# Patient Record
Sex: FEMALE | Race: WHITE | NOT HISPANIC OR LATINO | Employment: PART TIME | URBAN - METROPOLITAN AREA
[De-identification: names, ages, dates, MRNs, and addresses within clinical notes are randomized per-mention and may not be internally consistent; named-entity substitution may affect disease eponyms.]

---

## 2017-01-03 ENCOUNTER — GENERIC CONVERSION - ENCOUNTER (OUTPATIENT)
Dept: OTHER | Facility: OTHER | Age: 32
End: 2017-01-03

## 2017-01-09 ENCOUNTER — ALLSCRIPTS OFFICE VISIT (OUTPATIENT)
Dept: OTHER | Facility: OTHER | Age: 32
End: 2017-01-09

## 2017-01-11 ENCOUNTER — ALLSCRIPTS OFFICE VISIT (OUTPATIENT)
Dept: PERINATAL CARE | Facility: CLINIC | Age: 32
End: 2017-01-11
Payer: COMMERCIAL

## 2017-01-11 PROCEDURE — 99204 OFFICE O/P NEW MOD 45 MIN: CPT | Performed by: GENETIC COUNSELOR, MS

## 2017-01-14 ENCOUNTER — LAB CONVERSION - ENCOUNTER (OUTPATIENT)
Dept: OTHER | Facility: OTHER | Age: 32
End: 2017-01-14

## 2017-01-14 LAB
T4 FREE SERPL-MCNC: 1.5 NG/DL (ref 0.8–1.8)
TSH SERPL DL<=0.05 MIU/L-ACNC: 0.87 MIU/L

## 2017-01-16 ENCOUNTER — LAB CONVERSION - ENCOUNTER (OUTPATIENT)
Dept: OTHER | Facility: OTHER | Age: 32
End: 2017-01-16

## 2017-01-16 ENCOUNTER — GENERIC CONVERSION - ENCOUNTER (OUTPATIENT)
Dept: OTHER | Facility: OTHER | Age: 32
End: 2017-01-16

## 2017-01-16 LAB
AB SCRN, RBC W/RFLX ID,TITER,AG (HISTORICAL): NORMAL
ABO GROUP BLD: NORMAL
BASOPHILS # BLD AUTO: 0.5 %
BASOPHILS # BLD AUTO: 40 CELLS/UL (ref 0–200)
DEPRECATED RDW RBC AUTO: 13.7 % (ref 11–15)
EOSINOPHIL # BLD AUTO: 174 CELLS/UL (ref 15–500)
EOSINOPHIL # BLD AUTO: 2.2 %
HCT VFR BLD AUTO: 38.8 % (ref 35–45)
HEPATITIS B SURFACE ANTIGEN (HISTORICAL): NORMAL
HGB BLD-MCNC: 13.1 G/DL (ref 11.7–15.5)
HIV AG/AB, 4TH GEN (HISTORICAL): NORMAL
LYMPHOCYTES # BLD AUTO: 1612 CELLS/UL (ref 850–3900)
LYMPHOCYTES # BLD AUTO: 20.4 %
MCH RBC QN AUTO: 29.4 PG (ref 27–33)
MCHC RBC AUTO-ENTMCNC: 33.8 G/DL (ref 32–36)
MCV RBC AUTO: 87.2 FL (ref 80–100)
MONOCYTES # BLD AUTO: 356 CELLS/UL (ref 200–950)
MONOCYTES (HISTORICAL): 4.5 %
NEUTROPHILS # BLD AUTO: 5720 CELLS/UL (ref 1500–7800)
NEUTROPHILS # BLD AUTO: 72.4 %
PLATELET # BLD AUTO: 221 THOUSAND/UL (ref 140–400)
PMV BLD AUTO: 9.1 FL (ref 7.5–11.5)
RBC # BLD AUTO: 4.45 MILLION/UL (ref 3.8–5.1)
RH BLD: NORMAL
RPR SCREEN (HISTORICAL): NORMAL
RUBELLA, IGG (HISTORICAL): 2.73
WBC # BLD AUTO: 7.9 THOUSAND/UL (ref 3.8–10.8)

## 2017-01-17 ENCOUNTER — GENERIC CONVERSION - ENCOUNTER (OUTPATIENT)
Dept: OTHER | Facility: OTHER | Age: 32
End: 2017-01-17

## 2017-01-19 ENCOUNTER — LAB CONVERSION - ENCOUNTER (OUTPATIENT)
Dept: OTHER | Facility: OTHER | Age: 32
End: 2017-01-19

## 2017-01-19 ENCOUNTER — ALLSCRIPTS OFFICE VISIT (OUTPATIENT)
Dept: OTHER | Facility: OTHER | Age: 32
End: 2017-01-19

## 2017-01-23 ENCOUNTER — ALLSCRIPTS OFFICE VISIT (OUTPATIENT)
Dept: OTHER | Facility: OTHER | Age: 32
End: 2017-01-23

## 2017-01-30 ENCOUNTER — ALLSCRIPTS OFFICE VISIT (OUTPATIENT)
Dept: PERINATAL CARE | Facility: CLINIC | Age: 32
End: 2017-01-30
Payer: COMMERCIAL

## 2017-01-31 ENCOUNTER — GENERIC CONVERSION - ENCOUNTER (OUTPATIENT)
Dept: OTHER | Facility: OTHER | Age: 32
End: 2017-01-31

## 2017-02-04 ENCOUNTER — GENERIC CONVERSION - ENCOUNTER (OUTPATIENT)
Dept: OTHER | Facility: OTHER | Age: 32
End: 2017-02-04

## 2017-02-13 ENCOUNTER — ALLSCRIPTS OFFICE VISIT (OUTPATIENT)
Dept: PERINATAL CARE | Facility: CLINIC | Age: 32
End: 2017-02-13
Payer: COMMERCIAL

## 2017-02-13 ENCOUNTER — GENERIC CONVERSION - ENCOUNTER (OUTPATIENT)
Dept: OTHER | Facility: OTHER | Age: 32
End: 2017-02-13

## 2017-02-13 PROCEDURE — 76801 OB US < 14 WKS SINGLE FETUS: CPT | Performed by: OBSTETRICS & GYNECOLOGY

## 2017-02-14 DIAGNOSIS — E55.9 VITAMIN D DEFICIENCY: ICD-10-CM

## 2017-02-14 DIAGNOSIS — F41.9 ANXIETY DISORDER: ICD-10-CM

## 2017-02-14 DIAGNOSIS — E06.3 AUTOIMMUNE THYROIDITIS: ICD-10-CM

## 2017-02-14 DIAGNOSIS — E03.8 OTHER SPECIFIED HYPOTHYROIDISM: ICD-10-CM

## 2017-02-14 DIAGNOSIS — L30.9 DERMATITIS: ICD-10-CM

## 2017-02-14 DIAGNOSIS — Z33.1 PREGNANT STATE, INCIDENTAL: ICD-10-CM

## 2017-02-15 ENCOUNTER — ALLSCRIPTS OFFICE VISIT (OUTPATIENT)
Dept: OTHER | Facility: OTHER | Age: 32
End: 2017-02-15

## 2017-02-15 ENCOUNTER — GENERIC CONVERSION - ENCOUNTER (OUTPATIENT)
Dept: OTHER | Facility: OTHER | Age: 32
End: 2017-02-15

## 2017-02-16 LAB — CULTURE RESULT (HISTORICAL): NORMAL

## 2017-02-20 ENCOUNTER — GENERIC CONVERSION - ENCOUNTER (OUTPATIENT)
Dept: OTHER | Facility: OTHER | Age: 32
End: 2017-02-20

## 2017-02-22 ENCOUNTER — LAB CONVERSION - ENCOUNTER (OUTPATIENT)
Dept: OTHER | Facility: OTHER | Age: 32
End: 2017-02-22

## 2017-02-22 LAB
25(OH)D3 SERPL-MCNC: 26 NG/ML (ref 30–100)
ALBUMIN SERPL BCP-MCNC: 4 G/DL (ref 3.6–5.1)
BUN SERPL-MCNC: 13 MG/DL (ref 7–25)
BUN/CREA RATIO (HISTORICAL): NORMAL (CALC) (ref 6–22)
CALCIUM SERPL-MCNC: 9 MG/DL (ref 8.6–10.2)
CALCIUM SERPL-MCNC: 9 MG/DL (ref 8.6–10.2)
CHLORIDE SERPL-SCNC: 103 MMOL/L (ref 98–110)
CO2 SERPL-SCNC: 25 MMOL/L (ref 20–31)
CREAT SERPL-MCNC: 0.59 MG/DL (ref 0.5–1.1)
EGFR AFRICAN AMERICAN (HISTORICAL): 143 ML/MIN/1.73M2
EGFR-AMERICAN CALC (HISTORICAL): 123 ML/MIN/1.73M2
GLUCOSE (HISTORICAL): 89 MG/DL (ref 65–99)
PHOSPHATE SERPL-MCNC: 3.6 MG/DL (ref 2.5–4.5)
POTASSIUM SERPL-SCNC: 3.7 MMOL/L (ref 3.5–5.3)
PTH-INTACT SERPL-MCNC: 32 PG/ML (ref 14–64)
SODIUM SERPL-SCNC: 135 MMOL/L (ref 135–146)
T4 FREE SERPL-MCNC: 1.4 NG/DL (ref 0.8–1.8)
TSH SERPL DL<=0.05 MIU/L-ACNC: 0.8 MIU/L

## 2017-02-27 ENCOUNTER — GENERIC CONVERSION - ENCOUNTER (OUTPATIENT)
Dept: OTHER | Facility: OTHER | Age: 32
End: 2017-02-27

## 2017-03-06 ENCOUNTER — GENERIC CONVERSION - ENCOUNTER (OUTPATIENT)
Dept: OTHER | Facility: OTHER | Age: 32
End: 2017-03-06

## 2017-03-06 ENCOUNTER — ALLSCRIPTS OFFICE VISIT (OUTPATIENT)
Dept: PERINATAL CARE | Facility: CLINIC | Age: 32
End: 2017-03-06
Payer: COMMERCIAL

## 2017-03-06 PROCEDURE — 76805 OB US >/= 14 WKS SNGL FETUS: CPT | Performed by: OBSTETRICS & GYNECOLOGY

## 2017-03-08 ENCOUNTER — GENERIC CONVERSION - ENCOUNTER (OUTPATIENT)
Dept: OTHER | Facility: OTHER | Age: 32
End: 2017-03-08

## 2017-03-09 ENCOUNTER — ALLSCRIPTS OFFICE VISIT (OUTPATIENT)
Dept: OTHER | Facility: OTHER | Age: 32
End: 2017-03-09

## 2017-03-13 ENCOUNTER — GENERIC CONVERSION - ENCOUNTER (OUTPATIENT)
Dept: OTHER | Facility: OTHER | Age: 32
End: 2017-03-13

## 2017-03-14 ENCOUNTER — LAB CONVERSION - ENCOUNTER (OUTPATIENT)
Dept: OTHER | Facility: OTHER | Age: 32
End: 2017-03-14

## 2017-03-14 LAB
A/G RATIO (HISTORICAL): 1.6 (CALC) (ref 1–2.5)
ALBUMIN SERPL BCP-MCNC: 4 G/DL (ref 3.6–5.1)
ALP SERPL-CCNC: 41 U/L (ref 33–115)
ALT SERPL W P-5'-P-CCNC: 16 U/L (ref 6–29)
AST SERPL W P-5'-P-CCNC: 19 U/L (ref 10–30)
BASOPHILS # BLD AUTO: 0.3 %
BASOPHILS # BLD AUTO: 23 CELLS/UL (ref 0–200)
BILIRUB SERPL-MCNC: 0.7 MG/DL (ref 0.2–1.2)
BUN SERPL-MCNC: 10 MG/DL (ref 7–25)
BUN/CREA RATIO (HISTORICAL): ABNORMAL (CALC) (ref 6–22)
CALCIUM SERPL-MCNC: 9 MG/DL (ref 8.6–10.2)
CHLORIDE SERPL-SCNC: 103 MMOL/L (ref 98–110)
CO2 SERPL-SCNC: 24 MMOL/L (ref 20–31)
CREAT SERPL-MCNC: 0.6 MG/DL (ref 0.5–1.1)
DEPRECATED RDW RBC AUTO: 14.2 % (ref 11–15)
EGFR AFRICAN AMERICAN (HISTORICAL): 142 ML/MIN/1.73M2
EGFR-AMERICAN CALC (HISTORICAL): 122 ML/MIN/1.73M2
EOSINOPHIL # BLD AUTO: 0.7 %
EOSINOPHIL # BLD AUTO: 53 CELLS/UL (ref 15–500)
ERYTHROCYTE SEDIMENTATION RATE (HISTORICAL): 9 MM/H
GAMMA GLOBULIN (HISTORICAL): 2.5 G/DL (CALC) (ref 1.9–3.7)
GLUCOSE (HISTORICAL): 72 MG/DL (ref 65–99)
HCT VFR BLD AUTO: 36 % (ref 35–45)
HGB BLD-MCNC: 12.1 G/DL (ref 11.7–15.5)
LYMPHOCYTES # BLD AUTO: 2040 CELLS/UL (ref 850–3900)
LYMPHOCYTES # BLD AUTO: 27.2 %
MCH RBC QN AUTO: 30 PG (ref 27–33)
MCHC RBC AUTO-ENTMCNC: 33.7 G/DL (ref 32–36)
MCV RBC AUTO: 89.2 FL (ref 80–100)
MONOCYTES # BLD AUTO: 278 CELLS/UL (ref 200–950)
MONOCYTES (HISTORICAL): 3.7 %
NEUTROPHILS # BLD AUTO: 5108 CELLS/UL (ref 1500–7800)
NEUTROPHILS # BLD AUTO: 68.1 %
PLATELET # BLD AUTO: 224 THOUSAND/UL (ref 140–400)
PMV BLD AUTO: 9.1 FL (ref 7.5–12.5)
POTASSIUM SERPL-SCNC: 3.7 MMOL/L (ref 3.5–5.3)
RBC # BLD AUTO: 4.04 MILLION/UL (ref 3.8–5.1)
SODIUM SERPL-SCNC: 134 MMOL/L (ref 135–146)
TOTAL PROTEIN (HISTORICAL): 6.5 G/DL (ref 6.1–8.1)
WBC # BLD AUTO: 7.5 THOUSAND/UL (ref 3.8–10.8)

## 2017-03-15 ENCOUNTER — GENERIC CONVERSION - ENCOUNTER (OUTPATIENT)
Dept: OTHER | Facility: OTHER | Age: 32
End: 2017-03-15

## 2017-03-16 ENCOUNTER — GENERIC CONVERSION - ENCOUNTER (OUTPATIENT)
Dept: OTHER | Facility: OTHER | Age: 32
End: 2017-03-16

## 2017-03-23 ENCOUNTER — GENERIC CONVERSION - ENCOUNTER (OUTPATIENT)
Dept: OTHER | Facility: OTHER | Age: 32
End: 2017-03-23

## 2017-03-23 DIAGNOSIS — E03.8 OTHER SPECIFIED HYPOTHYROIDISM: ICD-10-CM

## 2017-03-23 DIAGNOSIS — E06.3 AUTOIMMUNE THYROIDITIS: ICD-10-CM

## 2017-03-23 DIAGNOSIS — O99.281 ENDOCRINE, NUTRITIONAL AND METABOLIC DISEASES COMPLICATING PREGNANCY, FIRST TRIMESTER: ICD-10-CM

## 2017-03-25 ENCOUNTER — LAB CONVERSION - ENCOUNTER (OUTPATIENT)
Dept: OTHER | Facility: OTHER | Age: 32
End: 2017-03-25

## 2017-03-25 LAB
T4 FREE SERPL-MCNC: 1.2 NG/DL (ref 0.8–1.8)
TSH SERPL DL<=0.05 MIU/L-ACNC: 0.8 MIU/L

## 2017-03-28 ENCOUNTER — ALLSCRIPTS OFFICE VISIT (OUTPATIENT)
Dept: OTHER | Facility: OTHER | Age: 32
End: 2017-03-28

## 2017-03-29 DIAGNOSIS — E03.8 OTHER SPECIFIED HYPOTHYROIDISM: ICD-10-CM

## 2017-03-29 DIAGNOSIS — E06.3 AUTOIMMUNE THYROIDITIS: ICD-10-CM

## 2017-03-29 DIAGNOSIS — O99.281 ENDOCRINE, NUTRITIONAL AND METABOLIC DISEASES COMPLICATING PREGNANCY, FIRST TRIMESTER: ICD-10-CM

## 2017-03-29 DIAGNOSIS — F41.9 ANXIETY DISORDER: ICD-10-CM

## 2017-03-31 ENCOUNTER — GENERIC CONVERSION - ENCOUNTER (OUTPATIENT)
Dept: OTHER | Facility: OTHER | Age: 32
End: 2017-03-31

## 2017-04-03 ENCOUNTER — ALLSCRIPTS OFFICE VISIT (OUTPATIENT)
Dept: PERINATAL CARE | Facility: CLINIC | Age: 32
End: 2017-04-03
Payer: COMMERCIAL

## 2017-04-03 ENCOUNTER — GENERIC CONVERSION - ENCOUNTER (OUTPATIENT)
Dept: OTHER | Facility: OTHER | Age: 32
End: 2017-04-03

## 2017-04-03 PROCEDURE — 76811 OB US DETAILED SNGL FETUS: CPT | Performed by: OBSTETRICS & GYNECOLOGY

## 2017-04-03 PROCEDURE — 76817 TRANSVAGINAL US OBSTETRIC: CPT | Performed by: OBSTETRICS & GYNECOLOGY

## 2017-04-06 ENCOUNTER — GENERIC CONVERSION - ENCOUNTER (OUTPATIENT)
Dept: OTHER | Facility: OTHER | Age: 32
End: 2017-04-06

## 2017-04-10 ENCOUNTER — ALLSCRIPTS OFFICE VISIT (OUTPATIENT)
Dept: OTHER | Facility: OTHER | Age: 32
End: 2017-04-10

## 2017-04-10 ENCOUNTER — GENERIC CONVERSION - ENCOUNTER (OUTPATIENT)
Dept: OTHER | Facility: OTHER | Age: 32
End: 2017-04-10

## 2017-04-11 ENCOUNTER — ALLSCRIPTS OFFICE VISIT (OUTPATIENT)
Dept: PERINATAL CARE | Facility: CLINIC | Age: 32
End: 2017-04-11
Payer: COMMERCIAL

## 2017-04-11 ENCOUNTER — GENERIC CONVERSION - ENCOUNTER (OUTPATIENT)
Dept: OTHER | Facility: OTHER | Age: 32
End: 2017-04-11

## 2017-04-11 PROCEDURE — 76827 ECHO EXAM OF FETAL HEART: CPT | Performed by: OBSTETRICS & GYNECOLOGY

## 2017-04-11 PROCEDURE — 76825 ECHO EXAM OF FETAL HEART: CPT | Performed by: OBSTETRICS & GYNECOLOGY

## 2017-04-11 PROCEDURE — 93325 DOPPLER ECHO COLOR FLOW MAPG: CPT | Performed by: OBSTETRICS & GYNECOLOGY

## 2017-04-18 ENCOUNTER — ALLSCRIPTS OFFICE VISIT (OUTPATIENT)
Dept: PERINATAL CARE | Facility: CLINIC | Age: 32
End: 2017-04-18
Payer: COMMERCIAL

## 2017-04-18 ENCOUNTER — GENERIC CONVERSION - ENCOUNTER (OUTPATIENT)
Dept: OTHER | Facility: OTHER | Age: 32
End: 2017-04-18

## 2017-04-18 PROCEDURE — 76816 OB US FOLLOW-UP PER FETUS: CPT | Performed by: OBSTETRICS & GYNECOLOGY

## 2017-05-08 ENCOUNTER — GENERIC CONVERSION - ENCOUNTER (OUTPATIENT)
Dept: OTHER | Facility: OTHER | Age: 32
End: 2017-05-08

## 2017-05-09 ENCOUNTER — LAB CONVERSION - ENCOUNTER (OUTPATIENT)
Dept: OTHER | Facility: OTHER | Age: 32
End: 2017-05-09

## 2017-05-09 LAB
T4 FREE SERPL-MCNC: 1.2 NG/DL (ref 0.8–1.8)
TSH SERPL DL<=0.05 MIU/L-ACNC: 0.33 MIU/L

## 2017-05-11 ENCOUNTER — GENERIC CONVERSION - ENCOUNTER (OUTPATIENT)
Dept: OTHER | Facility: OTHER | Age: 32
End: 2017-05-11

## 2017-05-16 ENCOUNTER — ALLSCRIPTS OFFICE VISIT (OUTPATIENT)
Dept: OTHER | Facility: OTHER | Age: 32
End: 2017-05-16

## 2017-05-17 ENCOUNTER — LAB CONVERSION - ENCOUNTER (OUTPATIENT)
Dept: OTHER | Facility: OTHER | Age: 32
End: 2017-05-17

## 2017-05-17 LAB
MESSAGE: (HISTORICAL): NORMAL
NAME ON REQUISITION: (HISTORICAL): NORMAL
QUESTION/PROBLEM (HISTORICAL): NORMAL
SITE/SOURCE (HISTORICAL): NORMAL
TEST ORDERED ON REQ: (HISTORICAL): NORMAL

## 2017-05-22 ENCOUNTER — LAB CONVERSION - ENCOUNTER (OUTPATIENT)
Dept: OTHER | Facility: OTHER | Age: 32
End: 2017-05-22

## 2017-05-22 ENCOUNTER — GENERIC CONVERSION - ENCOUNTER (OUTPATIENT)
Dept: OTHER | Facility: OTHER | Age: 32
End: 2017-05-22

## 2017-05-22 DIAGNOSIS — E06.3 AUTOIMMUNE THYROIDITIS: ICD-10-CM

## 2017-05-22 DIAGNOSIS — F41.9 ANXIETY DISORDER: ICD-10-CM

## 2017-05-22 DIAGNOSIS — E03.8 OTHER SPECIFIED HYPOTHYROIDISM: ICD-10-CM

## 2017-05-22 LAB
GLUCOSE 1 HR 50 GM GLUC CHALLENGE-PREG PTS (HISTORICAL): 80 MG/DL
HCT VFR BLD AUTO: 33.9 % (ref 35–45)
HGB BLD-MCNC: 11 G/DL (ref 11.7–15.5)
RPR SCREEN (HISTORICAL): NORMAL

## 2017-05-23 ENCOUNTER — GENERIC CONVERSION - ENCOUNTER (OUTPATIENT)
Dept: OTHER | Facility: OTHER | Age: 32
End: 2017-05-23

## 2017-05-25 ENCOUNTER — LAB CONVERSION - ENCOUNTER (OUTPATIENT)
Dept: OTHER | Facility: OTHER | Age: 32
End: 2017-05-25

## 2017-05-25 ENCOUNTER — GENERIC CONVERSION - ENCOUNTER (OUTPATIENT)
Dept: OTHER | Facility: OTHER | Age: 32
End: 2017-05-25

## 2017-05-25 LAB
T4 FREE SERPL-MCNC: 1.3 NG/DL (ref 0.8–1.8)
TSH SERPL DL<=0.05 MIU/L-ACNC: 0.22 MIU/L

## 2017-05-26 ENCOUNTER — GENERIC CONVERSION - ENCOUNTER (OUTPATIENT)
Dept: OTHER | Facility: OTHER | Age: 32
End: 2017-05-26

## 2017-05-26 DIAGNOSIS — E03.8 OTHER SPECIFIED HYPOTHYROIDISM: ICD-10-CM

## 2017-05-26 DIAGNOSIS — O99.281 ENDOCRINE, NUTRITIONAL AND METABOLIC DISEASES COMPLICATING PREGNANCY, FIRST TRIMESTER: ICD-10-CM

## 2017-05-31 ENCOUNTER — LAB CONVERSION - ENCOUNTER (OUTPATIENT)
Dept: OTHER | Facility: OTHER | Age: 32
End: 2017-05-31

## 2017-06-05 ENCOUNTER — GENERIC CONVERSION - ENCOUNTER (OUTPATIENT)
Dept: OTHER | Facility: OTHER | Age: 32
End: 2017-06-05

## 2017-06-08 ENCOUNTER — GENERIC CONVERSION - ENCOUNTER (OUTPATIENT)
Dept: OTHER | Facility: OTHER | Age: 32
End: 2017-06-08

## 2017-06-13 ENCOUNTER — GENERIC CONVERSION - ENCOUNTER (OUTPATIENT)
Dept: OTHER | Facility: OTHER | Age: 32
End: 2017-06-13

## 2017-06-13 ENCOUNTER — HOSPITAL ENCOUNTER (OUTPATIENT)
Facility: HOSPITAL | Age: 32
Discharge: HOME | End: 2017-06-13
Attending: OBSTETRICS & GYNECOLOGY | Admitting: OBSTETRICS & GYNECOLOGY
Payer: COMMERCIAL

## 2017-06-13 VITALS
HEIGHT: 72 IN | BODY MASS INDEX: 27.9 KG/M2 | HEART RATE: 77 BPM | RESPIRATION RATE: 18 BRPM | TEMPERATURE: 98.2 F | DIASTOLIC BLOOD PRESSURE: 61 MMHG | WEIGHT: 206 LBS | SYSTOLIC BLOOD PRESSURE: 115 MMHG

## 2017-06-13 DIAGNOSIS — O26.893 ABDOMINAL PAIN DURING PREGNANCY IN THIRD TRIMESTER: ICD-10-CM

## 2017-06-13 DIAGNOSIS — R10.9 ABDOMINAL PAIN DURING PREGNANCY IN THIRD TRIMESTER: ICD-10-CM

## 2017-06-13 PROCEDURE — 99201 HB OFFICE/OUTPATIENT VISIT NEW (BRIEF): CPT

## 2017-06-13 RX ORDER — FERROUS SULFATE 325(65) MG
325 TABLET ORAL DAILY
COMMUNITY
End: 2018-06-08

## 2017-06-13 RX ORDER — LEVOTHYROXINE SODIUM 0.12 MG/1
125 TABLET ORAL DAILY
COMMUNITY
Start: 2014-12-31 | End: 2018-03-12

## 2017-06-13 RX ORDER — DOCOSAHEXAENOIC ACID 200 MG
1 CAPSULE ORAL DAILY
COMMUNITY
End: 2018-11-29 | Stop reason: ALTCHOICE

## 2017-06-26 ENCOUNTER — GENERIC CONVERSION - ENCOUNTER (OUTPATIENT)
Dept: OTHER | Facility: OTHER | Age: 32
End: 2017-06-26

## 2017-06-26 ENCOUNTER — ALLSCRIPTS OFFICE VISIT (OUTPATIENT)
Dept: PERINATAL CARE | Facility: CLINIC | Age: 32
End: 2017-06-26
Payer: COMMERCIAL

## 2017-06-26 PROCEDURE — 76816 OB US FOLLOW-UP PER FETUS: CPT | Performed by: OBSTETRICS & GYNECOLOGY

## 2017-06-28 DIAGNOSIS — E03.8 OTHER SPECIFIED HYPOTHYROIDISM: ICD-10-CM

## 2017-06-29 ENCOUNTER — LAB CONVERSION - ENCOUNTER (OUTPATIENT)
Dept: OTHER | Facility: OTHER | Age: 32
End: 2017-06-29

## 2017-06-29 LAB
T4 FREE SERPL-MCNC: 1.3 NG/DL (ref 0.8–1.8)
TSH SERPL DL<=0.05 MIU/L-ACNC: 0.24 MIU/L

## 2017-06-30 ENCOUNTER — GENERIC CONVERSION - ENCOUNTER (OUTPATIENT)
Dept: OTHER | Facility: OTHER | Age: 32
End: 2017-06-30

## 2017-07-03 ENCOUNTER — GENERIC CONVERSION - ENCOUNTER (OUTPATIENT)
Dept: OTHER | Facility: OTHER | Age: 32
End: 2017-07-03

## 2017-07-10 ENCOUNTER — ALLSCRIPTS OFFICE VISIT (OUTPATIENT)
Dept: OTHER | Facility: OTHER | Age: 32
End: 2017-07-10

## 2017-07-11 ENCOUNTER — GENERIC CONVERSION - ENCOUNTER (OUTPATIENT)
Dept: OTHER | Facility: OTHER | Age: 32
End: 2017-07-11

## 2017-07-25 ENCOUNTER — ALLSCRIPTS OFFICE VISIT (OUTPATIENT)
Dept: OTHER | Facility: OTHER | Age: 32
End: 2017-07-25

## 2017-07-27 LAB — CULTURE GENITAL-BSB ON (HISTORICAL): NORMAL

## 2017-07-31 DIAGNOSIS — D64.9 ANEMIA: ICD-10-CM

## 2017-07-31 DIAGNOSIS — O99.283 ENDOCRINE, NUTRITIONAL AND METABOLIC DISEASES COMPLICATING PREGNANCY, THIRD TRIMESTER: ICD-10-CM

## 2017-07-31 DIAGNOSIS — E03.8 OTHER SPECIFIED HYPOTHYROIDISM: ICD-10-CM

## 2017-08-01 ENCOUNTER — GENERIC CONVERSION - ENCOUNTER (OUTPATIENT)
Dept: OTHER | Facility: OTHER | Age: 32
End: 2017-08-01

## 2017-08-01 ENCOUNTER — LAB CONVERSION - ENCOUNTER (OUTPATIENT)
Dept: OTHER | Facility: OTHER | Age: 32
End: 2017-08-01

## 2017-08-01 LAB
T4 FREE SERPL-MCNC: 1.1 NG/DL (ref 0.8–1.8)
TSH SERPL DL<=0.05 MIU/L-ACNC: 0.56 MIU/L

## 2017-08-08 ENCOUNTER — GENERIC CONVERSION - ENCOUNTER (OUTPATIENT)
Dept: OTHER | Facility: OTHER | Age: 32
End: 2017-08-08

## 2017-08-11 ENCOUNTER — HOSPITAL ENCOUNTER (INPATIENT)
Facility: HOSPITAL | Age: 32
LOS: 2 days | Discharge: HOME | End: 2017-08-13
Attending: OBSTETRICS & GYNECOLOGY | Admitting: OBSTETRICS & GYNECOLOGY
Payer: COMMERCIAL

## 2017-08-11 DIAGNOSIS — Z3A.38 38 WEEKS GESTATION OF PREGNANCY: Primary | ICD-10-CM

## 2017-08-11 DIAGNOSIS — O26.899 ABDOMINAL PAIN AFFECTING PREGNANCY: ICD-10-CM

## 2017-08-11 DIAGNOSIS — R10.9 ABDOMINAL PAIN AFFECTING PREGNANCY: ICD-10-CM

## 2017-08-11 LAB
BASOPHILS # BLD AUTO: 0.02 THOUSANDS/ΜL (ref 0–0.1)
BASOPHILS NFR BLD AUTO: 0 % (ref 0–1)
EOSINOPHIL # BLD AUTO: 0.05 THOUSAND/ΜL (ref 0–0.61)
EOSINOPHIL NFR BLD AUTO: 1 % (ref 0–6)
ERYTHROCYTE [DISTWIDTH] IN BLOOD BY AUTOMATED COUNT: 13.6 % (ref 11.6–15.1)
HCT VFR BLD AUTO: 32.5 % (ref 34.8–46.1)
HGB BLD-MCNC: 11.5 G/DL (ref 11.5–15.4)
LYMPHOCYTES # BLD AUTO: 2.5 THOUSANDS/ΜL (ref 0.6–4.47)
LYMPHOCYTES NFR BLD AUTO: 26 % (ref 14–44)
MCH RBC QN AUTO: 30.5 PG (ref 26.8–34.3)
MCHC RBC AUTO-ENTMCNC: 35.4 G/DL (ref 31.4–37.4)
MCV RBC AUTO: 86 FL (ref 82–98)
MONOCYTES # BLD AUTO: 0.58 THOUSAND/ΜL (ref 0.17–1.22)
MONOCYTES NFR BLD AUTO: 6 % (ref 4–12)
NEUTROPHILS # BLD AUTO: 6.47 THOUSANDS/ΜL (ref 1.85–7.62)
NEUTS SEG NFR BLD AUTO: 67 % (ref 43–75)
NRBC BLD AUTO-RTO: 0 /100 WBCS
PLATELET # BLD AUTO: 190 THOUSANDS/UL (ref 149–390)
PMV BLD AUTO: 11.2 FL (ref 8.9–12.7)
RBC # BLD AUTO: 3.77 MILLION/UL (ref 3.81–5.12)
WBC # BLD AUTO: 9.62 THOUSAND/UL (ref 4.31–10.16)

## 2017-08-11 PROCEDURE — 86900 BLOOD TYPING SEROLOGIC ABO: CPT | Performed by: OBSTETRICS & GYNECOLOGY

## 2017-08-11 PROCEDURE — 86592 SYPHILIS TEST NON-TREP QUAL: CPT | Performed by: OBSTETRICS & GYNECOLOGY

## 2017-08-11 PROCEDURE — 85025 COMPLETE CBC W/AUTO DIFF WBC: CPT | Performed by: OBSTETRICS & GYNECOLOGY

## 2017-08-11 PROCEDURE — 86901 BLOOD TYPING SEROLOGIC RH(D): CPT | Performed by: OBSTETRICS & GYNECOLOGY

## 2017-08-11 PROCEDURE — 86850 RBC ANTIBODY SCREEN: CPT | Performed by: OBSTETRICS & GYNECOLOGY

## 2017-08-11 RX ORDER — VALACYCLOVIR HYDROCHLORIDE 500 MG/1
500 TABLET, FILM COATED ORAL
Status: DISCONTINUED | OUTPATIENT
Start: 2017-08-12 | End: 2017-08-13 | Stop reason: HOSPADM

## 2017-08-11 RX ORDER — SODIUM CHLORIDE, SODIUM LACTATE, POTASSIUM CHLORIDE, CALCIUM CHLORIDE 600; 310; 30; 20 MG/100ML; MG/100ML; MG/100ML; MG/100ML
125 INJECTION, SOLUTION INTRAVENOUS CONTINUOUS
Status: DISCONTINUED | OUTPATIENT
Start: 2017-08-11 | End: 2017-08-13 | Stop reason: HOSPADM

## 2017-08-11 RX ORDER — LEVOTHYROXINE SODIUM 0.12 MG/1
125 TABLET ORAL
Status: DISCONTINUED | OUTPATIENT
Start: 2017-08-12 | End: 2017-08-13 | Stop reason: HOSPADM

## 2017-08-11 RX ORDER — ONDANSETRON 2 MG/ML
4 INJECTION INTRAMUSCULAR; INTRAVENOUS EVERY 6 HOURS PRN
Status: DISCONTINUED | OUTPATIENT
Start: 2017-08-11 | End: 2017-08-12

## 2017-08-12 ENCOUNTER — ANESTHESIA (INPATIENT)
Dept: LABOR AND DELIVERY | Facility: HOSPITAL | Age: 32
End: 2017-08-12
Payer: COMMERCIAL

## 2017-08-12 ENCOUNTER — ANESTHESIA EVENT (INPATIENT)
Dept: LABOR AND DELIVERY | Facility: HOSPITAL | Age: 32
End: 2017-08-12
Payer: COMMERCIAL

## 2017-08-12 PROBLEM — Z3A.38 38 WEEKS GESTATION OF PREGNANCY: Status: ACTIVE | Noted: 2017-08-12

## 2017-08-12 PROBLEM — O98.519 HSV-2 INFECTION COMPLICATING PREGNANCY: Status: ACTIVE | Noted: 2017-08-12

## 2017-08-12 PROBLEM — B00.9 HSV-2 INFECTION COMPLICATING PREGNANCY: Status: ACTIVE | Noted: 2017-08-12

## 2017-08-12 LAB
ABO GROUP BLD: NORMAL
BASE EXCESS BLDCOA CALC-SCNC: -0.9 MMOL/L (ref 3–11)
BASE EXCESS BLDCOV CALC-SCNC: 0 MMOL/L (ref 1–9)
BLD GP AB SCN SERPL QL: NEGATIVE
HCO3 BLDCOA-SCNC: 25.1 MMOL/L (ref 17.3–27.3)
HCO3 BLDCOV-SCNC: 24.1 MMOL/L (ref 12.2–28.6)
O2 CT VFR BLDCOA CALC: 12.1 ML/DL
OXYHGB MFR BLDCOA: 56.6 %
OXYHGB MFR BLDCOV: 79.8 %
PCO2 BLDCOA: 46.7 MM[HG] (ref 30–60)
PCO2 BLDCOV: 37.5 MM HG (ref 27–43)
PH BLDCOA: 7.35 [PH] (ref 7.23–7.43)
PH BLDCOV: 7.43 [PH] (ref 7.19–7.49)
PO2 BLDCOA: 25 MM HG (ref 5–25)
PO2 BLDCOV: 35.8 MM HG (ref 15–45)
RH BLD: POSITIVE
SAO2 % BLDCOV: 16 ML/DL
SPECIMEN EXPIRATION DATE: NORMAL

## 2017-08-12 PROCEDURE — 3E033VJ INTRODUCTION OF OTHER HORMONE INTO PERIPHERAL VEIN, PERCUTANEOUS APPROACH: ICD-10-PCS | Performed by: OBSTETRICS & GYNECOLOGY

## 2017-08-12 PROCEDURE — 99214 OFFICE O/P EST MOD 30 MIN: CPT

## 2017-08-12 PROCEDURE — 82805 BLOOD GASES W/O2 SATURATION: CPT | Performed by: OBSTETRICS & GYNECOLOGY

## 2017-08-12 RX ORDER — DOCUSATE SODIUM 100 MG/1
100 CAPSULE, LIQUID FILLED ORAL 2 TIMES DAILY
Status: DISCONTINUED | OUTPATIENT
Start: 2017-08-12 | End: 2017-08-13 | Stop reason: HOSPADM

## 2017-08-12 RX ORDER — ROPIVACAINE HYDROCHLORIDE 2 MG/ML
INJECTION, SOLUTION EPIDURAL; INFILTRATION; PERINEURAL AS NEEDED
Status: DISCONTINUED | OUTPATIENT
Start: 2017-08-12 | End: 2017-08-12 | Stop reason: SURG

## 2017-08-12 RX ORDER — OXYTOCIN/RINGER'S LACTATE 30/500 ML
PLASTIC BAG, INJECTION (ML) INTRAVENOUS
Status: COMPLETED
Start: 2017-08-12 | End: 2017-08-13

## 2017-08-12 RX ORDER — ONDANSETRON 2 MG/ML
4 INJECTION INTRAMUSCULAR; INTRAVENOUS EVERY 8 HOURS PRN
Status: DISCONTINUED | OUTPATIENT
Start: 2017-08-12 | End: 2017-08-13 | Stop reason: HOSPADM

## 2017-08-12 RX ORDER — IBUPROFEN 600 MG/1
600 TABLET ORAL EVERY 6 HOURS PRN
Status: DISCONTINUED | OUTPATIENT
Start: 2017-08-12 | End: 2017-08-13 | Stop reason: HOSPADM

## 2017-08-12 RX ORDER — OXYCODONE HYDROCHLORIDE AND ACETAMINOPHEN 5; 325 MG/1; MG/1
2 TABLET ORAL EVERY 4 HOURS PRN
Status: DISCONTINUED | OUTPATIENT
Start: 2017-08-12 | End: 2017-08-13 | Stop reason: HOSPADM

## 2017-08-12 RX ORDER — DIPHENHYDRAMINE HCL 25 MG
25 TABLET ORAL EVERY 6 HOURS PRN
Status: DISCONTINUED | OUTPATIENT
Start: 2017-08-12 | End: 2017-08-13 | Stop reason: HOSPADM

## 2017-08-12 RX ORDER — OXYCODONE HYDROCHLORIDE AND ACETAMINOPHEN 5; 325 MG/1; MG/1
1 TABLET ORAL EVERY 4 HOURS PRN
Status: DISCONTINUED | OUTPATIENT
Start: 2017-08-12 | End: 2017-08-13 | Stop reason: HOSPADM

## 2017-08-12 RX ORDER — ACETAMINOPHEN 325 MG/1
650 TABLET ORAL EVERY 4 HOURS PRN
Status: DISCONTINUED | OUTPATIENT
Start: 2017-08-12 | End: 2017-08-13 | Stop reason: HOSPADM

## 2017-08-12 RX ORDER — OXYTOCIN/RINGER'S LACTATE 30/500 ML
250 PLASTIC BAG, INJECTION (ML) INTRAVENOUS CONTINUOUS
Status: ACTIVE | OUTPATIENT
Start: 2017-08-12 | End: 2017-08-12

## 2017-08-12 RX ORDER — DIAPER,BRIEF,INFANT-TODD,DISP
1 EACH MISCELLANEOUS AS NEEDED
Status: DISCONTINUED | OUTPATIENT
Start: 2017-08-12 | End: 2017-08-13 | Stop reason: HOSPADM

## 2017-08-12 RX ORDER — ACETAMINOPHEN 325 MG/1
650 TABLET ORAL EVERY 6 HOURS PRN
Status: DISCONTINUED | OUTPATIENT
Start: 2017-08-12 | End: 2017-08-13 | Stop reason: HOSPADM

## 2017-08-12 RX ADMIN — ROPIVACAINE HYDROCHLORIDE 5 ML: 2 INJECTION, SOLUTION EPIDURAL; INFILTRATION at 04:40

## 2017-08-12 RX ADMIN — SODIUM CHLORIDE, SODIUM LACTATE, POTASSIUM CHLORIDE, AND CALCIUM CHLORIDE 125 ML/HR: .6; .31; .03; .02 INJECTION, SOLUTION INTRAVENOUS at 06:39

## 2017-08-12 RX ADMIN — ROPIVACAINE HYDROCHLORIDE 5 ML: 2 INJECTION, SOLUTION EPIDURAL; INFILTRATION at 04:38

## 2017-08-12 RX ADMIN — IBUPROFEN 600 MG: 600 TABLET, FILM COATED ORAL at 20:44

## 2017-08-12 RX ADMIN — SODIUM CHLORIDE, SODIUM LACTATE, POTASSIUM CHLORIDE, AND CALCIUM CHLORIDE 125 ML/HR: .6; .31; .03; .02 INJECTION, SOLUTION INTRAVENOUS at 04:41

## 2017-08-12 RX ADMIN — DOCUSATE SODIUM 100 MG: 100 CAPSULE, LIQUID FILLED ORAL at 19:08

## 2017-08-12 RX ADMIN — SODIUM CHLORIDE, SODIUM LACTATE, POTASSIUM CHLORIDE, AND CALCIUM CHLORIDE 999 ML/HR: .6; .31; .03; .02 INJECTION, SOLUTION INTRAVENOUS at 03:44

## 2017-08-12 RX ADMIN — LEVOTHYROXINE SODIUM 125 MCG: 125 TABLET ORAL at 09:42

## 2017-08-12 RX ADMIN — ROPIVACAINE HYDROCHLORIDE: 2 INJECTION, SOLUTION EPIDURAL; INFILTRATION at 04:45

## 2017-08-12 RX ADMIN — Medication 250 UNITS: at 09:30

## 2017-08-12 RX ADMIN — IBUPROFEN 600 MG: 600 TABLET, FILM COATED ORAL at 13:47

## 2017-08-12 RX ADMIN — BENZOCAINE AND MENTHOL: 20; .5 SPRAY TOPICAL at 13:47

## 2017-08-13 VITALS
DIASTOLIC BLOOD PRESSURE: 70 MMHG | HEIGHT: 72 IN | HEART RATE: 55 BPM | SYSTOLIC BLOOD PRESSURE: 117 MMHG | BODY MASS INDEX: 29.66 KG/M2 | WEIGHT: 219 LBS | RESPIRATION RATE: 17 BRPM | OXYGEN SATURATION: 98 % | TEMPERATURE: 98.5 F

## 2017-08-13 PROBLEM — F41.9 ANXIETY: Status: ACTIVE | Noted: 2017-08-13

## 2017-08-13 RX ORDER — ACETAMINOPHEN 325 MG/1
TABLET ORAL
Qty: 30 TABLET | Refills: 0
Start: 2017-08-13 | End: 2021-09-24 | Stop reason: ALTCHOICE

## 2017-08-13 RX ORDER — IBUPROFEN 600 MG/1
600 TABLET ORAL EVERY 6 HOURS PRN
Qty: 30 TABLET | Refills: 0
Start: 2017-08-13 | End: 2018-11-29 | Stop reason: ALTCHOICE

## 2017-08-13 RX ADMIN — IBUPROFEN 600 MG: 600 TABLET, FILM COATED ORAL at 16:25

## 2017-08-13 RX ADMIN — LEVOTHYROXINE SODIUM 125 MCG: 125 TABLET ORAL at 06:28

## 2017-08-13 RX ADMIN — DOCUSATE SODIUM 100 MG: 100 CAPSULE, LIQUID FILLED ORAL at 08:10

## 2017-08-13 RX ADMIN — IBUPROFEN 600 MG: 600 TABLET, FILM COATED ORAL at 04:19

## 2017-08-14 ENCOUNTER — GENERIC CONVERSION - ENCOUNTER (OUTPATIENT)
Dept: OTHER | Facility: OTHER | Age: 32
End: 2017-08-14

## 2017-08-14 LAB — RPR SER QL: NORMAL

## 2017-08-16 ENCOUNTER — TELEPHONE (OUTPATIENT)
Dept: LABOR AND DELIVERY | Facility: HOSPITAL | Age: 32
End: 2017-08-16

## 2017-08-17 ENCOUNTER — GENERIC CONVERSION - ENCOUNTER (OUTPATIENT)
Dept: OTHER | Facility: OTHER | Age: 32
End: 2017-08-17

## 2017-08-18 ENCOUNTER — GENERIC CONVERSION - ENCOUNTER (OUTPATIENT)
Dept: OTHER | Facility: OTHER | Age: 32
End: 2017-08-18

## 2017-08-21 LAB — PLACENTA IN STORAGE: NORMAL

## 2017-09-20 ENCOUNTER — TELEPHONE (OUTPATIENT)
Dept: LABOR AND DELIVERY | Facility: HOSPITAL | Age: 32
End: 2017-09-20

## 2017-09-20 ENCOUNTER — GENERIC CONVERSION - ENCOUNTER (OUTPATIENT)
Dept: OTHER | Facility: OTHER | Age: 32
End: 2017-09-20

## 2017-09-20 ENCOUNTER — ALLSCRIPTS OFFICE VISIT (OUTPATIENT)
Dept: OTHER | Facility: OTHER | Age: 32
End: 2017-09-20

## 2017-09-20 LAB — HGB BLD-MCNC: 12.7 G/DL

## 2017-09-23 DIAGNOSIS — E03.8 OTHER SPECIFIED HYPOTHYROIDISM: ICD-10-CM

## 2017-09-27 ENCOUNTER — LAB CONVERSION - ENCOUNTER (OUTPATIENT)
Dept: OTHER | Facility: OTHER | Age: 32
End: 2017-09-27

## 2017-09-27 LAB
T4 FREE SERPL-MCNC: 1.2 NG/DL (ref 0.8–1.8)
TSH SERPL DL<=0.05 MIU/L-ACNC: 0.32 MIU/L

## 2017-09-28 ENCOUNTER — GENERIC CONVERSION - ENCOUNTER (OUTPATIENT)
Dept: OTHER | Facility: OTHER | Age: 32
End: 2017-09-28

## 2017-10-03 DIAGNOSIS — F41.9 ANXIETY DISORDER: ICD-10-CM

## 2017-10-03 DIAGNOSIS — O99.283 ENDOCRINE, NUTRITIONAL AND METABOLIC DISEASES COMPLICATING PREGNANCY, THIRD TRIMESTER: ICD-10-CM

## 2017-10-03 DIAGNOSIS — E03.8 OTHER SPECIFIED HYPOTHYROIDISM: ICD-10-CM

## 2017-10-03 DIAGNOSIS — E06.3 AUTOIMMUNE THYROIDITIS: ICD-10-CM

## 2017-10-12 ENCOUNTER — GENERIC CONVERSION - ENCOUNTER (OUTPATIENT)
Dept: OTHER | Facility: OTHER | Age: 32
End: 2017-10-12

## 2017-10-25 ENCOUNTER — ALLSCRIPTS OFFICE VISIT (OUTPATIENT)
Dept: OTHER | Facility: OTHER | Age: 32
End: 2017-10-25

## 2017-10-25 LAB — HCG, QUALITATIVE (HISTORICAL): NEGATIVE

## 2017-10-26 NOTE — PROCEDURES
Assessment  1  Encounter for insertion of mirena IUD (V25 11) (Z30 430)    Plan  Encounter for insertion of mirena IUD    · Urine HCG- POC; Status:Complete - Retrospective Authorization;   Done: 58GLQ0988  04:04PM   Performed: In Office; 050 [id number]; Last Updated By:Martín Nava; 10/25/2017 4:04:30 PM;Ordered; Today;For:Encounter for insertion of mirena IUD; Ordered By:Bryon Sousa;   · Follow-up visit in 1 month Evaluation and Treatment  Follow-up  Status: Hold For -  Scheduling  Requested for: 63IVZ1017   Ordered; For: Encounter for insertion of mirena IUD; Ordered By: Shae Kelly Performed:  Due: 90IUR9910    Discussion/Summary  Discussion Summary:   1  Mirena IUD insertion-inserted without problems  Patient tolerated the procedure well  Pelvic rest was recommended for the next 5-7 days  She will follow up in 1 month time for IUD check  She was counseled about cramping and irregular bleeding but will call with any severe pain, heavy bleeding, or fevers  History herpes-no active infections noted recentlyOther-we discussed healthy eating/exercise  The patient is about 15 or 20 lb above her goal weight  Practical recommendations were reviewed  She could consider dietitian or weight watchers as wellwill follow up in 1 month for IUD check  Active Problems  1  34 weeks gestation of pregnancy (V22 2) (Z3A 34)   2  Anemia (285 9) (D64 9)   3  Anxiety (300 00) (F41 9)   4  Dermatitis (692 9) (L30 9)   5  Encounter for postpartum visit (V24 2) (Z39 2)   6  Family historic risk of congenital abnormality (655 23) (O35 8XX0)   7  Fetal arrhythmia affecting pregnancy, antepartum (659 73) (H61 8111)   8  Hashimoto's disease (245 2) (E06 3)   9  Herpes simplex infection (054 9) (B00 9)   10  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 thyroiditis (244 8,245 2) (E03 8,E06 3)   11  Hypothyroidism in pregnancy, third trimester (648 13,244 9) (O99 283,E03 9)   12  Pregnancy, obstetrical care (V22 1) (Z34 90)   13   Previous child with anomaly, antepartum, first trimester (V23 49) (O09 291)   14  Vitamin D deficiency (268 9) (E55 9)   15  Weight loss (783 21) (R63 4)    Current Meds  1  Synthroid 100 MCG Oral Tablet; take one tablet daily 1 hour before breakfast;   Therapy: 79JTG6792 to (Last Rx:54Oil8872)  Requested for: 33ACG6736 Ordered  2  Pre- Formula Oral Tablet Recorded    Allergies  1  No Known Drug Allergies  2  Seasonal    Physical Exam    Constitutional   General appearance: No acute distress, well appearing and well nourished  Genitourinary   External genitalia: Normal and no lesions appreciated  Vagina: Normal, no lesions or dryness appreciated  Urethra: Normal     Urethral meatus: Normal     Bladder: Normal, soft, non-tender and no prolapse or masses appreciated  Cervix: Normal, no palpable masses  Uterus: Normal, non-tender, not enlarged, and no palpable masses  -- Anteverted, top-normal size, nontender, without mass  Adnexa/parametria: Normal, non-tender and no fullness or masses appreciated  Abdomen   Abdomen: Normal, non-tender, and no organomegaly noted  Liver and spleen: No hepatomegaly or splenomegaly  Examination for hernias: No hernias appreciated  Psychiatric   Orientation to person, place, and time: Normal     Mood and affect: Normal        Procedure    Procedure: intrauterine device (IUD) placement  Risks, benefits and alternatives were discussed with the patient  We discussed possible complications and risks, including infection,-- bleeding,-- pelvic pain,-- expulsion,-- failure,-- uterine perforation-- and-- increased risk of ectopic should pregnancy occur  written consent was obtained prior to the procedure and is detailed in the patient's record  Prior to the start of the procedure a time out was taken and the identity of the patient was confirmed via name and date of birth with the patient  The correct procedure to be performed were confirmed   The positioning of the patient was verified  The availability of the correct equipment was verified  the patient's LMP was Pre pregnancy-- and-- a pregnancy test was performed and confirmed negative  Procedure Note:   Anesthesia: none  The cervix was prepped with betadine  The cervix was stabilized with a single toothed tenaculum  The cervix allowed easy passage of a uterine sound without dilation  The uterus was anteverted-- and-- sounded to Eight cm  The Mirena IUD was gently inserted to the fundus of the uterus using standard technique  Post-Procedure:   Patient Status: the patient tolerated the procedure well  Complications: there were no complications  Follow up: return for follow up visit in 1-2 months  Future Appointments    Date/Time Provider Specialty Site   04/03/2018 09:40 AM MACARENA Mulligan   Endocrinology [facility]     Signatures   Electronically signed by : MACARENA Littlejohn ; Oct 25 2017  4:11PM EST                       (Author)

## 2017-11-27 ENCOUNTER — ALLSCRIPTS OFFICE VISIT (OUTPATIENT)
Dept: OTHER | Facility: OTHER | Age: 32
End: 2017-11-27

## 2017-11-28 DIAGNOSIS — E03.8 OTHER SPECIFIED HYPOTHYROIDISM: ICD-10-CM

## 2017-11-28 NOTE — PROGRESS NOTES
Assessment    1  IUD contraception (V45 51) (Z97 5)   2  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 thyroiditis (244 8,245 2) (E03 8,E06  3)    Plan  SocHx: IUD contraception    · Follow-up visit in 3 months Evaluation and Treatment  Follow-up  Status: Complete Done: 51GFW5427   Ordered;SocHx: IUD contraception; Ordered By: Damian Pratt Performed:  Due: 21CZL5657; Last Updated By: Chayo Barba; 11/27/2017 4:35:39 PM    Discussion/Summary  Discussion Summary:   1  Mirena IUD check-in good position on exam today  Patient was instructed on how to check for the IUD string, but she likely will not do so  She will call with any issues  Of note, she had some hormonal issues for the 1st few weeks after IUD was inserted, but this seems to have resolved  She will call with any worsening symptoms  History herpes-no active infections noted recentlyOther-we discussed healthy eating/exercise previously  Additionally, the patient does have hypothyroidism and thyroid testing is to be done tomorrow  Strongly suggested follow up with this as this could also cause hormonal type symptoms  will follow up in January 2018 for yearly exam or as needed  Chief Complaint  Chief Complaint Free Text Note Form: PT C/O BEING VERY HORMONAL THE FIRST TWO WEEKS AFTER THE IUD PLACEMENT BUT NOW SEEMS TO BE GETTING BETTER  History of Present Illness  HPI: Patient was seen today for follow-up visit  Please see assessment plan for details  Review of Systems  Focused-Female:  Constitutional: No fever, no chills, feels well, no tiredness, no recent weight gain or loss  ENT: no ear ache, no loss of hearing, no nosebleeds or nasal discharge, no sore throat or hoarseness  Cardiovascular: no complaints of slow or fast heart rate, no chest pain, no palpitations, no leg claudication or lower extremity edema  Respiratory: no complaints of shortness of breath, no wheezing, no dyspnea on exertion, no orthopnea or PND    Breasts: no complaints of breast pain, breast lump or nipple discharge  Gastrointestinal: no complaints of abdominal pain, no constipation, no nausea or diarrhea, no vomiting, no bloody stools  Genitourinary: no complaints of dysuria, no incontinence, no pelvic pain, no dysmenorrhea, no vaginal discharge or abnormal vaginal bleeding  Musculoskeletal: no complaints of arthralgia, no myalgia, no joint swelling or stiffness, no limb pain or swelling  Integumentary: no complaints of skin rash or lesion, no itching or dry skin, no skin wounds  Neurological: no complaints of headache, no confusion, no numbness or tingling, no dizziness or fainting  ROS Reviewed:   ROS reviewed  Active Problems    1  34 weeks gestation of pregnancy (V22 2) (Z3A 34)   2  Anemia (285 9) (D64 9)   3  Anxiety (300 00) (F41 9)   4  Dermatitis (692 9) (L30 9)   5  Encounter for insertion of mirena IUD (V25 11) (Z30 430)   6  Family historic risk of congenital abnormality (655 23) (O35 8XX0)   7  Fetal arrhythmia affecting pregnancy, antepartum (659 73) (X77 7284)   8  Hashimoto's disease (245 2) (E06 3)   9  Herpes simplex infection (054 9) (B00 9)   10  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 thyroiditis (244 8,245 2) (E03 8,E06 3)   11  Previous child with anomaly, antepartum, first trimester (V23 49) (O09 291)   12  Vitamin D deficiency (268 9) (E55 9)   13  Weight loss (783 21) (R63 4)    Past Medical History  1  History of Depression (311) (F32 9)   2  History of  2   3  History of Hirsutism (704 1) (L68 0)   4  History of Hashimoto thyroiditis (V12 29) (Z86 39)   5  History of pregnancy (V13 29)   6  History of urinary tract infection (V13 02) (Z87 440)   7  History of Hypothyroidism in pregnancy, first trimester (648 13,244 9) (O99 281,E03 9)   8  History of Mild cervical dysplasia (622 11) (N87 0)   9  History of Subclinical hypothyroidism (244 8) (E03 9)   10   History of Vitamin D deficiency (268 9) (E55 9)  Active Problems And Past Medical History Reviewed: The active problems and past medical history were reviewed and updated today  Surgical History  1  History of Atypical endocervical cells on Pap smear (795 00) (R8 619)   2  History of Colposcopy Cervix With Biopsy(S) With Endocervical Curettage   3  History of Dilation And Curettage   4  History of Hysteroscopy With Resection For Intrauterine Polyp Removal  Surgical History Reviewed: The surgical history was reviewed and updated today  Family History  Mother    1  Family history of    2  Family history of cerebrovascular accident (V17 1) (Z82 3)   3  Family history of depression (V17 0) (Z81 8)   4  Family history of thyroid disease (V18 19) (Z83 49)  Father    5  No pertinent family history  Maternal Aunt    6  Family history of Depression   7  Family history of Diabetes Mellitus (V18 0)  Maternal Uncle    8  Family history of Diabetes Mellitus (V18 0)  Paternal Uncle    5  Family history of myocardial infarction (V17 3) (Z82 49)  Family History    10  Family history of Diabetes Mellitus (V18 0)  Family History Reviewed: The family history was reviewed and updated today  Social History     · Being A Social Drinker   · Caffeine Use   · Denied: History of Drug Use   · IUD contraception (V45 51) (Z97 5)   · Marital History - Currently    · Never A Smoker   · [address]   · Two children   · Uses Safety Equipment - Seatbelts  Social History Reviewed: The social history was reviewed and updated today  The social history was reviewed and is unchanged  Current Meds   1  Mirena (52 MG) 20 MCG/24HR Intrauterine Intrauterine Device; Therapy: (Recorded:2017) to Recorded   2  Pre- Formula Oral Tablet Recorded   3  Synthroid 100 MCG Oral Tablet; take one tablet daily 1 hour before breakfast; Therapy: 94KAN5215 to (Last Rx:94Qnb8309)  Requested for: 75YGU1367 Ordered  Medication List Reviewed: The medication list was reviewed and updated today  Allergies  1  No Known Drug Allergies  2  Seasonal    Vitals  Vital Signs    Recorded: 03RLT2475 96:72JB   Systolic 135, LUE, Sitting   Diastolic 60, LUE, Sitting   Height 6 ft    Weight 197 lb 6 oz   BMI Calculated 26 77   BSA Calculated 2 12   LMP 24KHY6264       Physical Exam   Constitutional  General appearance: No acute distress, well appearing and well nourished  Genitourinary  External genitalia: Normal and no lesions appreciated  Vagina: Normal, no lesions or dryness appreciated  Urethra: Normal    Urethral meatus: Normal    Bladder: Normal, soft, non-tender and no prolapse or masses appreciated  Cervix: Normal, no palpable masses  -- Without lesions or discharge or cervicitis  No Cervical motion tenderness  IUD string seen at a length of about 2 5 cm  Uterus: Normal, non-tender, not enlarged, and no palpable masses  -- Anteverted, top-normal size, nontender, without mass  Adnexa/parametria: Normal, non-tender and no fullness or masses appreciated  Abdomen  Abdomen: Normal, non-tender, and no organomegaly noted  Liver and spleen: No hepatomegaly or splenomegaly  Examination for hernias: No hernias appreciated  Psychiatric  Orientation to person, place, and time: Normal    Mood and affect: Normal        Future Appointments    Date/Time Provider Specialty Site   04/03/2018 09:40 MACARENA Butts  Endocrinology [facility]OLOG[facility]   01/22/2018 10:30 AM MACARENA Neumann   Obstetrics/Gynecology [facility]       Signatures   Electronically signed by : MACARENA Pascual ; Nov 27 2017  4:54PM EST                       (Author)

## 2017-11-30 ENCOUNTER — GENERIC CONVERSION - ENCOUNTER (OUTPATIENT)
Dept: OTHER | Facility: OTHER | Age: 32
End: 2017-11-30

## 2017-11-30 ENCOUNTER — LAB CONVERSION - ENCOUNTER (OUTPATIENT)
Dept: OTHER | Facility: OTHER | Age: 32
End: 2017-11-30

## 2017-11-30 LAB
T4 FREE SERPL-MCNC: 1.6 NG/DL (ref 0.8–1.8)
TSH SERPL DL<=0.05 MIU/L-ACNC: 0.18 MIU/L

## 2018-01-09 NOTE — RESULT NOTES
Discussion/Summary   take levothyroxine 125 mcg Mon-Saturday and 2 tabs on sunday   repeat TSH and Ft4 in 4 weeks     Verified Results  (1) T4, FREE 82WFH6801 09:32AM Clarisse Garcia     Test Name Result Flag Reference   T4, FREE 1 3 ng/dL  0 8-1 8     (Q) TSH, 3RD GENERATION 40RDC0743 09:32AM Clarisse Garcia   REPORT COMMENT:  FASTING:NO     Test Name Result Flag Reference   TSH 0 22 mIU/L L    Reference Range                         > or = 20 Years  0 40-4 50                              Pregnancy Ranges            First trimester    0 26-2 66            Second trimester   0 55-2 73            Third trimester    0 43-2 91

## 2018-01-09 NOTE — MISCELLANEOUS
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

## 2018-01-09 NOTE — MISCELLANEOUS
Message  Message Free Text Note Form: Patient called with nausea and vomiting  Last menstrual period places her at 8+ weeks gestation  She states that the symptoms are much more significant than with her first pregnancy  Recommended vitamin B6 25 mg twice a day along with doxylamine 12 5 or 25 mg at at bedtime  If no improvement, she should call back for further evaluation  Also discussed practical measures such as sea bands, avoidance of irritating foods, eating solids with solids and liquids with liquids, avoiding lying down shortly after eating        Signatures   Electronically signed by : MACARENA Parr ; Jan 2 2017 10:16AM EST                       (Author)

## 2018-01-09 NOTE — RESULT NOTES
Message   all recent bw was stable     Verified Results  (1) COMPREHENSIVE METABOLIC PANEL 79HJD6086 25:54DK Xena Riggs     Test Name Result Flag Reference   GLUCOSE 72 mg/dL  65-99   Fasting reference interval   UREA NITROGEN (BUN) 10 mg/dL  7-25   CREATININE 0 60 mg/dL  0 50-1 10   eGFR NON-AFR   AMERICAN 122 mL/min/1 73m2  > OR = 60   eGFR AFRICAN AMERICAN 142 mL/min/1 73m2  > OR = 60   BUN/CREATININE RATIO   4-75   NOT APPLICABLE (calc)   SODIUM 134 mmol/L L 135-146   POTASSIUM 3 7 mmol/L  3 5-5 3   CHLORIDE 103 mmol/L     CARBON DIOXIDE 24 mmol/L  20-31   CALCIUM 9 0 mg/dL  8 6-10 2   PROTEIN, TOTAL 6 5 g/dL  6 1-8 1   ALBUMIN 4 0 g/dL  3 6-5 1   GLOBULIN 2 5 g/dL (calc)  1 9-3 7   ALBUMIN/GLOBULIN RATIO 1 6 (calc)  1 0-2 5   BILIRUBIN, TOTAL 0 7 mg/dL  0 2-1 2   ALKALINE PHOSPHATASE 41 U/L     AST 19 U/L  10-30   ALT 16 U/L  6-29     (1) CBC/PLT/DIFF 10GXI8150 22:55SF Toney Rodriguez   REPORT COMMENT:  FASTING:NO     Test Name Result Flag Reference   WHITE BLOOD CELL COUNT 7 5 Thousand/uL  3 8-10 8   RED BLOOD CELL COUNT 4 04 Million/uL  3 80-5 10   HEMOGLOBIN 12 1 g/dL  11 7-15 5   HEMATOCRIT 36 0 %  35 0-45 0   MCV 89 2 fL  80 0-100 0   MCH 30 0 pg  27 0-33 0   MCHC 33 7 g/dL  32 0-36 0   RDW 14 2 %  11 0-15 0   PLATELET COUNT 268 Thousand/uL  140-400   MPV 9 1 fL  7 5-12 5   ABSOLUTE NEUTROPHILS 5108 cells/uL  1655-8781   ABSOLUTE LYMPHOCYTES 2040 cells/uL  850-3900   ABSOLUTE MONOCYTES 278 cells/uL  200-950   ABSOLUTE EOSINOPHILS 53 cells/uL     ABSOLUTE BASOPHILS 23 cells/uL  0-200   NEUTROPHILS 68 1 %     LYMPHOCYTES 27 2 %     MONOCYTES 3 7 %     EOSINOPHILS 0 7 %     BASOPHILS 0 3 %       (Q) SED RATE BY MODIFIED WESTERGREN [id number] 44:97EG Toney Rodriguez     Test Name Result Flag Reference   SED RATE BY MODIFIED$WESTERGREN 9 mm/h  < OR = 20

## 2018-01-10 NOTE — PROGRESS NOTES
Active Problems    1  Anxiety (300 00) (F41 9)   2  Dermatitis (692 9) (L30 9)   3  Family historic risk of congenital abnormality (655 23) (O35 8XX0)   4  Hashimoto's disease (245 2) (E06 3)   5  Herpes simplex infection (054 9) (B00 9)   6  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 thyroiditis (244 8,245 2) (E03 8,E06 3)   7  Hypothyroidism in pregnancy, first trimester (648 13,244 9) (O99 281,E03 9)   8  Pregnancy (V22 2) (Z33 1)   9  Pregnancy, obstetrical care (V22 1) (Z34 90)   10  Previous child with anomaly, antepartum, first trimester (V23 49) (O09 291)   11  Urinary tract infection (599 0) (N39 0)   12  Vitamin D deficiency (268 9) (E55 9)    Current Meds    1  Levothyroxine Sodium 125 MCG Oral Tablet; take 1 tablet by mouth daily Monday thru   Friday; take 2 tabs on Saturday and ; Therapy: 28PTI8871 to (Evaluate:84Dov6685)  Requested for: 94FXS9268; Last   Rx:88Jks7752 Ordered    2  PreNatal DHA CAPS; 1 tablet daily; Therapy: (Recorded:2017) to Recorded   3  Pre- Formula Oral Tablet Recorded    Allergies    1  No Known Drug Allergies    2  Seasonal    Results/Data   Transvaginal Ultrasound OB [facility]:   Procedure: [id number] pregnant uterus real time with image documentation, fetal and maternal evaluation, first trimester (<14 weeks 0 days), transvaginal approach: single or first gestation  The study was done today in the office  Indication: EDC gestational age 19w2d weeks  Exam indication: heart rate  Findings:   Impression: Fetal heart rate was recorded for 7-8min with the recorded heart rate 154-160 bpm  Follow up at  will be performed  Future Appointments    Date/Time Provider Specialty Site   07/10/2017 01:15 PM Prashant Mckeon, [address] Endocrinology [facility] END[facility]INOLOG[facility]   2017 03:15 PM MACARNEA Jones   Obstetrics/Gynecology [facility]   2017 09:00 AM  [address] Signatures   Electronically signed by : Juan M Bautista, ; Apr 10 2017  4:10PM EST                       (Author)    Electronically signed by : MACARENA Domingo ; Apr 10 2017  9:41PM EST                       (Author)

## 2018-01-10 NOTE — PROGRESS NOTES
2017         RE: Benito Zavala                             To: MACARENA Dean    MR#: [id number]                                   [address]     : [facility], [address] [place] [address]: [id number]                             Fax: [phone]   (Exam #: [id number])      The LMP of this 28year old,  G2, P1-0-0-1 patient was 2016, her   working JADEN is AUG 23 2017 and the current gestational age is 26 weeks 2   days by 1st Trimester Sono  A sonographic examination was performed on 2017 using real time equipment  The ultrasound examination was   performed using abdominal technique  The patient has a BMI of 27 9  Her   blood pressure today was 95/51  Earliest ultrasound found in her record: 17   8w2d  17 JADEN                  Francois Spatz has no complaints today  She reports regular fetal movement and   denies problems related to vaginal bleeding,  labor, or   hypertension  A TSH level obtained on May 24 was abnormally low at 0 22   mIU/ml  She is currently treated with levothyroxine replacement at the   direction of her endocrinologist   Screening for gestational diabetes in   late May was normal  Francois Spatz is scheduled for a follow-up TSH level later   this week        Cardiac motion was observed at 132 bpm       INDICATIONS      previous child with birth defects   fetal arrythmia   fetal growth   hypothyroidism      Exam Types      Level I      RESULTS      Fetus # 1 of 1   Vertex presentation   Fetal growth appeared normal   Placenta Location = Anterior   No placenta previa   Placenta Grade = II      MEASUREMENTS (* Included In Average GA)      AC              28 4 cm        32 weeks 4 days* (50%)   BPD              8 4 cm        33 weeks 5 days* (66%)   HC              30 7 cm        33 weeks 6 days* (58%)   Femur            6 5 cm        33 weeks 1 day * (66%)      Cerebellum       4 0 cm        33 weeks 3 days HC/AC           1 08   FL/AC           0 23   FL/BPD          0 77   EFW (Ac/Fl/Hc)  2093 grams - 4 lbs 10 oz                 (54%)      THE AVERAGE GESTATIONAL AGE is 33 weeks 2 days +/- 18 days  AMNIOTIC FLUID      Q1: 3 6      Q2: 2 0      Q3: 1 9      Q4: 4 9   HORACE Total = 12 5 cm   Amniotic Fluid: Normal      ANATOMY DETAILS      Visualized Appearing Sonographically Normal:   HEAD: (Calvarium, BPD Level, Cavum, Lateral Ventricles, Cerebellum);      TH  CAV : (Diaphragm); HEART: (Four Chamber View, Proximal Left   Outflow, Proximal Right Outflow, 3VV, Short [place] of Greater Vessels,   Cardiac Axis, Cardiac Position);    STOMACH, RIGHT KIDNEY, LEFT KIDNEY,   BLADDER, PLACENTA, UMBL  CORD      IMPRESSION      Joaquin IUP   33 weeks and 2 days by this ultrasound  (JADEN=AUG 12 2017)   32 weeks and 2 days by 1st Tri Sono  (JADEN=AUG 19 2017)   Vertex presentation   Fetal growth appeared normal   Regular fetal heart rate of 132 bpm   Anterior placenta   No placenta previa      GENERAL COMMENT      No fetal structural abnormality is identified on the Level I survey today  Fetal interval growth and amniotic fluid volume are normal       Today's ultrasound findings and suggested follow-up were discussed in   detail with [facility]  Daily third trimester fetal kick counting was   discussed at the visit today  No further appointment has been scheduled in   the [facility], [facility]  for [facility] at this time  Follow-up [facility] ultrasound   evaluation is recommended as clinically indicated  Aileen's TSH and   gestational diabetes screening results were reviewed at her visit today  The face to face time, in addition to time spent discussing ultrasound   results, was approximately 10 minutes, greater than 50% of which was spent   during counseling and coordination of care  CloudbuildDAT M D     Maternal-Fetal Medicine   Electronically signed 06/27/17 11:19

## 2018-01-10 NOTE — MISCELLANEOUS
Message   Recorded as Task   Date: 09/20/2017 11:03 AM, Created By: Micheal Glover   Task Name: Miscellaneous   Assigned To: Babar Lea   Regarding Patient: Paul Cleary, Status: Active   CommentLiborio Cantu - 20 Sep 2017 11:03 AM     TASK CREATED  Patient is considering Mirena IUD or Nexplanon for contraception  Please check for insurance company coverage and inform the patient of the findings  Please arrange for insertion in the near future and recommend ibuprofen or naproxen prior to the procedure  Babar Lea - 20 Sep 2017 11:46 AM     TASK EDITED  Patient aware  No coverage under the plan for iud  Patient will decide what she would like to use for birth control and she will call back  She is changing insurance in January  Maybe she will have coverage for iud with the new  I will check once she calls me back with new information  Spoke to insurance again:patient is covered under plan for Mirena iud  Patient wishes to proceed with insertion  Appointment given with Dr Melissa Dubose  1        1 Amended By: Babar Lea; Oct 16 2017 3:11 PM EST    Active Problems   1  34 weeks gestation of pregnancy (V22 2) (Z3A 34)  2  Anemia (285 9) (D64 9)  3  Anxiety (300 00) (F41 9)  4  Dermatitis (692 9) (L30 9)  5  Encounter for postpartum visit (V24 2) (Z39 2)  6  Family historic risk of congenital abnormality (655 23) (O35 8XX0)  7  Fetal arrhythmia affecting pregnancy, antepartum (659 73) (O36 8990)  8  Hashimoto's disease (245 2) (E06 3)  9  Herpes simplex infection (054 9) (B00 9)  10  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 thyroiditis (244 8,245 2) (E03 8,E06 3)  11  Hypothyroidism in pregnancy, third trimester (648 13,244 9) (O99 283,E03 9)  12  Pregnancy, obstetrical care (V22 1) (Z34 90)  13  Previous child with anomaly, antepartum, first trimester (V23 49) (O09 291)  14  Vitamin D deficiency (268 9) (E55 9)    Current Meds  1  Iron 325 (65 Fe) MG Oral Tablet; 4-5 times per week;    Therapy: (Recorded:81Ihj4507) to Recorded  2  Levothyroxine Sodium 125 MCG Oral Tablet; TAKE 1 TABLET BY MOUTH EVERY DAY  AFTER DELIVERY, DECREASE 1 TAB LESS  A WEEK; Therapy: 68UUR9603 to (Jose Alberto Pettit)  Requested for: 85Vys4317; Last   Rx:[id number] Ordered  3  Pre-Juan Formula Oral Tablet Recorded  4  ValACYclovir HCl - 500 MG Oral Tablet; TAKE 1 TABLET DAILY; Therapy:  to (Evaluate:2017); Last Rx:13Seu0955 Ordered    Allergies   1  No Known Drug Allergies   2  Seasonal    Signatures   Electronically signed by :  Radha Friedman, ; Oct 16 2017  3:12PM EST                       (Author)

## 2018-01-10 NOTE — PROGRESS NOTES
Assessment    1  Dermatitis (692 9) (L30 9)    Plan  Dermatitis    · (1) CBC/PLT/DIFF; Status:Active; Requested for:09Mar2017;    · (1) COMPREHENSIVE METABOLIC PANEL; Status:Active; Requested for:09Mar2017;    · (1) SED RATE; Status:Active; Requested EQF:32PZT2869;     Discussion/Summary    Dermatitis  Etiology unknown at this time  Patient with possible breakout of eczema although does not have a prior history of eczema  She will use Zyrtec 10 mg one by mouth daily at bedtime  She may use hydrocortisone over-the-counter on more pruritic areas  She was given a referral to dermatologist for further evaluation  Chief Complaint  Patient is here c/o a red itchy rash on her torso which is now spreading to her arms x's 1 wk  All medications were reviewed and updated with the patient  History of Present Illness  HPI: Review chief complaint with the patient  She does not identify any triggers or changes in her home  She denies any changes in diet or any new clothing  She denies any illness recently although she states her son has been sick with viral upper respiratory infections in the past few weeks  Review of Systems    Constitutional: No fever, no chills, feels well, no tiredness, no recent weight gain or loss  Cardiovascular: no complaints of slow or fast heart rate, no chest pain, no palpitations, no leg claudication or lower extremity edema  Respiratory: no complaints of shortness of breath, no wheezing, no dyspnea on exertion, no orthopnea or PND  Gastrointestinal: no complaints of abdominal pain, no constipation, no nausea or diarrhea, no vomiting, no bloody stools  Genitourinary: no complaints of dysuria, no incontinence, no pelvic pain, no dysmenorrhea, no vaginal discharge or abnormal vaginal bleeding  Integumentary: as noted in HPI  Active Problems    1  Anxiety (300 00) (F41 9)   2  Family historic risk of congenital abnormality (655 23) (O35 8XX0)   3   Hashimoto's disease (245 2) (E06 3)   4  Herpes simplex infection (054 9) (B00 9)   5  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 thyroiditis (244 8,245 2) (E03 8,E06 3)   6  Hypothyroidism in pregnancy, first trimester (648 13,244 9) (O99 281,E03 9)   7  Pregnancy (V22 2) (Z33 1)   8  Pregnancy, obstetrical care (V22 1) (Z34 90)   9  Previous child with anomaly, antepartum, first trimester (V23 49) (O09 291)   10  Urinary tract infection (599 0) (N39 0)   11  Vitamin D deficiency (268 9) (E55 9)    Past Medical History    1  History of Depression (311) (F32 9)   2  History of Hirsutism (704 1) (L68 0)   3  History of Hashimoto thyroiditis (V12 29) (Z86 39)   4  History of Mild cervical dysplasia (622 11) (N87 0)   5  History of Subclinical hypothyroidism (244 8) (E03 9)   6  History of Vitamin D deficiency (268 9) (E55 9)    Family History  Mother    1  Family history of    2  Family history of cerebrovascular accident (V17 1) (Z82 3)   3  Family history of depression (V17 0) (Z81 8)   4  Family history of thyroid disease (V18 19) (Z83 49)  Father    5  No pertinent family history  Maternal Aunt    6  Family history of Depression   7  Family history of Diabetes Mellitus (V18 0)  Maternal Uncle    8  Family history of Diabetes Mellitus (V18 0)  Paternal Uncle    5  Family history of myocardial infarction (V17 3) (Z82 49)  Family History    10  Family history of Diabetes Mellitus (V18 0)    Social History    · Being A Social Drinker   · Caffeine Use   · Denied: History of Drug Use   · Marital History - Currently    · Never A Smoker   · One child   · [address]   · Uses Safety Equipment - Seatbelts  The social history was reviewed and updated today  Surgical History    1  History of Atypical endocervical cells on Pap smear (795 00) (R87 619)   2  History of Colposcopy Cervix With Biopsy(S) With Endocervical Curettage   3  History of Dilation And Curettage   4   History of Hysteroscopy With Resection For Intrauterine Polyp Removal    Current Meds   1  Levothyroxine Sodium 125 MCG Oral Tablet; take 1 tablet by mouth daily Monday thru   Friday; take 2 tabs on Saturday and ; Therapy: 79WHE0764 to (Evaluate:30Hwd5731)  Requested for: 36SRD4091; Last   Rx:[id number] Ordered   2  PreNatal DHA CAPS; 1 tablet daily; Therapy: (Recorded:2017) to Recorded   3  Pre- Formula Oral Tablet Recorded    The medication list was reviewed and updated today  Allergies    1  No Known Drug Allergies    2  Seasonal    Vitals   Recorded: 44XJR2948 02:34PM   Temperature 98 5 F   Heart Rate 92   Respiration 14   Systolic 438   Diastolic 82   Height 6 ft    Weight 189 lb 4 00 oz   BMI Calculated 25 67   BSA Calculated 2 08     Physical Exam    Constitutional   General appearance: No acute distress, well appearing and well nourished  Pulmonary   Respiratory effort: No increased work of breathing or signs of respiratory distress  Auscultation of lungs: Clear to auscultation  Cardiovascular   Auscultation of heart: Normal rate and rhythm, normal S1 and S2, without murmurs  Skin   Skin and subcutaneous tissue: Abnormal   Patient with scattered sandpaper type skin on bilateral arms and trunk area  No significant changes of lower extremities  Future Appointments    Date/Time Provider Specialty Site   2017 01:30 PM MACARENA Ojeda  Endocrinology [facility] ENDOCRINOLOGY [facility]   2017 02:00 PM  [address]   2017 03:45 PM MACARENA Ribera   Obstetrics/Gynecology [facility]     Signatures   Electronically signed by : MACARENA Chaudhary ; Mar  9 5326  3:48PM EST                       (Author)

## 2018-01-11 NOTE — RESULT NOTES
Message   normal thyroid function continue with same levothyroxine  TSH and FT4 in 4 weeks   - no need for extra vitamin D , c/w MVI as such   Last seen by [facility]     Verified Results  (1) T4, FREE [id number] 11:37AM [facility]     Test Name Result Flag Reference   T4, FREE 1 4 ng/dL  0 8-1 8     (Q) PTH, INTACT AND CALCIUM 55Sae2393 11:37AM [facility]     Test Name Result Flag Reference   PARATHYROID HORMONE,$INTACT 32 pg/mL  14-64   Interpretive Guide    Intact PTH           Calcium  ------------------    ----------           -------  Normal Parathyroid    Normal               Normal  Hypoparathyroidism    Low or Low Normal    Low  Hyperparathyroidism     Primary            Normal or High       High     Secondary          High                 Normal or Low     Tertiary           High                 High  Non-Parathyroid     Hypercalcemia      Low or Low Normal    High   CALCIUM 9 0 mg/dL  8 6-10 2     (1) RENAL FUNCTION PANEL [id number] 11:37AM [facility]     Test Name Result Flag Reference   GLUCOSE 89 mg/dL  65-99   Fasting reference interval   UREA NITROGEN (BUN) 13 mg/dL  7-25   CREATININE 0 59 mg/dL  0 50-1 10   eGFR NON-AFR   AMERICAN 123 mL/min/1 73m2  > OR = 60   eGFR AFRICAN AMERICAN 143 mL/min/1 73m2  > OR = 60   BUN/CREATININE RATIO   9-13   NOT APPLICABLE (calc)   SODIUM 135 mmol/L  135-146   POTASSIUM 3 7 mmol/L  3 5-5 3   CHLORIDE 103 mmol/L     CARBON DIOXIDE 25 mmol/L  20-31   CALCIUM 9 0 mg/dL  8 6-10 2   PHOSPHATE (AS PHOSPHORUS) 3 6 mg/dL  2 5-4 5   ALBUMIN 4 0 g/dL  3 6-5 1     (Q) TSH, 3RD GENERATION [id number] 11:37AM [facility]     Test Name Result Flag Reference   TSH 0 80 mIU/L     Reference Range                         > or = 20 Years  0 40-4 50                              Pregnancy Ranges            First trimester    0 26-2 66            Second trimester   0 55-2 73            Third trimester    0 43-2 91     *(Q) VITAMIN D, 25-HYDROXY, LC/MS/MS 75Gtp6573 11:37AM Eriberto Garzon   REPORT COMMENT:  FASTING:NO     Test Name Result Flag Reference   VITAMIN D, 25-OH, TOTAL 26 ng/mL L    Vitamin D Status         25-OH Vitamin D:     Deficiency:                    <20 ng/mL  Insufficiency:             20 - 29 ng/mL  Optimal:                 > or = 30 ng/mL     For 25-OH Vitamin D testing on patients on   D2-supplementation and patients for whom quantitation   of D2 and D3 fractions is required, the QuestAssureD(TM)  25-OH VIT D, (D2,D3), LC/MS/MS is recommended: order   code 44527 (patients >2yrs)  For more information on this test, go to:  http://education  [employer]/faq/IKT829  (This link is being provided for   informational/educational purposes only )

## 2018-01-11 NOTE — RESULT NOTES
Discussion/Summary   TSH and free T4 within normal, continue current dose of Levothyroxine, repeat TSH/free T4 in 3 weeks  Hope she is doing well  Remind patient after delievery to decrease Levothyroxine 125 mg by taking 1 tab less a week and repeat TSH/free T4 6 weeks after change        Verified Results  (Q) TSH, 3RD GENERATION [id number] 11:02AM Álvaro Stone   REPORT COMMENT:  FASTING:NO     Test Name Result Flag Reference   TSH 0 56 mIU/L     Reference Range                         > or = 20 Years  0 40-4 50                              Pregnancy Ranges            First trimester    0 26-2 66            Second trimester   0 55-2 73            Third trimester    0 43-2 91

## 2018-01-11 NOTE — PROGRESS NOTES
Active Problems    1  Anxiety (300 00) (F41 9)   2  Hashimoto's disease (245 2) (E06 3)   3  Herpes simplex infection (054 9) (B00 9)   4  Pregnancy, obstetrical care (V22 1) (Z34 90)    Current Meds    1  Levothyroxine Sodium 125 MCG Oral Tablet; take 1 tablet by mouth daily Monday thru   Friday; take 2 tabs on Saturday and ; Therapy: 53RSD8857 to (Evaluate:09Eei1024)  Requested for: 82AIS0876; Last   Rx:[id number] Ordered    2  Pre-Juan Formula Oral Tablet Recorded    Allergies    1  No Known Drug Allergies    2  Seasonal    Results/Data  [address] Transvaginal Ultrasound OB [facility]:   Procedure: [id number] pregnant uterus real time with image documentation, fetal and maternal evaluation, first trimester (<14 weeks 0 days), transvaginal approach: single or first gestation  The study was done today in the office  Indication: EDC gestational age 6w7d with an JADEN of 2017 weeks  Exam indication: New OB  Findings:   Number of gestational sacs and fetuses: One gestational sac containing one embryo  Gestational sac/fetal measurements appropriate for gestation:   CRL= 18 2mm 8w2d  YS= 5 0mm  FHR= 147bpm      Survey of visible fetal and placental anatomic structure within normal limits  Qualitative assessment of amniotic fluid volume/gestational sac shape: within normal limits  Exam of maternal uterus and adnexa: within normal limits  Right ovary contains a 2 2cm corpus luteal cyst    Impression: Single, viable IUP small for dates, however consistent with patient's longer cycles  8w2d with an JADEN of 2017        Future Appointments    Date/Time Provider Specialty Site   2017 12:45 PM Angeles Breaux, [address]  Endocrinology [facility] ENDOCRINOLOGY [facility]   2017 02:15 PM Alyne Boyd, Nurse Schedule  [facility]   2017 01:45 PM [facility]     Signatures   Electronically signed by : Susie Blue, ; 2017  2:16PM EST (Author)    Electronically signed by : MACARENA Giraldo ; Jan 9 2017  3:22PM EST                          Electronically signed by : MACARENA Giraldo ; [address] 10 2017  4:57PM EST

## 2018-01-11 NOTE — RESULT NOTES
Discussion/Summary   Please inform patient, we need to reduce the dose, to Synthroid 100 mcg, p o  daily, except on Sunday she should take half tablet only, please update the med list, she will need repeat TSH and free T4 in 6 weeks        Verified Results  (Q) TSH, 3RD GENERATION W/REFLEX TO FT4 [id number] 10:27AM Melani Mosher   REPORT COMMENT:  FASTING:NO     Test Name Result Flag Reference   TSH W/REFLEX TO FT4 0 18 mIU/L L    Reference Range                         > or = 20 Years  0 40-4 50                              Pregnancy Ranges            First trimester    0 26-2 66            Second trimester   0 55-2 73            Third trimester    0 43-2 91   T4, FREE 1 6 ng/dL  0 8-1 8

## 2018-01-11 NOTE — RESULT NOTES
Message   Thyroid function is normal  keep same levothyroxine  calcium is normal  PTH is slightly elevated and vitamin D is low normal  Start taking vitamin D 4000 units a day, OTC and stop when she  gets pregnant  Verified Results  (1) RENAL FUNCTION PANEL [id number] 09:45AM Asia Vidal     Test Name Result Flag Reference   GLUCOSE 80 mg/dL  65-99   Fasting reference interval   UREA NITROGEN (BUN) 19 mg/dL  7-25   CREATININE 0 83 mg/dL  0 50-1 10   eGFR NON-AFR   AMERICAN 95 mL/min/1 73m2  > OR = 60   eGFR AFRICAN AMERICAN 110 mL/min/1 73m2  > OR = 60   BUN/CREATININE RATIO   4-11   NOT APPLICABLE (calc)   SODIUM 139 mmol/L  135-146   POTASSIUM 4 0 mmol/L  3 5-5 3   CHLORIDE 103 mmol/L     CARBON DIOXIDE 27 mmol/L  20-31   CALCIUM 9 3 mg/dL  8 6-10 2   PHOSPHATE (AS PHOSPHORUS) 3 8 mg/dL  2 5-4 5   ALBUMIN 4 6 g/dL  3 6-5 1     (1) T4, FREE 07Oct2016 09:45AM Asia Vidal     Test Name Result Flag Reference   T4, FREE 1 1 ng/dL  0 8-1 8     (Q) PTH, INTACT AND CALCIUM 07Oct2016 09:45AM Asia Young     Test Name Result Flag Reference   PARATHYROID HORMONE,$INTACT 72 pg/mL H 14-64   Interpretive Guide    Intact PTH           Calcium  ------------------    ----------           -------  Normal Parathyroid    Normal               Normal  Hypoparathyroidism    Low or Low Normal    Low  Hyperparathyroidism     Primary            Normal or High       High     Secondary          High                 Normal or Low     Tertiary           High                 High  Non-Parathyroid     Hypercalcemia      Low or Low Normal    High   CALCIUM 9 3 mg/dL  8 6-10 2     (1) HEPATIC FUNCTION PANEL 07Oct2016 09:45AM Asia Young     Test Name Result Flag Reference   PROTEIN, TOTAL 7 3 g/dL  6 1-8 1   ALBUMIN 4 6 g/dL  3 6-5 1   GLOBULIN 2 7 g/dL (calc)  1 9-3 7   ALBUMIN/GLOBULIN RATIO 1 7 (calc)  1 0-2 5   BILIRUBIN, TOTAL 0 9 mg/dL  0 2-1 2   BILIRUBIN, DIRECT 0 2 mg/dL  < OR = 0 2   BILIRUBIN, INDIRECT 0 7 mg/dL (calc)  0 2-1 2   ALKALINE PHOSPHATASE 65 U/L     AST 20 U/L  10-30   ALT 15 U/L  6-29     (1) CBC/PLT/DIFF 07Oct2016 09:45YANNICK Vidal     Test Name Result Flag Reference   WHITE BLOOD CELL COUNT 5 2 Thousand/uL  3 8-10 8   RED BLOOD CELL COUNT 4 85 Million/uL  3 80-5 10   HEMOGLOBIN 13 9 g/dL  11 7-15 5   HEMATOCRIT 43 3 %  35 0-45 0   MCV 89 3 fL  80 0-100 0   MCH 28 6 pg  27 0-33 0   MCHC 32 0 g/dL  32 0-36 0   RDW 14 1 %  11 0-15 0   PLATELET COUNT 106 Thousand/uL  140-400   MPV 9 0 fL  7 5-11 5   ABSOLUTE NEUTROPHILS 2954 cells/uL  9335-9520   ABSOLUTE LYMPHOCYTES 1934 cells/uL  850-3900   ABSOLUTE MONOCYTES 255 cells/uL  200-950   ABSOLUTE EOSINOPHILS 52 cells/uL     ABSOLUTE BASOPHILS 5 cells/uL  0-200   NEUTROPHILS 56 8 %     LYMPHOCYTES 37 2 %     MONOCYTES 4 9 %     EOSINOPHILS 1 0 %     BASOPHILS 0 1 %       (Q) TSH, 3RD GENERATION 07Oct2016 09:45YANNICK Vidal     Test Name Result Flag Reference   TSH 0 80 mIU/L     Reference Range                         > or = 20 Years  0 40-4 50                              Pregnancy Ranges            First trimester    0 26-2 66            Second trimester   0 55-2 73            Third trimester    0 43-2 91     *(Q) VITAMIN D, 25-HYDROXY, LC/MS/MS 07Oct2016 09:45YANNICK Vidal   REPORT COMMENT:  FASTING:YES     Test Name Result Flag Reference   VITAMIN D, 25-OH, TOTAL 31 ng/mL     Vitamin D Status         25-OH Vitamin D:     Deficiency:                    <20 ng/mL  Insufficiency:             20 - 29 ng/mL  Optimal:                 > or = 30 ng/mL     For 25-OH Vitamin D testing on patients on   D2-supplementation and patients for whom quantitation   of D2 and D3 fractions is required, the QuestAssureD(TM)  25-OH VIT D, (D2,D3), LC/MS/MS is recommended: order   code 97921 (patients >2yrs)  For more information on this test, go to:  http://[employer]/faq/QHU676  (This link is being provided for informational/educational purposes only )

## 2018-01-12 VITALS
WEIGHT: 197.38 LBS | SYSTOLIC BLOOD PRESSURE: 110 MMHG | BODY MASS INDEX: 26.73 KG/M2 | DIASTOLIC BLOOD PRESSURE: 60 MMHG | HEIGHT: 72 IN

## 2018-01-12 VITALS
DIASTOLIC BLOOD PRESSURE: 64 MMHG | SYSTOLIC BLOOD PRESSURE: 109 MMHG | WEIGHT: 185.6 LBS | HEIGHT: 72 IN | BODY MASS INDEX: 25.14 KG/M2

## 2018-01-12 VITALS
DIASTOLIC BLOOD PRESSURE: 82 MMHG | HEART RATE: 92 BPM | RESPIRATION RATE: 14 BRPM | HEIGHT: 72 IN | WEIGHT: 189.25 LBS | TEMPERATURE: 98.5 F | BODY MASS INDEX: 25.63 KG/M2 | SYSTOLIC BLOOD PRESSURE: 122 MMHG

## 2018-01-12 VITALS
HEIGHT: 72 IN | BODY MASS INDEX: 26.73 KG/M2 | WEIGHT: 197.38 LBS | DIASTOLIC BLOOD PRESSURE: 64 MMHG | SYSTOLIC BLOOD PRESSURE: 102 MMHG

## 2018-01-12 VITALS
DIASTOLIC BLOOD PRESSURE: 65 MMHG | SYSTOLIC BLOOD PRESSURE: 101 MMHG | HEIGHT: 72 IN | WEIGHT: 194 LBS | BODY MASS INDEX: 26.28 KG/M2

## 2018-01-12 NOTE — RESULT NOTES
Discussion/Summary   what was the dose of lt4 before and after this test result?      Verified Results  (1) T4, FREE [id number] 12:35PM [facility]     Test Name Result Flag Reference   T4, FREE 1 2 ng/dL  0 8-1 8     (Q) TSH, 3RD GENERATION [id number] 12:35PM [facility]   REPORT COMMENT:  FASTING:NO     Test Name Result Flag Reference   TSH 0 33 mIU/L L    Reference Range                         > or = 20 Years  0 40-4 50                              Pregnancy Ranges            First trimester    0 26-2 66            Second trimester   0 55-2 73            Third trimester    0 43-2 91

## 2018-01-12 NOTE — RESULT NOTES
Message   TSH and free T4 normal, continue current dose of Levothyroxine       Verified Results  (1) T4, FREE [id number] 11:24AM Arminda Felipe     Test Name Result Flag Reference   T4, FREE 1 6 ng/dL  0 8-1 8     (Q) TSH, 3RD GENERATION [id number] 11:24AM Arminda Feliep   REPORT COMMENT:  FASTING:NO     Test Name Result Flag Reference   TSH 0 47 mIU/L     Reference Range                         > or = 20 Years  0 40-4 50                              Pregnancy Ranges            First trimester    0 26-2 66            Second trimester   0 55-2 73            Third trimester    0 43-2 91       Signatures   Electronically signed by : Yoni Diaz, ; Feb 10 2016  6:17PM EST                       (Author)

## 2018-01-13 VITALS
BODY MASS INDEX: 27.09 KG/M2 | WEIGHT: 200 LBS | DIASTOLIC BLOOD PRESSURE: 68 MMHG | HEIGHT: 72 IN | SYSTOLIC BLOOD PRESSURE: 120 MMHG

## 2018-01-13 VITALS
SYSTOLIC BLOOD PRESSURE: 112 MMHG | DIASTOLIC BLOOD PRESSURE: 62 MMHG | HEIGHT: 72 IN | WEIGHT: 212.38 LBS | HEART RATE: 88 BPM | BODY MASS INDEX: 28.76 KG/M2

## 2018-01-13 VITALS
WEIGHT: 198.2 LBS | HEIGHT: 72 IN | BODY MASS INDEX: 26.84 KG/M2 | DIASTOLIC BLOOD PRESSURE: 52 MMHG | SYSTOLIC BLOOD PRESSURE: 99 MMHG

## 2018-01-13 VITALS
HEIGHT: 72 IN | BODY MASS INDEX: 29.39 KG/M2 | SYSTOLIC BLOOD PRESSURE: 116 MMHG | WEIGHT: 217 LBS | DIASTOLIC BLOOD PRESSURE: 62 MMHG

## 2018-01-13 VITALS
SYSTOLIC BLOOD PRESSURE: 95 MMHG | BODY MASS INDEX: 27.87 KG/M2 | HEIGHT: 72 IN | DIASTOLIC BLOOD PRESSURE: 51 MMHG | WEIGHT: 205.8 LBS

## 2018-01-13 VITALS
HEIGHT: 72 IN | SYSTOLIC BLOOD PRESSURE: 106 MMHG | DIASTOLIC BLOOD PRESSURE: 60 MMHG | BODY MASS INDEX: 25.38 KG/M2 | HEART RATE: 60 BPM | WEIGHT: 187.38 LBS

## 2018-01-13 NOTE — MISCELLANEOUS
Message   Recorded as Task   Date: 2017 12:24 PM, Created By: Robin Huston   Task Name: Follow Up   Assigned To: Janeen Del Cid   Regarding Patient: Con Aguiar, Status: Active   CommentDonshahana Ibarra - 25 May 2017 12:24 PM     TASK CREATED  Called patient to check  States feeling fine today  No contx  She thinks she had too much activity yesterday and was feeling pressure from that  Advised pace herself, rest, increase fluids, eat regularly, call back if problem  Bryon Sousa - 25 May 2017 4:29 PM     TASK REPLIED TO: Previously Assigned To Bryon Sousa  Agree, thanks        Active Problems    1  Anxiety (300 00) (F41 9)   2  Dermatitis (692 9) (L30 9)   3  Family historic risk of congenital abnormality (655 23) (O35 8XX0)   4  Fetal arrhythmia affecting pregnancy, antepartum (659 73) (O36 8990)   5  Hashimoto's disease (245 2) (E06 3)   6  Herpes simplex infection (054 9) (B00 9)   7  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 thyroiditis (244 8,245 2) (E03 8,E06 3)   8  Pregnancy, obstetrical care (V22 1) (Z34 90)   9  Previous child with anomaly, antepartum, first trimester (V23 49) (O09 291)   10  Vitamin D deficiency (268 9) (E55 9)    Current Meds   1  Levothyroxine Sodium 125 MCG Oral Tablet; take 1 tablet Monday through Friday take 2   tablets on Saturday and ; Therapy: 36SFF0093 to (Evaluate:2017)  Requested for: 59JLO5190 Recorded   2  PreNatal DHA CAPS; 1 tablet daily; Therapy: (Recorded:2017) to Recorded   3  Pre- Formula Oral Tablet Recorded    Allergies    1  No Known Drug Allergies    2   Seasonal    Signatures   Electronically signed by : Shara Del Real, ; May 25 2017  5:02PM EST                       (Author)

## 2018-01-13 NOTE — PROGRESS NOTES
2017         RE: Tayler Flores                             To: MACARENA Figueroa    MR#: [id number]                                   [address]     : [facility], [address]: [phone]                             Fax: [phone]   (Exam #: [id number])      The LMP of this 28year old,  G2, P1-0-0-1 patient was 2016, her   working JADEN is AUG 23 2017 and the current gestational age is 25 weeks 3   days by 1st Trimester Sono  A sonographic examination was performed on 2017 using real time equipment  The ultrasound examination was   performed using abdominal technique  The patient has a BMI of 26 9  Her   blood pressure today was 99/52  Earliest ultrasound found in her record: 17   8w2d  17 JADEN      Problem list   1  History of Hashimoto's thyroiditis with a TSH of 0 80 on 17  She   is on Levothyroxine and follows with Dr Dawn Stephenson from endocrinology  2  First trimester bleeding   3  Normal NIPT   4  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 and a normal MicroArray  She underwent   selective reduction with a normal outcome of the unaffected twin  5  HSV infection   6  Vitamin D deficiency on replacement   7   Patient was diagnosed with fetal PACs at 21 weeks      Cardiac motion was observed at 141 bpm       INDICATIONS      previous child with birth defects   fetal arrythmia      Exam Types      Doppler study   Amniotic Fluid Index      RESULTS      Fetus # 1 of 1   Vertex presentation   Placenta Location = Anterior   Placenta Grade = I      AMNIOTIC FLUID         Largest Vertical Pocket = 4 6 cm   Amniotic Fluid: Normal      FETAL VESSELS                                     S/D   PI    RI    PSV   AEDV RF                                                    cm/s       Umbilical Artery                 1 02              No   No       Middle Cerebral Artery           1 41        23 40 No      ANATOMY COMMENTS      No further PACs are noted on today's ultrasound  The middle cerebral artery   dopplers are normal for gestational age  There is no cephalization of flow   seen  The umbilical artery dopplers are normal for gestational age  IMPRESSION      Joaquin IUP   22 weeks and 3 days by 1st Tri Sono  (JADEN=AUG 19 2017)   Vertex presentation   Regular fetal heart rate of 141 bpm   Anterior placenta      GENERAL COMMENT      She can resume her normal care with a follow-up ultrasound at 32 weeks for   fetal growth secondary to hypothyroidism  Recommend she continue to   abstain from caffeinated foods  DAT Massey M D     Maternal-Fetal Medicine   Electronically signed 04/18/17 19:54

## 2018-01-13 NOTE — MISCELLANEOUS
Message   Date: 08 Mar 2017 8:58 AM EST, Recorded By: Tricia Ray For: Hieu Matt: Suman Grigsby, [place]   Phone: [phone] [facility]), [phone] (Work)   Reason: Medical Complaint   pt is 16 weeks pregnant and has a rash    it isn't itchy  Latisha Lightning advised pt to see PCP  Latisha Lightning Active Problems    1  Anxiety (300 00) (F41 9)   2  Family historic risk of congenital abnormality (655 23) (O35 8XX0)   3  Hashimoto's disease (245 2) (E06 3)   4  Herpes simplex infection (054 9) (B00 9)   5  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 thyroiditis (244 8,245 2) (E03 8,E06 3)   6  Hypothyroidism in pregnancy, first trimester (648 13,244 9) (O99 281,E03 9)   7  Pregnancy (V22 2) (Z33 1)   8  Pregnancy, obstetrical care (V22 1) (Z34 90)   9  Previous child with anomaly, antepartum, first trimester (V23 49) (O09 291)   10  Urinary tract infection (599 0) (N39 0)   11  Vitamin D deficiency (268 9) (E55 9)    Current Meds   1  Levothyroxine Sodium 125 MCG Oral Tablet; take 1 tablet by mouth daily Monday thru   Friday; take 2 tabs on Saturday and ; Therapy: 26CXC4613 to (Evaluate:43Car6393)  Requested for: 92GDC3678; Last   Rx:[id number] Ordered   2  PreNatal DHA CAPS; 1 tablet daily; Therapy: (Recorded:2017) to Recorded   3  Pre-Juan Formula Oral Tablet Recorded    Allergies    1  No Known Drug Allergies    2   Seasonal    Signatures   Electronically signed by : Chan Honeycutt, ; Mar  8 2017  8:59AM EST                       (Author)

## 2018-01-13 NOTE — RESULT NOTES
Message   c/w same Levothyroxine and repeat TSH and FT4 in 4-6 weeks     Verified Results  (1) T4, FREE [id number] 11:32AM Amira Su     Test Name Result Flag Reference   T4, FREE 1 5 ng/dL  0 8-1 8     (Q) TSH, 3RD GENERATION 74SQB1978 11:32AM Amira Su   REPORT COMMENT:  FASTING:NO     Test Name Result Flag Reference   TSH 0 87 mIU/L     Reference Range                         > or = 20 Years  0 40-4 50                              Pregnancy Ranges            First trimester    0 26-2 66            Second trimester   0 55-2 73            Third trimester    0 43-2 91

## 2018-01-14 VITALS
WEIGHT: 186.5 LBS | HEIGHT: 72 IN | DIASTOLIC BLOOD PRESSURE: 80 MMHG | SYSTOLIC BLOOD PRESSURE: 114 MMHG | BODY MASS INDEX: 25.26 KG/M2

## 2018-01-14 VITALS
SYSTOLIC BLOOD PRESSURE: 115 MMHG | DIASTOLIC BLOOD PRESSURE: 63 MMHG | HEIGHT: 72 IN | BODY MASS INDEX: 25.14 KG/M2 | WEIGHT: 185.6 LBS

## 2018-01-14 VITALS
HEIGHT: 72 IN | BODY MASS INDEX: 25.9 KG/M2 | WEIGHT: 191.2 LBS | SYSTOLIC BLOOD PRESSURE: 106 MMHG | DIASTOLIC BLOOD PRESSURE: 55 MMHG

## 2018-01-14 VITALS
SYSTOLIC BLOOD PRESSURE: 96 MMHG | DIASTOLIC BLOOD PRESSURE: 60 MMHG | BODY MASS INDEX: 26.36 KG/M2 | WEIGHT: 194.6 LBS | HEIGHT: 72 IN

## 2018-01-14 VITALS
DIASTOLIC BLOOD PRESSURE: 62 MMHG | SYSTOLIC BLOOD PRESSURE: 100 MMHG | BODY MASS INDEX: 25.77 KG/M2 | HEART RATE: 72 BPM | WEIGHT: 190 LBS

## 2018-01-14 NOTE — PROGRESS NOTES
MAR 6 2017         RE: Balaji Dural                             To: Nathaly Andres, M D    MR#: [id number]                                   [address]     : [address] [place] [address], [address] [place] [address] Street: [id number]                             Fax: [phone]   (Exam #: [id number])      The LMP of this 28year old,  G2, P1-0-0-1 patient was 2016, her   working JADEN is AUG 23 2017 and the current gestational age is 12 weeks 2   days by 1st Trimester Sono  A sonographic examination was performed on MAR   6 2017 using real time equipment  The ultrasound examination was performed   using abdominal technique  The patient has a BMI of 25 9  Her blood   pressure today was 106/55  Earliest ultrasound found in her record: 17   8w2d  17 JADEN      Problem list   1  History of Hashimoto's thyroiditis with a TSH of 0 80 on 17  She   is on Levothyroxine and follows with Dr Orantes from endocrinology  2  First trimester bleeding   3  Normal NIPT   4  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 and a normal MicroArray  She underwent   selective reduction with a normal outcome of the unaffected twin  5  HSV infection   6   Vitamin D deficiency on replacement      Cardiac motion was observed at 138 bpm       INDICATIONS      maternal hashimoto thyroiditis   previous child with birth defects   early fetal anatomy and growth      Exam Types      Level I      RESULTS      Fetus # 1 of 1   Vertex presentation   Fetal growth appeared normal   Placenta Location = Posterior   No placenta previa   Placenta Grade = II      MEASUREMENTS (* Included In Average GA)      AC              11 2 cm        16 weeks 5 days* (65%)   BPD              3 6 cm        16 weeks 6 days*   HC              13 3 cm        16 weeks 5 days*   Femur            2 2 cm        16 weeks 4 days*      Nuchal Fold      3 2 mm Humerus          2 3 cm        17 weeks 1 day   (75%)      Cerebellum       1 6 cm        16 weeks 6 days   Biorbit          2 2 cm        14 weeks 4 days   CisternaMagna    4 5 mm      HC/AC           1 18   FL/AC           0 19   FL/BPD          0 61   EFW (Ac/Fl/Hc)   167 grams - 0 lbs 6 oz      THE AVERAGE GESTATIONAL AGE is 16 weeks 5 days +/- 7 days  AMNIOTIC FLUID         Largest Vertical Pocket = 4 3 cm   Amniotic Fluid: Normal      ANATOMY      Head                                    See Details   Face/Neck                               See Details   Th  Cav  See Details   Heart                                   See Details   Abd  Cav  Normal   Stomach                                 Normal   Right Kidney                            Normal   Left Kidney                             Normal   Bladder                                 Normal   Abd  Wall                               Normal   Spine                                   See Details   Extrems                                 See Details   Genitalia                               Normal   Placenta                                Normal   Umbl  Cord                              Normal   Uterus                                  Normal   PCI                                     Normal      ANATOMY DETAILS      Visualized Appearing Sonographically Normal:   HEAD: (Calvarium, BPD Level, Lateral Ventricles, Choroid Plexus,   Cerebellum, Cisterna Magna);    FACE/NECK: (Neck, Nuchal Fold, Orbits,   Palate);    TH  CAV : (Diaphragm); HEART: (Four Chamber View, Proximal   Left Outflow, Proximal Right Outflow, 3VV, 3 Vessel Trachea, Short Axis of   Greater Vessels, Ductal Arch, Aortic Arch, Cardiac Axis, Cardiac   Position);    ABD  CAV : (Liver);    STOMACH, RIGHT KIDNEY, LEFT KIDNEY,   BLADDER, ABD   WALL, EXTREMS: (Lt Humerus, Rt Humerus, Lt Forearm, Rt   Forearm, Lt Femur, Rt Femur, Lt Low Leg, Rt Low Leg, Lt Foot, Rt Foot);      GENITALIA (Female), PLACENTA, UMBL  CORD, UTERUS, PCI      Suboptimally Visualized:   HEART: (Interventricular Septum, Interatrial Septum); SPINE: (Cervical   Spine, Thoracic Spine, Lumbar Spine, Sacrum);    EXTREMS: (Lt Hand, Rt   Hand)      Not Visualized:   HEAD: (Cavum);    FACE/NECK: (Profile, Nose/Lips, Face); HEART: (IVC,   SVC)      ANATOMY COMMENTS      Her survey of the fetal anatomy is not complete  No fetal structural abnormality or ultrasound marker for aneuploidy is   identified on the Level II ultrasound study today  The missed or limited   views above are secondary to her early gestational age  Fetal growth and   amniotic fluid volume appear normal   Active movement of the fetal body &   extremities was seen  There is no suspicion of a subchorionic bleed  The   placental cord insertion was normal       ADNEXA      The left ovary was not visualized  The right ovary was not visualized  IMPRESSION      Joaquin IUP   16 weeks and 5 days by this ultrasound  (JADEN=AUG 16 2017)   16 weeks and 2 days by 1st Tri Sono  (JADEN=AUG 19 2017)   Vertex presentation   Fetal growth appeared normal   Regular fetal heart rate of 138 bpm   Posterior placenta   No placenta previa      GENERAL COMMENT      The patient was informed of the findings and counseled about the   limitations of the exam in detecting all forms of fetal congenital   abnormalities  She denies any vaginal bleeding or uterine cramping/contractions  She does   feel fetal movement  She complained today of a very small quarter-sized   rash underneath her bra near the bra closure  This is erythematous and not   raised and only appears mildly irritated   In other areas of her skin she   also feels some raised little bumps that are not erythematous not pustules   and not visible to the naked eye on her lower back and in the area just   underneath her anterior chest  She denies any of these symptoms on her   arms or legs  Exam shows she is comfortable and her abdomen is non tender  Follow up recommended: 1  Recommend a follow up US at 20 weeks for a full anatomy scan  2  Recommend just observation of her raised bump rash that she can feel   but not seen as many skin rashes will resolve on their own and this does   not appear to be a rash associated with pregnancy that I can tell  3  The small quarter size rash near her Bra closure may be reaction to the   metal in the closure  She can also try 1% hydrocortizone cream          The face to face time, in addition to time spent discussing ultrasound   results, was approximately 15 minutes, greater than 50% of which was spent   during counseling and coordination of care  DAT Connor M D     Maternal-Fetal Medicine   Electronically signed 03/06/17 19:11

## 2018-01-14 NOTE — PROGRESS NOTES
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, large choroid plexus cyst and suspicion of neuronal migration abnormalities  The patient and her  were seen at [address] during that pregnancy and counseled of the poor prognosis associated with the brain abnormality  They elected to selectively reduce that pregnancy  Chromosome analysis and MicroArray performed in that pregnancy were normal  They declined autopsy evaluation of the fetal remains following delivery of the healthy twin  No diagnosis was ever confirmed for the anomalies seen in her prior pregnancy therefore we discussed and  Risk for recurrence of approximately 3-5%  This risk takes into consideration that the anomalies seen in her prior pregnancy was a random occurrence with minimal risk for happening again versus a rare autosomal recessive condition with up to a 25% risk for recurrence in this current pregnancy  Without a confirmed diagnosis no prenatal diagnosis would be available in this pregnancy other than ultrasound evaluation to assess fetal anatomy for the presence of similar findings  This couple was advised of the availability and limitations of a nuchal translucency ultrasound evaluation at approximately 12-13 weeks followed by an early anatomy scan at 16 weeks and full level II ultrasound at 20 weeks   They are planning on pursuing all these ultrasound evaluations at the appropriate times  We did discuss that since chromosome analysis and MicroArray performed in her prior pregnancy were normal that the risk for them to have a chromosome problem in this pregnancy would be the patient's age-related risk  At the age of 28, there is an estimated 1/384 risk for a fetal chromosome abnormality at term  This risk includes an estimated 1/796 risk for Down syndrome with the remainder being due to other chromosome abnormalities  Even though this couple is not considered at increased risk for a chromosome abnormality in this pregnancy they were interested in considering all available testing options  I reviewed with the patient the options regarding prenatal diagnosis for chromosome abnormalities including CVS and amniocentesis  Chorionic villus sampling(CVS) is generally performed between 10 and 12 weeks of pregnancy and amniocentesis is generally performed at 16 weeks or later  Recent literature supports that CVS and amniocentesis both have an associated procedure related risk of miscarriage of less than 1/300  Chromosome results from CVS or amniocentesis are greater than 99 9% accurate  In addition, micrarray testing is able to detect submicroscopic deletions and duplications throughout the genome  Occasionally a repeat procedure may be necessary due to cell culture failure  Approximately 1% of the time, a mosaic chromosome result from CVS will necessitate a followup amniocentesis  Measurement of AFP from amniotic fluid is able to detect approximately 95% of open neural tube defects  Maternal serum AFP is recommended for patients who elect CVS     We also reviewed the availability and limitations of sequential screening, NIPS, and level II ultrasound evaluation   Sequential screening consisting of first trimester measurement of nuchal translucency combined with first trimester biochemical analysis as well as second trimester biochemical analysis is able to detect approximately 90% of pregnancies in which the fetus has Down syndrome, 90% of pregnancies in which the fetus has trisomy 25 and 80% of pregnancies which appears has an open neural tube defect  NIPS involves assessment of fragments of fetal DNA in maternal blood  This test has varying detection rates depending on the laboratory used though the quoted detection rate for Down syndrome is generally greater than 99% and detection rate for trisomy 18 is 98% or better  NIPS has a low false positive rate however consideration of prenatal diagnostic testing is always recommended following a positive NIPS  Level II ultrasound evaluation is able to detect many major physical birth defects and variations in fetal development that may be associated with chromosome abnormalities  Level II ultrasound evaluation is not able to detect all birth defects or health problems  After discussing the available prenatal diagnostic and prescription procedures, this couple elected to pursue NIPS  They were provided with the contact information for 's labs to check insurance coverage and any expected out of pocket expense  Her insurance requires prior authorization  Our office will initiate that process  During our counseling session, histories were taken on the patient's family and her 's family both of which were otherwise unremarkable  The patient reports being of [place] and [place] descent while her partner reports being of [place], [place] and [place] descent  They both deny having any known [employer] ancestry carrier screening for CF, SMA and Fragile X as well as expanded carrier screening given that Justin's father was adopted was discussed  This couple expressed interest in considering expanded carrier screening and were given the codes to check insurance coverage she  They were instructed to contact me if they elected to move forward      Lastly, we discussed the fact that it is important to keep in mind that everyone in the general population regardless of age, family history, or medical background has approximately a 3% risk of having a child with some type of her defect, genetic disease or intellectual disability  Currently there are no tests available to rule out all birth defects or health problems  Active Problems    1  Anxiety (300 00) (F41 9)   2  Hashimoto's disease (245 2) (E06 3)   3  Herpes simplex infection (054 9) (B00 9)   4  Pregnancy, obstetrical care (V22 1) (Z34 90)    Past Medical History    1  History of Abnormal menstrual periods (626 2) (N92 6)   2  History of Acute sinusitis (461 9) (J01 90)   3  History of Allergic rhinitis (477 9) (J30 9)   4  History of Amenorrhea (626 0) (N91 2)   5  History of Cervical cancer screening (V76 2) (Z12 4)   6  History of Cervical cancer screening (V76 2) (Z12 4)   7  History of Depression (311) (F32 9)   8  History of Encounter for initial prescription of contraceptive pills (V25 01) (Z30 011)   9  History of Encounter for preconception consultation (V26 49) (Z31 69)   10  History of Encounter for routine gynecological examination with Papanicolaou smear of    cervix (V72 31,V76 2) (Z01 419)   11  History of Encounter for surveillance of contraceptive pills (V25 41) (Z30 41)   12  History of Hirsutism (704 1) (L68 0)   13  History of Hashimoto thyroiditis (V12 29) (Z86 39)   14  History of self breast exam   15  History of upper respiratory infection (V12 09) (Z87 09)   16  History of Influenza vaccine administered (V04 81) (Z23)   17  History of Mild cervical dysplasia (622 11) (N87 0)   18  History of Muscle ache (729 1) (M79 1)   19  History of Oligomenorrhea (626 1) (N91 5)   20  History of Pain, upper back (724 5) (M54 9)   21  History of Postpartum exam (V24 2) (Z39 2)   22  History of Subclinical hypothyroidism (244 8) (E03 9)   23  History of Visit for routine gyn exam (V72 31) (Z01 419)   24  History of Vitamin D deficiency (268 9) (E55 9)    Surgical History    1   History of Atypical endocervical cells on Pap smear (795 00) (R85 339)   2  History of Colposcopy Cervix With Biopsy(S) With Endocervical Curettage   3  History of Dilation And Curettage   4  History of Hysteroscopy With Resection For Intrauterine Polyp Removal    Family History    1  Family history of    2  Family history of cerebrovascular accident (V17 1) (Z82 3)   3  Family history of depression (V17 0) (Z81 8)   4  Family history of thyroid disease (V18 19) (Z83 49)    5  No pertinent family history    6  Family history of Depression   7  Family history of Diabetes Mellitus (V18 0)    8  Family history of Diabetes Mellitus (V18 0)    9  Family history of myocardial infarction (V17 3) (Z82 49)    10  Family history of Diabetes Mellitus (V18 0)    Social History    · Being A Social Drinker   · Caffeine Use   · Denied: History of Drug Use   · Marital History - Currently    · Never A Smoker   · One child   · Christianity Affiliation [employer]   · Uses Safety Equipment - Seatbelts    Current Meds   1  Levothyroxine Sodium 125 MCG Oral Tablet; take 1 tablet by mouth daily Monday thru   Friday; take 2 tabs on Saturday and ; Therapy: 26MJM4921 to (Evaluate:89Xjl8632)  Requested for: 17UZD1590; Last   Rx:36Pyv7745 Ordered   2  Pre- Formula Oral Tablet Recorded    Allergies    1  No Known Drug Allergies    2  Seasonal    Vitals  Signs   Recorded: 45JCM9271 93:84ST   Systolic: 99, LUE, Sitting  Diastolic: 57, LUE, Sitting  Height: 6 ft   Weight: 189 lb   BMI Calculated: 25 63  BSA Calculated: 2 08  Pain Scale: 0    Future Appointments    Date/Time Provider Specialty Site   2017 12:45 PM Angeles Breaux, [address] Endocrinology [facility] ENDOCRINOLOGY Aimee Basta [facility]   02/15/2017 02:30 PM MACARENA Cm   Obstetrics/Gynecology [facility]   2017 01:00 PM  [address]   Electronically signed by : Rustam Andersen, ; 2017 4:22PM EST                       (Author)

## 2018-01-15 NOTE — MISCELLANEOUS
Message   Date: 09 Dec 2016 2:53 PM EST, Recorded By: Daja Herron   Calling For: Alexis Has: Adan Parkinson, Self   Phone: [phone] [facility]), [phone] (Work)   Reason: Other   Pt called, has first prenatal appointments in January  Questions if she should get a flu shot  Informed patient she can get it at her appt or she can go elsewhere anytime but bring documentation of receiving injection  Also asked about folic acid  Advised is getting enough in her PNV DHA  Active Problems    1  Anxiety (300 00) (F41 9)   2  Cervical cancer screening (V76 2) (Z12 4)   3  E coli infection (041 49) (A49 8)   4  Encounter for initial prescription of contraceptive pills (V25 01) (Z30 011)   5  Encounter for routine gynecological examination with Papanicolaou smear of cervix   (V72 31,V76 2) (Z01 419)   6  Encounter for surveillance of contraceptive pills (V25 41) (Z30 41)   7  Fatigue (780 79) (R53 83)   8  Hashimoto's thyroiditis (245 2) (E06 3)   9  Herpes simplex infection (054 9) (B00 9)   10  Hirsutism (704 1) (L68 0)   11  History of self breast exam   12  Hypothyroidism (244 9) (E03 9)   13  Influenza vaccine administered (V04 81) (Z23)   14  Muscle ache (729 1) (M79 1)   15  Pain, upper back (724 5) (M54 9)   16  Postpartum exam (V24 2) (Z39 2)   17  Upper respiratory infection (465 9) (J06 9)   18  Vitamin D deficiency (268 9) (E55 9)    Current Meds   1  Levothyroxine Sodium 125 MCG Oral Tablet; take 1 tablet by mouth every day; Therapy: 06NLR4148 to (Evaluate:38Mwr2754)  Requested for: 14Grh1303; Last   Rx:21Ybd1939 Ordered   2  Pre-Juan Formula Oral Tablet Recorded    Allergies    1  No Known Drug Allergies    2   Seasonal    Signatures   Electronically signed by : Shaunna Spain, ; Dec  9 2016  3:10PM EST                       (Author)

## 2018-01-15 NOTE — PROGRESS NOTES
2017         RE: Benito Zavala                             To: MACARENA Dean    MR#: [id number]                                   [address]     : [address]: [id number]                             Fax: [phone]   (Exam #: [id number])      The LMP of this 28year old,  G2, P1-0-0-1 patient was 2016, her   working JADEN is AUG 23 2017 and the current gestational age is 22 weeks 3   days by 1st Trimester Sono  A sonographic examination was performed on 2017 using real time equipment  The ultrasound examination was   performed using abdominal technique  The patient has a BMI of 26 3  Her   blood pressure today was 101/65  Earliest ultrasound found in her record: 17   8w2d  17 JADEN      Problem list   1  History of Hashimoto's thyroiditis with a TSH of 0 80 on 17  She   is on Levothyroxine and follows with Dr Dean Girard from endocrinology  2  First trimester bleeding   3  Normal NIPT   4  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 and a normal MicroArray  She underwent   selective reduction with a normal outcome of the unaffected twin  5  HSV infection   6  Vitamin D deficiency on replacement      Francois Spatz presents today for a maternal fetal medicine evaluation secondary   to a fetal arrhythmia that was appreciated in the office  Her pregnancy   has been otherwise uncomplicated  On exam, the patient appears well, in no   acute distress, and her abdomen is nontender  Cardiac motion was observed        INDICATIONS      previous child with birth defects   fetal arrythmia      Exam Types      Fetal Echocardiogram      RESULTS      Fetus # 1 of 1   Breech presentation   Placenta Location = Anterior   No placenta previa   Placenta Grade = I      AMNIOTIC FLUID         Largest Vertical Pocket = 3 9 cm   Amniotic Fluid: Normal FETAL VESSELS                                     S/D   PI    RI    PSV   AEDV RF                                                    cm/s       Umbilical Artery                 1 31       Middle Cerebral Artery           1 38        30 00      FETAL ECHOCARDIOGRAPHY      Visceral/abdominal situs                Normal   4 chamber view apical                   Normal   4 chamber view subcostal                Normal   Atrial septum                           Normal   Ventricular septum                      Normal   LVOT                                    Normal   RVOT                                    Normal   3 Vessel-trachea view                   Normal   3 Vessel view                           Normal   Short Axis ventricles                   Normal   Short axis outflows                     Normal   Aortic Arch                             Normal   Ductal Arch                             Normal   Superior vena cava                      Normal   Inferior vena cava                      Normal   Pulmonary veins                         Normal   Foramen ovale                           Normal   Mitral valve                            Normal   Tricuspid valve                         Normal   Aortic valve                            Normal   Pulmonary valve                         Normal   M-mode/rhythm                           Abnormal   Umbilical artery                        Normal   Ductus venosus                          Normal   Umbilical vein                          Normal      FETAL ECHO      FETAL ECHOCARDIOGRAM: The four chamber view of the heart was seen and   appeared normal  The situs and axis were normal   The left ventricular   outflow tract to aorta and the right ventricular outflow tract to   pulmonary artery were seen  At least two pulmonary veins were seen   entering the left atrium  In the sagittal plane, the IVC and SVC were   noted to enter the right atrium   The aortic arch to descending aorta and the pulmonary artery to ductus to descending aorta were seen  The three   vessel view and low short axis view were obtained and appeared normal     There was no evidence of pericardial, pleural or ascitic fluid  The atrial   to ventricular ratio was irregular most consistent with premature atrial   contractions with bigeminy  Pulsed Doppler demonstrated normal waveform   features and the flow velocities across the mitral 52 cm/sec , tricuspid   50 cm/sec, aortic 97 cm/sec,  pulmonary 66 cm/sec and ductus arteriosus 99   cm/sec appear normal  The cardiothorasic area appears normal at < 33%  A   normal ductus venosus waveform is appreciated  Color Doppler study   demonstrated unidirectional flow across the tricuspid and mitral valves   with no evidence of regurgitation  INTERPRETATION:  Normal structural fetal echocardiogram with irregular   fetal rhythm most consistent with premature atrial contractions with   bigeminy  The patient was made aware of the limitations of fetal   echocardiography in that malformations such as atrial septal defects,   ventricular septal defects and coarctation of the aorta are very difficult   to diagnose prenatally  Frequent premature atrial contractions with   bigeminy are seen today  Isolated fetal premature atrial contractions are   usually self-limited and not associated with fetal morbidity or mortality  Fetal supraventricular tachycardia may occur in less than 1% of cases  We recommend avoidance of caffeine, chocolate, tobacco and other   stimulants which may exacerbate this arrhythmia  Recommend weekly MFM   evaluation of the fetal heart, and daily fetal movement counting  If   persistent over the next 2 weeks or if associated with frequent fetal   tachycardia, recommend evaluation with pediatric cardiology        IMPRESSION      Joaquin IUP   21 weeks and 3 days by 1st Tri Sono  (JADEN=AUG 19 2017)   Breech presentation   Irregular fetal heart rate   Anterior placenta   No placenta previa      GENERAL COMMENT      I discussed today's findings in detail with Ale Ro  I reassured her   that the overwhelming majority of the time premature atrial contractions   even with bigeminy are benign findings with overall excellent fetal   outcomes  I did recommend weekly follow-up until the arrhythmia resolves  If it persists or if it is associated with tachycardia,, I recommend   evaluation by pediatric cardiology  Ale Ro was somewhat upset although   I try to reassure her that this has excellent prognosis and outcomes  Thank you very much for allowing us to participate in the care of this   very nice patient  Should you have any questions, please do not hesitate   to contact our office  Please note, in addition to the time spent discussing the results of the   ultrasound, I spent approximately 10 minutes of face-to-face time with the   patient, greater than 50% of which was spent in counseling and the   coordination of care for this patient  Portions of the record may have been created with voice recognition   software  Occasional wrong word or "sound a like" substitutions may have   occurred due to the inherent limitations of voice recognition software  Read the chart carefully and recognize, using context, where substitutions   have occurred  DAT Garcia M D     Electronically signed 04/18/17 08:28

## 2018-01-15 NOTE — MISCELLANEOUS
Message   Recorded as Task   Date: 08/15/2017 09:48 AM, Created By: Filemon Chaidez   Task Name: Follow Up   Assigned To: Seven Gonzales   Regarding Patient: Chantell Chaidez, Status: Complete   Radha Dmitry - 15 Aug 2017 9:48 AM     TASK CREATED  Caller: Self; [phone] (Home); [phone] (Work)  Called patient for post partum check  SAVD on 8/12/17  States feeling well, minimal lochia, bowels and bladder WNL, Breastfeeding with no problems  Will continue PNV but will d/c extra iron as directed  Patient questions need for a follow up H&H  Seven Gonzales - 15 Aug 2017 1:05 PM     TASK REASSIGNED: Previously Assigned To Jose A Preciado  - 15 Aug 2017 7:17 PM     TASK REPLIED TO: Previously Assigned To js [facility]o [facility] - 15 Aug 2017 7:30 PM     TASK COMPLETED   Mailed order to patient for follow up blood work  Active Problems    1  34 weeks gestation of pregnancy (V22 2) (Z3A 34)   2  Anemia (285 9) (D64 9)   3  Anxiety (300 00) (F41 9)   4  Dermatitis (692 9) (L30 9)   5  Family historic risk of congenital abnormality (655 23) (O35 8XX0)   6  Fetal arrhythmia affecting pregnancy, antepartum (659 73) (O36 8990)   7  Hashimoto's disease (245 2) (E06 3)   8  Herpes simplex infection (054 9) (B00 9)   9  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 thyroiditis (244 8,245 2) (E03 8,E06 3)   10  Hypothyroidism in pregnancy, third trimester (648 13,244 9) (O99 283,E03 9)   11  Pregnancy, obstetrical care (V22 1) (Z34 90)   12  Previous child with anomaly, antepartum, first trimester (V23 49) (O09 291)   13  Vitamin D deficiency (268 9) (E55 9)    Current Meds   1  Iron 325 (65 Fe) MG Oral Tablet; 4-5 times per week; Therapy: (Recorded:28Uqc5794) to Recorded   2  Levothyroxine Sodium 125 MCG Oral Tablet; Take 1 tablet daily (after delivery, decrease   1 tab less a week);    Therapy: 02ANQ7766 to (Ines Garcia)  Requested for: 10Aug2017; Last   Rx:72Saw2782 Ordered   3  Pre- Formula Oral Tablet Recorded   4  ValACYclovir HCl - 500 MG Oral Tablet; TAKE 1 TABLET DAILY; Therapy: 69FOY2696 to (Evaluate:2017); Last Rx:12Rwq4889 Ordered    Allergies    1  No Known Drug Allergies    2  Seasonal    Plan  Anemia    · (1) CBC/ PLT (NO DIFF); Status:Active - Retrospective Authorization;  Requested  for:90Jwl6329;     Signatures   Electronically signed by : Jessika Yoo, ; Aug 18 2017  1:53PM EST                       (Author)

## 2018-01-15 NOTE — MISCELLANEOUS
Message   Recorded as Task   Date: 2017 11:36 AM, Created By: Beto Holliday   Task Name: Miscellaneous   Assigned To: Bertha Mendoza   Regarding Patient: Alberto Perez, Status: Active   CommentTanya Gunter - 23 May 2017 11:36 AM     TASK CREATED  Glucola is normal, RPR is negative, hemoglobin 11 0, slightly low  Recommend iron once daily   JackSwapna - 23 May 2017 3:23 PM     TASK EDITED  pt informed        Active Problems    1  Anxiety (300 00) (F41 9)   2  Dermatitis (692 9) (L30 9)   3  Family historic risk of congenital abnormality (655 23) (O35 8XX0)   4  Fetal arrhythmia affecting pregnancy, antepartum (659 73) (O36 8990)   5  Hashimoto's disease (245 2) (E06 3)   6  Herpes simplex infection (054 9) (B00 9)   7  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 thyroiditis (244 8,245 2) (E03 8,E06 3)   8  Pregnancy, obstetrical care (V22 1) (Z34 90)   9  Previous child with anomaly, antepartum, first trimester (V23 49) (O09 291)   10  Vitamin D deficiency (268 9) (E55 9)    Current Meds   1  Levothyroxine Sodium 125 MCG Oral Tablet; take 1 tablet Monday through Friday take 2   tablets on Saturday and ; Therapy: 19OIM0677 to (Evaluate:2017)  Requested for: 26ZEM5625 Recorded   2  PreNatal DHA CAPS; 1 tablet daily; Therapy: (Recorded:2017) to Recorded   3  Pre- Formula Oral Tablet Recorded    Allergies    1  No Known Drug Allergies    2   Seasonal    Signatures   Electronically signed by : Dimas Nguyễn, ; May 23 2017  3:23PM EST                       (Author)

## 2018-01-15 NOTE — PROGRESS NOTES
APR 3 2017         RE: Luna Ragsdale                             To: MACARENA Pascual ROBIN    MR#: [id number]                                   [address]     : [address] [address], [address] [place] [address] Street: [id number]                             Fax: [phone]   (Exam #: [id number])      The LMP of this 28year old,  G2, P1-0-0-1 patient was 2016, her   working JADEN is AUG 23 2017 and the current gestational age is 25 weeks 2   days by 1st Trimester Sono  A sonographic examination was performed on APR   3 2017 using real time equipment  The ultrasound examination was performed   using abdominal & vaginal techniques  The patient has a BMI of 26 3  Her   blood pressure today was 96/60  Earliest ultrasound found in her record: 17   8w2d  17 JADEN      Problem list   1  History of Hashimoto's thyroiditis with a TSH of 0 80 on 17  She   is on Levothyroxine and follows with Dr Seldon Gowers from endocrinology  2  First trimester bleeding   3  Normal NIPT   4  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 and a normal MicroArray  She underwent   selective reduction with a normal outcome of the unaffected twin  5  HSV infection   6  Vitamin D deficiency on replacement      Costa Pinto reports fetal movement and denies vaginal bleeding  Cell free DNA   analysis earlier in the pregnancy revealed negative results  She was   recently evaluated by Dermatology for a diffuse rash, with biopsies taken  The differential diagnosis is pityriasis rosea versus Mucha-Habermann   disease  Her non-pruritic diffuse rash has been less prominent recently  Costa Pinto has a follow-up Dermatology evaluation scheduled for later this   week  Recent parvovirus B-19 titers revealed a positive IgG and negative   IgM component, indicating past infection and current immunity   Costa Pinto   remains treated with 125 micrograms of levothyroxine Monday through Friday   and 250 mg Saturday and Sunday  A TSH level obtained on March 24 was 0 80   mIU/L  Cardiac motion was observed at 145 bpm       INDICATIONS      previous child with birth defects      Exam Types      LEVEL II   Transvaginal      RESULTS      Fetus # 1 of 1   Vertex presentation   Placenta Location = Anterior   No placenta previa   Placenta Grade = I      MEASUREMENTS (* Included In Average GA)      AC              16 2 cm        21 weeks 0 days* (64%)   BPD              4 5 cm        19 weeks 5 days* (35%)   HC              17 4 cm        19 weeks 6 days* (36%)   Femur            3 6 cm        21 weeks 3 days* (64%)      NBL              5 6 mm      Humerus          3 4 cm        21 weeks 3 days  (73%)      Biorbit          3 5 cm        22 weeks 3 days      HC/AC           1 08   FL/AC           0 22   FL/BPD          0 79   EFW (Ac/Fl/Hc)   394 grams - 0 lbs 14 oz      THE AVERAGE GESTATIONAL AGE is 20 weeks 4 days +/- 10 days  AMNIOTIC FLUID         Largest Vertical Pocket = 4 7 cm   Amniotic Fluid: Normal      CERVICAL EVALUATION      SUPINE      Cervical Length: 4 30 cm      OTHER TEST RESULTS           Funneling?: No             Dynamic Changes?: No        Resp  To TFP?: No      ANATOMY      Head                                    Normal   Face/Neck                               Normal   Th  Cav  Normal   Heart                                   Normal   Abd  Cav  Normal   Stomach                                 Normal   Right Kidney                            Normal   Left Kidney                             Normal   Bladder                                 Normal   Abd   Wall                               Normal   Spine                                   Normal   Extrems                                 Normal   Genitalia                               Normal   Placenta Normal   Umbl  Cord                              Normal   Uterus                                  Normal   PCI                                     Normal      ANATOMY DETAILS      Visualized Appearing Sonographically Normal:   HEAD: (Calvarium, BPD Level, Cavum, Lateral Ventricles, Choroid Plexus,   Cerebellum, Cisterna Magna);    FACE/NECK: (Neck, Nuchal Fold, Profile,   Orbits, Nose/Lips, Palate, Face);    TH  CAV  : (Lungs, Diaphragm); HEART: (Four Chamber View, Proximal Left Outflow, Proximal Right Outflow,   3VV, 3 Vessel Trachea, Short Axis of Greater Vessels, Ductal Arch, Aortic   Arch, Interventricular Septum, Interatrial Septum, IVC, SVC, Cardiac Axis,   Cardiac Position);    ABD  CAV : (Liver);    STOMACH, RIGHT KIDNEY, LEFT   KIDNEY, BLADDER, ABD  WALL, SPINE: (Cervical Spine, Thoracic Spine, Lumbar   Spine, Sacrum);    EXTREMS: (Lt Humerus, Rt Humerus, Lt Forearm, Rt   Forearm, Lt Hand, Rt Hand, Lt Femur, Rt Femur, Lt Low Leg, Rt Low Leg, Lt   Foot, Rt Foot);    GENITALIA (Female), PLACENTA, UMBL  CORD, UTERUS, PCI      ADNEXA      The left ovary was not visualized  The right ovary appeared normal and   measured 3 5 x 4 2 x 2 2 cm with a volume of 16 9 cc  IMPRESSION      Joaquin IUP   20 weeks and 4 days by this ultrasound  (JADEN=AUG 17 2017)   20 weeks and 2 days by [facility] Tri Sono  (JADEN=AUG 19 2017)   Vertex presentation   Normal anatomy survey   Regular fetal heart rate of 145 bpm   Anterior placenta   No placenta previa      GENERAL COMMENT      No fetal structural abnormality or ultrasound marker for aneuploidy is   identified on the Level II ultrasound study today  Fetal growth and   amniotic fluid volume are normal   The placenta is normal in appearance  The cervix is normal in appearance by transvaginal sonography  The   cervical length is normal   Cervical debris is not present  Cervical   funneling is not present  Neither provocative nor dynamic change is   appreciated  Today's ultrasound findings and suggested follow-up were discussed in   detail with [facility]  We discussed that prenatal ultrasound cannot rule   out all congenital abnormalities  [facility] will return to the [facility] at 32 weeks gestation to assess interval growth for the indication   of hypothyroidism  TSH levels are recommended at least once per trimester,   more frequently if otherwise clinically indicated  The face to face time, in addition to time spent discussing ultrasound   results, was approximately 10 minutes, greater than 50% of which was spent   during counseling and coordination of care  DAT Allen M D     Maternal-Fetal Medicine   Electronically signed 04/03/17 16:50

## 2018-01-15 NOTE — MISCELLANEOUS
Message   Recorded as Task   Date: 2017 01:07 PM, Created By: Chris Miller   Task Name: Go to Result   Assigned To: Julia Malcolmfelicita   Regarding Patient: Anne Hall, Status: In Progress   Zheng Carr - 2017 1:07 PM     TASK CREATED  Urine culture is negative, please inform the patient   Daja Herron - 2017 3:08 PM     TASK IN PROGRESS   Swapna Santiago - 2017 10:54 AM     TASK EDITED  pt informed        Active Problems    1  Anxiety (300 00) (F41 9)   2  Encounter for genetic counseling (V26 33) (Z31 5)   3  Family historic risk of congenital abnormality (655 23) (O35 8XX0)   4  Hashimoto's disease (245 2) (E06 3)   5  Herpes simplex infection (054 9) (B00 9)   6  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 thyroiditis (244 8,245 2) (E03 8,E06 3)   7  Hypothyroidism in pregnancy, first trimester (648 13,244 9) (O99 281,E03 9)   8  Pregnancy (V22 2) (Z33 1)   9  Pregnancy, obstetrical care (V22 1) (Z34 90)   10  Previous child with anomaly, antepartum, first trimester (V23 49) (O09 291)   11  Urinary tract infection (599 0) (N39 0)   12  Vitamin D deficiency (268 9) (E55 9)    Current Meds   1  Levothyroxine Sodium 125 MCG Oral Tablet; take 1 tablet by mouth daily Monday thru   Friday; take 2 tabs on Saturday and ; Therapy: 94LBD1163 to (Evaluate:97Meg1860)  Requested for: 38CTY8606; Last   Rx:90Adn4000 Ordered   2  PreNatal DHA CAPS; 1 tablet daily; Therapy: (Recorded:2017) to Recorded   3  Pre-Juan Formula Oral Tablet Recorded    Allergies    1  No Known Drug Allergies    2   Seasonal    Signatures   Electronically signed by : Griselda Shiley, ; 2017 10:54AM EST                       (Author)

## 2018-01-16 NOTE — MISCELLANEOUS
Message   Date: 06 Dec 2016 10:22 AM EST, Recorded By: John Mayer For: Olivia Rojas: Erin Robbins, Self   Phone: [phone] [facility]), [phone] (Work)   Reason: Other   New OB-  pts LMP 2016    pt will schedule her 9 week appt for around 17    Active Problems    1  Anxiety (300 00) (F41 9)   2  Cervical cancer screening (V76 2) (Z12 4)   3  E coli infection (041 49) (A49 8)   4  Encounter for initial prescription of contraceptive pills (V25 01) (Z30 011)   5  Encounter for routine gynecological examination with Papanicolaou smear of cervix   (V72 31,V76 2) (Z01 419)   6  Encounter for surveillance of contraceptive pills (V25 41) (Z30 41)   7  Fatigue (780 79) (R53 83)   8  Hashimoto's thyroiditis (245 2) (E06 3)   9  Herpes simplex infection (054 9) (B00 9)   10  Hirsutism (704 1) (L68 0)   11  History of self breast exam   12  Hypothyroidism (244 9) (E03 9)   13  Influenza vaccine administered (V04 81) (Z23)   14  Muscle ache (729 1) (M79 1)   15  Pain, upper back (724 5) (M54 9)   16  Postpartum exam (V24 2) (Z39 2)   17  Upper respiratory infection (465 9) (J06 9)   18  Vitamin D deficiency (268 9) (E55 9)    Current Meds   1  Levothyroxine Sodium 125 MCG Oral Tablet; take 1 tablet by mouth every day; Therapy: 45KVX8000 to (Evaluate:34Njf3192)  Requested for: 29Hdn5434; Last   Rx:51Yxr9920 Ordered   2  Pre-Juan Formula Oral Tablet Recorded    Allergies    1  No Known Drug Allergies    2   Seasonal    Signatures   Electronically signed by : Hudson Welch, ; Dec  6 2016 10:23AM EST                       (Author)

## 2018-01-16 NOTE — MISCELLANEOUS
Chief Complaint  Chief Complaint Free Text Note Form: BALAJI: Pt was admitted to [facility] from 2017 through 2017  Dx: 38 weeks gestation of pregnancy,   LM for pt to call back  Pt did return call  Spoke with front office staff  Pt reports she had her second baby and is doing well  Pt will follow up with her OB for post partum check up  History of Present Illness  TCM Communication [facility]: The patient is being contacted for follow-up after hospitalization and 2017  She was hospitalized at [facility]  The date of admission: 2017, date of discharge: 2017  Diagnosis: see note  She was discharged to home  She did not schedule a follow up appointment  The patient is currently asymptomatic  Communication performed and completed by Kell Alexander      Active Problems    1  34 weeks gestation of pregnancy (V22 2) (Z3A 34)   2  Anxiety (300 00) (F41 9)   3  Dermatitis (692 9) (L30 9)   4  Family historic risk of congenital abnormality (655 23) (O35 8XX0)   5  Fetal arrhythmia affecting pregnancy, antepartum (659 73) (O36 8990)   6  Hashimoto's disease (245 2) (E06 3)   7  Herpes simplex infection (054 9) (B00 9)   8  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 thyroiditis (244 8,245 2) (E03 8,E06 3)   9  Hypothyroidism in pregnancy, third trimester (648 13,244 9) (O99 283,E03 9)   10  Pregnancy, obstetrical care (V22 1) (Z34 90)   11  Previous child with anomaly, antepartum, first trimester (V23 49) (O09 291)   12  Vitamin D deficiency (268 9) (E55 9)    Past Medical History    1  History of Depression (311) (F32 9)   2  History of Hirsutism (704 1) (L68 0)   3  History of Hashimoto thyroiditis (V12 29) (Z86 39)   4  History of pregnancy (V13 29)   5  History of urinary tract infection (V13 02) (Z87 440)   6  History of Hypothyroidism in pregnancy, first trimester (648 13,244 9) (O99 281,E03 9)   7  History of Mild cervical dysplasia (622 11) (N87 0)   8   History of Subclinical hypothyroidism (244 8) (E03 9)   9  History of Vitamin D deficiency (268 9) (E55 9)    Surgical History    1  History of Atypical endocervical cells on Pap smear (795 00) (R87 619)   2  History of Colposcopy Cervix With Biopsy(S) With Endocervical Curettage   3  History of Dilation And Curettage   4  History of Hysteroscopy With Resection For Intrauterine Polyp Removal    Family History  Mother    1  Family history of    2  Family history of cerebrovascular accident (V17 1) (Z82 3)   3  Family history of depression (V17 0) (Z81 8)   4  Family history of thyroid disease (V18 19) (Z83 49)  Father    5  No pertinent family history  Maternal Aunt    6  Family history of Depression   7  Family history of Diabetes Mellitus (V18 0)  Maternal Uncle    8  Family history of Diabetes Mellitus (V18 0)  Paternal Uncle    5  Family history of myocardial infarction (V17 3) (Z82 49)  Family History    10  Family history of Diabetes Mellitus (V18 0)    Social History    · Being A Social Drinker   · Caffeine Use   · Denied: History of Drug Use   · Marital History - Currently    · Never A Smoker   · One child   · Yarsanism Affiliation [employer]   · Uses Safety Equipment - Seatbelts    Current Meds   1  Iron 325 (65 Fe) MG Oral Tablet; 4-5 times per week; Therapy: (Recorded:33Tog5199) to Recorded   2  Levothyroxine Sodium 125 MCG Oral Tablet; Take 1 tablet daily (after delivery, decrease 1   tab less a week); Therapy: 32VXE7020 to ([facility])  Requested for: 2017; Last   Rx:97Lgd9289 Ordered   3  Pre-Juan Formula Oral Tablet Recorded   4  ValACYclovir HCl - 500 MG Oral Tablet; TAKE 1 TABLET DAILY; Therapy: 87MJR5050 to (Evaluate:2017); Last Rx:49Ryb3490 Ordered    Allergies    1  No Known Drug Allergies    2  Seasonal    Future Appointments    Date/Time Provider Specialty Site   2017 09:00 AM MACARENA Feliz   Endocrinology [facility] ENDOCRINOLOGY [facility] 08/15/2017 08:00 AM Fito Marquis MD Obstetrics/Gynecology [facility]     Signatures   Electronically signed by :  MACARENA More ; Aug 14 2017  9:40AM EST                       (Author)

## 2018-01-16 NOTE — RESULT NOTES
Message   seen in office     Verified Results  (1) T4, FREE [id number] 09:51AM [address]     Test Name Result Flag Reference   T4, FREE 1 2 ng/dL  0 8-1 8     (Q) TSH, 3RD GENERATION [id number] 09:51AM Lillie Gunjose carlos   REPORT COMMENT:  FASTING:NO     Test Name Result Flag Reference   TSH 0 80 mIU/L     Reference Range                         > or = 20 Years  0 40-4 50                              Pregnancy Ranges            First trimester    0 26-2 66            Second trimester   0 55-2 73            Third trimester    0 43-2 91

## 2018-01-16 NOTE — MISCELLANEOUS
Message  Spoke with [employer] rep, Emmy Angel, for precert for NIPT and carrier screening  Tests were precerted #[id number]  Called patient and advised her to schedule an appointment for blood draw next week preferably on a day that I am in the office        Signatures   Electronically signed by : Bertha Espino, ; Jan 20 2017  4:25PM EST                       (Author)

## 2018-01-16 NOTE — MISCELLANEOUS
Message   Date: 2017 3:20 PM EST, Recorded By: Kevin Alarcon   Calling For: Marilyn Zepeda: Bernardo Dowling, Self   Phone: [phone] [facility]), [phone] (Work)   Reason: Medical Complaint   Patient called c/o slammed down hard on her chair seat and now has pelvic cramps  FM WNL  [facility]  Active Problems    1  Anxiety (300 00) (F41 9)   2  Dermatitis (692 9) (L30 9)   3  Family historic risk of congenital abnormality (655 23) (O35 8XX0)   4  Fetal arrhythmia affecting pregnancy, antepartum (659 73) (O36 8990)   5  Hashimoto's disease (245 2) (E06 3)   6  Herpes simplex infection (054 9) (B00 9)   7  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 thyroiditis (244 8,245 2) (E03 8,E06 3)   8  Pregnancy, obstetrical care (V22 1) (Z34 90)   9  Previous child with anomaly, antepartum, first trimester (V23 49) (O09 291)   10  Vitamin D deficiency (268 9) (E55 9)    Current Meds   1  Iron 325 (65 Fe) MG Oral Tablet; Therapy: (Recorded:87Mli3667) to Recorded   2  Levothyroxine Sodium 125 MCG Oral Tablet; take 1 tablet Monday through Saturday   take 2 tablets on ; Therapy: 91YZW6766 to (Evaluate:44Ich4239)  Requested for: 52SZF5403 Recorded   3  PreNatal DHA CAPS; 1 tablet daily; Therapy: (CIRROTUO:26VUA0342) to Recorded   4  Pre- Formula Oral Tablet Recorded    Allergies    1  No Known Drug Allergies    2   Seasonal    Signatures   Electronically signed by : Padmaja Santana, ; 2017  3:22PM EST                       (Author)

## 2018-01-17 NOTE — MISCELLANEOUS
Message   Recorded as Task   Date: 10/11/2017 04:51 PM, Created By: Juliann Patterson   Task Name: Care Coordination   Assigned To: Swapna Santiago   Regarding Patient: Con Aguiar, Status: In Progress   Comment:    Swapna Santiago - 11 Oct 2017 4:51 PM     TASK CREATED  pt was interested in the IUD, but has been seeing that there are lawsuits for pseudo tumors    Any opinion on this? Bryon Sousa - 11 Oct 2017 4:58 PM     TASK REPLIED TO: Previously Assigned To Bryon Sousa  Have not heard anything about this  Would recommend she limit Internet searching and stick to credible web site such as [employer] - 12 Oct 2017 1:58 PM     TASK IN PROGRESS   Swapna Santiago - 12 Oct 2017 2:35 PM     TASK EDITED  PT INFORMED  Mesha Briggs Active Problems    1  34 weeks gestation of pregnancy (V22 2) (Z3A 34)   2  Anemia (285 9) (D64 9)   3  Anxiety (300 00) (F41 9)   4  Dermatitis (692 9) (L30 9)   5  Encounter for postpartum visit (V24 2) (Z39 2)   6  Family historic risk of congenital abnormality (655 23) (O35 8XX0)   7  Fetal arrhythmia affecting pregnancy, antepartum (659 73) (O20 8684)   8  Hashimoto's disease (245 2) (E06 3)   9  Herpes simplex infection (054 9) (B00 9)   10  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 thyroiditis (244 8,245 2) (E03 8,E06 3)   11  Hypothyroidism in pregnancy, third trimester (648 13,244 9) (O99 283,E03 9)   12  Pregnancy, obstetrical care (V22 1) (Z34 90)   13  Previous child with anomaly, antepartum, first trimester (V23 49) (O09 291)   14  Vitamin D deficiency (268 9) (E55 9)   15  Weight loss (783 21) (R63 4)    Current Meds   1  Pre-Juan Formula Oral Tablet Recorded   2  Synthroid 100 MCG Oral Tablet (Levothyroxine Sodium); take one tablet daily 1 hour   before breakfast;   Therapy: 96VMH9202 to (Last Rx:83Gsu7373)  Requested for: 86KJP6991 Ordered    Allergies    1  No Known Drug Allergies    2   Seasonal    Signatures   Electronically signed by : Gene Pink ; Oct 12 2017  2:36PM EST                       (Author)

## 2018-01-17 NOTE — PROGRESS NOTES
2017         RE: Gemma Zhao                             To: Sycamorenohemi Rodríguezs, M D    MR#: [id number]                                   [address]     : [facility] [address], [address]: [id number]                             Fax: [phone]   (Exam #: [id number])      The LMP of this 28year old,  G2, P1-0-0-1 patient was 2016, her   working JADEN is AUG 23 2017 and the current gestational age is 17 weeks 2   days by 1st Trimester Sono  A sonographic examination was performed on 2017 using real time equipment  The ultrasound examination was   performed using abdominal technique  The patient has a BMI of 25 2  Her   blood pressure today was 109/64  Earliest ultrasound found in her record: 17   8w2d  17 JADEN   Multiple longitudinal and transverse sections revealed a knight   intrauterine pregnancy  The placenta is anterior in implantation  Cardiac motion was observed at 141 bpm       INDICATIONS      maternal hashimoto thyroiditis   previous child with birth defects      Exam Types      Level I      RESULTS      Fetus # 1 of 1   Fetal growth appeared normal      MEASUREMENTS (* Included In Average GA)      CRL              8 6 cm        14 weeks 1 day *   Nuchal Trans    2 60 mm      THE AVERAGE GESTATIONAL AGE is 14 weeks 1 day +/- 7 days  ANATOMY COMMENTS      Anatomic detail is limited at this gestational age  The yolk sac was not   noted  The fetal cranium appeared normal in shape and the nuchal   translucency was normal in size at 2 6 mm  Several small unilateral   choroid plexus cysts appear to be present  The brain is otherwise normal   in appearance  The nasal bone appears to be present     Evaluation of the   spine revealed no obvious evidence for a neural tube defect  Anatomy of   the fetal thorax appeared within normal limits  The cardiac rhythm was   regular    Within the abdomen, stomach & bladder were visualized and the   abdominal wall appeared intact  A three vessel cord appears to be present  Active movement of the fetal body & extremities was seen  There is no   suspicion of a subchorionic bleed  The placental cord insertion was   normal    There is no suspicion of a uterine myoma  Free fluid is not seen   in the posterior cul-de-sac  ADNEXA      The left ovary was not visualized  The right ovary was not visualized  IMPRESSION      Joaquin IUP   14 weeks and 1 day by this ultrasound  (JADEN=AUG 13 2017)   13 weeks and 2 days by 1st Tri Sono  (JADEN=AUG 19 2017)   Fetal growth appeared normal   Regular fetal heart rate of 141 bpm   Anterior placenta      CONSULT COMMENT      Thank you very much for your kind referral of Claudean Auerbach  to the   [address] [place] [address] on February 13, 2017 for first trimester   ultrasound evaluation and MFM consult  [facility] is a 28-year-old white   female who is currently at 13-2/7 weeks gestation by an estimated due date   of August 19, 2017  She is referred for the indications of hypothyroidism   and a history of a prior pregnancy in which he fetal congenital anomaly   was diagnosed  She experienced a small amount of vaginal bleeding in early   February, and has  also experienced intermittent nausea  Otherwise, her   early prenatal course so far has been unremarkable  Saintclair Seller met with   Sidra Gallego for genetic counseling earlier in the pregnancy  Non   invasive prenatal testing using cell free DNA analysis recently revealed   negative results  A  TSH level obtained on January 13 was normal at 0 87   uIU/L, with a normal free T4 value of 1 5 ng/dL  She is currently followed   by an endocrinologist       [facility] is well known to the [facility] group  Her only prior pregnancy was   complicated by a dichorionic, diamniotic twin pregnancy with multiple    brain abnormalities affecting one of the fetuses   Genetic amniocentesis   had revealed a normal microarray result  Fabricio Thurman had evaluation also   performed at [facility]  Given a   dismal  prognosis for a good outcome for the anomalous fetus based upon   ultrasound and MRI imaging, Fabricio Thurman chose to undergo selective reduction   of that fetus  Her prenatal course then went on to be unremarkable, with   eventual delivery at term vaginally of a 8 lbs  8 oz  baby boy, currently   healthy  Fabricio Thurman has a history of Hashimoto's thyroiditis, currently treated with   125 micrograms  of levothyroxine a day 5 days a week and 250 micrograms on   the other 2 days of the week  Her past medical and surgical histories are   otherwise unremarkable  She currently takes no other medication with the   exception of  prenatal vitamin on a daily basis and has no known drug   allergy  Fabricio Thurman denies tobacco, alcohol, or illicit drug use during the   pregnancy  The family genetic history is otherwise noncontributory  Today's ultrasound findings and suggested follow-up were discussed in   detail with Fabricio Thurman  She will return to the [facility]  in 4 weeks   for early second trimester assessment of fetal anatomy, with level II   ultrasound evaluation to be performed at about 20 weeks gestation, given a   history of a prior baby with multiple congenital anomalies  MSAFP testing   is recommended between 16 and 20 weeks gestation  Her cell free DNA   analysis results were discussed today  Hypothyroidism can have adverse effects on pregnancy outcomes, depending   upon the severity of the biochemical abnormalities   Hypothyroidism has   been associated with an increased risk of several complications, including   preeclampsia and gestational hypertension, placental abruption, non   reassuring fetal heart rate tracings,  delivery, low birthweight,   increased rate  section,   morbidity and mortality, and   neuropsychological and cognitive impairment  The goal is to maintain TSH   in the trimester-specific reference range (0 1 to 2 5mU/L, 0 2 to 3 mU/L,   and 0 3 to 3 mU/L for the first, second, and third trimesters,   respectively)  If the TSH remains above the normal trimester-specific   reference range, the dose of T4 can be increased by 12 to 25 mcg a day  TSH should be measured every 4 weeks during the first trimester, because   dose adjustments are often required  TSH can be monitored less often,   though at least once each trimester, in the latter half of the pregnancy,   as long as the dose is unchanged  About 50-85% of women with pre-existing   hypothyroidism need an increase of the T4 dose during pregnancy  Dose   requirements may increase to as much as 50% during pregnancy  The face to face time, in addition to time spent discussing ultrasound   results, was approximately 15 minutes, greater than 50% of which was spent   during counseling and coordination of care  DAT Marie S , R ROBIN CANO S  MACARENA Haley     Maternal-Fetal Medicine   Electronically signed 02/14/17 11:46

## 2018-01-18 NOTE — RESULT NOTES
Discussion/Summary   did She get my last message from May 26? If she did, decrease to levothyroxine 125 Âµg once days of the week  Repeat TSH and free T4 in 4 weeks       Verified Results  (1) T4, FREE 28Jun2017 09:35AM Eriberto Garzon     Test Name Result Flag Reference   T4, FREE 1 3 ng/dL  0 8-1 8     (Q) TSH, 3RD GENERATION 28Jun2017 09:35AM Eriberto Garzon   REPORT COMMENT:  FASTING:NO     Test Name Result Flag Reference   TSH 0 24 mIU/L L    Reference Range                         > or = 20 Years  0 40-4 50                              Pregnancy Ranges            First trimester    0 26-2 66            Second trimester   0 55-2 73            Third trimester    0 43-2 91

## 2018-01-22 VITALS
BODY MASS INDEX: 29.05 KG/M2 | HEIGHT: 72 IN | WEIGHT: 200.38 LBS | BODY MASS INDEX: 27.14 KG/M2 | DIASTOLIC BLOOD PRESSURE: 58 MMHG | SYSTOLIC BLOOD PRESSURE: 100 MMHG | SYSTOLIC BLOOD PRESSURE: 112 MMHG | DIASTOLIC BLOOD PRESSURE: 68 MMHG | WEIGHT: 214.5 LBS | HEIGHT: 72 IN

## 2018-01-22 VITALS
HEART RATE: 68 BPM | BODY MASS INDEX: 27.06 KG/M2 | SYSTOLIC BLOOD PRESSURE: 112 MMHG | DIASTOLIC BLOOD PRESSURE: 66 MMHG | WEIGHT: 199.5 LBS

## 2018-01-22 VITALS
DIASTOLIC BLOOD PRESSURE: 64 MMHG | SYSTOLIC BLOOD PRESSURE: 120 MMHG | WEIGHT: 219.25 LBS | BODY MASS INDEX: 29.7 KG/M2 | HEIGHT: 72 IN

## 2018-01-22 VITALS
BODY MASS INDEX: 25.9 KG/M2 | DIASTOLIC BLOOD PRESSURE: 78 MMHG | WEIGHT: 191.25 LBS | SYSTOLIC BLOOD PRESSURE: 120 MMHG | HEIGHT: 72 IN

## 2018-01-22 VITALS
DIASTOLIC BLOOD PRESSURE: 68 MMHG | SYSTOLIC BLOOD PRESSURE: 110 MMHG | WEIGHT: 206.5 LBS | BODY MASS INDEX: 27.97 KG/M2 | HEIGHT: 72 IN

## 2018-01-22 VITALS
DIASTOLIC BLOOD PRESSURE: 62 MMHG | SYSTOLIC BLOOD PRESSURE: 110 MMHG | BODY MASS INDEX: 25.48 KG/M2 | HEIGHT: 72 IN | WEIGHT: 188.13 LBS

## 2018-01-22 VITALS
SYSTOLIC BLOOD PRESSURE: 118 MMHG | BODY MASS INDEX: 29.73 KG/M2 | DIASTOLIC BLOOD PRESSURE: 66 MMHG | WEIGHT: 219.5 LBS | HEIGHT: 72 IN

## 2018-01-22 VITALS
SYSTOLIC BLOOD PRESSURE: 98 MMHG | DIASTOLIC BLOOD PRESSURE: 78 MMHG | HEIGHT: 72 IN | WEIGHT: 196.13 LBS | BODY MASS INDEX: 26.56 KG/M2

## 2018-01-22 VITALS
HEIGHT: 72 IN | SYSTOLIC BLOOD PRESSURE: 99 MMHG | DIASTOLIC BLOOD PRESSURE: 57 MMHG | BODY MASS INDEX: 25.6 KG/M2 | WEIGHT: 189 LBS

## 2018-01-22 VITALS
HEIGHT: 72 IN | BODY MASS INDEX: 27.95 KG/M2 | SYSTOLIC BLOOD PRESSURE: 108 MMHG | DIASTOLIC BLOOD PRESSURE: 68 MMHG | WEIGHT: 206.38 LBS

## 2018-01-22 VITALS — HEIGHT: 72 IN | WEIGHT: 199.5 LBS | BODY MASS INDEX: 27.02 KG/M2

## 2018-02-09 ENCOUNTER — OFFICE VISIT (OUTPATIENT)
Dept: OBGYN CLINIC | Facility: CLINIC | Age: 33
End: 2018-02-09
Payer: COMMERCIAL

## 2018-02-09 VITALS
WEIGHT: 195.8 LBS | HEIGHT: 71 IN | BODY MASS INDEX: 27.41 KG/M2 | DIASTOLIC BLOOD PRESSURE: 60 MMHG | SYSTOLIC BLOOD PRESSURE: 110 MMHG

## 2018-02-09 DIAGNOSIS — Z12.4 SCREENING FOR CERVICAL CANCER: ICD-10-CM

## 2018-02-09 DIAGNOSIS — Z01.419 WOMEN'S ANNUAL ROUTINE GYNECOLOGICAL EXAMINATION: Primary | ICD-10-CM

## 2018-02-09 DIAGNOSIS — Z11.51 SCREENING FOR HUMAN PAPILLOMAVIRUS (HPV): ICD-10-CM

## 2018-02-09 PROBLEM — E55.9 VITAMIN D DEFICIENCY: Status: ACTIVE | Noted: 2017-01-23

## 2018-02-09 PROBLEM — O36.8390 FETAL ARRHYTHMIA AFFECTING PREGNANCY, ANTEPARTUM: Status: ACTIVE | Noted: 2017-04-18

## 2018-02-09 PROBLEM — E03.8 HYPOTHYROIDISM DUE TO HASHIMOTO'S THYROIDITIS: Status: ACTIVE | Noted: 2017-01-23

## 2018-02-09 PROBLEM — E55.9 VITAMIN D DEFICIENCY: Status: RESOLVED | Noted: 2017-01-23 | Resolved: 2018-02-09

## 2018-02-09 PROBLEM — O35.8XX0 FAMILY HISTORIC RISK OF CONGENITAL ABNORMALITY: Status: ACTIVE | Noted: 2017-01-30

## 2018-02-09 PROBLEM — D64.9 ANEMIA: Status: ACTIVE | Noted: 2017-08-18

## 2018-02-09 PROBLEM — R63.4 WEIGHT LOSS: Status: ACTIVE | Noted: 2017-09-28

## 2018-02-09 PROBLEM — D64.9 ANEMIA: Status: RESOLVED | Noted: 2017-08-18 | Resolved: 2018-02-09

## 2018-02-09 PROBLEM — L30.9 DERMATITIS: Status: RESOLVED | Noted: 2017-03-09 | Resolved: 2018-02-09

## 2018-02-09 PROBLEM — E06.3 HYPOTHYROIDISM DUE TO HASHIMOTO'S THYROIDITIS: Status: ACTIVE | Noted: 2017-01-23

## 2018-02-09 PROBLEM — L30.9 DERMATITIS: Status: ACTIVE | Noted: 2017-03-09

## 2018-02-09 PROBLEM — O36.8390 FETAL ARRHYTHMIA AFFECTING PREGNANCY, ANTEPARTUM: Status: RESOLVED | Noted: 2017-04-18 | Resolved: 2018-02-09

## 2018-02-09 PROBLEM — R63.4 WEIGHT LOSS: Status: RESOLVED | Noted: 2017-09-28 | Resolved: 2018-02-09

## 2018-02-09 PROBLEM — O35.8XX0 FAMILY HISTORIC RISK OF CONGENITAL ABNORMALITY: Status: RESOLVED | Noted: 2017-01-30 | Resolved: 2018-02-09

## 2018-02-09 PROCEDURE — G0145 SCR C/V CYTO,THINLAYER,RESCR: HCPCS | Performed by: OBSTETRICS & GYNECOLOGY

## 2018-02-09 PROCEDURE — 87624 HPV HI-RISK TYP POOLED RSLT: CPT | Performed by: OBSTETRICS & GYNECOLOGY

## 2018-02-09 PROCEDURE — 99395 PREV VISIT EST AGE 18-39: CPT | Performed by: OBSTETRICS & GYNECOLOGY

## 2018-02-09 RX ORDER — LEVOTHYROXINE SODIUM 100 MCG
TABLET ORAL
COMMUNITY
Start: 2018-02-08 | End: 2018-03-12

## 2018-02-09 NOTE — PROGRESS NOTES
Assessment/Plan:  1  Yearly exam-Pap smear done with HPV testing with patient consent, self-breast awareness reviewed  2  Mirena IUD-placed 10/25/17  The patient is doing well with this  She was counseled about how to check for the IUD string  She will call with any issues  3   Secondary amenorrhea-likely related to breastfeeding her 10month-old baby and the Mirena IUD  She will call with any menometrorrhagia  4   History of HSV-no current symptoms noted  5  History of anxiety-patient does occasionally feel overwhelmed with   She does plan to follow up with psychologist and she will do this through her new insurance  She was also referred to the [facility] program   She does note mostly anger  She denies depression she  She denies any suicidal/homicidal issues  6   Other-patient had numerous concerns about HPV  Pap with HPV was done today  All questions were answered in detail  She will follow-up in 1 year or as needed  No problem-specific Assessment & Plan notes found for this encounter  There are no diagnoses linked to this encounter  Vitals:    02/09/18 0842   BP: 110/60       Subjective:      Patient ID: Annemarie Miranda is a 33 y o  female  Patient was seen today for yearly exam   She denies any complaints  Gynecologic Exam   The patient's pertinent negatives include no pelvic pain or vaginal discharge  Pertinent negatives include no abdominal pain, back pain, chills, constipation, diarrhea, dysuria, fever, frequency, headaches, hematuria, nausea, rash, urgency or vomiting  The following portions of the patient's history were reviewed and updated as appropriate: allergies, current medications, past family history, past medical history, past social history, past surgical history and problem list     Review of Systems   Constitutional: Negative for chills, diaphoresis, fatigue and fever     Respiratory: Negative for apnea, cough, chest tightness, shortness of breath and wheezing  Cardiovascular: Negative for chest pain, palpitations and leg swelling  Gastrointestinal: Negative for abdominal distention, abdominal pain, anal bleeding, constipation, diarrhea, nausea, rectal pain and vomiting  Genitourinary: Negative for difficulty urinating, dyspareunia, dysuria, frequency, hematuria, menstrual problem, pelvic pain, urgency, vaginal bleeding, vaginal discharge and vaginal pain  Musculoskeletal: Negative for arthralgias, back pain and myalgias  Skin: Negative for color change and rash  Neurological: Negative for dizziness, syncope, light-headedness, numbness and headaches  Hematological: Negative for adenopathy  Does not bruise/bleed easily  Psychiatric/Behavioral: Negative for dysphoric mood and sleep disturbance  The patient is not nervous/anxious  Objective:     Physical Exam   Constitutional: She appears well-developed and well-nourished  Objective      /60 (BP Location: Right arm, Patient Position: Sitting, Cuff Size: Standard)   Ht 5' 11" (1 803 m)   Wt 88 8 kg (195 lb 12 8 oz)   BMI 27 31 kg/m²     General:   alert and oriented, in no acute distress, alert, appears stated age and cooperative   Neck: normal to inspection and palpation   Breast: normal appearance, no masses or tenderness   Heart:    Lungs:    Abdomen: soft, non-tender, without masses or organomegaly   Vulva: normal, without lesions noted   Vagina: Without lesions or discharge noted  Cervix: Without lesions or discharge or cervicitis  No Cervical motion tenderness    IUD string seen at a length of 2 5 cm   Uterus: top normal size, anteverted   Adnexa: no mass, fullness, tenderness   Rectum: negative

## 2018-02-13 LAB — HPV RRNA GENITAL QL NAA+PROBE: NORMAL

## 2018-02-14 ENCOUNTER — TELEPHONE (OUTPATIENT)
Dept: OBGYN CLINIC | Facility: CLINIC | Age: 33
End: 2018-02-14

## 2018-02-14 LAB
LAB AP GYN PRIMARY INTERPRETATION: NORMAL
Lab: NORMAL

## 2018-02-28 LAB
25(OH)D3 SERPL-MCNC: 40 NG/ML (ref 30–100)
TSH SERPL-ACNC: 4.5 MIU/L

## 2018-03-12 DIAGNOSIS — E03.9 HYPOTHYROIDISM, UNSPECIFIED TYPE: Primary | ICD-10-CM

## 2018-03-12 RX ORDER — LEVOTHYROXINE SODIUM 112 MCG
112 TABLET ORAL DAILY
Qty: 30 TABLET | Refills: 2 | Status: SHIPPED | OUTPATIENT
Start: 2018-03-12 | End: 2018-06-18 | Stop reason: SDUPTHER

## 2018-03-12 NOTE — TELEPHONE ENCOUNTER
Spoke to patient who understood  Patient is taking brand  Patient wondering if she'll need any labs before 4/3/18 appt with Dr Emily Weston

## 2018-03-28 DIAGNOSIS — E03.8 OTHER SPECIFIED HYPOTHYROIDISM: ICD-10-CM

## 2018-03-28 DIAGNOSIS — E55.9 VITAMIN D DEFICIENCY: ICD-10-CM

## 2018-04-26 LAB
T4 FREE SERPL-MCNC: 1.3 NG/DL (ref 0.8–1.8)
TSH SERPL-ACNC: 2.59 MIU/L

## 2018-05-16 ENCOUNTER — TELEPHONE (OUTPATIENT)
Dept: ENDOCRINOLOGY | Facility: CLINIC | Age: 33
End: 2018-05-16

## 2018-05-18 ENCOUNTER — OFFICE VISIT (OUTPATIENT)
Dept: ENDOCRINOLOGY | Facility: CLINIC | Age: 33
End: 2018-05-18
Payer: COMMERCIAL

## 2018-05-18 VITALS
SYSTOLIC BLOOD PRESSURE: 110 MMHG | BODY MASS INDEX: 26.88 KG/M2 | DIASTOLIC BLOOD PRESSURE: 72 MMHG | HEIGHT: 71 IN | WEIGHT: 192 LBS

## 2018-05-18 DIAGNOSIS — E03.8 HYPOTHYROIDISM DUE TO HASHIMOTO'S THYROIDITIS: Primary | ICD-10-CM

## 2018-05-18 DIAGNOSIS — E06.3 HYPOTHYROIDISM DUE TO HASHIMOTO'S THYROIDITIS: Primary | ICD-10-CM

## 2018-05-18 DIAGNOSIS — E06.3 HASHIMOTO'S DISEASE: ICD-10-CM

## 2018-05-18 PROCEDURE — 99214 OFFICE O/P EST MOD 30 MIN: CPT | Performed by: NURSE PRACTITIONER

## 2018-05-18 NOTE — PROGRESS NOTES
Dakota Patch 33 y o  female MRN: [id number]    Encounter: [id number]      Assessment/Plan     Assessment: This is a 33y o -year-old female with hypothyroidism due to Hashimoto's thyroiditis  Plan:  1  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 thyroiditis:  She complains of some fatigue which she attributes to being a new parent and lack of sleep  Her most recent TSH is 2 59 with a free T4 of 1 3  Discussed increasing her levothyroxine dose by 1/2 tablet on Sunday to bring her in the target range of 0 6 to 2 0 for her TSH  She states that she would like to hold off for now and re-evaluate when her lab work is reassessed in July 2018  2   Vitamin-D deficiency:  She states she sometimes forgets her vitamin-D  Suggested that she continue supplementation with 2000 units of vitamin D3 daily on a consistent basis  CC:  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 thyroiditis    History of Present Illness     HPI:  71-year-old female presents in the office today for follow-up of hypothyroidism due to Hashimoto's thyroiditis and vitamin-D deficiency  She states that she has been treated for hypothyroidism since her pregnancy with her 1st child  She is currently taking levothyroxine 112 mcg daily  She complains of some fatigue which she attributes to being a new mother and lack of sleep  Her most recent TSH from April 25, 2018 is 2 59 with a free T4 of 1 3  She states that she often times forgets her vitamin-D supplementation  She is attempting to supplement with 2000 units of vitamin D3 daily  Her most recent Vitamin D level was 40 on 2/27/2018  Review of Systems   Constitutional: Positive for fatigue (At times)  Negative for chills and fever  HENT: Negative  Eyes: Negative  Respiratory: Negative  Negative for chest tightness and shortness of breath  Cardiovascular: Negative  Negative for chest pain  Gastrointestinal: Negative    Negative for abdominal pain, constipation, diarrhea and vomiting  Endocrine: Negative for cold intolerance, heat intolerance, polydipsia, polyphagia and polyuria  Genitourinary: Negative  Musculoskeletal: Negative  Skin: Negative  Allergic/Immunologic: Negative  Neurological: Negative  Negative for dizziness, syncope, light-headedness and headaches  Hematological: Negative  Psychiatric/Behavioral: Negative  All other systems reviewed and are negative        Historical Information   Past Medical History:   Diagnosis Date    Abnormal Pap smear of cervix     Anemia     Depression     Dermatitis      2 para 2     Herpes     Hirsutism     last assessed: 14    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 thyroiditis     Mild cervical dysplasia     Pregnancy     last assessed: 3/28/17    Subclinical hypothyroidism     last assessed: 14    Urinary tract infection     Varicella     Vitamin D deficiency      Past Surgical History:   Procedure Laterality Date    COLPOSCOPY VULVA W/ BIOPSY      10/1/07 - MALLORY-1 noted at 5:00 and 12:00 ECC was negative    COMBINED HYSTEROSCOPY DIAGNOSTIC / D&C  10/2014    polyp removal    DILATION AND CURETTAGE OF UTERUS      WISDOM TOOTH EXTRACTION       Social History   History   Alcohol Use No     Comment: social drinker as per allscripts     History   Drug Use No     History   Smoking Status    Never Smoker   Smokeless Tobacco    Never Used     Family History:   Family History   Problem Relation Age of Onset    Stroke Mother     Depression Mother     Thyroid disease Mother     Depression Maternal Aunt     Diabetes Maternal Aunt     Heart attack Paternal Uncle     Diabetes Other        Meds/Allergies   Current Outpatient Prescriptions   Medication Sig Dispense Refill    acetaminophen (TYLENOL) 325 mg tablet Every 4 hours for mild pain 30 tablet 0    [employer] VITAMIN D 2000 units CAPS Take 1 capsule by mouth daily  2    ibuprofen (MOTRIN) 600 mg tablet Take 1 tablet by mouth every 6 (six) hours as needed (cramps) 30 tablet 0    Prenatal Multivit-Min-Fe-FA (PRE- FORMULA PO) Take by mouth      SYNTHROID 112 MCG tablet Take 1 tablet (112 mcg total) by mouth daily for 90 days 30 tablet 2    Docosahexaenoic Acid (PRENATAL DHA) 200 MG CAPS Take 1 tablet by mouth daily      ferrous sulfate 325 (65 Fe) mg tablet Take 325 mg by mouth daily       No current facility-administered medications for this visit  Allergies   Allergen Reactions    Other        Objective   Vitals: Blood pressure 110/72, height 5' 11" (1 803 m), weight 87 1 kg (192 lb), currently breastfeeding  Physical Exam   Constitutional: She is oriented to person, place, and time  She appears well-developed and well-nourished  No distress  HENT:   Head: Normocephalic and atraumatic  Mouth/Throat: Oropharynx is clear and moist    Eyes: Conjunctivae and EOM are normal  Pupils are equal, round, and reactive to light  Right eye exhibits no discharge  Left eye exhibits no discharge  Neck: Normal range of motion  Neck supple  No JVD present  No tracheal deviation present  No thyromegaly present  Cardiovascular: Normal rate, regular rhythm, normal heart sounds and intact distal pulses  Pulmonary/Chest: Effort normal and breath sounds normal  No stridor  No respiratory distress  She has no wheezes  She has no rales  She exhibits no tenderness  Abdominal: Soft  Bowel sounds are normal  She exhibits no distension  There is no tenderness  Musculoskeletal: Normal range of motion  She exhibits no edema, tenderness or deformity  Lymphadenopathy:     She has no cervical adenopathy  Neurological: She is alert and oriented to person, place, and time  She displays normal reflexes  No cranial nerve deficit  Coordination normal    Skin: Skin is warm and dry  No rash noted  No erythema  No pallor  Psychiatric: She has a normal mood and affect  Her behavior is normal  Judgment and thought content normal    Vitals reviewed      Lab Results:   Lab Results   Component Value Date/Time    TSH 3RD GENERATON 0 18 (L) 11/29/2017 10:27 AM    TSH 3RD GENERATON 0 32 (L) 09/26/2017 09:29 AM    TSH 3RD GENERATON 0 56 07/31/2017 11:02 AM    Free T4 1 6 11/29/2017 10:27 AM    Free T4 1 2 09/26/2017 09:29 AM    Free T4 1 1 07/31/2017 11:02 AM    Free t4 1 3 04/25/2018 11:03 AM       Portions of the record may have been created with voice recognition software  Occasional wrong word or "sound a like" substitutions may have occurred due to the inherent limitations of voice recognition software  Read the chart carefully and recognize, using context, where substitutions have occurred

## 2018-05-18 NOTE — PATIENT INSTRUCTIONS
For now, continue Levothyroxine at current dose  Check thyroid function testing in July 2018  Continue Vitamin D3 daily on a consistent basis

## 2018-06-08 ENCOUNTER — OFFICE VISIT (OUTPATIENT)
Dept: FAMILY MEDICINE CLINIC | Facility: CLINIC | Age: 33
End: 2018-06-08
Payer: COMMERCIAL

## 2018-06-08 VITALS
DIASTOLIC BLOOD PRESSURE: 62 MMHG | RESPIRATION RATE: 18 BRPM | WEIGHT: 192.8 LBS | BODY MASS INDEX: 26.99 KG/M2 | HEIGHT: 71 IN | SYSTOLIC BLOOD PRESSURE: 102 MMHG | TEMPERATURE: 97.8 F | HEART RATE: 64 BPM

## 2018-06-08 DIAGNOSIS — J01.90 ACUTE SINUSITIS, RECURRENCE NOT SPECIFIED, UNSPECIFIED LOCATION: Primary | ICD-10-CM

## 2018-06-08 PROCEDURE — 99213 OFFICE O/P EST LOW 20 MIN: CPT | Performed by: FAMILY MEDICINE

## 2018-06-08 RX ORDER — PREDNISONE 20 MG/1
20 TABLET ORAL 2 TIMES DAILY WITH MEALS
Qty: 6 TABLET | Refills: 0 | Status: SHIPPED | OUTPATIENT
Start: 2018-06-08 | End: 2018-06-11

## 2018-06-08 RX ORDER — AMOXICILLIN 875 MG/1
875 TABLET, COATED ORAL 2 TIMES DAILY
Qty: 20 TABLET | Refills: 0 | Status: SHIPPED | OUTPATIENT
Start: 2018-06-08 | End: 2018-06-18

## 2018-06-08 NOTE — PROGRESS NOTES
FAMILY PRACTICE OFFICE VISIT       NAME: Wyatt Mendez  AGE: 33 y o  SEX: female       : 1985        MRN: [id number]    DATE: 2018  TIME: 2:04 PM    Assessment and Plan     Problem List Items Addressed This Visit     None      Visit Diagnoses     Acute sinusitis, recurrence not specified, unspecified location    -  Primary    Relevant Medications    amoxicillin (AMOXIL) 875 mg tablet    predniSONE 20 mg tablet        Patient presents for evaluation of right maxillary/right frontal sinusitis  Lung exam is clear  She is nursing mother of 8month-old infant  Will start her on Claritin, saline nasal rinses, Flonase sensimist over-the-counter  Patient will use amoxicillin 875 mg twice a day for 10 days  If her symptoms will not improve significantly in 2 days-she should hold nursing and start prednisone 20 mg twice a day for 3 days  There are no Patient Instructions on file for this visit  Chief Complaint     Chief Complaint   Patient presents with    Sinus Problem     Patient is here c/o a sinus pressure, ear pressure, dry cough and nasal congestion x's 2 wks  History of Present Illness     Cold s/o  For 2 weeks, worsening of symptoms within past few days  Patient is nursing mother of 8month-old baby girl  She is afebrile  Her daughter was recently diagnosed with ear infection is currently treated with Zithromax  Patient is complaining of significant sinus pressure that is worse within past 2 days, she has sinus tenderness in right maxillary and right frontal areas  She is complaining of toothache, ear pressure, coughing, green mucus expectoration  Patient denies chest tightness, wheezing or shortness of breath  Patient has not been using any medications over-the-counter aside from as needed Tylenol/ibuprofen within past few days  Review of Systems   Review of Systems   Constitutional: Positive for fatigue  Negative for fever     HENT: Positive for congestion, ear pain, postnasal drip, sinus pain and sinus pressure  Eyes: Negative  Respiratory: Positive for cough  Cardiovascular: Negative  Neurological: Positive for headaches (Right frontal and facial headache, sinus pressure)  Hematological: Negative  Psychiatric/Behavioral: Negative  Active Problem List     Patient Active Problem List   Diagnosis    Anxiety    Hashimoto's disease    Herpes simplex infection    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 thyroiditis       Past Medical History:  Past Medical History:   Diagnosis Date    Abnormal Pap smear of cervix     Anemia     Depression     Dermatitis      2 para 2     Herpes     Hirsutism     last assessed: 14    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 thyroiditis     Mild cervical dysplasia     Pregnancy     last assessed: 3/28/17    Subclinical hypothyroidism     last assessed: 14    Urinary tract infection     Varicella     Vitamin D deficiency        Past Surgical History:  Past Surgical History:   Procedure Laterality Date    COLPOSCOPY VULVA W/ BIOPSY      10/1/07 - MALLORY-1 noted at 5:00 and 12:00 ECC was negative    COMBINED HYSTEROSCOPY DIAGNOSTIC / D&C  10/2014    polyp removal    DILATION AND CURETTAGE OF UTERUS      WISDOM TOOTH EXTRACTION         Family History:  Family History   Problem Relation Age of Onset    Stroke Mother     Depression Mother     Thyroid disease Mother     Depression Maternal Aunt     Diabetes Maternal Aunt     Heart attack Paternal Uncle     Diabetes Other        Social History:  Social History     Social History    Marital status: /Civil Union     Spouse name: N/A    Number of children: 2    Years of education: N/A     Occupational History    Not on file       Social History Main Topics    Smoking status: Never Smoker    Smokeless tobacco: Never Used    Alcohol use No      Comment: social drinker as per allscripts    Drug use: No    Sexual activity: Yes Partners: Male     Birth control/ protection: IUD     Other Topics Concern    Not on file     Social History Narrative    Caffeine use    Sikhism affiliation Religion    Uses seatbelts           Objective     Vitals:    06/08/18 1504   BP: 102/62   Pulse: 64   Resp: 18   Temp: 97 8 °F (36 6 °C)     Wt Readings from Last 3 Encounters:   06/08/18 87 5 kg (192 lb 12 8 oz)   05/18/18 87 1 kg (192 lb)   02/09/18 88 8 kg (195 lb 12 8 oz)       Physical Exam   Constitutional: She is oriented to person, place, and time  She appears well-developed and well-nourished  HENT:   Head: Normocephalic and atraumatic  Right Ear: External ear and ear canal normal    Left Ear: External ear and ear canal normal    Nose: Mucosal edema present  Mouth/Throat: Posterior oropharyngeal erythema present  No oropharyngeal exudate (Mild erythema of posterior oropharynx, copious postnasal drip)  Left TM:  Clear right TM:  Fluid, no erythema, light reflex is present, TM is slightly bulging/pressured   Eyes: Conjunctivae are normal    Neck: Neck supple  Cardiovascular: Normal rate, regular rhythm and normal heart sounds  Pulmonary/Chest: Effort normal and breath sounds normal  No respiratory distress  She has no wheezes  She has no rales  Lymphadenopathy:     Cervical adenopathy: Bilateral cervical lymphadenopathy, mild, tender  Neurological: She is alert and oriented to person, place, and time  She has normal reflexes  No cranial nerve deficit  Psychiatric: She has a normal mood and affect  Her behavior is normal    Nursing note and vitals reviewed        Pertinent Laboratory/Diagnostic Studies:  Lab Results   Component Value Date    BUN 10 03/13/2017    CREATININE 0 60 03/13/2017    CALCIUM 9 0 03/13/2017     (L) 03/13/2017    K 3 7 03/13/2017    CO2 24 03/13/2017     03/13/2017     Lab Results   Component Value Date    ALT 16 03/13/2017    AST 19 03/13/2017    ALKPHOS 41 03/13/2017    BILITOT 0 7 2017       Lab Results   Component Value Date    WBC 9 62 2017    HGB 12 7 2017    HCT 32 5 (L) 2017    MCV 86 2017     2017       Lab Results   Component Value Date    TSH 2 59 2018       Lab Results   Component Value Date    CHOL 193 2014     Lab Results   Component Value Date    TRIG 62 2014     Lab Results   Component Value Date    HDL 76 2014     No results found for: [facility]  Lab Results   Component Value Date    HGBA1C 5 4 2014       Results for orders placed or performed in visit on 18   T4, free   Result Value Ref Range    Free t4 1 3 0 8 - 1 8 ng/dL   TSH, 3rd generation   Result Value Ref Range    TSH 2 59 mIU/L       No orders of the defined types were placed in this encounter  ALLERGIES:  Allergies   Allergen Reactions    Other        Current Medications     Current Outpatient Prescriptions   Medication Sig Dispense Refill    acetaminophen (TYLENOL) 325 mg tablet Every 4 hours for mild pain 30 tablet 0    amoxicillin (AMOXIL) 875 mg tablet Take 1 tablet (875 mg total) by mouth 2 (two) times a day for 10 days 20 tablet 0    Docosahexaenoic Acid (PRENATAL DHA) 200 MG CAPS Take 1 tablet by mouth daily      ibuprofen (MOTRIN) 600 mg tablet Take 1 tablet by mouth every 6 (six) hours as needed (cramps) 30 tablet 0    predniSONE 20 mg tablet Take 1 tablet (20 mg total) by mouth 2 (two) times a day with meals for 3 days 6 tablet 0    Prenatal Multivit-Min-Fe-FA (PRE- FORMULA PO) Take by mouth      SYNTHROID 112 MCG tablet Take 1 tablet (112 mcg total) by mouth daily for 90 days 30 tablet 2     No current facility-administered medications for this visit            Health Maintenance     Health Maintenance   Topic Date Due    Depression Screening PHQ-9  1985    INFLUENZA VACCINE  2018    HIV SCREENING  2020    DTaP,Tdap,and Td Vaccines (3 - Td) 2027     Immunization History   Administered Date(s) Administered    Influenza Quadrivalent Preservative Free 3 years and older IM 10/28/2015, 01/19/2017    Influenza TIV (IM) 01/19/2017    Tdap 07/06/2015, 07/11/2017       Sarah Peterson MD

## 2018-06-18 ENCOUNTER — TELEPHONE (OUTPATIENT)
Dept: PSYCHIATRY | Facility: CLINIC | Age: 33
End: 2018-06-18

## 2018-06-18 ENCOUNTER — TELEPHONE (OUTPATIENT)
Dept: BEHAVIORAL/MENTAL HEALTH CLINIC | Facility: CLINIC | Age: 33
End: 2018-06-18

## 2018-06-18 DIAGNOSIS — E03.9 HYPOTHYROIDISM, UNSPECIFIED TYPE: ICD-10-CM

## 2018-06-18 RX ORDER — LEVOTHYROXINE SODIUM 112 MCG
112 TABLET ORAL DAILY
Qty: 90 TABLET | Refills: 1 | Status: SHIPPED | OUTPATIENT
Start: 2018-06-18 | End: 2018-11-29 | Stop reason: SDUPTHER

## 2018-06-18 NOTE — TELEPHONE ENCOUNTER
Behavorial Health Outpatient Intake Questions    Referred by:DR SALGUERO    Check with provider before scheduling    Are there any developmental disabilities? No    Does the patient have hearing impairment? No    Does the patient have ICM or CTT? No    Taking injectable psychiatric medications? NoIf yes, patient can not be seen here  Has the patient ever seen or currently see a psychiatrist? Yes If yes who/when? 5 YRS AGO,DR  GROSS OFFICE FOR ANXIETY,SOME DEPRESSION    Has the patient ever seen or currently see a therapist? Yes If yes who/when? IN 10 YRS HAS SEEN 5 PSYCHOLOGIST    How many visits did the pt have for previous psychiatric treatment?  History    Has the patient served in the [place] [address]? No    If yes, have you had combat services? No    Was the patient activated into federal active duty as a member of the national guard or reserve? No    Minor Child    Who has custody of the child? N/A    Is there a custody agreement? N/A    If there is a custody agreement remind parent that they must bring a copy to the first appt or they will not be seen  Behavorial Health Outpatient Intake History     Presenting Problem (in patient's words) ANXIETY,AT HOME WITH 2 YOUNG CHILDREN,POST PARTUM DEPRESSION    Substance Abuse:No concerns of substance abuse are reported  Has the patient been seen here previously, either inpatient or outpatient? No outpatient    If seen as outpatient, what provider(s) did the patient see? N/A    A member of the patient's family has been in therapy here with NO    ACCEPTED as a patient Yes Appointment Date: 18 AT 12NOON    Referred Elsewhere?  No    Primary Care Physician: Isidro Duran MD    PCP telephone number: [phone]    SUB: Jose Sampson     : 1979  EMPLOYER: [employer]  INS: [employer]  ID: [id number]  GRP: [id number]

## 2018-06-18 NOTE — TELEPHONE ENCOUNTER
----- Message from Efren Celestin MA sent at 6/18/2018  3:31 PM EDT -----  Regarding: PPD Referral   Contact: [phone]  Please call to schedule patient for a PPD appointment with a therapist

## 2018-06-20 ENCOUNTER — OFFICE VISIT (OUTPATIENT)
Dept: BEHAVIORAL/MENTAL HEALTH CLINIC | Facility: CLINIC | Age: 33
End: 2018-06-20
Payer: COMMERCIAL

## 2018-06-20 DIAGNOSIS — F41.9 ANXIETY DISORDER, UNSPECIFIED TYPE: Primary | ICD-10-CM

## 2018-06-20 PROCEDURE — 90791 PSYCH DIAGNOSTIC EVALUATION: CPT | Performed by: SOCIAL WORKER

## 2018-06-20 NOTE — PSYCH
Assessment/Plan:            Subjective:      Patient ID: Shari Deutsch is a 33 y o  female  HPI:     Pre-morbid level of function and History of Present Illness: "I feel like I have nobody to talk to about how I feel " had been "on the top of my game (in her job) and I f------ quit to take care of my kids  " states had high anxiety regarding the thought of taking care of her first child, a son; "I have Mommy burn out  I have been losing my temper more at my children  It is starting to bother me  It is really around discipline " "I will do what I am told (as a child)  " "My life has been yell to get results  I serve all day   feed my kids and my   I feel like my  patronizes me "  "My value and self-worth has gone down the Tubes since I left my job " learned how to "avoid confrontation;" "Things are black and white, not gray  I am half [facility] " "I feel (like) I am alone (crying)  " "No one listens to me, not my  Iveth Rolon), my children " "I want to get these feelings out and be constructive with them " "I just want to do better not take on more "  "I do believe in gender roles with mothers staying at home (ie , a stay-at-home mom)  "         Previous Psychiatric/psychological treatment/year: ind psych previously 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) during her first two years of college (summer of 2005-spring of 2007); at second college had ind psych from  fall of 2007 - spring of 2009-"maintenance (dealing with change);" following college reports had ind psych which she states ended about 3/5 years ago; She states found all of her counseling experiences to be helpful  Current Psychiatrist/Therapist: SEFERINO Reinoso, [facility]      Outpatient and/or Partial and Other Community Resources Used (CTT, ICM, VNA): Outpatient outpatient      Problem Assessment:     SOCIAL/VOCATION:  Family Constellation (include parents, relationship with each and pertinent Psych/Medical History):     Family History   Problem Relation Age of Onset    Stroke Mother     Depression Mother     Thyroid disease Mother     Depression Maternal Aunt     Diabetes Maternal Aunt     Heart attack Paternal Uncle     Diabetes Other        Mother: dec'd unexpectedly in 2008 ("a stroke, she was healthy; she was fit");  Spouse: Keri Yi, age   "(He is) like his mom; a wonderful man"   Father: age 76, "great relationship;"  Children: Yoli Key, age 2   Sibling: bro;    Sibling: n/a   Children: Roberto Rodriguez, age 8/2017; Other: step mother    Mil Yi relates best to a close friend (about certain things only)  she lives with  and two children  she does not live alone  Domestic Violence: No past history of domestic violence    Additional Comments related to family/relationships/peer support:  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  "I never get to do anything by myself " I was talented at a young age at basketball  I was immersed in it  That was my main source of identification "       School or Work History (strengths/limitations/needs): Earned her Master's Degree; 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  Yes     FUNCTIONAL STATUS: There has been a recent change in Mil Yi ability to do the following: n/a    Level of Assistance Needed/By Whom?: n/a    Aileen learns best by  Demonstration; visual    SUBSTANCE ABUSE ASSESSMENT: no substance abuse    Substance/Route/Age/Amount/Frequency/Last Use: n/a    DETOX HISTORY: n/a    Previous detox/rehab treatment: n/a    HEALTH ASSESSMENT: no referral to PCP needed    LEGAL: No Mental Health Advance Directive or Power of  on file    Prenatal History: N/A    Delivery History: N/A    Developmental Milestones: N/A  Temperament as an infant was N/A  Temperament as a toddler was N/A  Temperament at school age was N/A  Temperament as a teenager was n/a  Risk Assessment:   The following ratings are based on my observation of this patient over the last interview    Risk of Harm to Self:   Demographic risk factors include   Historical Risk Factors include n/a  Recent Specific Risk Factors include feelings of guilt or self blame  Additional Factors for a Child or Adolescent n/a    Risk of Harm to Others:   Demographic Risk Factors include n/a  Historical Risk Factors include n/a  Recent Specific Risk Factors include multiple stressors    Access to Weapons:   Lissa Salomon has access to the following weapons: a hand gun; The clip is hidden with bullets separate  The following steps have been taken to ensure weapons are properly secured: n/a    Based on the above information, the client presents the following risk of harm to self or others:  low    The following interventions are recommended:   no intervention changes    Notes regarding this Risk Assessment: has had three experience with counseling which she reports were all helpful to her;      Review Of Systems:     Mood Anxiety   Behavior Normal    Thought Content Normal   General Relationship Problems and Emotional Problems   Personality Normal   Other Psych Symptoms Normal   Constitutional n/a   ENT n/a   Cardiovascular n/a   Respiratory n/a   Gastrointestinal n/a   Genitourinary n/a   Musculoskeletal n/a   Integumentary n/a   Neurological n/a Endocrine n/a         Mental status:  Appearance adequate hygiene and grooming and good eye contact    Mood anxious and agitated;    Affect affect was broad   Speech a normal rate   Thought Processes coherent/organized   Hallucinations no hallucinations present    Thought Content no delusions   Abnormal Thoughts no suicidal thoughts    Orientation  oriented to person and place and time   Remote Memory short term memory impaired and long term memory intact   Attention Span concentration impaired   Intellect Appears to be of Average Intelligence   Fund of Knowledge n/a   Insight Limited insight   Judgement judgment was intact   Muscle Strength n/a   Language n/a   Pain none   Pain Scale 0

## 2018-08-28 ENCOUNTER — DOCUMENTATION (OUTPATIENT)
Dept: BEHAVIORAL/MENTAL HEALTH CLINIC | Facility: CLINIC | Age: 33
End: 2018-08-28

## 2018-08-28 ENCOUNTER — TELEPHONE (OUTPATIENT)
Dept: ENDOCRINOLOGY | Facility: CLINIC | Age: 33
End: 2018-08-28

## 2018-08-28 ENCOUNTER — OFFICE VISIT (OUTPATIENT)
Dept: BEHAVIORAL/MENTAL HEALTH CLINIC | Facility: CLINIC | Age: 33
End: 2018-08-28
Payer: COMMERCIAL

## 2018-08-28 DIAGNOSIS — F41.9 ANXIETY DISORDER, UNSPECIFIED TYPE: ICD-10-CM

## 2018-08-28 PROCEDURE — 90834 PSYTX W PT 45 MINUTES: CPT | Performed by: SOCIAL WORKER

## 2018-08-28 NOTE — TELEPHONE ENCOUNTER
Patient called said she is breast feeding and is now weaning her 12mo dtr off  I emailed pt her lab slips and asked her to have labs done    Any other recommendations we can call pt back

## 2018-08-28 NOTE — PROGRESS NOTES
Treatment Plan Tracking    # 1Treatment Plan not completed within required time limits due to: Client presented with emotional/behavorial issues that required clinical intervention  Mariusz Hernández

## 2018-08-28 NOTE — PSYCH
Psychotherapy Provided: Individual Psychotherapy 45 minutes "We hired an  which is not working out very well  She is good with each kid alone but together   " contends this individual does not understand English as well as was presented; "I did not know her training (ie , Aileen's efforts to orient the  to the family routine, including ) would be this difficult " "It is another thing (to deal with)  "  had unexpectedly lost her previous  arrangement for the summer; "I think that the weaning (her daughter turned age 3 in mid August 2018) is a factor of the anxiety she reports had historically experienced  The depression has lifted  The anxiety is pervasive (reports awake from Midnight until 3 AM last night) and the mood swings remain  states is crying adding"I don't feel right;" I need at least 3 hrs/week to go to the gym and each week time to meet with my  " states is yelling at her toddler, Fredy Chase, "more often than I should be "  discussed anxiety management and self-care strategies; A: presents as frustrated with how to establish order in her life; She understands the concept of anxiety management  "I have been able to (in small steps) put myself first " states had been taking Abilify and Lexapro in tandem during her college years; "My depression is situational " She wants 10 hours to herself and 10 hours with her  as part of her expectations for her quality of life  Discussed/reviewed anxiety management to include prioritizing and her having realistic expectations of herself (including allowing for her to act on her thoughts/judgment regarding her life; A: She presented as having mixed feelings about having to terminate the current  yet was able to confirm that the  acknowledged having difficulties with the responsibilities  She stress her wanting to have balance in her life with her "self" and her family as top priority   P: (G#1) will develop her treatment plan to include self-care and anxiety management;    Length of time in session: 45 minutes, follow up in 1 month    Goals addressed in session: Goal 1     Pain:      none    0    Current suicide risk : [address]: Diagnosis and Treatment Plan explained to Clifton Saldaña relates understanding diagnosis and is agreeable to Treatment Plan   Yes

## 2018-08-31 ENCOUNTER — OFFICE VISIT (OUTPATIENT)
Dept: FAMILY MEDICINE CLINIC | Facility: CLINIC | Age: 33
End: 2018-08-31
Payer: COMMERCIAL

## 2018-08-31 VITALS
TEMPERATURE: 97 F | SYSTOLIC BLOOD PRESSURE: 102 MMHG | RESPIRATION RATE: 16 BRPM | HEIGHT: 71 IN | BODY MASS INDEX: 27.16 KG/M2 | WEIGHT: 194 LBS | HEART RATE: 64 BPM | DIASTOLIC BLOOD PRESSURE: 62 MMHG

## 2018-08-31 DIAGNOSIS — F32.A ANXIETY AND DEPRESSION: Primary | ICD-10-CM

## 2018-08-31 DIAGNOSIS — F41.9 ANXIETY AND DEPRESSION: Primary | ICD-10-CM

## 2018-08-31 PROCEDURE — 99214 OFFICE O/P EST MOD 30 MIN: CPT | Performed by: NURSE PRACTITIONER

## 2018-08-31 PROCEDURE — 3008F BODY MASS INDEX DOCD: CPT | Performed by: NURSE PRACTITIONER

## 2018-08-31 NOTE — PROGRESS NOTES
FAMILY PRACTICE OFFICE VISIT       NAME: Janeice Apley  AGE: 33 y o  SEX: female       : 1985        MRN: [id number]    DATE: 2018    Assessment and Plan     Problem List Items Addressed This Visit     Anxiety and depression - Primary          1  Anxiety and depression       Patient presents today for increasing anxiety and depression over the past 3 weeks  She had situational anxiety and depression in the past and has been treated with Lexapro and Abilify  We discussed treatment with medications such as SSRI's  Reviewed side effects, onset of action, and avoiding abrupt withdrawal  Reviewed role of using benzodiazepines, sparingly as a bridge until SSRI's reach peak effect  Reviewed addicting potential of benzodiazepines  She verbalizes understanding  She would like to hold off on medications for now  She would like to pursue counseling  She will contact [facility], as she has declined a sooner counseling appointment due to her kids schedules  States she will call and see if she can get a sooner appointment, and ask her step mom to help with the children  Reviewed importance of caring for herself, so she is able to care for her children  Her 1year old son, will be attending  this year  Suggested she use this time to do something she enjoys  States she will try to get a  for her daughter during this time, so she can get a few hours to herself  She will call if she would like to start medications  Follow up in 4-6 weeks  Chief Complaint     Chief Complaint   Patient presents with    Depression     Pt is here for depression and anxiety  Carmelina Loud off breast feeding       History of Present Illness     Xu Walsh is a 33year old female presenting today for anxiety and depression  She has a 3year old daughter, who she has been breastfeeding for the past 1 year  She is weaning breastfeeding, and down to 1-2 breastfeedings per day   She had her first menstrual cycle since giving birth to her daughter on   She notes since then, her emotions have been turbulent  Describes herself as hernández, depressed, and anxious  She is the mother of 2 children ages 1, and 3  Her  works many hours  She is a stay at home mom  Yesterday, she felt so anxious, she was panicky, states she has not felt this way since college  She has a history of situational anxiety and depression  She was treated with Lexapro and Abilify in the past  She has been to counseling in the past as well, which she did find effective  Recently stressed from hiring a , which did not work out  Also sites mom's death 10 years ago, and not being able to go to [place] due to having young children as contributing factors  She is trying to get an appointment with a counselor at [place] [id number], [place]  States her step-mom is a good support for her  Review of Systems   Review of Systems   Constitutional: Negative for chills, diaphoresis, fatigue and fever  Respiratory: Negative  Cardiovascular: Negative  Gastrointestinal: Negative  Psychiatric/Behavioral: Positive for dysphoric mood and sleep disturbance (Last night she did not sleep well, couldn' calm her racing thoughts)  Negative for self-injury and suicidal ideas  The patient is nervous/anxious          Active Problem List     Patient Active Problem List   Diagnosis    Anxiety    Hashimoto's disease    Herpes simplex infection    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 thyroiditis    Anxiety disorder    Anxiety and depression       Past Medical History:  Past Medical History:   Diagnosis Date    Abnormal Pap smear of cervix     Anemia     Depression     Dermatitis      2 para 2     Herpes     Hirsutism     last assessed: 14    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 thyroiditis     Mild cervical dysplasia     Pregnancy     last assessed: 3/28/17    Subclinical hypothyroidism     last assessed: 12/29/14    Urinary tract infection     Varicella     Vitamin D deficiency        Past Surgical History:  Past Surgical History:   Procedure Laterality Date    COLPOSCOPY VULVA W/ BIOPSY      10/1/07 - MALLORY-1 noted at 5:00 and 12:00 ECC was negative    COMBINED HYSTEROSCOPY DIAGNOSTIC / D&C  10/2014    polyp removal    DILATION AND CURETTAGE OF UTERUS      WISDOM TOOTH EXTRACTION         Family History:  Family History   Problem Relation Age of Onset    Stroke Mother     Depression Mother     Thyroid disease Mother     Depression Maternal Aunt     Diabetes Maternal Aunt     Heart attack Paternal Uncle     Diabetes Other        Social History:  Social History     Social History    Marital status: /Civil Union     Spouse name: N/A    Number of children: 2    Years of education: N/A     Occupational History    Not on file  Social History Main Topics    Smoking status: Never Smoker    Smokeless tobacco: Never Used    Alcohol use Yes      Comment: social drinker as per allscripts    Drug use: No    Sexual activity: Yes     Partners: Male     Birth control/ protection: IUD     Other Topics Concern    Not on file     Social History Narrative    Caffeine use    M[employer]ae[employer] affiliation [employer]    Uses seatbelts           I have reviewed the patient's medical history in detail; there are no changes to the history as noted in the electronic medical record  Objective     Vitals:    08/31/18 0836   BP: 102/62   Pulse: 64   Resp: 16   Temp: (!) 97 °F (36 1 °C)   TempSrc: Tympanic   Weight: 88 kg (194 lb)   Height: 5' 11" (1 803 m)     Wt Readings from Last 3 Encounters:   08/31/18 88 kg (194 lb)   06/08/18 87 5 kg (192 lb 12 8 oz)   05/18/18 87 1 kg (192 lb)     Body mass index is 27 06 kg/m²  Physical Exam   Constitutional: She appears well-developed and well-nourished  No distress  HENT:   Head: Normocephalic and atraumatic     Eyes: Conjunctivae are normal  Cardiovascular: Normal rate, regular rhythm and normal heart sounds  Pulmonary/Chest: Effort normal and breath sounds normal    Psychiatric: She has a normal mood and affect  ALLERGIES:  No Known Allergies    Current Medications     Current Outpatient Prescriptions   Medication Sig Dispense Refill    acetaminophen (TYLENOL) 325 mg tablet Every 4 hours for mild pain 30 tablet 0    Docosahexaenoic Acid (PRENATAL DHA) 200 MG CAPS Take 1 tablet by mouth daily      ibuprofen (MOTRIN) 600 mg tablet Take 1 tablet by mouth every 6 (six) hours as needed (cramps) 30 tablet 0    Prenatal Multivit-Min-Fe-FA (PRE- FORMULA PO) Take by mouth      SYNTHROID 112 MCG tablet Take 1 tablet (112 mcg total) by mouth daily for 90 days 90 tablet 1     No current facility-administered medications for this visit            Health Maintenance     Health Maintenance   Topic Date Due    Depression Screening PHQ  1985    INFLUENZA VACCINE  2018    DTaP,Tdap,and Td Vaccines (3 - Td) 2027     Immunization History   Administered Date(s) Administered    Influenza Quadrivalent Preservative Free 3 years and older IM 10/28/2015, 2017    Influenza TIV (IM) 2017    Tdap 2015, 2017       YOUNG Mcclendon

## 2018-09-02 PROBLEM — F41.9 ANXIETY AND DEPRESSION: Status: ACTIVE | Noted: 2018-09-02

## 2018-09-02 PROBLEM — F32.A ANXIETY AND DEPRESSION: Status: ACTIVE | Noted: 2018-09-02

## 2018-09-13 ENCOUNTER — DOCUMENTATION (OUTPATIENT)
Dept: BEHAVIORAL/MENTAL HEALTH CLINIC | Facility: CLINIC | Age: 33
End: 2018-09-13

## 2018-09-13 ENCOUNTER — OFFICE VISIT (OUTPATIENT)
Dept: BEHAVIORAL/MENTAL HEALTH CLINIC | Facility: CLINIC | Age: 33
End: 2018-09-13
Payer: COMMERCIAL

## 2018-09-13 DIAGNOSIS — F41.9 ANXIETY AND DEPRESSION: Primary | ICD-10-CM

## 2018-09-13 DIAGNOSIS — F32.A ANXIETY AND DEPRESSION: Primary | ICD-10-CM

## 2018-09-13 PROCEDURE — 90834 PSYTX W PT 45 MINUTES: CPT | Performed by: SOCIAL WORKER

## 2018-09-13 NOTE — PROGRESS NOTES
Balaji Dugan  1985       Date of Initial Treatment Plan: 9/13/18  Date of Current Treatment Plan: 09/13/18    Treatment Plan Number 1    Strengths/Personal Resources for Self Care: outgoing; passionate;    Diagnosis:   F41 9    Area of Needs: depression/anxiety; Long Term Goal 1: AI want to more consistently implement/do (have) life coping strategies while maintaining a positive personality (realistic expectations  )  Target Date: 1/13/19  Completion Date: n/a         Short Term Objectives for Goal 1: AI want to more effectively manage the quick temper (anger)  B  I want to make sure I am taking care of myself  C  I want to identiy/remain mindful of my qualities/stregnths/assets  D  I want to learn/use coping skills  E  I want to identfy/more effective manage the critical self-talk        Target Date: 1/13/19  Completion Date: n/a      GOAL 1: Modality: Individual 2x per month   Completion Date n/a and The person(s) responsible for carrying out the plan is  [address]: Diagnosis and Treatment Plan explained to Orion Houser relates understanding diagnosis and is agreeable to Treatment Plan  yes      Client Comments : Please share your thoughts, feelings, need and/or experiences regarding your treatment plan:       __________________________________________________________________    __________________________________________________________________    __________________________________________________________________    __________________________________________________________________    _______________________________________                Patient signature, Date Time: __________________________________________             Physician cosigner signature, Date, Time: ________________________________

## 2018-09-13 NOTE — PSYCH
Psychotherapy Provided: Individual Psychotherapy 45 minutes "We let the  go last week  It was draining " states will lose a lot of money (regarding the  program); The  experience is even if you try it may not work " I have come to more of an acceptance of this is more of my responsiblity  I need to do a little something for me every day "  "I am exhausted  It has been rough  The kids don't sleep well  He (her ) doesn't parent  he provides "states her son began  at her local [employer] from [address] [place] [address] (Thursday and Friday); "It is huge (to have a break with her son being in )  " states the younger child, daughter, is more independent in her play; 'In limbo with ; If I don't care of myself physically or mentally I feel like s___ " states is holding off on considering resuming the anti anxiety and anti depressant medications;  "When they scream out of pain, hurt or fear, I have a reaction of what is wrong fix the situation " 'I have not found another way to be successful " discussed/reviewed the  importance of mindfulness, acceptance and adapting to change (from clearer external structure: job/school to establishing a life style at home); A: presents as open to change/adapt yet struggling with what she expects of herself at this time;       Length of time in session: 45 minutes, follow up in 2 week    Goals addressed in session: Goal 1     Pain:      none    0    Current suicide risk : [address]: Diagnosis and Treatment Plan explained to Gustavo Thompson relates understanding diagnosis and is agreeable to Treatment Plan   Yes

## 2018-09-13 NOTE — PROGRESS NOTES
Treatment Plan Tracking    # 1Treatment Plan not completed within required time limits due to: As did not sign tx plan at most recent previous sessiondue to her being upset, she signed the updated treatment plant t[facility]

## 2018-09-20 ENCOUNTER — OFFICE VISIT (OUTPATIENT)
Dept: BEHAVIORAL/MENTAL HEALTH CLINIC | Facility: CLINIC | Age: 33
End: 2018-09-20
Payer: COMMERCIAL

## 2018-09-20 DIAGNOSIS — F32.A ANXIETY AND DEPRESSION: Primary | ICD-10-CM

## 2018-09-20 DIAGNOSIS — F41.9 ANXIETY AND DEPRESSION: Primary | ICD-10-CM

## 2018-09-20 PROCEDURE — 90834 PSYTX W PT 45 MINUTES: CPT | Performed by: SOCIAL WORKER

## 2018-09-20 NOTE — PSYCH
Psychotherapy Provided: Individual Psychotherapy 45 minutes having Angie Berry go to school during the week (two days) is easier for her; "Things are calming down a little bit for sure " (I am) avoiding stressful things  I am three months late for a project at work and needing to apply for a refund for the  ;  "I am giving myself a break " states wants to be more planful (ex , carving out a routine time/day to spend with her ); "What makes me angry is I am avoiding " discussed avoiding vs reprioriitizing; "I have been very conscious of my (emotional) state " I want to be successful without badgering myself "  Discussed self-accountability without badgering; discussed strategies for effectiveley managing emotions; A: presents as less frustrated and as wanting to better manage her emotions "to be able to teach my children how (to manage their emotions);" She is aware of her need to succeed and wants to do so without self-badgering  P: (G#1) will continue in her efforts to better manage the anger (and the desire to succeed via self-badgering); Length of time in session: 45 minutes, follow up in 2 week    Goals addressed in session: Goal 1     Pain:      none    0    Current suicide risk : Low         Behavioral Health Treatment Plan [facility]: Diagnosis and Treatment Plan explained to Rose Blood relates understanding diagnosis and is agreeable to Treatment Plan   Yes

## 2018-10-10 ENCOUNTER — OFFICE VISIT (OUTPATIENT)
Dept: BEHAVIORAL/MENTAL HEALTH CLINIC | Facility: CLINIC | Age: 33
End: 2018-10-10
Payer: COMMERCIAL

## 2018-10-10 DIAGNOSIS — F41.9 ANXIETY DISORDER, UNSPECIFIED TYPE: Primary | ICD-10-CM

## 2018-10-10 PROCEDURE — 90834 PSYTX W PT 45 MINUTES: CPT | Performed by: SOCIAL WORKER

## 2018-10-10 NOTE — PSYCH
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 and her  as the breadwinner  She noted while she wanted to be a stay-at-home mother, she had expected her  would help her with some of the responsibilities in the home  She noted her impression of his not wanting to be actively parenting their two children (ages 1 and 1)  She expressed doubt in her own parenting noting she reacts to her son rather than maintaining a center  She confirms she is overwhelmed  Discussed/reviewed basic parenting skills and communication skills with the option to have her  accompany her to a session  A: She presents as angry and as overwhelmed (both of which she acknowledges) and as wanting to reach an agreement with her  for him to be more involved at home  P:(G#1) will continue in her efforts to better manage her anger and better communicate with her ; will have her  accompany her to a session in the near future;     Length of time in session: 45 minutes, follow up in 2 week    Goals addressed in session: Goal 1     Pain:      none    0    Current suicide risk : [address]: Diagnosis and Treatment Plan explained to Armand Roberts relates understanding diagnosis and is agreeable to Treatment Plan   Yes

## 2018-10-15 ENCOUNTER — OFFICE VISIT (OUTPATIENT)
Dept: BEHAVIORAL/MENTAL HEALTH CLINIC | Facility: CLINIC | Age: 33
End: 2018-10-15
Payer: COMMERCIAL

## 2018-10-15 DIAGNOSIS — F41.9 ANXIETY: Primary | ICD-10-CM

## 2018-10-15 DIAGNOSIS — F41.9 ANXIETY AND DEPRESSION: ICD-10-CM

## 2018-10-15 DIAGNOSIS — F32.A ANXIETY AND DEPRESSION: ICD-10-CM

## 2018-10-15 PROCEDURE — 90834 PSYTX W PT 45 MINUTES: CPT | Performed by: SOCIAL WORKER

## 2018-10-15 NOTE — PSYCH
Psychotherapy Provided: Individual Psychotherapy 45 minutes  has decided to identify a therapist for counseling for her 1 yr old son, Miguel Angel James, "and the dynamics between him and me;" She had expressed a preference for a therapist who travels to clients'  homes; "He mirrors me  If I yell, he yells back  I have been reacting more(frequently to him)  I have to control myself "  (I need) clarity on parenting "  "I am not for shaming (a child)  What is the difference between effective time out and that which could shame the child?"  continues to grieve for her mother (it will be 10 years in October, 2018, since her mother's passing); She spoke of her and her  having to terminate a pregnancy (the twin sister to their son Miguel Angel James) with reported multiple deformities and with an alleged questionable prognosis for survival  She reported guilt about "doing the right thing" and noted her [address] [place] [address] Road does not permit terminating a pregnancy  discussed if the terminated pregnancy was a possible factor in her current relatinship with her son, Miguel Angel James; discussed coping mechanisms in dealing with the terminated pregnancy; discussed/reviewed the reported ongoing conflicts between her and , Brynn Stacy, regarding parenting issues to include the importance of addressing expectations: hers for herself as a parent; his for himself as a parent and, then, and only then, each sharing his/herexpectations of the other as a parent; A: She has repeatedly stated (including during this session today) that she does not want to "destroy Conrad's soul " She cried when talking about the terminated pregnancy saying she "killed" her daughter  P:(G#1) will continue to consider the option of her  participating in a joint session   will continue possible dynamics in her relationship with her son and she will contact [facility] regarding therapists who provide counseling services in the home;       Length of time in session: 45 minutes, follow up in 2 week    Goals addressed in session: Goal 1     Pain:      none    0    Current suicide risk : [address] Treatment Plan [facility]: Diagnosis and Treatment Plan explained to Hilario Johnson relates understanding diagnosis and is agreeable to Treatment Plan   Yes

## 2018-11-06 ENCOUNTER — IMMUNIZATION (OUTPATIENT)
Dept: FAMILY MEDICINE CLINIC | Facility: CLINIC | Age: 33
End: 2018-11-06
Payer: COMMERCIAL

## 2018-11-06 DIAGNOSIS — Z23 ENCOUNTER FOR IMMUNIZATION: ICD-10-CM

## 2018-11-06 PROCEDURE — 90471 IMMUNIZATION ADMIN: CPT

## 2018-11-06 PROCEDURE — 90686 IIV4 VACC NO PRSV 0.5 ML IM: CPT

## 2018-11-20 LAB
T4 FREE SERPL-MCNC: 1.4 NG/DL (ref 0.8–1.8)
TSH SERPL-ACNC: 1.81 MIU/L

## 2018-11-29 ENCOUNTER — OFFICE VISIT (OUTPATIENT)
Dept: ENDOCRINOLOGY | Facility: CLINIC | Age: 33
End: 2018-11-29
Payer: COMMERCIAL

## 2018-11-29 VITALS
HEART RATE: 54 BPM | WEIGHT: 185.4 LBS | DIASTOLIC BLOOD PRESSURE: 66 MMHG | HEIGHT: 71 IN | BODY MASS INDEX: 25.96 KG/M2 | SYSTOLIC BLOOD PRESSURE: 104 MMHG

## 2018-11-29 DIAGNOSIS — E03.8 HYPOTHYROIDISM DUE TO HASHIMOTO'S THYROIDITIS: Primary | ICD-10-CM

## 2018-11-29 DIAGNOSIS — E06.3 HASHIMOTO'S THYROIDITIS: ICD-10-CM

## 2018-11-29 DIAGNOSIS — E06.3 HYPOTHYROIDISM DUE TO HASHIMOTO'S THYROIDITIS: Primary | ICD-10-CM

## 2018-11-29 DIAGNOSIS — F41.9 ANXIETY: ICD-10-CM

## 2018-11-29 DIAGNOSIS — E55.9 VITAMIN D DEFICIENCY: ICD-10-CM

## 2018-11-29 PROCEDURE — 99214 OFFICE O/P EST MOD 30 MIN: CPT | Performed by: INTERNAL MEDICINE

## 2018-11-29 RX ORDER — LEVOTHYROXINE SODIUM 112 MCG
112 TABLET ORAL DAILY
Qty: 90 TABLET | Refills: 1 | Status: SHIPPED | OUTPATIENT
Start: 2018-11-29 | End: 2019-01-02 | Stop reason: SDUPTHER

## 2018-11-29 NOTE — PROGRESS NOTES
New Patient Progress Note      Referring Provider  Loan Gale Md  [address] [facility] , [place] [id number]     History of Present Illness:     33 yr old woman with PMH of Hypothyroidism due to Hashimoto thyroiditis, vitamin-D deficiency, is here for follow-up    She is currently taking Synthroid 112 mcg daily, 1 hour before breakfast without missing doses  Last TSH is 1 8 with free T4 1 4  Vitamin-D is 40 from 2018    She is currently not taking any vitamin-D supplementation  Patient has history of type 1 diabetes in the family, and concerned about her risk  She never had any workup done for type 1 diabetes however her blood sugars were always in normal range  Also she has changed her diet recently and cut down on gluten, and she is feeling much better in terms of focusing as well as has lost almost 8-10 lb  She has also stopped breastfeeding, feels more energy      Wt Readings from Last 3 Encounters:   18 84 1 kg (185 lb 6 4 oz)   18 88 kg (194 lb)   18 87 5 kg (192 lb 12 8 oz)       Patient Active Problem List   Diagnosis    Anxiety    Hashimoto's disease    Herpes simplex infection    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 thyroiditis    Anxiety disorder    Anxiety and depression     Past Medical History:   Diagnosis Date    Abnormal Pap smear of cervix     Anemia     Depression     Dermatitis      2 para 2     Herpes     Hirsutism     last assessed: 14    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 thyroiditis     Mild cervical dysplasia     Pregnancy     last assessed: 3/28/17    Subclinical hypothyroidism     last assessed: 14    Urinary tract infection     Varicella     Vitamin D deficiency       Past Surgical History:   Procedure Laterality Date    COLPOSCOPY VULVA W/ BIOPSY      10/1/07 - MALLORY-1 noted at 5:00 and 12:00 ECC was negative    COMBINED HYSTEROSCOPY DIAGNOSTIC / D&C  10/2014    polyp removal    DILATION AND CURETTAGE OF UTERUS      WISDOM TOOTH EXTRACTION        Family History   Problem Relation Age of Onset    Stroke Mother     Depression Mother     Thyroid disease Mother     Depression Maternal Aunt     Diabetes Maternal Aunt     Heart attack Paternal Uncle     Diabetes Other      Social History   Substance Use Topics    Smoking status: Never Smoker    Smokeless tobacco: Never Used    Alcohol use Yes      Comment: social drinker as per allscripts     No Known Allergies    Current Outpatient Prescriptions:     acetaminophen (TYLENOL) 325 mg tablet, Every 4 hours for mild pain, Disp: 30 tablet, Rfl: 0    levonorgestrel (MIRENA, 52 MG,) 20 MCG/24HR IUD, 1 each by Intrauterine route once, Disp: , Rfl:     SYNTHROID 112 MCG tablet, Take 1 tablet (112 mcg total) by mouth daily for 90 days, Disp: 90 tablet, Rfl: 1    Review of Systems   Constitutional: Negative for activity change, diaphoresis, fatigue, fever and unexpected weight change  HENT: Negative  Eyes: Negative for visual disturbance  Respiratory: Negative for cough, chest tightness and shortness of breath  Cardiovascular: Negative for chest pain, palpitations and leg swelling  Gastrointestinal: Negative for abdominal pain, blood in stool, constipation, diarrhea, nausea and vomiting  Endocrine: Negative for cold intolerance, heat intolerance, polydipsia, polyphagia and polyuria  Genitourinary: Negative for dysuria, enuresis, frequency and urgency  Musculoskeletal: Negative for arthralgias and myalgias  Skin: Negative for pallor, rash and wound  Allergic/Immunologic: Negative  Neurological: Negative for dizziness, tremors, weakness and numbness  Hematological: Negative  Psychiatric/Behavioral: Negative  Physical Exam:  Body mass index is 25 86 kg/m²    /66   Pulse (!) 54   Ht 5' 11" (1 803 m)   Wt 84 1 kg (185 lb 6 4 oz)   BMI 25 86 kg/m²    Wt Readings from Last 3 Encounters:   11/29/18 84 1 kg (185 lb 6 4 oz)   08/31/18 88 kg (194 lb)   06/08/18 87 5 kg (192 lb 12 8 oz)        Physical Exam   Constitutional: She is oriented to person, place, and time  She appears well-developed and well-nourished  No distress  HENT:   Head: Normocephalic and atraumatic  Eyes: Conjunctivae and EOM are normal  Right eye exhibits no discharge  Left eye exhibits no discharge  Neck: Normal range of motion  Neck supple  No thyromegaly present  Cardiovascular: Normal rate, regular rhythm and normal heart sounds  No murmur heard  Pulmonary/Chest: Effort normal and breath sounds normal  No respiratory distress  She has no wheezes  Abdominal: Soft  Bowel sounds are normal  She exhibits no distension  There is no tenderness  Musculoskeletal: Normal range of motion  She exhibits no edema, tenderness or deformity  Neurological: She is alert and oriented to person, place, and time  She has normal reflexes  No cranial nerve deficit  Skin: Skin is warm and dry  No rash noted  She is not diaphoretic  No erythema  Psychiatric: She has a normal mood and affect  Her behavior is normal    Vitals reviewed  Diabetic Foot Exam    Labs:   No components found for: HA1C  No components found for: GLU    Lab Results   Component Value Date    CREATININE 0 60 03/13/2017    CREATININE 0 59 02/21/2017    CREATININE 0 83 10/07/2016    BUN 10 03/13/2017     (L) 03/13/2017    K 3 7 03/13/2017     03/13/2017    CO2 24 03/13/2017     No results found for: EGFR  No components found for: [facility]    Lab Results   Component Value Date    CHOL 193 12/12/2014    HDL 76 12/12/2014    TRIG 62 12/12/2014       Lab Results   Component Value Date    ALT 16 03/13/2017    AST 19 03/13/2017    ALKPHOS 41 03/13/2017    BILITOT 0 7 03/13/2017       Lab Results   Component Value Date    TSH 1 81 11/19/2018    FREET4 1 4 11/19/2018       Impression:  1  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 thyroiditis    2  Vitamin D deficiency    3  Anxiety    4   Hashimoto's thyroiditis Plan:     Diagnoses and all orders for this visit:    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 thyroiditis  Patient is clinically and biochemically euthyroid  Continue current dose of levothyroxine supplementation, last TSH is normal at goal  Repeat TSH and free T4 in 6 months    -     Glutamic acid decarboxylase Lab Collect; Future  -     Anti-islet cell antibody; Future  -     Celiac Disease Panel; Future  -     TSH, 3rd generation; Future  -     T4, free; Future    Vitamin D deficiency  Discussed to start taking vitamin D3 supplementation 2000 International Units daily  -     Vitamin D 25 hydroxy; Future  -     Basic metabolic panel; Future    Anxiety  Her anxiety symptoms have improved  -     SYNTHROID 112 MCG tablet; Take 1 tablet (112 mcg total) by mouth daily for 90 days    Hashimoto's thyroiditis  Considering patient has autoimmune thyroid disease I have also ordered workup, for type 1 diabetes as well as celiac disease as could be part of autoimmune polyglandular syndrome, considering patient has history of type 1 diabetes in family  -     Glutamic acid decarboxylase Lab Collect; Future  -     Anti-islet cell antibody; Future  -     Celiac Disease Panel; Future        Discussed with the patient and all questioned fully answered  She will call me if any problems arise      Counseled patient on diagnostic results, prognosis, risk and benefit of treatment options, instruction for management, importance of treatment compliance, Risk  factor reduction and impressions      Carlos George MD

## 2018-12-06 ENCOUNTER — OFFICE VISIT (OUTPATIENT)
Dept: BEHAVIORAL/MENTAL HEALTH CLINIC | Facility: CLINIC | Age: 33
End: 2018-12-06
Payer: COMMERCIAL

## 2018-12-06 DIAGNOSIS — F41.9 ANXIETY AND DEPRESSION: Primary | ICD-10-CM

## 2018-12-06 DIAGNOSIS — F32.A ANXIETY AND DEPRESSION: Primary | ICD-10-CM

## 2018-12-06 PROCEDURE — 90834 PSYTX W PT 45 MINUTES: CPT | Performed by: SOCIAL WORKER

## 2018-12-06 NOTE — PSYCH
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 There is no pressure to sell  ""I have my own thing " states continues (as of 10/2/18) to follow the Whole Thirty Diet (no grains,no cheese) at 80% of the time; will be tested Celiac Disease and "a gluten sensitivity ( diagnosed with Hoshimoto's Disease-hyperthyroid);" states drinking 8 oz of Bone Broth daily which she contends is helping her be more focused and more mindful of emotional eating; She reports is working out 2x/week more consistently  "I am very clear what makes me angry (the triggers) " She, again, expressed frustration with her  with her report of his not engaging in parenting them  She described a recent conflict between her and her  about this matter  "I have to prepare for my (mat) aunt Albina Gibson) coming (for the holidays) "  She described her aunt as controlling  "She never had kids (alleging her aunt attempts to tell her how to parent)  " discussed/reviewed effective communication strategies regarding her relationships with her son and  and aunt; A: She seems to feel good about herself regarding her recent involvement with consulting for a beauty product line;"I have my own thing  "She presented with less frustration during this session despite the issues of her concerns with her toddler son and her ; "He (her ) is my third kid " She remains focused on her own accountability   P: (G#1)will remain mindful of her communication, of her emotions and realistically what she can/cannot control;     Length of time in session: 45 minutes, follow up in 1 month    Goals addressed in session: Goal 1     Pain:      none    0    Current suicide risk : [address]: Diagnosis and Treatment Plan explained to Davonte Reyes relates understanding diagnosis and is agreeable to Treatment Plan   Yes

## 2019-01-02 ENCOUNTER — OFFICE VISIT (OUTPATIENT)
Dept: BEHAVIORAL/MENTAL HEALTH CLINIC | Facility: CLINIC | Age: 34
End: 2019-01-02
Payer: COMMERCIAL

## 2019-01-02 DIAGNOSIS — F41.9 ANXIETY: ICD-10-CM

## 2019-01-02 DIAGNOSIS — F41.9 ANXIETY DISORDER, UNSPECIFIED TYPE: Primary | ICD-10-CM

## 2019-01-02 PROCEDURE — 90834 PSYTX W PT 45 MINUTES: CPT | Performed by: SOCIAL WORKER

## 2019-01-02 RX ORDER — LEVOTHYROXINE SODIUM 112 MCG
112 TABLET ORAL DAILY
Qty: 90 TABLET | Refills: 1 | Status: SHIPPED | OUTPATIENT
Start: 2019-01-02 | End: 2019-08-22 | Stop reason: SDUPTHER

## 2019-01-02 NOTE — PSYCH
Psychotherapy Provided: Individual Psychotherapy 45 minutes "I am better " "The holidays sucked " "I am exhausted  I was up all night  The kids are not good sleepers  It is situational for me  There is no solution for the sleep  I am awake and helping them (her two children ages 1 and 1) cry or helping them get back to sleep "   is looking forward to a two week vacation with her  and two children; "The more prepared I am the better  I didn't have anytime to relax "   continues to work out twice a week which she noted as "my sanity;" She noted she is being more mindful of eating less processed foods  "I still get angry, but I am able to control it more "  Nutrition, getting up early, not beating myself up and working out she noted as her efforts to take better care of herself; states her three year old became ill for a three week period durrng the ; "I have to take care of myself : treating myself, with the  twice a week, not feeling guilty about having babysitters and eating well " 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); noted food as her foundation and the pillars of not of guilty with treating herself, not beating herself, working out;  is "learning how to manage (the reported issue between her  mother's two sisters who are ten years apart);"  Discussed/reviewed boundaries, self-awareness/self-accountability and flexibility; A: presents as feeling more in control (realistic expectations) of her "self" and notes, "I set the tone for the family (her  and two children) " She is looking forward to her vacation with her  and two children at the end of this month adding that her one maternal aunt will be accompanying them to provide    P: (G#1) will remain mindful of her "self (ex , her acknowledgement of her feeling better when she eats more healthfully)" including self-care, expectations of herself and of others and communication;    Length of time in session: 45 minutes, follow up in 2 week    Goals addressed in session: Goal 1     Pain:      none    0    Current suicide risk : [address]: Diagnosis and Treatment Plan explained to Aidee Alexis relates understanding diagnosis and is agreeable to Treatment Plan   Yes

## 2019-02-04 ENCOUNTER — TELEPHONE (OUTPATIENT)
Dept: BEHAVIORAL/MENTAL HEALTH CLINIC | Facility: CLINIC | Age: 34
End: 2019-02-04

## 2019-02-04 ENCOUNTER — TELEPHONE (OUTPATIENT)
Dept: PSYCHIATRY | Facility: CLINIC | Age: 34
End: 2019-02-04

## 2019-02-04 NOTE — TELEPHONE ENCOUNTER
Michelle Tripathi returned your call  She said she was not aware that she had an appointment today as she has one on Wednesday (2/6 @ 9pm) She was concerned that she would be charged for not coming in today  She asked if you could give her a call back as she is concerned about budgeting if there is a fee

## 2019-02-06 ENCOUNTER — OFFICE VISIT (OUTPATIENT)
Dept: BEHAVIORAL/MENTAL HEALTH CLINIC | Facility: CLINIC | Age: 34
End: 2019-02-06
Payer: COMMERCIAL

## 2019-02-06 DIAGNOSIS — F41.9 ANXIETY: Primary | ICD-10-CM

## 2019-02-06 DIAGNOSIS — F41.9 ANXIETY AND DEPRESSION: ICD-10-CM

## 2019-02-06 DIAGNOSIS — F32.A ANXIETY AND DEPRESSION: ICD-10-CM

## 2019-02-06 PROCEDURE — 90834 PSYTX W PT 45 MINUTES: CPT | Performed by: SOCIAL WORKER

## 2019-02-06 NOTE — PSYCH
Psychotherapy Provided: Individual Psychotherapy 45 minutes Romulo Nguyễn brought her 3year old daughter with her to the session  "Everything is good   just tired  It was a really good vacation  Laerik Cassette the best vacation in my life!" "This is what is making me miserable, the constant chores at home   (while on vacation) not having to clean up after the kids "  "Today I am wrecked, " noting her 1year old son, Manan Lara, did not sleep well last night  Notes sleep issues with Manan Lara: "obviuously we (she and her ) are not doing something right;" states her , Linette Tobar, "can only do one thing at a time;" states he is not helpful to her or if doing something she asks him to do, "he does not do it correctly " She notes continues to work out and 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; She spoke at length about her son and her concerns about him  She did acknowledge she "reacts to him " discussed/reviewed the importance of her managing the anger she acknowledges she experiences; discussed the option of having her son evaluated at the respective [facility];  A: "I can go from 0 to 10 (re anger) just like that!" She, again, expressed frustration with her 's alleged lack of helping her in a way that she needs his help with the children  She expressed concern about their differing approaches to parenting ("He is way too lenient ")  She is receptive to her  accompanying her to a session to discuss some of her concerns  P:(G#1 ) will check with her  about participating in a joint session; will continue with her efforts to be aware of the anger triggers; Length of time in session: 45 minutes, follow up in 2 week    Goals addressed in session: Goal 1     Pain:      none    0    Current suicide risk : Low         Behavioral Health Treatment Plan [facility]: Diagnosis and Treatment Plan explained to Miguelina Orellana relates understanding diagnosis and is agreeable to Treatment Plan   Yes

## 2019-02-15 ENCOUNTER — ANNUAL EXAM (OUTPATIENT)
Dept: OBGYN CLINIC | Facility: CLINIC | Age: 34
End: 2019-02-15
Payer: COMMERCIAL

## 2019-02-15 VITALS
DIASTOLIC BLOOD PRESSURE: 70 MMHG | BODY MASS INDEX: 26.46 KG/M2 | SYSTOLIC BLOOD PRESSURE: 110 MMHG | WEIGHT: 189 LBS | HEIGHT: 71 IN

## 2019-02-15 DIAGNOSIS — Z01.419 WOMEN'S ANNUAL ROUTINE GYNECOLOGICAL EXAMINATION: Primary | ICD-10-CM

## 2019-02-15 DIAGNOSIS — Z97.5 IUD CONTRACEPTION: ICD-10-CM

## 2019-02-15 PROCEDURE — 99395 PREV VISIT EST AGE 18-39: CPT | Performed by: OBSTETRICS & GYNECOLOGY

## 2019-02-15 NOTE — PROGRESS NOTES
Assessment/Plan   Diagnoses and all orders for this visit:    Women's annual routine gynecological examination    IUD contraception     1  Yearly exam-Pap smear deferred, self-breast awareness reviewed  2  Mirena IUD-placed 10/25/17  Initially, she had amenorrhea from the device and from breastfeeding  She stop lactating mid 2018 and has had 3 menses over these past 4-5 months  She will call with any menometrorrhagia  She was counseled about possible oligomenorrhea/amenorrhea from the Mirena itself  3  History of HSV-no current issues  4  History of anxiety-patient to continue to follow-up with her treating doctor  Overall, she is doing well  5  Other-patient recently completed the whole 30 diet  Her doctor plans to test her for gluten in the near future  Her hypothyroidism is in good control on stable thyroid dosing  6  Other-patient may consider another pregnancy in the next few years  She was counseled briefly about this and about removal of IUD  She will return at the appropriate time  Otherwise, follow-up 1 year for yearly exam or as needed  Subjective   Patient ID: Jesus Matta is a 29 y o  female  Vitals:    02/15/19 0945   BP: 110/70     Patient was seen today for yearly exam   Please see assessment plan for details        The following portions of the patient's history were reviewed and updated as appropriate: allergies, current medications, past family history, past medical history, past social history, past surgical history and problem list   Past Medical History:   Diagnosis Date    Abnormal Pap smear of cervix     Anemia     Depression     Dermatitis      2 para 2     Herpes     Hirsutism     last assessed: 14    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 thyroiditis     Mild cervical dysplasia     Pregnancy     last assessed: 3/28/17    Subclinical hypothyroidism     last assessed: 14    Urinary tract infection     Varicella     Vitamin D deficiency      Past Surgical History:   Procedure Laterality Date    COLPOSCOPY VULVA W/ BIOPSY      10/1/07 - MALLORY-1 noted at 5:00 and 12:00 ECC was negative    COMBINED HYSTEROSCOPY DIAGNOSTIC / D&C  10/2014    polyp removal    DILATION AND CURETTAGE OF UTERUS      WISDOM TOOTH EXTRACTION       OB History    Para Term  AB Living   2 2 2     2   SAB TAB Ectopic Multiple Live Births         0 2      # Outcome Date GA Lbr Trevor/2nd Weight Sex Delivery Anes PTL Lv   2 Term 17 39w0d / 00:59 3799 g (8 lb 6 oz) F Vag-Spont EPI N AQUILES   1 Term 09/03/15 39w2d  3856 g (8 lb 8 oz) M Vag-Spont EPI N AQUILES       Current Outpatient Medications:     acetaminophen (TYLENOL) 325 mg tablet, Every 4 hours for mild pain, Disp: 30 tablet, Rfl: 0    levonorgestrel (MIRENA, 52 MG,) 20 MCG/24HR IUD, 1 each by Intrauterine route once, Disp: , Rfl:     SYNTHROID 112 MCG tablet, Take 1 tablet (112 mcg total) by mouth daily for 90 days, Disp: 90 tablet, Rfl: 1  No Known Allergies  Social History     Socioeconomic History    Marital status: /Civil Union     Spouse name: None    Number of children: 2    Years of education: None    Highest education level: None   Occupational History    None   Social Needs    Financial resource strain: None    Food insecurity:     Worry: None     Inability: None    Transportation needs:     Medical: None     Non-medical: None   Tobacco Use    Smoking status: Never Smoker    Smokeless tobacco: Never Used   Substance and Sexual Activity    Alcohol use: Yes     Comment: social drinker as per allscripts    Drug use: No    Sexual activity: Yes     Partners: Male     Birth control/protection: IUD   Lifestyle    Physical activity:     Days per week: None     Minutes per session: None    Stress: None   Relationships    Social connections:     Talks on phone: None     Gets together: None     Attends [employer] service: None     Active member of club or organization: None Attends meetings of clubs or organizations: None     Relationship status: None    Intimate partner violence:     Fear of current or ex partner: None     Emotionally abused: None     Physically abused: None     Forced sexual activity: None   Other Topics Concern    None   Social History Narrative    Caffeine use    Shinto affiliation Gnosticism    Uses seatbelts     Family History   Problem Relation Age of Onset    Stroke Mother     Depression Mother     Thyroid disease Mother     Depression Maternal Aunt     Diabetes Maternal Aunt     Heart attack Paternal Uncle     Diabetes Other        Review of Systems    Objective   Physical Exam    Objective      /70 (BP Location: Left arm, Patient Position: Sitting, Cuff Size: Standard)   Ht 5' 11" (1 803 m)   Wt 85 7 kg (189 lb)   LMP 01/22/2019 (Exact Date)   BMI 26 36 kg/m²     General:   alert and oriented, in no acute distress   Neck: normal to inspection and palpation   Breast: normal appearance, no masses or tenderness   Heart:    Lungs:    Abdomen: soft, non-tender, without masses or organomegaly   Vulva: normal   Vagina: Without erythema or lesions or discharge  Normal   Cervix: Without lesions or discharge or cervicitis  No Cervical motion tenderness  IUD string seen at a length of 1 5 cm      Uterus: top normal size, anteverted, non-tender   Adnexa: no mass, fullness, tenderness   Rectum: negative    Psych:  Normal mood and affect   Skin:  Without obvious lesions   Eyes: symmetric, with normal movements and reactivity   Musculoskeletal:  Normal muscle tone and movements appreciated

## 2019-02-15 NOTE — PATIENT INSTRUCTIONS
Intrauterine Device   WHAT YOU NEED TO KNOW:   What is an intrauterine device? An intrauterine device (IUD) is a type of birth control that is inserted into your uterus  It is a small, flexible piece of plastic with a string on the end  It is inserted and removed by your healthcare provider  IUDs prevent sperm from reaching or fertilizing an egg  IUDs also prevent a fertilized egg from attaching to the uterus and developing into a fetus  What are the most common types of IUDs? · Copper: This type of IUD slowly releases a small amount of copper into your uterus  This IUD can remain in place for up to 10 years  · Hormone-releasing: This type of IUD slowly releases a small amount of progesterone into your uterus  Progesterone is a hormone that is made by your body to help control your periods  This IUD can remain in place for up to 5 years  What are the advantages of an IUD? · Effective: An IUD is 98% to 99% effective in preventing pregnancy  · Easily removable: The IUD can be removed if you decide to have a baby  You may be able to get pregnant as soon as the IUD is removed  · Immediate:  An IUD protects you from pregnancy right after it is inserted  · Convenient:  You do not have to stop sexual activity to insert it or worry about remembering to take your birth control pill  · Safe:  Copper IUDs are safer for some women than oral birth control pills  Examples include women who smoke or have a history of blood clots  · Health effects:  Hormone-releasing IUDs may decrease certain health problems  Examples include bleeding and cramping that happen with your monthly period  What are the risks of an IUD? · An IUD does not protect you from sexually transmitted infections  You may have cramps during the first weeks after you get the IUD  A copper IUD may cause your monthly period to be heavier or more painful  This is more common within the first 3 months after you get the IUD   You may need to have your IUD removed if your bleeding or pain becomes severe  You may have spotting between periods  · There is a small chance that you could get pregnant  Sometimes the IUD cannot be removed after you get pregnant  This increases your risk of a miscarriage or an ectopic pregnancy  Ectopic pregnancy is when the fertilized egg starts to grow somewhere other than your uterus  There is a small risk of an infection within the first 20 days after the IUD is placed  Infection can lead to pelvic inflammatory disease  This can cause infertility  Your uterus may tear when the IUD is inserted  The IUD may slip part or all of the way out of your uterus  How is the IUD inserted? · The IUD is usually inserted during your monthly period  This may help decrease the amount of discomfort you have during the procedure  It also helps ensure that you are not pregnant  You will be asked to lie down and place your feet in stirrups  Your healthcare provider will gently insert a speculum into your vagina  This is the same tool used during a pap smear  The speculum allows your healthcare provider to see inside your vagina to your cervix  The cervix is the opening of your uterus  · Your healthcare provider will clean your cervix with an antiseptic solution to prevent infection  You may be given numbing medicine  A long plastic tube is gently passed through your cervix and into your uterus  This tube has the IUD inside of it  The IUD is pushed out of the tube and into your uterus  You may have cramps as the IUD is inserted  The tube is removed after the IUD is in place  How can I make sure my IUD is still in place? An IUD has a string made of plastic thread  One to 2 inches of this string hangs into your vagina  You cannot see this string, and it will not cause problems when you have sex  Check your IUD string every 3 days for the first 3 months after it is inserted  After that, check the string after each monthly period   Do the following to check the placement of your IUD:  · Wash your hands with soap and warm water  Dry them with a clean towel  · Bend your knees and squat low to the ground  · Gently put your index finger inside your vagina  The cervix is at the top of the vagina and feels like the tip of your nose  Feel for the IUD string  Do not pull on the string  You should not be able to feel the firm plastic of the IUD itself  · Wash your hands after you check your IUD string  Where can I find more information? · [facility] [address] , [address] [place]  Phone: 2- [phone] [phone]  Web Address: https://[employer]/  org  When should I contact my healthcare provider? · You think you are pregnant  · You bleed after you have sex  · You have pain during sex  · You cannot feel the IUD string, the string feels longer, or you feel the plastic of the IUD itself  · You have vaginal discharge that is green, yellow, or has a foul odor  · You have questions or concerns about your condition or care  When should I seek immediate care? · You have severe pain or bleeding during your period  · You have a fever and severe abdominal pain  CARE AGREEMENT:   You have the right to help plan your care  Learn about your health condition and how it may be treated  Discuss treatment options with your caregivers to decide what care you want to receive  You always have the right to refuse treatment  The above information is an  only  It is not intended as medical advice for individual conditions or treatments  Talk to your doctor, nurse or pharmacist before following any medical regimen to see if it is safe and effective for you  © 2017 [facility] Information is for End User's use only and may not be sold, redistributed or otherwise used for commercial purposes   All illustrations and images included in CareNotes® are the copyrighted property of A D A M , Inc  or Misael Alves

## 2019-02-28 ENCOUNTER — TELEPHONE (OUTPATIENT)
Dept: ENDOCRINOLOGY | Facility: CLINIC | Age: 34
End: 2019-02-28

## 2019-02-28 DIAGNOSIS — E06.3 HASHIMOTO'S DISEASE: Primary | ICD-10-CM

## 2019-02-28 LAB
25(OH)D3 SERPL-MCNC: 35 NG/ML (ref 30–100)
BUN SERPL-MCNC: 16 MG/DL (ref 7–25)
BUN/CREAT SERPL: NORMAL (CALC) (ref 6–22)
CALCIUM SERPL-MCNC: 9.3 MG/DL (ref 8.6–10.2)
CHLORIDE SERPL-SCNC: 105 MMOL/L (ref 98–110)
CO2 SERPL-SCNC: 27 MMOL/L (ref 20–32)
CREAT SERPL-MCNC: 0.94 MG/DL (ref 0.5–1.1)
GAD65 AB SER IA-ACNC: <5 IU/ML
GLUCOSE SERPL-MCNC: 87 MG/DL (ref 65–99)
IGA SERPL-MCNC: 143 MG/DL (ref 81–463)
PANC ISLET CELL AB SER QL IF: NEGATIVE
PANC ISLET CELL AB TITR SER: NORMAL JDF UNITS
POTASSIUM SERPL-SCNC: 4 MMOL/L (ref 3.5–5.3)
SL AMB EGFR AFRICAN AMERICAN: 93 ML/MIN/1.73M2
SL AMB EGFR NON AFRICAN AMERICAN: 80 ML/MIN/1.73M2
SODIUM SERPL-SCNC: 139 MMOL/L (ref 135–146)
T4 FREE SERPL-MCNC: 1.2 NG/DL (ref 0.8–1.8)
TSH SERPL-ACNC: 3.89 MIU/L
TTG IGA SER-ACNC: 1 U/ML

## 2019-02-28 NOTE — TELEPHONE ENCOUNTER
----- Message from Lemuel Zuleta MD sent at 2/28/2019 10:58 AM EST -----  Please call the patient regarding her abnormal result  Based on her recent thyroid blood work results, will have to increase her dose by half tablet weekly, she will take Synthroid 112 mcg daily except on Sunday 1 and half tablet  She will need repeat TSH, free T4 in 6 weeks, please order blood work  Also her workup for type 1 diabetes is negative please inform pt

## 2019-03-04 ENCOUNTER — TELEPHONE (OUTPATIENT)
Dept: BEHAVIORAL/MENTAL HEALTH CLINIC | Facility: CLINIC | Age: 34
End: 2019-03-04

## 2019-03-06 ENCOUNTER — TELEPHONE (OUTPATIENT)
Dept: ENDOCRINOLOGY | Facility: CLINIC | Age: 34
End: 2019-03-06

## 2019-03-06 NOTE — TELEPHONE ENCOUNTER
Since we called pt yesterday asking her to increase her levo, and we called her today and let her know that labs were normal, pt is asking if she should still change her levo dose  New dosinmcg daily but  5

## 2019-03-06 NOTE — TELEPHONE ENCOUNTER
----- Message from Dylan Weller sent at 3/1/2019  9:52 AM EST -----  Patient asking for rest of results

## 2019-03-06 NOTE — TELEPHONE ENCOUNTER
Please inform pt that antibody titer for type 1 diabetes is negative, celiac profile is negative, thyroid blood work is in acceptable range, continue current dose of thyroid supplementation, Synthroid  Her vitamin-D is also normal    Zeb Reilly MD

## 2019-03-07 ENCOUNTER — TELEPHONE (OUTPATIENT)
Dept: ENDOCRINOLOGY | Facility: CLINIC | Age: 34
End: 2019-03-07

## 2019-03-07 NOTE — TELEPHONE ENCOUNTER
TSH is slightly elevated, so her dose was increased by 1/2 tablet weekly if she does not have palpitations, she should continue 112 mcg po daily from Mon to Saturday, except sundays 1 5 tablet   If she notice any palpitations she should contact office,   Also she will need follow-up appointment in 2 months, with repeat TSH and free Nafisa Meyer MD

## 2019-06-03 ENCOUNTER — TELEPHONE (OUTPATIENT)
Dept: ENDOCRINOLOGY | Facility: CLINIC | Age: 34
End: 2019-06-03

## 2019-06-07 ENCOUNTER — OFFICE VISIT (OUTPATIENT)
Dept: ENDOCRINOLOGY | Facility: CLINIC | Age: 34
End: 2019-06-07
Payer: COMMERCIAL

## 2019-06-07 VITALS
SYSTOLIC BLOOD PRESSURE: 98 MMHG | HEIGHT: 71 IN | HEART RATE: 52 BPM | DIASTOLIC BLOOD PRESSURE: 64 MMHG | BODY MASS INDEX: 26.88 KG/M2 | WEIGHT: 192 LBS

## 2019-06-07 DIAGNOSIS — E03.8 HYPOTHYROIDISM DUE TO HASHIMOTO'S THYROIDITIS: Primary | ICD-10-CM

## 2019-06-07 DIAGNOSIS — E06.3 HYPOTHYROIDISM DUE TO HASHIMOTO'S THYROIDITIS: Primary | ICD-10-CM

## 2019-06-07 LAB
T4 FREE SERPL-MCNC: 1.3 NG/DL (ref 0.8–1.8)
TSH SERPL-ACNC: 1.98 MIU/L

## 2019-06-07 PROCEDURE — 99214 OFFICE O/P EST MOD 30 MIN: CPT | Performed by: INTERNAL MEDICINE

## 2019-06-25 ENCOUNTER — TELEPHONE (OUTPATIENT)
Dept: ENDOCRINOLOGY | Facility: CLINIC | Age: 34
End: 2019-06-25

## 2019-08-22 DIAGNOSIS — F41.9 ANXIETY: ICD-10-CM

## 2019-08-22 RX ORDER — LEVOTHYROXINE SODIUM 112 MCG
TABLET ORAL
Qty: 90 TABLET | Refills: 4 | Status: SHIPPED | OUTPATIENT
Start: 2019-08-22 | End: 2020-02-21 | Stop reason: ALTCHOICE

## 2019-10-01 ENCOUNTER — PROCEDURE VISIT (OUTPATIENT)
Dept: OBGYN CLINIC | Facility: CLINIC | Age: 34
End: 2019-10-01
Payer: COMMERCIAL

## 2019-10-01 VITALS — BODY MASS INDEX: 26.78 KG/M2 | DIASTOLIC BLOOD PRESSURE: 62 MMHG | SYSTOLIC BLOOD PRESSURE: 100 MMHG | WEIGHT: 192 LBS

## 2019-10-01 DIAGNOSIS — Z31.69 ENCOUNTER FOR PRECONCEPTION CONSULTATION: ICD-10-CM

## 2019-10-01 DIAGNOSIS — Z30.432 ENCOUNTER FOR IUD REMOVAL: Primary | ICD-10-CM

## 2019-10-01 PROBLEM — Z97.5 IUD CONTRACEPTION: Status: RESOLVED | Noted: 2019-02-15 | Resolved: 2019-10-01

## 2019-10-01 PROCEDURE — 58301 REMOVE INTRAUTERINE DEVICE: CPT | Performed by: OBSTETRICS & GYNECOLOGY

## 2019-10-01 NOTE — PROGRESS NOTES
Iud removal  Date/Time: 10/1/2019 3:53 PM  Performed by: Manuelito Amin MD  Authorized by: Manuelito Amin MD     Consent:     Consent obtained:  Verbal and written    Consent given by:  Patient    Procedure risks and benefits discussed: yes      Patient questions answered: yes      Patient agrees, verbalizes understanding, and wants to proceed: yes      Educational handouts given: yes      Instructions and paperwork completed: yes    Procedure:     Removed with no complications: yes      Other reason for removal:  Desire pregnancy  Comments:      Patient tolerated the procedure well  She will call with any issues

## 2019-10-01 NOTE — PROGRESS NOTES
Assessment/Plan   Diagnoses and all orders for this visit:    Encounter for IUD removal    Encounter for preconception consultation     1  Mirena IUD removal-patient interested in attempting pregnancy in the next few months  IUD was removed without problems  She tolerated well  She will call or return with any issues  2  Oligomenorrhea-related to Mirena IUD  Patient could expect normal menses in the near future  3  History of HSV-no current issues  4  History of anxiety/situational depression-doing well  Not on any medications  5  Pre conceptual-recommend she start vitamins with folic acid  She will do this, she has a regimen which she takes vitamins with folic acid and DHA  Suggested she start this as soon as possible  Recommend good control of her hypothyroidism and she has a plan to see them soon  She is on stable medication at this time  She has laboratory sheet and she will get this done  Also, recommend up-to-date with vaccinations  She plans to use other means of contraception until later this year or early next year  She does plan to lose more weight and may participate in a triathlon early next year  Otherwise, follow-up 2020 for yearly exam or as needed  Subjective   Patient ID: Michelle Jernigan is a 29 y o  female  Vitals:    10/01/19 1504   BP: 100/62     Patient was seen today for IUD removal   Please see assessment plan for details        The following portions of the patient's history were reviewed and updated as appropriate: allergies, current medications, past family history, past medical history, past social history, past surgical history and problem list   Past Medical History:   Diagnosis Date    Abnormal Pap smear of cervix     Anemia     Depression     Dermatitis      2 para 2     Herpes     Hirsutism     last assessed: 14    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 thyroiditis     Mild cervical dysplasia     Pregnancy     last assessed: 3/28/17  Subclinical hypothyroidism     last assessed: 14    Urinary tract infection     Varicella     Vitamin D deficiency      Past Surgical History:   Procedure Laterality Date    COLPOSCOPY VULVA W/ BIOPSY      10/1/07 - MALLORY-1 noted at 5:00 and 12:00 ECC was negative    COMBINED HYSTEROSCOPY DIAGNOSTIC / D&C  10/2014    polyp removal    DILATION AND CURETTAGE OF UTERUS      WISDOM TOOTH EXTRACTION       OB History    Para Term  AB Living   2 2 2     2   SAB TAB Ectopic Multiple Live Births         0 2      # Outcome Date GA Lbr Trevor/2nd Weight Sex Delivery Anes PTL Lv   2 Term 17 39w0d / 00:59 3799 g (8 lb 6 oz) F Vag-Spont EPI N AQUILES   1 Term 09/03/15 39w2d  3856 g (8 lb 8 oz) M Vag-Spont EPI N AQUILES       Current Outpatient Medications:     acetaminophen (TYLENOL) 325 mg tablet, Every 4 hours for mild pain, Disp: 30 tablet, Rfl: 0    SYNTHROID 112 MCG tablet, TAKE 1 TABLET BY MOUTH DAILY FOR 90 DAYS, Disp: 90 tablet, Rfl: 4  No Known Allergies  Social History     Socioeconomic History    Marital status: /Civil Union     Spouse name: Not on file    Number of children: 2    Years of education: Not on file    Highest education level: Not on file   Occupational History    Not on file   Social Needs    Financial resource strain: Not on file    Food insecurity:     Worry: Not on file     Inability: Not on file    Transportation needs:     Medical: Not on file     Non-medical: Not on file   Tobacco Use    Smoking status: Never Smoker    Smokeless tobacco: Never Used   Substance and Sexual Activity    Alcohol use: Yes     Comment: social drinker as per allscripts    Drug use: No    Sexual activity: Yes     Partners: Male     Birth control/protection: IUD   Lifestyle    Physical activity:     Days per week: Not on file     Minutes per session: Not on file    Stress: Not on file   Relationships    Social connections:     Talks on phone: Not on file     Gets together: Not on file     Attends Confucianism service: Not on file     Active member of club or organization: Not on file     Attends meetings of clubs or organizations: Not on file     Relationship status: Not on file    Intimate partner violence:     Fear of current or ex partner: Not on file     Emotionally abused: Not on file     Physically abused: Not on file     Forced sexual activity: Not on file   Other Topics Concern    Not on file   Social History Narrative    Caffeine use    Hinduism affiliation [employer]    Uses seatbelts     Family History   Problem Relation Age of Onset    Stroke Mother     Depression Mother     Thyroid disease Mother     Depression Maternal Aunt     Diabetes Maternal Aunt     Heart attack Paternal Uncle     Diabetes Other        Review of Systems   Constitutional: Negative for chills, diaphoresis, fatigue and fever  Respiratory: Negative for apnea, cough, chest tightness, shortness of breath and wheezing  Cardiovascular: Negative for chest pain, palpitations and leg swelling  Gastrointestinal: Negative for abdominal distention, abdominal pain, anal bleeding, constipation, diarrhea, nausea, rectal pain and vomiting  Genitourinary: Negative for difficulty urinating, dyspareunia, dysuria, frequency, hematuria, menstrual problem, pelvic pain, urgency, vaginal bleeding, vaginal discharge and vaginal pain  Musculoskeletal: Negative for arthralgias, back pain and myalgias  Skin: Negative for color change and rash  Neurological: Negative for dizziness, syncope, light-headedness, numbness and headaches  Hematological: Negative for adenopathy  Does not bruise/bleed easily  Psychiatric/Behavioral: Negative for dysphoric mood and sleep disturbance  The patient is not nervous/anxious          Objective   Physical Exam    Objective      /62   Wt 87 1 kg (192 lb)   LMP 09/20/2019   BMI 26 78 kg/m²     General:   alert and oriented, in no acute distress   Neck:    Breast: Heart:    Lungs:    Abdomen: soft, non-tender, without masses or organomegaly   Vulva: normal   Vagina: Without erythema or lesions or discharge  Normal   Cervix: Without lesions or discharge or cervicitis    No Cervical motion tenderness   Uterus: top normal size, anteverted, non-tender   Adnexa: no mass, fullness, tenderness   Rectum: deferred    Psych:  Normal mood and affect   Skin:  Without obvious lesions   Eyes: symmetric, with normal movements and reactivity   Musculoskeletal:  Normal muscle tone and movements appreciated

## 2019-10-01 NOTE — PATIENT INSTRUCTIONS
Planning for Pregnancy   WHAT YOU NEED TO KNOW:   Why is it important to plan for pregnancy? There are things you can do to get your body ready for a healthy pregnancy  A healthy pregnancy can improve your chance of having a healthy baby  The steps you need to take and the amount of time needed depends on your current health and habits  Work with your healthcare provider to help you plan a healthy pregnancy  What do I need to know about nutrition and exercise before pregnancy? · Eat a variety of healthy foods  Healthy foods include fruits, vegetables, whole-grain breads, low-fat dairy foods, beans, lean meats, and fish  Limit foods high in sugar, fat, and sodium  Limit your intake of fish to 2 servings each week  Choose fish low in mercury such as canned light tuna, shrimp, salmon, cod, or tilapia  Do not  eat fish high in mercury such as swordfish, tilefish, marei mackerel, and shark  · Take 400 micrograms (mcg) of folic acid each day  This will help to prevent birth defects of the brain and spine such as spina bifida  Most women should take folic acid before pregnancy and up to 12 weeks after getting pregnant  · Exercise for at least 30 minutes, 5 days a week  Some examples of exercise include walking, biking, dancing, and swimming  Include muscle strengthening activities 2 days each week  Regular exercise provides many health benefits  It helps you manage your weight, and decreases your risk for type 2 diabetes, heart disease, stroke, and high blood pressure  Exercise can also help improve your mood  Ask your healthcare provider about the best exercise plan for you  How does weight affect pregnancy? · Obesity  can make it harder for you to get pregnant  It also increases your risk of health problems during pregnancy  Some of these health problems include gestational diabetes, high blood pressure, and infections  It can also increase your baby's risk of health problems such as birth defects   Your baby may also be large and harder to deliver or be born prematurely (early)  Your risk of miscarriage is also higher if you are obese  Work with your healthcare provider to reach a healthy weight before you try to get pregnant  · Being underweight  can also make it hard for you to get pregnant  It can also increase your risk of having a premature baby and miscarriage  Your baby may be born at a low birth weight  What do I need to know about smoking, alcohol, and drugs? · Smoking  increases your risk of a miscarriage and other health problems during pregnancy  Smoking can cause your baby to be born too early or weigh less at birth  Ask your healthcare provider for information if you need help quitting  · Alcohol  passes from your body to your baby through the placenta  It can affect your baby's brain development and cause fetal alcohol syndrome (FAS)  FAS is a group of conditions that causes mental, behavior, and growth problems  Talk to your healthcare provider if you abuse alcohol and need help quitting before pregnancy  · Drugs , such as marijuana and cocaine, should not be used while you are trying to get pregnant or during pregnancy  They increase your risk of problems during pregnancy and increase the risk of having a baby with health problems  These include birth defects, premature birth, and infant death  What do I need to know about medicines and supplements? Tell your healthcare provider about all the medicines and supplements you take  Certain medicines and supplements should not be used during pregnancy  These include over-the-counter medicines, prescription medicines, vitamins, and herbal supplements  He or she may recommend that you take different medicines that are safer during pregnancy  What do I need to know about immunizations? Tell your healthcare provider about all the immunizations you have had   If you have missed any immunizations, your healthcare provider may recommend that you update your immunizations  These include hepatitis B, influenza, MMR (measles, mumps, rubella), Tdap, and varicella immunizations  What tests may I need to have before pregnancy? Your healthcare provider may recommend that you have tests to screen for sexually transmitted infections  These include chlamydia, gonorrhea, herpes, HIV infection, syphilis, and tuberculosis  These infectious diseases should be treated before pregnancy, if needed  What do I need to know about toxic substances? Toxic substances can harm a developing baby  Examples include cleaning products, paints, solvents, pesticides, and other chemical products  They can increase the risk of having a miscarriage, premature birth, and low-birth weight baby  They also increase the risk of developmental delay and childhood cancer  Avoid exposure to toxic substances and materials at work and home  What do I need to know about genetic testing? Tell your healthcare provider about your and your partner's family history of genetic disorders, developmental delays, or other disabilities  Also tell your healthcare provider about any problems you have had in previous pregnancies  Your healthcare provider may recommend that you see a healthcare professional called a genetic counselor  He or she will talk to you about how genes, birth defects, and other medical conditions are passed down  He or she can also tell you about your risk for passing a genetic disease in a future pregnancy  How can I prepare for pregnancy if I have a medical condition? Medical conditions such as diabetes, high blood pressure, asthma, seizure disorders, and thyroid disorders should be managed before pregnancy  Mental health conditions, such as depression and anxiety, should also be treated  This will decrease your risk of having health problems during pregnancy  It will also decrease your baby's risk of medical problems   Medicines used to treat certain conditions are not safe to use during pregnancy and may need to be changed before you get pregnant  Ask your healthcare provider if it is safe for you to get pregnant if you have a medical condition  CARE AGREEMENT:   You have the right to help plan your care  Learn about your health condition and how it may be treated  Discuss treatment options with your caregivers to decide what care you want to receive  You always have the right to refuse treatment  The above information is an  only  It is not intended as medical advice for individual conditions or treatments  Talk to your doctor, nurse or pharmacist before following any medical regimen to see if it is safe and effective for you  © 2017 [address]  Information is for End User's use only and may not be sold, redistributed or otherwise used for commercial purposes  All illustrations and images included in CareNotes® are the copyrighted property of [employer]  or Misael Alves

## 2019-10-09 ENCOUNTER — TELEPHONE (OUTPATIENT)
Dept: ENDOCRINOLOGY | Facility: CLINIC | Age: 34
End: 2019-10-09

## 2019-10-09 DIAGNOSIS — E55.9 VITAMIN D DEFICIENCY: Primary | ICD-10-CM

## 2019-10-09 NOTE — TELEPHONE ENCOUNTER
Pt called she wanted to know if there is any additional bw she should get done? She is going to start  trying to get pregnant in March   She would like lab slips emailed to her

## 2019-10-09 NOTE — TELEPHONE ENCOUNTER
Lab Results   Component Value Date    [id number] 0 18 (L) 11/29/2017    TSH 1 98 06/06/2019       Her last TSH from June 2019 is normal  She has appointment in December for follow-up  She should get TSH, free T4 before her next appointment, as well as vitamin-D level    Jose Antonio Harvey MD

## 2019-10-10 ENCOUNTER — TELEPHONE (OUTPATIENT)
Dept: ENDOCRINOLOGY | Facility: CLINIC | Age: 34
End: 2019-10-10

## 2019-10-10 NOTE — TELEPHONE ENCOUNTER
She has appt in Dec, she should do blood work for [facility] and free t4 before that visit, thanks   Also if she gets pregnant, she needs to inform us as she will need dose change appropriate for pregnancy   Samuel Truong MD

## 2019-11-20 ENCOUNTER — TELEPHONE (OUTPATIENT)
Dept: ENDOCRINOLOGY | Facility: CLINIC | Age: 34
End: 2019-11-20

## 2019-11-20 LAB
T4 FREE SERPL-MCNC: 1.5 NG/DL (ref 0.8–1.8)
TSH SERPL-ACNC: 0.96 MIU/L
TSH SERPL-ACNC: 1.01 MIU/L

## 2019-11-20 NOTE — TELEPHONE ENCOUNTER
She should follow up for her appt in Dec with martha   Lab Results   Component Value Date    [id number] 0 18 (L) 11/29/2017    TSH 1 98 06/06/2019     Thanks     Magdalene Webb MD

## 2019-11-20 NOTE — TELEPHONE ENCOUNTER
Pt aware  Wanted to let Dr know Nahun Donald was taking out in October and they will be trying to get pregnant in March  When do you want her to recheck BW?

## 2019-11-20 NOTE — TELEPHONE ENCOUNTER
----- Message from Rasta Earl MD sent at 11/20/2019  1:07 PM EST -----  Please inform patient that thyroid blood work is normal, continue current dose of synthroid

## 2019-12-03 ENCOUNTER — IMMUNIZATIONS (OUTPATIENT)
Dept: FAMILY MEDICINE CLINIC | Facility: CLINIC | Age: 34
End: 2019-12-03
Payer: COMMERCIAL

## 2019-12-03 VITALS — TEMPERATURE: 96.4 F

## 2019-12-03 DIAGNOSIS — Z23 INFLUENZA VACCINE NEEDED: Primary | ICD-10-CM

## 2019-12-03 PROCEDURE — 90682 RIV4 VACC RECOMBINANT DNA IM: CPT

## 2019-12-03 PROCEDURE — 90471 IMMUNIZATION ADMIN: CPT

## 2019-12-16 ENCOUNTER — OFFICE VISIT (OUTPATIENT)
Dept: ENDOCRINOLOGY | Facility: CLINIC | Age: 34
End: 2019-12-16
Payer: COMMERCIAL

## 2019-12-16 VITALS
WEIGHT: 182 LBS | SYSTOLIC BLOOD PRESSURE: 120 MMHG | BODY MASS INDEX: 25.48 KG/M2 | HEIGHT: 71 IN | DIASTOLIC BLOOD PRESSURE: 80 MMHG

## 2019-12-16 DIAGNOSIS — E03.8 HYPOTHYROIDISM DUE TO HASHIMOTO'S THYROIDITIS: Primary | ICD-10-CM

## 2019-12-16 DIAGNOSIS — E06.3 HYPOTHYROIDISM DUE TO HASHIMOTO'S THYROIDITIS: Primary | ICD-10-CM

## 2019-12-16 DIAGNOSIS — E55.9 VITAMIN D DEFICIENCY: ICD-10-CM

## 2019-12-16 PROCEDURE — 99213 OFFICE O/P EST LOW 20 MIN: CPT | Performed by: PHYSICIAN ASSISTANT

## 2019-12-16 RX ORDER — ACETAMINOPHEN 160 MG
2000 TABLET,DISINTEGRATING ORAL DAILY
Start: 2019-12-16

## 2019-12-16 NOTE — PROGRESS NOTES
Patient Progress Note    CC: hypothyroidism     Referring Provider  Malvin Almanza Md  [address] , [address]     History of Present Illness:     Patient is a 41-year-old female here for follow-up of hypothyroidism secondary to Hashimoto's thyroiditis and vitamin-D deficiency  Patient is on Synthroid 112 mcg 1 tablet daily  Patient is taking it 1 hr before breakfast on an empty stomach and at least 4 hrs apart from supplements  Tolerating medication well  No history of external radiation to head/neck/chest   No family history of thyroid cancer  No recent Iodine loading in form of medication, erbs or kelp supplements or radiological diagnostic studies  Most recent thyroid function tests further within normal range, TSH 1 01 and free T4 1 5    Most recent thyroid ultrasound results: 2015  FINDINGS-   Thyroid parenchyma is diffusely heterogeneous in echotexture  Right gland-  4 0 x 1 1 x 1 5 cm  No dominant nodules  Left gland-  4 1 x 1 5 x 1 2 cm  No dominant nodules  Isthmus- 0 3 cm in AP dimension  No dominant nodules  IMPRESSION-   Heterogeneous thyroid gland, with no focal lesions identified  She plans for pregnancy around February or 2020  She was pregnant with twins for her first pregnancy but had to undergo fetal reduction due to complications to the fetus  She exercises routinely and eats well       Vitamin-D deficiency:  Vitamin-D checked earlier this year was normal at 28    Patient Active Problem List   Diagnosis    Anxiety    Hashimoto's disease    Herpes simplex infection    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 thyroiditis    Anxiety disorder    Anxiety and depression     Past Medical History:   Diagnosis Date    Abnormal Pap smear of cervix     Anemia     Depression     Dermatitis      2 para 2     Herpes     Hirsutism     last assessed: 14    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 thyroiditis     Mild cervical dysplasia  Pregnancy     last assessed: 3/28/17    Subclinical hypothyroidism     last assessed: 12/29/14    Urinary tract infection     Varicella     Vitamin D deficiency       Past Surgical History:   Procedure Laterality Date    COLPOSCOPY VULVA W/ BIOPSY      10/1/07 - MALLORY-1 noted at 5:00 and 12:00 ECC was negative    COMBINED HYSTEROSCOPY DIAGNOSTIC / D&C  10/2014    polyp removal    DILATION AND CURETTAGE OF UTERUS      WISDOM TOOTH EXTRACTION        Family History   Problem Relation Age of Onset    Stroke Mother     Depression Mother     Thyroid disease Mother     Depression Maternal Aunt     Diabetes Maternal Aunt     Heart attack Paternal Uncle     Diabetes Other      Social History     Tobacco Use    Smoking status: Never Smoker    Smokeless tobacco: Never Used   Substance Use Topics    Alcohol use: Yes     Comment: social drinker as per allscripts     No Known Allergies  Current Outpatient Medications   Medication Sig Dispense Refill    acetaminophen (TYLENOL) 325 mg tablet Every 4 hours for mild pain 30 tablet 0    Prenatal MV-Min-Fe Fum-FA-DHA (PRENATAL+DHA PO) Take by mouth daily      SYNTHROID 112 MCG tablet TAKE 1 TABLET BY MOUTH DAILY FOR 90 DAYS 90 tablet 4    Cholecalciferol (VITAMIN D3) 50 MCG (2000 UT) capsule Take 1 capsule (2,000 Units total) by mouth daily       No current facility-administered medications for this visit  Review of Systems   Constitutional: Negative for activity change, appetite change, fatigue and unexpected weight change  HENT: Negative for trouble swallowing  Eyes: Negative for visual disturbance  Respiratory: Negative for shortness of breath  Cardiovascular: Negative for chest pain and palpitations  Gastrointestinal: Negative for constipation and diarrhea  Endocrine: Positive for cold intolerance  Negative for heat intolerance  Musculoskeletal: Negative  Skin: Negative  Neurological: Negative for tremors  Psychiatric/Behavioral: Negative  Physical Exam:  Body mass index is 25 38 kg/m²  /80   Ht 5' 11" (1 803 m)   Wt 82 6 kg (182 lb)   BMI 25 38 kg/m²    Wt Readings from Last 3 Encounters:   12/16/19 82 6 kg (182 lb)   10/01/19 87 1 kg (192 lb)   06/07/19 87 1 kg (192 lb)       Physical Exam   Constitutional: She appears well-developed and well-nourished  HENT:   Head: Normocephalic  Eyes: Pupils are equal, round, and reactive to light  EOM are normal  No scleral icterus  Neck: Neck supple  No thyromegaly present  Cardiovascular: Normal rate and regular rhythm  No murmur heard  Pulses:       Radial pulses are 2+ on the right side, and 2+ on the left side  Pulmonary/Chest: Effort normal and breath sounds normal  No respiratory distress  She has no wheezes  Neurological: She is alert  She has normal reflexes  Skin: Skin is warm and dry  Psychiatric: She has a normal mood and affect  Nursing note and vitals reviewed  Patient's shoes and socks were not removed  Labs: Results for Kristie Dc (MRN [id number]) as of 12/16/2019 10:57   Ref  Range 4/25/2018 11:03 11/19/2018 12:04 2/22/2019 10:54 6/6/2019 11:19 11/19/2019 09:46   TSH, POC Latest Units: mIU/L 2 59 1 81 3 89 1 98    Free T4 Latest Ref Range: 0 8 - 1 8 ng/dL 1 3 1 4 1 2 1 3 1 5   Results for Kristie Dc (MRN [id number]) as of 12/16/2019 10:57   Ref  Range 11/19/2019 09:46   TSH, UNTREATED Latest Units: mIU/L 1 01   TSH, HAMA Treated Latest Ref Range: SEE BELOW mIU/L 0 96   Results for Kristie Dc (MRN [id number]) as of 12/16/2019 10:57   Ref   Range 2/27/2018 11:53 2/22/2019 10:54   EXTERNAL VITAMIN D,25 Latest Ref Range: 30 - 100 ng/mL 40 35     Plan:    Diagnoses and all orders for this visit:    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 thyroiditis  Thyroid function tests within normal range, TSH 1 01 and free T4 1 5  Patient clinically and biochemically euthyroidism  Continue current dose of Synthroid  Advised to call if she becomes pregnant as she will need change in Synthroid dose and blood work  Repeat blood work prior to next visit  -     T4, free; Future  -     TSH, 3rd generation; Future    Vitamin D deficiency  History of vitamin D deficiency  Take vitamin D3 5000 IU daily  -     Vitamin D 25 hydroxy; Future  -     Cholecalciferol (VITAMIN D3) 50 MCG (2000 UT) capsule; Take 1 capsule (2,000 Units total) by mouth daily      Discussed with the patient and all questions fully answered  She will call me if any problems arise      Counseled patient on diagnostic results, prognosis, risk and benefit of treatment options, instruction for management, importance of treatment compliance, risk  factor reduction and impressions      Rahel Banks PA-C

## 2020-01-27 ENCOUNTER — TELEPHONE (OUTPATIENT)
Dept: OBGYN CLINIC | Facility: CLINIC | Age: 35
End: 2020-01-27

## 2020-01-27 NOTE — TELEPHONE ENCOUNTER
Patient called and stated she had her IUD removed because she was going to start trying to have a baby  Patient and her  discussed they where going to what to March and she would like to get back on birthcontrol  Appt made

## 2020-02-01 ENCOUNTER — OFFICE VISIT (OUTPATIENT)
Dept: URGENT CARE | Facility: CLINIC | Age: 35
End: 2020-02-01
Payer: COMMERCIAL

## 2020-02-01 VITALS
HEART RATE: 80 BPM | SYSTOLIC BLOOD PRESSURE: 120 MMHG | TEMPERATURE: 97.8 F | DIASTOLIC BLOOD PRESSURE: 76 MMHG | BODY MASS INDEX: 23.52 KG/M2 | RESPIRATION RATE: 20 BRPM | OXYGEN SATURATION: 98 % | HEIGHT: 71 IN | WEIGHT: 168 LBS

## 2020-02-01 DIAGNOSIS — H66.92 LEFT OTITIS MEDIA, UNSPECIFIED OTITIS MEDIA TYPE: Primary | ICD-10-CM

## 2020-02-01 PROCEDURE — 99213 OFFICE O/P EST LOW 20 MIN: CPT | Performed by: NURSE PRACTITIONER

## 2020-02-01 RX ORDER — AMOXICILLIN 875 MG/1
875 TABLET, COATED ORAL 2 TIMES DAILY
Qty: 14 TABLET | Refills: 0 | Status: SHIPPED | OUTPATIENT
Start: 2020-02-01 | End: 2020-02-08

## 2020-02-01 NOTE — PROGRESS NOTES
[facility]        NAME: Vanessa Lei is a 36 y o  female  : 1985    MRN: [id number]  DATE: 2020  TIME: 1:41 PM    Assessment and Plan   Left otitis media, unspecified otitis media type [H66 92]  1  Left otitis media, unspecified otitis media type  amoxicillin (AMOXIL) 875 mg tablet         Patient Instructions     Amoxicillin for ear infection sent to pharmacy  May take OTC Peptobismol or imodium for diarrhea  Adequate hydration with fluids or Pedialyte  Follow up with PCP in 3-5 days  Proceed to  ER if symptoms worsen  Chief Complaint     Chief Complaint   Patient presents with    Gas     Symptoms began wednesday   Diarrhea    Fever     Developed a blister on the lower lip    Cough    Chills    Vomiting         History of Present Illness       HPI   36year old female presents with diarrhea, cough, nasal congestion, fatigue, body aches  Symptoms all began 4 days ago  6-10 times of watery diarrhea 4 days ago and yesterday  Denies seeing any blood in diarrhea  She complained also of chills, but did not check her temperature at home  Her daughter is sick with bilateral ear infection  She was traveling in [place] 2 weeks ago  She has tried tylenol for symptoms that didn't help  Review of Systems   Review of Systems   Constitutional: Positive for appetite change, chills and fatigue  HENT: Positive for congestion, ear pain (L ear began today), postnasal drip, sinus pressure and sinus pain  Respiratory: Positive for cough and shortness of breath  Cardiovascular: Negative for chest pain  Gastrointestinal: Positive for abdominal pain (diffuse abdominal pain before having diarrhea) and nausea  Diarrhea: watery 6-10 times yesterday  Neurological: Positive for headaches (mild generalized headache)           Current Medications       Current Outpatient Medications:     acetaminophen (TYLENOL) 325 mg tablet, Every 4 hours for mild pain, Disp: 30 tablet, Rfl: 0    Prenatal MV-Min-Fe Fum-FA-DHA (PRENATAL+DHA PO), Take by mouth daily, Disp: , Rfl:     SYNTHROID 112 MCG tablet, TAKE 1 TABLET BY MOUTH DAILY FOR 90 DAYS, Disp: 90 tablet, Rfl: 4    amoxicillin (AMOXIL) 875 mg tablet, Take 1 tablet (875 mg total) by mouth 2 (two) times a day for 7 days, Disp: 14 tablet, Rfl: 0    Cholecalciferol (VITAMIN D3) 50 MCG (2000 UT) capsule, Take 1 capsule (2,000 Units total) by mouth daily (Patient not taking: Reported on 2020), Disp: , Rfl:     Current Allergies     Allergies as of 2020    (No Known Allergies)            The following portions of the patient's history were reviewed and updated as appropriate: allergies, current medications, past family history, past medical history, past social history, past surgical history and problem list      Past Medical History:   Diagnosis Date    Abnormal Pap smear of cervix     Anemia     Depression     Dermatitis      2 para 2     Herpes     Hirsutism     last assessed: 14    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 thyroiditis     Mild cervical dysplasia     Pregnancy     last assessed: 3/28/17    Subclinical hypothyroidism     last assessed: 14    Urinary tract infection     Varicella     Vitamin D deficiency        Past Surgical History:   Procedure Laterality Date    COLPOSCOPY VULVA W/ BIOPSY      10/1/07 - MALLORY-1 noted at 5:00 and 12:00 ECC was negative    COMBINED HYSTEROSCOPY DIAGNOSTIC / D&C  10/2014    polyp removal    DILATION AND CURETTAGE OF UTERUS      WISDOM TOOTH EXTRACTION         Family History   Problem Relation Age of Onset    Stroke Mother     Depression Mother     Thyroid disease Mother     Depression Maternal Aunt     Diabetes Maternal Aunt     Heart attack Paternal Uncle     Diabetes Other          Medications have been verified          Objective   /76   Pulse 80   Temp 97 8 °F (36 6 °C) (Tympanic)   Resp 20   Ht 5' 11" (1 803 m)   Wt 76 2 kg (168 lb)   SpO2 98%   BMI 23 43 kg/m²        Physical Exam     Physical Exam   Constitutional: She appears well-developed and well-nourished  No distress  HENT:   Right Ear: Tympanic membrane, external ear and ear canal normal    Left Ear: External ear normal  Tympanic membrane is erythematous and bulging  Nose: Mucosal edema and rhinorrhea present  Mouth/Throat: Posterior oropharyngeal erythema present  No oropharyngeal exudate  Cardiovascular: Normal rate, regular rhythm and normal heart sounds  Pulmonary/Chest: Effort normal and breath sounds normal  She has no wheezes  She has no rales  Abdominal: Soft  Bowel sounds are normal  There is no tenderness  Lymphadenopathy:     She has cervical adenopathy (anterior cervical)

## 2020-02-01 NOTE — PATIENT INSTRUCTIONS
Influenza   AMBULATORY CARE:   Influenza  (the flu) is an infection caused by the influenza virus  The flu is easily spread when an infected person coughs, sneezes, or has close contact with others  You may be able to spread the flu to others for 1 week or longer after signs or symptoms appear  Common signs and symptoms include the following:   · Fever and chills    · Headaches, body aches, and muscle or joint pain    · Cough, runny nose, and sore throat    · Loss of appetite, nausea, vomiting, or diarrhea    · Tiredness    · Trouble breathing  Call 911 for any of the following:   · You have trouble breathing, and your lips look purple or blue  · You have a seizure  Seek care immediately if:   · You are dizzy, or you are urinating less or not at all  · You have a headache with a stiff neck, and you feel tired or confused  · You have new pain or pressure in your chest     · Your symptoms, such as shortness of breath, vomiting, or diarrhea, get worse  · Your symptoms, such as fever and coughing, seem to get better, but then get worse  Contact your healthcare provider if:   · You have new muscle pain or weakness  · You have questions or concerns about your condition or care  Treatment for influenza  may include any of the following:  · Acetaminophen  decreases pain and fever  It is available without a doctor's order  Ask how much to take and how often to take it  Follow directions  Acetaminophen can cause liver damage if not taken correctly  · NSAIDs , such as ibuprofen, help decrease swelling, pain, and fever  This medicine is available with or without a doctor's order  NSAIDs can cause stomach bleeding or kidney problems in certain people  If you take blood thinner medicine, always ask your healthcare provider if NSAIDs are safe for you  Always read the medicine label and follow directions  · Antivirals  help fight a viral infection    Manage your symptoms:   · Rest  as much as you can to help you recover  · Drink liquids as directed  to help prevent dehydration  Ask how much liquid to drink each day and which liquids are best for you  Prevent the spread of the flu:   · Wash your hands often  Use soap and water  Wash your hands after you use the bathroom, change a child's diapers, or sneeze  Wash your hands before you prepare or eat food  Use gel hand cleanser when soap and water are not available  Do not touch your eyes, nose, or mouth unless you have washed your hands first            · Cover your mouth when you sneeze or cough  Cough into a tissue or the bend of your arm  · Clean shared items with a germ-killing   Clean table surfaces, doorknobs, and light switches  Do not share towels, silverware, and dishes with people who are sick  Wash bed sheets, towels, silverware, and dishes with soap and water  · Wear a mask  over your mouth and nose if you are sick or are near anyone who is sick  · Stay away from others  if you are sick  · Influenza vaccine  helps prevent influenza (flu)  Everyone older than 6 months should get a yearly influenza vaccine  Get the vaccine as soon as it is available, usually in September or October each year  Follow up with your healthcare provider as directed:  Write down your questions so you remember to ask them during your visits  © 2017 [facility] Information is for End User's use only and may not be sold, redistributed or otherwise used for commercial purposes  All illustrations and images included in CareNotes® are the copyrighted property of [employer]  or Misael Alves  The above information is an  only  It is not intended as medical advice for individual conditions or treatments  Talk to your doctor, nurse or pharmacist before following any medical regimen to see if it is safe and effective for you    Otitis Media, Ambulatory Care   GENERAL INFORMATION:   Otitis media  is an ear infection  Common symptoms include the following:   · Fever or a headache    · Ear pain    · Trouble hearing    · Ear feels plugged or full or you have ringing or buzzing in your ear    · Dizziness or you lose your balance    · Nausea or vomiting  Seek immediate care for the following symptoms:   · Seizure    · Fever and a stiff neck  Treatment for otitis media  may include any of the following:  · NSAIDs  help decrease swelling and pain or fever  This medicine is available with or without a doctor's order  NSAIDs can cause stomach bleeding or kidney problems in certain people  If you take blood thinner medicine, always ask your healthcare provider if NSAIDs are safe for you  Always read the medicine label and follow directions  · Ear drops  to help treat your ear pain  · Antibiotics  to help kill the germs that caused your ear infection  Care for otitis media:   · Use heat  Place a warm, moist washcloth on your ear to decrease pain  Apply for 15 to 20 minutes, 3 to 4 times a day    · Use ice  Ice helps decrease swelling and pain  Use an ice pack or put crushed ice in a plastic bag  Cover the ice pack with a towel and place it on your ear for 15 to 20 minutes, 3 to 4 times a day for 2 days  Prevent otitis media:   · Wash your hands often  This will help prevent the spread of germs  Encourage everyone in your house to wash their hands with soap and water after they use the bathroom  Everyone should also wash their hands after they change a child's diaper and before they prepare or eat food  · Stay away from people who are ill  Germs are easily and quickly spread through contact  Follow up with your healthcare provider as directed:  Write down your questions so you remember to ask them during your visits  CARE AGREEMENT:   You have the right to help plan your care  Learn about your health condition and how it may be treated   Discuss treatment options with your caregivers to decide what care you want to receive  You always have the right to refuse treatment  The above information is an  only  It is not intended as medical advice for individual conditions or treatments  Talk to your doctor, nurse or pharmacist before following any medical regimen to see if it is safe and effective for you  © 2014 [address] is for End User's use only and may not be sold, redistributed or otherwise used for commercial purposes  All illustrations and images included in CareNotes® are the copyrighted property of A D A M , [employer]  or Misael Alves

## 2020-02-14 ENCOUNTER — TELEPHONE (OUTPATIENT)
Dept: ENDOCRINOLOGY | Facility: CLINIC | Age: 35
End: 2020-02-14

## 2020-02-14 ENCOUNTER — TELEPHONE (OUTPATIENT)
Dept: FAMILY MEDICINE CLINIC | Facility: CLINIC | Age: 35
End: 2020-02-14

## 2020-02-14 ENCOUNTER — TELEPHONE (OUTPATIENT)
Dept: OBGYN CLINIC | Facility: CLINIC | Age: 35
End: 2020-02-14

## 2020-02-14 ENCOUNTER — APPOINTMENT (OUTPATIENT)
Dept: LAB | Facility: HOSPITAL | Age: 35
End: 2020-02-14
Attending: INTERNAL MEDICINE
Payer: COMMERCIAL

## 2020-02-14 DIAGNOSIS — E03.9 HYPOTHYROIDISM, UNSPECIFIED TYPE: Primary | ICD-10-CM

## 2020-02-14 DIAGNOSIS — N92.6 IRREGULAR MENSTRUAL CYCLE: Primary | ICD-10-CM

## 2020-02-14 DIAGNOSIS — E06.3 HYPOTHYROIDISM DUE TO HASHIMOTO'S THYROIDITIS: Primary | ICD-10-CM

## 2020-02-14 DIAGNOSIS — E03.8 HYPOTHYROIDISM DUE TO HASHIMOTO'S THYROIDITIS: Primary | ICD-10-CM

## 2020-02-14 DIAGNOSIS — N92.6 IRREGULAR MENSTRUAL CYCLE: ICD-10-CM

## 2020-02-14 DIAGNOSIS — E03.9 HYPOTHYROIDISM, UNSPECIFIED TYPE: ICD-10-CM

## 2020-02-14 LAB
B-HCG SERPL-ACNC: 2026 MIU/ML
T4 FREE SERPL-MCNC: 1.07 NG/DL (ref 0.76–1.46)
TSH SERPL DL<=0.05 MIU/L-ACNC: 5.16 UIU/ML (ref 0.36–3.74)

## 2020-02-14 PROCEDURE — 84439 ASSAY OF FREE THYROXINE: CPT | Performed by: NURSE PRACTITIONER

## 2020-02-14 PROCEDURE — 84443 ASSAY THYROID STIM HORMONE: CPT | Performed by: NURSE PRACTITIONER

## 2020-02-14 PROCEDURE — 84702 CHORIONIC GONADOTROPIN TEST: CPT

## 2020-02-14 PROCEDURE — 36415 COLL VENOUS BLD VENIPUNCTURE: CPT

## 2020-02-14 NOTE — TELEPHONE ENCOUNTER
Patient just found out that she is pregnant  (took 2 pregnancy tests) She would like to know what she needs to do     Levothyroxine 112mcg daily

## 2020-02-14 NOTE — TELEPHONE ENCOUNTER
Will be happy to perform urine pregnancy test or serum pregnancy test if patient desires  Please triage and let me know  I do see that she has pending follow-up with OBGYN next week as well      Thank you

## 2020-02-14 NOTE — TELEPHONE ENCOUNTER
Check TSH and free T4 now  She will need thyroid function closely monitored during pregnancy   She should also schedule  sooner appt

## 2020-02-14 NOTE — TELEPHONE ENCOUNTER
Pt called hysterical stating she took a pregnancy test and it was positive, she is not ready and if very upset about this, would like to know if she can come in and drop off a urine sample to be tested because she read a UTI can cause a false positive on a pregnancy test  She will be out of town until next Wednesday, and would like to know ASAP what is going on  Please advise

## 2020-02-18 ENCOUNTER — TELEPHONE (OUTPATIENT)
Dept: PSYCHIATRY | Facility: CLINIC | Age: 35
End: 2020-02-18

## 2020-02-20 ENCOUNTER — TELEPHONE (OUTPATIENT)
Dept: PSYCHIATRY | Facility: CLINIC | Age: 35
End: 2020-02-20

## 2020-02-20 ENCOUNTER — OFFICE VISIT (OUTPATIENT)
Dept: OBGYN CLINIC | Facility: CLINIC | Age: 35
End: 2020-02-20
Payer: COMMERCIAL

## 2020-02-20 VITALS
BODY MASS INDEX: 24.05 KG/M2 | SYSTOLIC BLOOD PRESSURE: 118 MMHG | DIASTOLIC BLOOD PRESSURE: 72 MMHG | WEIGHT: 171.8 LBS | HEIGHT: 71 IN

## 2020-02-20 DIAGNOSIS — E06.3 HYPOTHYROIDISM DUE TO HASHIMOTO'S THYROIDITIS: Primary | ICD-10-CM

## 2020-02-20 DIAGNOSIS — E03.8 HYPOTHYROIDISM DUE TO HASHIMOTO'S THYROIDITIS: Primary | ICD-10-CM

## 2020-02-20 DIAGNOSIS — N91.2 AMENORRHEA: ICD-10-CM

## 2020-02-20 DIAGNOSIS — F32.A ANXIETY AND DEPRESSION: ICD-10-CM

## 2020-02-20 DIAGNOSIS — F41.9 ANXIETY AND DEPRESSION: ICD-10-CM

## 2020-02-20 PROCEDURE — 3008F BODY MASS INDEX DOCD: CPT | Performed by: OBSTETRICS & GYNECOLOGY

## 2020-02-20 PROCEDURE — 1036F TOBACCO NON-USER: CPT | Performed by: OBSTETRICS & GYNECOLOGY

## 2020-02-20 PROCEDURE — 99215 OFFICE O/P EST HI 40 MIN: CPT | Performed by: OBSTETRICS & GYNECOLOGY

## 2020-02-20 NOTE — PATIENT INSTRUCTIONS
Pregnancy   WHAT YOU NEED TO KNOW:   What do I need to know about pregnancy? A normal pregnancy lasts about 40 weeks  The first trimester lasts from your last period through the 12th week of pregnancy  The second trimester lasts from the 13th week of your pregnancy through the 23rd week  The third trimester lasts from your 24th week of pregnancy until your baby is born  If you know the date of your last period, your healthcare provider can estimate your due date  You may give birth to your baby any time from 37 weeks to 2 weeks after your due date  What is prenatal care? Prenatal care is a series of visits with your healthcare provider throughout your pregnancy  Prenatal care can help prevent problems during pregnancy and childbirth  At each prenatal visit, your healthcare provider will weigh you and check your blood pressure  Your healthcare provider will also check your baby's heartbeat and growth  You may also need the following at some visits:  · A pelvic exam  allows your healthcare provider to see your cervix (the bottom part of your uterus)  Your healthcare provider uses a speculum to gently open your vagina  He will check the size and shape of your uterus  · Blood tests  may be done to check for gestational diabetes and anemia (low iron level)  You may need other blood tests, such as blood type, Rh factor, or tests to check for birth defects  · A fetal ultrasound  shows pictures of your baby inside your uterus  It shows your baby's development  The movement and position of your baby can also be seen  Your healthcare provider may be able to tell you what your baby's gender is during the ultrasound  What can I do to have a healthy pregnancy? · Eat a variety of healthy foods  Healthy foods include fruits, vegetables, whole-grain breads, low-fat dairy foods, beans, lean meats, and fish  Drink liquids as directed  Ask how much liquid to drink each day and which liquids are best for you   Limit caffeine to less than 200 milligrams each day  Limit your intake of fish to 2 servings each week  Choose fish low in mercury such as canned light tuna, shrimp, crab, salmon, cod, or tilapia  Do not  eat fish high in mercury such as swordfish, tilefish, marie mackerel, and shark  · Take prenatal vitamins as directed  Your need for certain vitamins and minerals, such as folic acid, increases during pregnancy  Prenatal vitamins provide some of the extra vitamins and minerals you need  Prenatal vitamins may also help to decrease the risk of certain birth defects  · Ask how much weight you should gain during your pregnancy  Too much or too little weight gain can be unhealthy for you and your baby  · Talk to your healthcare provider about exercise  Moderate exercise can help you stay fit  Your healthcare provider will help you plan an exercise program that is safe for you during pregnancy  · Do not smoke  If you smoke, it is never too late to quit  Smoking increases your risk of a miscarriage and other health problems during your pregnancy  Smoking can cause your baby to be born too early or weigh less at birth  Ask your healthcare provider for information if you need help quitting  · Do not drink alcohol  Alcohol passes from your body to your baby through the placenta  It can affect your baby's brain development and cause fetal alcohol syndrome (FAS)  FAS is a group of conditions that causes mental, behavior, and growth problems  · Talk to your healthcare provider before you take any medicines  Many medicines may harm your baby if you take them when you are pregnant  Do not take any medicines, vitamins, herbs, or supplements without first talking to your healthcare provider  Never use illegal or street drugs (such as marijuana or cocaine) while you are pregnant  What body changes may happen during my pregnancy?    · Breast changes  you will experience include tenderness and tingling during the early part of your pregnancy  Your breasts will become larger  You may need to use a support bra  You may see a thin, yellow fluid, called colostrum, leak from your nipples during the second trimester  Colostrum is a liquid that changes to milk about 3 days after you give birth  · Skin changes and stretch marks  may occur during your pregnancy  You may have red marks, called stretch marks, on your skin  Stretch marks will usually fade after pregnancy  Use lotion if your skin is dry and itchy  The skin on your face, around your nipples, and below your belly button may darken  Most of the time, your skin will return to its normal color after your baby is born  · Morning sickness  is nausea and vomiting that can happen at any time of day  Avoid fatty and spicy foods  Eat small meals throughout the day instead of large meals  Nova may help to decrease nausea  Ask your healthcare provider about other ways of decreasing nausea and vomiting  · Heartburn  may be caused by changes in your hormones during pregnancy  Your growing uterus may also push your stomach upward and force stomach acid to back up into your esophagus  Eat 4 or 5 small meals each day instead of large meals  Avoid spicy foods  Avoid eating right before bedtime  · Constipation  may develop during your pregnancy  To treat constipation, eat foods high in fiber such as fiber cereals, beans, fruits, vegetables, whole-grain breads, and prune juice  Get regular exercise and drink plenty of water  Your healthcare provider may also suggest a fiber supplement to soften your bowel movements  Talk to your healthcare provider before you use any medicines to decrease constipation  · Hemorrhoids  are enlarged veins in the rectal area  They may cause pain, itching, and bright red bleeding from your rectum  To decrease your risk of hemorrhoids, prevent constipation and do not strain to have a bowel movement   If you have hemorrhoids, soak in a tub of warm water to ease discomfort  Ask your healthcare provider how you can treat hemorrhoids  · Leg cramps and swelling  may be caused by low calcium levels or the added weight of pregnancy  Raise your legs above the level of your heart to decrease swelling  During a leg cramp, stretch or massage the muscle that has the cramp  Heat may help decrease pain and muscle spasms  Apply heat on your muscle for 20 to 30 minutes every 2 hours for as many days as directed  · Back pain  may occur as your baby grows  Do not stand for long periods of time or lift heavy items  Use good posture while you stand, squat, or bend  Wear low-heeled shoes with good support  Rest may also help to relieve back pain  Ask your healthcare provider about exercises you can do to strengthen your back muscles  What are some safety tips during pregnancy? · Avoid hot tubs and saunas  Do not use a hot tub or sauna while you are pregnant, especially during your first trimester  Hot tubs and saunas may raise your baby's temperature and increase the risk of birth defects  · Avoid toxoplasmosis  This is an infection caused by eating raw meat or being around infected cat feces  It can cause birth defects, miscarriages, and other problems  Wash your hands after you touch raw meat  Make sure any meat is well-cooked before you eat it  Avoid raw eggs and unpasteurized milk  Use gloves or ask someone else to clean your cat's litter box while you are pregnant  · Ask your healthcare provider about travel  The most comfortable time to travel is during the second trimester  Ask your healthcare provider if you can travel after 36 weeks  You may not be able to travel in an airplane after 36 weeks  He may also recommend that you avoid long road trips  When should I seek immediate care? · You develop a severe headache that does not go away  · You have new or increased vision changes, such as blurred or spotted vision      · You have new or increased swelling in your face or hands  · You have pain or cramping in your abdomen or low back  · You have vaginal bleeding  When should I contact my healthcare provider? · You have abdominal cramps, pressure, or tightening  · You have a change in vaginal discharge  · You cannot keep food or drinks down, and you are losing weight  · You have chills or a fever  · You have vaginal itching, burning, or pain  · You have yellow, green, white, or foul-smelling vaginal discharge  · You have pain or burning when you urinate, less urine than usual, or pink or bloody urine  · You have questions or concerns about your condition or care  CARE AGREEMENT:   You have the right to help plan your care  Learn about your health condition and how it may be treated  Discuss treatment options with your caregivers to decide what care you want to receive  You always have the right to refuse treatment  The above information is an  only  It is not intended as medical advice for individual conditions or treatments  Talk to your doctor, nurse or pharmacist before following any medical regimen to see if it is safe and effective for you  © 2017 [employer]  Information is for End User's use only and may not be sold, redistributed or otherwise used for commercial purposes  All illustrations and images included in CareNotes® are the copyrighted property of [employer]  or Misael Alves

## 2020-02-20 NOTE — TELEPHONE ENCOUNTER
Behavorial Health Outpatient Intake Questions    Referred by: PCP/Self     Please advised interviewee that they need to answer all questions truthfully to allow for best care and any misrepresentations of information may affect their ability to be seen at this clinic   => Was this discussed? 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  She has been struggling in her relationship with her   Nothing has changed in his life, everything has changed in her life  She gave up her job and career and just has she is starting to feel like she is getting her life back as the children are older, she finds out she is pregnant  She is Yazidi and obviously her sabi is against abortions but she is not sure she can mentally have another child  She is unsure if her relationship can sustain another child  Patient also states that she may have had undiagnosed Post Partum from first children  She needs someone to talk to about this and feels she is in crisis  Her  MAY be coming to the first appointment if his job allows, but she wants to come even if he cannot  Are there any developmental disabilities? ? If yes, can they speak to you on the phone? If they are too limited to speak to you on phone, refer out No    Are you taking any psychiatric medications? No    => If yes, who prescribes? If yes, are they injectable medications? Does the patient have a language barrier or hearing impairment? No    Have you been treated at [facility] by a therapist or a doctor in the past? If yes, who? Yes  Eryn Reinoso    Has the patient been hospitalized for mental health? No   If yes, how long ago was last hospitalization and where was it? Do you actively use alcohol or marijuana or illegal substances?  If yes, what and how much - refer out to Drug and alcohol treatment if use is excessive or daily use of illegal substances No concerns of substance abuse are reported  Do you have a community treatment team or ? No    Legal History-     Does the patient have any history of arrests, [facility]/group home time, or DUIs? No  If Yes-  1) What types of charges? 2) When were they last incarcerated? 3) Are they currently on parole or probation? Minor Child-    Who has custody of the child? Is there a custody agreement? If there is a custody agreement remind parent that they must bring a copy to the first appt or they will not be seen  Intake Team, please check with provider before scheduling if flags come up such as:  - complex case  - legal history (other than DUI)  - communication barrier concerns are present  - if, in your judgment, this needs further review    ACCEPTED as a patient Yes  => Appointment Date:     Referred Elsewhere? No    Name of Insurance Co:  [id number] ID# [id number]   Insurance Phone #  If ins is primary or secondary  If patient is a minor, parents information such as Name, D  O B of guarantor   Aide Faria 1/8/1980

## 2020-02-20 NOTE — PROGRESS NOTES
Assessment/Plan   Diagnoses and all orders for this visit:    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 thyroiditis    Anxiety and depression    Amenorrhea  -     Prenatal Panel; Future     1  Early pregnancy-patient with LMP 1/10/20 with positive pregnancy test on 2/14/20, confirmed by blood pregnancy test with HCG of 2026 on 2/14/20  Patient is shocked by this  She had numerous plans to get together with her friends, proceed with vigorous exercise, sign up for a triathlon, etc   This was not planned  She also alluded to some issues with her marriage  Her  was present and supportive during the visit  She was given the medication in pregnancy sheet and the nutrition in pregnancy  Her 1st pregnancy was a twin pregnancy with 1 baby with deformities with KCl treatment for that baby but her residual pregnancy was good and she was status post delivery 4 years ago for that  Follow-up delivery August 2017 was unremarkable  She has been taking vitamin with folic acid intermittently  She was encouraged to take this daily  She was given prenatal panel and will get this done in the near future  To follow up to 3 weeks time for ultrasound and 1st prenatal visit  Additionally, patient is still considering termination and she will call back if interested in proceeding in that fashion  [facility]   2  Hypothyroidism-TSH was elevated at 5 16 on recent labs from 2/14/20  Free T4 was normal   She is on Synthroid 112 mcg and needs increased dose  She will follow-up with endocrinology to arrange for that  3  History of HSV-we will treat accordingly at 36 weeks of pregnancy  4  History of oligomenorrhea-this was noted on Mirena IUD which was removed 10/1/19  She has had normal menses at 30 day intervals +/-2 days  5  History of depression/anxiety-patient with significant issues with this  She has follow-up with Iveth Acuna from [facility]    Strongly, strongly recommended she follow up with her as soon as she can to discuss her ongoing issues  She was crying frequently throughout her visit and would like to work on her mental health along with her marriage health  I will send a note to her regarding this visit  Visit greater than 45 minutes duration, with greater than 50% of time spent counseling and coordinating care for these issues  Subjective   Patient ID: Manish Jackson is a 36 y o  female  Vitals:    20 [id number]   BP: 118/72     Patient was seen today for follow-up with early pregnancy        The following portions of the patient's history were reviewed and updated as appropriate: allergies, current medications, past family history, past medical history, past social history, past surgical history and problem list   Past Medical History:   Diagnosis Date    Abnormal Pap smear of cervix     Anemia     Depression     Dermatitis      2 para 2     Herpes     Hirsutism     last assessed: 14    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 thyroiditis     Mild cervical dysplasia     Pregnancy     last assessed: 3/28/17    Subclinical hypothyroidism     last assessed: 14    Urinary tract infection     Varicella     Vitamin D deficiency      Past Surgical History:   Procedure Laterality Date    COLPOSCOPY VULVA W/ BIOPSY      10/1/07 - MALLORY-1 noted at 5:00 and 12:00 ECC was negative    COMBINED HYSTEROSCOPY DIAGNOSTIC / D&C  10/2014    polyp removal    DILATION AND CURETTAGE OF UTERUS      WISDOM TOOTH EXTRACTION       OB History    Para Term  AB Living   2 2 2     2   SAB TAB Ectopic Multiple Live Births         0 2      # Outcome Date GA Lbr Trevor/2nd Weight Sex Delivery Anes PTL Lv   2 Term 17 39w0d / 00:59 3799 g (8 lb 6 oz) F Vag-Spont EPI N AQUILES   1 Term 09/03/15 39w2d  3856 g (8 lb 8 oz) M Vag-Spont EPI N AQUILES       Current Outpatient Medications:     acetaminophen (TYLENOL) 325 mg tablet, Every 4 hours for mild pain, Disp: 30 tablet, Rfl: 0    Prenatal MV-Min-Fe Fum-FA-DHA (PRENATAL+DHA PO), Take by mouth daily, Disp: , Rfl:     SYNTHROID 112 MCG tablet, TAKE 1 TABLET BY MOUTH DAILY FOR 90 DAYS, Disp: 90 tablet, Rfl: 4    Cholecalciferol (VITAMIN D3) 50 MCG (2000 UT) capsule, Take 1 capsule (2,000 Units total) by mouth daily (Patient not taking: Reported on 2/1/2020), Disp: , Rfl:   No Known Allergies  Social History     Socioeconomic History    Marital status: /Civil Union     Spouse name: None    Number of children: 2    Years of education: None    Highest education level: None   Occupational History    None   Social Needs    Financial resource strain: None    Food insecurity:     Worry: None     Inability: None    Transportation needs:     Medical: None     Non-medical: None   Tobacco Use    Smoking status: Never Smoker    Smokeless tobacco: Never Used   Substance and Sexual Activity    Alcohol use: Yes     Comment: social drinker as per allscripts    Drug use: No    Sexual activity: Yes     Partners: Male     Birth control/protection: IUD   Lifestyle    Physical activity:     Days per week: None     Minutes per session: None    Stress: None   Relationships    Social connections:     Talks on phone: None     Gets together: None     Attends Mu-ism service: None     Active member of club or organization: None     Attends meetings of clubs or organizations: None     Relationship status: None    Intimate partner violence:     Fear of current or ex partner: None     Emotionally abused: None     Physically abused: None     Forced sexual activity: None   Other Topics Concern    None   Social History Narrative    Caffeine use    Taoism affiliation Alevism    Uses seatbelts     Family History   Problem Relation Age of Onset    Stroke Mother     Depression Mother     Thyroid disease Mother     Depression Maternal Aunt     Diabetes Maternal Aunt     Heart attack Paternal Cody Galindo Diabetes Other        Review of Systems   Constitutional: Negative for chills, diaphoresis, fatigue and fever  Respiratory: Negative for apnea, cough, chest tightness, shortness of breath and wheezing  Cardiovascular: Negative for chest pain, palpitations and leg swelling  Gastrointestinal: Negative for abdominal distention, abdominal pain, anal bleeding, constipation, diarrhea, nausea, rectal pain and vomiting  Genitourinary: Negative for difficulty urinating, dyspareunia, dysuria, frequency, hematuria, menstrual problem, pelvic pain, urgency, vaginal bleeding, vaginal discharge and vaginal pain  Musculoskeletal: Negative for arthralgias, back pain and myalgias  Skin: Negative for color change and rash  Neurological: Negative for dizziness, syncope, light-headedness, numbness and headaches  Hematological: Negative for adenopathy  Does not bruise/bleed easily  Psychiatric/Behavioral: Negative for dysphoric mood and sleep disturbance  The patient is not nervous/anxious          Objective   Physical Exam    Objective      /72 (BP Location: Left arm, Patient Position: Sitting, Cuff Size: Large)   Ht 5' 11" (1 803 m)   Wt 77 9 kg (171 lb 12 8 oz)   LMP 01/10/2020 (Exact Date)   BMI 23 96 kg/m²     General:   alert and oriented, in no acute distress   Neck:    Breast:    Heart:    Lungs:    Abdomen: nontender   Vulva:    Vagina:    Cervix:    Uterus:    Adnexa:    Rectum:     Psych:  Normal mood and affect   Skin:  Without obvious lesions   Eyes: symmetric, with normal movements and reactivity   Musculoskeletal:  Normal muscle tone and movements appreciated

## 2020-02-21 ENCOUNTER — TELEPHONE (OUTPATIENT)
Dept: ENDOCRINOLOGY | Facility: CLINIC | Age: 35
End: 2020-02-21

## 2020-02-21 DIAGNOSIS — E06.3 HYPOTHYROIDISM DUE TO HASHIMOTO'S THYROIDITIS: Primary | ICD-10-CM

## 2020-02-21 DIAGNOSIS — E03.8 HYPOTHYROIDISM DUE TO HASHIMOTO'S THYROIDITIS: Primary | ICD-10-CM

## 2020-02-21 RX ORDER — LEVOTHYROXINE SODIUM 137 MCG
137 TABLET ORAL DAILY
Qty: 30 TABLET | Refills: 3 | Status: SHIPPED | OUTPATIENT
Start: 2020-02-21 | End: 2020-07-07

## 2020-02-21 NOTE — TELEPHONE ENCOUNTER
Component      Latest Ref Rng & Units 2/14/2020   Free T4      0 76 - 1 46 ng/dL 1 07   TSH 3RD GENERATON      0 358 - 3 740 uIU/mL 5 160 (H)     Pt is pregnant , goal for TSH is < 2 5   Spoke with patient to increase the dose to 137 mcg po daily of synthroid, she will need blood work in 4 weeks and follow up appt in 4 weeks     Please call pt and scheduled appt in 4 weeks, you can cancel appt on 2/25/2020   She should l do blood work in 4 weeks, ( ordered in epic)    Niall Waters MD

## 2020-03-03 ENCOUNTER — SOCIAL WORK (OUTPATIENT)
Dept: BEHAVIORAL/MENTAL HEALTH CLINIC | Facility: CLINIC | Age: 35
End: 2020-03-03
Payer: COMMERCIAL

## 2020-03-03 DIAGNOSIS — F41.9 ANXIETY DISORDER, UNSPECIFIED TYPE: Primary | ICD-10-CM

## 2020-03-03 PROCEDURE — 90834 PSYTX W PT 45 MINUTES: CPT | Performed by: SOCIAL WORKER

## 2020-03-03 PROCEDURE — 1036F TOBACCO NON-USER: CPT | Performed by: SOCIAL WORKER

## 2020-03-03 NOTE — PSYCH
Assessment/Plan:      There are no diagnoses linked to this encounter  Subjective:      Patient ID: Woody Ramos is a 36 y o  female  HPI:     Pre-morbid level of function and History of Present Illness: Jarred Byrd and Shannon Bunch attended the couples session together  Shannon Bunch wants to to be more effective in communicatingiin the marriage  Shannon Bunch acknowledged having difficulties effectivrly managing her feelings  I don't know how to communicate with him without p___ you off, stated Shannon Bunch  "I am in the process of a miscarriage (a reported unplanned pregnancy)," stated Shannon Bunch  Initially Jarred Byrd offered, "I just want to make her happy " She acknowledged the precipitating factor in their decision to participate in couples counseling was their unplanned pregnancy and subsequent miscarriage  Both had confirmed they were not ready to have a third child  While Humphreymatthew Rochelle presented as concerned about the relationship, he was not as demonstrative as was Shannon Bunch and needed more direction to express his thoughts and feelings  Both confirm difficulties in finding quality  including babysitters  While family is available, both describe this source of support as inconsistent  Previous Psychiatric/psychological treatment/year: Shannon Bunch participated in solo counseling approximately one year ago with this therapist (6/20/2018 - 2/6/2019)  Had had ind psych approximately 3 5 years ago; had ind counseling during her four years of college [phone]); She confirmed the helpfulness of all of her counseling experiences  Current Psychiatrist/Therapist:  SEFERINO Reinoso MSW/SANDIEW    Outpatient and/or Partial and Other Community Resources Used (CTT, ICM, VNA): Outpatient outpatient      Problem Assessment:     SOCIAL/VOCATION:  Family Constellation (include parents, relationship with each and pertinent Psych/Medical History):     Family History   Problem Relation Age of Onset   [employer] Stroke Mother     Depression Mother  Thyroid disease Mother     Depression Maternal Aunt     Diabetes Maternal Aunt     Heart attack Paternal Uncle     Diabetes Other        Mother: dec'd, 36;    Spouse: Orlando Kirkpatrick     Father:  Age 79, "great relationship;"    Children: Amelia Epp, age 3;  Teresita Vinson 13, age, 2     Sibling: bro;     Other:  Step mother;    Deedee Soto relates best to a close friend (about certain things only)  she lives with  and their two children  she does not live alone  Domestic Violence: No past history of domestic violence    Additional Comments related to family/relationships/peer support:  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; 'I never get to do much  By myself " I was talented at a young age at basketball  I was immersed in it  That was my main source of identification  Both have earned college degrees; She worked in [employer] Until she had their children  School or Work History (strengths/limitations/needs): Both have earned college degrees  She, also, has earned a Masters Degree  Her highest grade level achieved was 12th grade     history includes: n/a  Financial status includes : n/a    LEISURE ASSESSMENT (Include past and present hobbies/interests and level of involvement (Ex: Group/Club Affiliations): taking care of my physical health; music reading; self-help and advise books;  her primary language is [place]  Preferred language is [place]  Ethnic considerations are   "I am half [facility] affiliations and level of involvement reports no affiliation at this time but did confirm both being reared in the [address] [place] [address] Road  Does spirituality help you cope?  Yes     FUNCTIONAL STATUS: There has been a recent change in Deedee Soto ability to do the following: n/a    Level of Assistance Needed/By Whom?: n/a    Deedee Soto learns best by  Demonstration and visual    SUBSTANCE ABUSE ASSESSMENT: no substance abuse    Substance/Route/Age/Amount/Frequency/Last Use: n/a    DETOX HISTORY: n/a    Previous detox/rehab treatment: n/a    HEALTH ASSESSMENT: no referral to PCP needed    LEGAL: No Mental Health Advance Directive or Power of  on file    Prenatal History: N/A    Delivery History: N/A    Developmental Milestones: N/A  Temperament as an infant was N/A  Temperament as a toddler was N/A  Temperament at school age was N/A  Temperament as a teenager was N/A  Risk Assessment:   The following ratings are based on my observation of this patient over the last interview    Risk of Harm to Self:   Demographic risk factors include   Historical Risk Factors include none reported;  Recent Specific Risk Factors include recent losses miscarriage; Additional Factors for a Child or Adolescent n/a    Risk of Harm to Others:   Demographic Risk Factors include n/a  Historical Risk Factors include n/a  Recent Specific Risk Factors include multiple stressors    Access to Weapons:   Vazquez Moody has access to the following weapons: a hand gun  The clip is hidden with bullets stored separately  The following steps have been taken to ensure weapons are properly secured: locked up    Based on the above information, the client presents the following risk of harm to self or others:  low    The following interventions are recommended:   no intervention changes    Notes regarding this Risk Assessment: While Vazquez Moody expresses her appreciation of her 's successful business, she shared she feels his mind is,at times,  more directed to the business than to the family            Review Of Systems:     Mood Anxiety and Emotional Lability   Behavior Normal    Thought Content Normal   General Relationship Problems and Emotional Problems   Personality Normal   Other Psych Symptoms Normal   Constitutional n/a   ENT n/a   Cardiovascular n/a   Respiratory n/a   Gastrointestinal n/a   Genitourinary n/a   Musculoskeletal n/a   Integumentary n/a   Neurological n/a   Endocrine n/a         Mental status:  Appearance calm and cooperative , adequate hygiene and grooming and good eye contact    Mood anxious and uncertain   Affect affect was broad   Speech a normal rate   Thought Processes coherent/organized and normal thought processes   Hallucinations no hallucinations present    Thought Content no delusions   Abnormal Thoughts no suicidal thoughts  and no homicidal thoughts    Orientation  oriented to person and place and time   Remote Memory short term memory intact and long term memory intact   Attention Span concentration impaired   Intellect Appears to be of Average Intelligence   Fund of Knowledge na/   Insight Insight intact   Judgement judgment was intact   Muscle Strength n/a   Language n/a   Pain none   Pain Scale 0

## 2020-03-12 ENCOUNTER — TELEPHONE (OUTPATIENT)
Dept: OBGYN CLINIC | Facility: CLINIC | Age: 35
End: 2020-03-12

## 2020-03-16 ENCOUNTER — SOCIAL WORK (OUTPATIENT)
Dept: BEHAVIORAL/MENTAL HEALTH CLINIC | Facility: CLINIC | Age: 35
End: 2020-03-16
Payer: COMMERCIAL

## 2020-03-16 DIAGNOSIS — F41.9 ANXIETY: Primary | ICD-10-CM

## 2020-03-16 PROCEDURE — 90847 FAMILY PSYTX W/PT 50 MIN: CPT | Performed by: SOCIAL WORKER

## 2020-03-16 NOTE — BH TREATMENT PLAN
Laisha Duffy  1985       Date of Initial Treatment Plan: 3/16/2020  Date of Current Treatment Plan: 03/16/20    Treatment Plan Number   1     Strengths/Personal Resources for Self Care: C- dedication to the family; health consciousness; acknowledgement of feelings; commitment; intellectual; strong;  G - confident; reliable (ex , financially); communication of life lessons;  commitment to health;  Diagnosis:   1  Anxiety         Area of Needs:  Communication with respect; Long Term Goal 1: AWe want to communicate better with each other  Target Date: 7/16/2020  Completion Date: n/a         Short Term Objectives for Goal 1: AWe want to each learn/review effective conflict resolution strategies  B  We want to each learn/review assertiveness  C  We each want to learn/revview active listening  D  We will use "let's ,we us "  E  We will each maintain our integrity and support one another's integrity        Target Date: 7/16/2020  Completion Date: n/a    GOAL 1: Modality: Couples 2x/month; n/a; Adama Jimenez and Lisa Torrez will work together to accomplish their goal           [address]: Diagnosis and Treatment Plan explained to Quynh Gamino relates understanding diagnosis and is agreeable to Treatment Plan  yes      Client Comments : Please share your thoughts, feelings, need and/or experiences regarding your treatment plan:

## 2020-03-16 NOTE — PSYCH
Psychotherapy Provided: Family Therapy Ivory Valdez and her , Aishwarya Price, attended the second couples session together  C has been continuing to reflect (since the initial session)  "What a marriage is?" C noted to G that there has been much improvement (in their relationship) since the most recent initial session  Levels of [place] (handout) - G states Llevel 6; C states it fluctuates between levels 5 5; trust and mutual support; discussed/reviewed the importance of each liking each other (in addition to trust/respect as love for one another); A: presents as genuine in their desire to improve their marriage; C speaks more frequently during this session than did G  P: (1) will identify what each likes about each other;     Length of time in session: 45 minutes, follow up in 2 week    Goals addressed in session: Goal 1     Pain:      none    0    Current suicide risk : [address] Treatment Plan [facility]: Diagnosis and Treatment Plan explained to Merced Hagen relates understanding diagnosis and is agreeable to Treatment Plan   Yes

## 2020-03-23 ENCOUNTER — SOCIAL WORK (OUTPATIENT)
Dept: BEHAVIORAL/MENTAL HEALTH CLINIC | Facility: CLINIC | Age: 35
End: 2020-03-23
Payer: COMMERCIAL

## 2020-03-23 DIAGNOSIS — F41.9 ANXIETY: Primary | ICD-10-CM

## 2020-03-23 PROCEDURE — 90834 PSYTX W PT 45 MINUTES: CPT | Performed by: SOCIAL WORKER

## 2020-03-23 PROCEDURE — 1036F TOBACCO NON-USER: CPT | Performed by: SOCIAL WORKER

## 2020-03-23 NOTE — PSYCH
Psychotherapy Provided: Individual Psychotherapy 45 minutes Rodri Harris attended her ind session as part of her couples sessions  She held her ground ("I am not going to be passive any more ")  "I don't know what I want for myself anymore  I have been rasing these children by myself "   "I can't be the ignorance is bliss (in my marriage) and to not care if my  comes home at 2 AM "  "I feel emotionally neglected and overworked " "I am focusing on little habits daily (ex , meditation, a project of documenting Covid 19 pandemic)  "  finding my courage again to be who I want to be including speaking for myself when I am not happy "  "This has been four years (since I had Anita Brandon, her older child)   "  Discussed/reviewed the importance of her maintaining her "self" regarding her reported marital concerns and reviewed effective communication strategies; Length of time in session: 45 minutes, follow up in 2 week    Goals addressed in session: Goal 1     Pain:      none    0    Current suicide risk : Low         Behavioral Health Treatment Plan  [facility]: Diagnosis and Treatment Plan explained to May Lyman relates understanding diagnosis and is agreeable to Treatment Plan   Yes

## 2020-03-26 ENCOUNTER — TELEMEDICINE (OUTPATIENT)
Dept: ENDOCRINOLOGY | Facility: CLINIC | Age: 35
End: 2020-03-26
Payer: COMMERCIAL

## 2020-03-26 DIAGNOSIS — E06.3 HYPOTHYROIDISM DUE TO HASHIMOTO'S THYROIDITIS: Primary | ICD-10-CM

## 2020-03-26 DIAGNOSIS — E03.8 HYPOTHYROIDISM DUE TO HASHIMOTO'S THYROIDITIS: Primary | ICD-10-CM

## 2020-03-26 PROCEDURE — G2012 BRIEF CHECK IN BY MD/QHP: HCPCS | Performed by: PHYSICIAN ASSISTANT

## 2020-03-26 NOTE — PROGRESS NOTES
Virtual Regular Visit    Problem List Items Addressed This Visit     None               Reason for visit is hypothyroidism, pregnancy  Encounter provider Brook Hernandez PA-C    Provider located at [address] [place] [id number]      Recent Visits  No visits were found meeting these conditions  Showing recent visits within past 7 days and meeting all other requirements     Today's Visits  Date Type Provider Dept   20 Telemedicine Rahel Banks PA-C  [facility]   Showing today's visits and meeting all other requirements     Future Appointments  No visits were found meeting these conditions  Showing future appointments within next 150 days and meeting all other requirements        After connecting through [email or web address], the patient was identified by name and date of birth  Christie Cruz was informed that this is a telemedicine visit and that the visit is being conducted through telephone which may not be secure and therefore, might not be HIPAA-compliant  My office door was closed  No one else was in the room  She acknowledged consent and understanding of privacy and security of the video platform  The patient has agreed to participate and understands they can discontinue the visit at any time  Subjective  Christie Cruz is a 36 y o  female on the phone for follow-up of hypothyroidism secondary to Hashimoto's thyroiditis  She is currently taking Synthroid 137 mcg 1 tablet daily on an empty stomach 1 hour before breakfast and at least 4 hours apart from supplements  Patient is tolerating medication well  Dose of medication was increased about a month ago as TSH was elevated at 5 160 and free T4 normal at 1 07 and she noted at the time she was pregnant  She has not repeated blood work since then   She ended up having an  on  as she was states her mental health was not good at the time  She has been dealing with anxiety / depression and some marital issues recently and becoming pregnant exacerbated those symptoms  She is seeing a therapist with her  for her marriage and also will be seeing a therapist for her anxiety and depression  She is trying to exercise and meditate routinely to help her mood  She has considered medication for treatment of anxiety and depression but first would like to attempt therapy  Past Medical History:   Diagnosis Date    Abnormal Pap smear of cervix     Anemia     Depression     Dermatitis      2 para 2     Herpes     Hirsutism     last assessed: 14    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 thyroiditis     Mild cervical dysplasia     Pregnancy     last assessed: 3/28/17    Subclinical hypothyroidism     last assessed: 14    Urinary tract infection     Varicella     Vitamin D deficiency        Past Surgical History:   Procedure Laterality Date    COLPOSCOPY VULVA W/ BIOPSY      10/1/07 - MALLORY-1 noted at 5:00 and 12:00 ECC was negative    COMBINED HYSTEROSCOPY DIAGNOSTIC / D&C  10/2014    polyp removal    DILATION AND CURETTAGE OF UTERUS      WISDOM TOOTH EXTRACTION         Current Outpatient Medications   Medication Sig Dispense Refill    acetaminophen (TYLENOL) 325 mg tablet Every 4 hours for mild pain 30 tablet 0    Cholecalciferol (VITAMIN D3) 50 MCG (2000 UT) capsule Take 1 capsule (2,000 Units total) by mouth daily (Patient not taking: Reported on 2020)      Prenatal MV-Min-Fe Fum-FA-DHA (PRENATAL+DHA PO) Take by mouth daily      SYNTHROID 137 MCG tablet Take 1 tablet (137 mcg total) by mouth daily 30 tablet 3     No current facility-administered medications for this visit  No Known Allergies    Review of Systems   Constitutional: Negative for activity change, appetite change, fatigue and unexpected weight change  HENT: Negative for trouble swallowing      Eyes: Negative for visual disturbance  Respiratory: Negative for shortness of breath  Cardiovascular: Negative for chest pain and palpitations  Gastrointestinal: Negative for constipation and diarrhea  Endocrine: Positive for cold intolerance  Negative for heat intolerance  Musculoskeletal: Negative  Skin: Negative  Neurological: Negative for tremors  Psychiatric/Behavioral: The patient is nervous/anxious (exacerbated by recent situation)  PLAN  Hypothyroidism  TSH previously was elevated at 5 160 and free T4 normal at 1 07  Dose of Synthroid was increased 1 month ago  Repeat blood work now and prior to next visit   Terminated pregnancy  Plans to get an IUD  Anxiety / Depression  Has a good support group  Encouraged she continue with therapy  Recommended follow-up with PCP to discussed possible medication options    I spent 20 minutes with the patient during this visit        Rahel Banks PA-C

## 2020-04-01 ENCOUNTER — TELEPHONE (OUTPATIENT)
Dept: OBGYN CLINIC | Facility: CLINIC | Age: 35
End: 2020-04-01

## 2020-04-03 ENCOUNTER — TELEPHONE (OUTPATIENT)
Dept: OBGYN CLINIC | Facility: CLINIC | Age: 35
End: 2020-04-03

## 2020-04-06 ENCOUNTER — ULTRASOUND (OUTPATIENT)
Dept: OBGYN CLINIC | Facility: CLINIC | Age: 35
End: 2020-04-06
Payer: COMMERCIAL

## 2020-04-06 ENCOUNTER — PROCEDURE VISIT (OUTPATIENT)
Dept: OBGYN CLINIC | Facility: CLINIC | Age: 35
End: 2020-04-06
Payer: COMMERCIAL

## 2020-04-06 VITALS
HEIGHT: 71 IN | SYSTOLIC BLOOD PRESSURE: 110 MMHG | DIASTOLIC BLOOD PRESSURE: 58 MMHG | WEIGHT: 175 LBS | BODY MASS INDEX: 24.5 KG/M2

## 2020-04-06 DIAGNOSIS — Z30.430 ENCOUNTER FOR INSERTION OF MIRENA IUD: Primary | ICD-10-CM

## 2020-04-06 DIAGNOSIS — Z98.890 HISTORY OF ELECTIVE ABORTION: ICD-10-CM

## 2020-04-06 DIAGNOSIS — IMO0002 ABNORMAL HUMAN CHORIONIC GONADOTROPIN (HCG): Primary | ICD-10-CM

## 2020-04-06 DIAGNOSIS — N92.6 IRREGULAR MENSES: ICD-10-CM

## 2020-04-06 DIAGNOSIS — Z30.430 ENCOUNTER FOR INSERTION OF INTRAUTERINE CONTRACEPTIVE DEVICE (IUD): ICD-10-CM

## 2020-04-06 LAB — SL AMB POCT URINE HCG: POSITIVE

## 2020-04-06 PROCEDURE — 81025 URINE PREGNANCY TEST: CPT | Performed by: OBSTETRICS & GYNECOLOGY

## 2020-04-06 PROCEDURE — 99213 OFFICE O/P EST LOW 20 MIN: CPT | Performed by: OBSTETRICS & GYNECOLOGY

## 2020-04-06 PROCEDURE — 1036F TOBACCO NON-USER: CPT | Performed by: OBSTETRICS & GYNECOLOGY

## 2020-04-06 PROCEDURE — 76830 TRANSVAGINAL US NON-OB: CPT | Performed by: OBSTETRICS & GYNECOLOGY

## 2020-04-06 PROCEDURE — 58300 INSERT INTRAUTERINE DEVICE: CPT | Performed by: OBSTETRICS & GYNECOLOGY

## 2020-04-08 ENCOUNTER — TELEPHONE (OUTPATIENT)
Dept: OBGYN CLINIC | Facility: CLINIC | Age: 35
End: 2020-04-08

## 2020-04-23 ENCOUNTER — TELEMEDICINE (OUTPATIENT)
Dept: BEHAVIORAL/MENTAL HEALTH CLINIC | Facility: CLINIC | Age: 35
End: 2020-04-23
Payer: COMMERCIAL

## 2020-04-23 DIAGNOSIS — F41.9 ANXIETY AND DEPRESSION: Primary | ICD-10-CM

## 2020-04-23 DIAGNOSIS — F32.A ANXIETY AND DEPRESSION: Primary | ICD-10-CM

## 2020-04-23 PROCEDURE — 90834 PSYTX W PT 45 MINUTES: CPT | Performed by: SOCIAL WORKER

## 2020-04-29 ENCOUNTER — TELEMEDICINE (OUTPATIENT)
Dept: BEHAVIORAL/MENTAL HEALTH CLINIC | Facility: CLINIC | Age: 35
End: 2020-04-29
Payer: COMMERCIAL

## 2020-04-29 DIAGNOSIS — F41.9 ANXIETY DISORDER, UNSPECIFIED TYPE: Primary | ICD-10-CM

## 2020-04-29 PROCEDURE — 90834 PSYTX W PT 45 MINUTES: CPT | Performed by: SOCIAL WORKER

## 2020-05-06 ENCOUNTER — TELEMEDICINE (OUTPATIENT)
Dept: OBGYN CLINIC | Facility: CLINIC | Age: 35
End: 2020-05-06
Payer: COMMERCIAL

## 2020-05-06 DIAGNOSIS — N92.6 IRREGULAR MENSES: Primary | ICD-10-CM

## 2020-05-06 DIAGNOSIS — Z97.5 IUD (INTRAUTERINE DEVICE) IN PLACE: ICD-10-CM

## 2020-05-06 PROCEDURE — 99213 OFFICE O/P EST LOW 20 MIN: CPT | Performed by: OBSTETRICS & GYNECOLOGY

## 2020-05-07 ENCOUNTER — TELEMEDICINE (OUTPATIENT)
Dept: BEHAVIORAL/MENTAL HEALTH CLINIC | Facility: CLINIC | Age: 35
End: 2020-05-07

## 2020-05-07 DIAGNOSIS — F32.A ANXIETY AND DEPRESSION: Primary | ICD-10-CM

## 2020-05-07 DIAGNOSIS — F41.9 ANXIETY AND DEPRESSION: Primary | ICD-10-CM

## 2020-05-07 PROCEDURE — NC001 PR NO CHARGE: Performed by: SOCIAL WORKER

## 2020-05-22 ENCOUNTER — ANNUAL EXAM (OUTPATIENT)
Dept: OBGYN CLINIC | Facility: CLINIC | Age: 35
End: 2020-05-22
Payer: COMMERCIAL

## 2020-05-22 VITALS
HEIGHT: 71 IN | SYSTOLIC BLOOD PRESSURE: 116 MMHG | BODY MASS INDEX: 24.92 KG/M2 | WEIGHT: 178 LBS | DIASTOLIC BLOOD PRESSURE: 72 MMHG

## 2020-05-22 DIAGNOSIS — E55.9 VITAMIN D DEFICIENCY: ICD-10-CM

## 2020-05-22 DIAGNOSIS — Z01.419 WOMEN'S ANNUAL ROUTINE GYNECOLOGICAL EXAMINATION: Primary | ICD-10-CM

## 2020-05-22 DIAGNOSIS — Z97.5 IUD (INTRAUTERINE DEVICE) IN PLACE: ICD-10-CM

## 2020-05-22 DIAGNOSIS — B00.9 HSV (HERPES SIMPLEX VIRUS) INFECTION: ICD-10-CM

## 2020-05-22 DIAGNOSIS — Z32.00 POSSIBLE PREGNANCY: ICD-10-CM

## 2020-05-22 LAB — SL AMB POCT URINE HCG: NORMAL

## 2020-05-22 PROCEDURE — 99395 PREV VISIT EST AGE 18-39: CPT | Performed by: OBSTETRICS & GYNECOLOGY

## 2020-05-22 PROCEDURE — 81025 URINE PREGNANCY TEST: CPT | Performed by: OBSTETRICS & GYNECOLOGY

## 2020-05-22 RX ORDER — VALACYCLOVIR HYDROCHLORIDE 500 MG/1
500 TABLET, FILM COATED ORAL 2 TIMES DAILY
Qty: 30 TABLET | Refills: 1 | Status: SHIPPED | OUTPATIENT
Start: 2020-05-22 | End: 2021-03-24

## 2020-06-04 ENCOUNTER — OFFICE VISIT (OUTPATIENT)
Dept: OBGYN CLINIC | Facility: CLINIC | Age: 35
End: 2020-06-04
Payer: COMMERCIAL

## 2020-06-04 ENCOUNTER — ULTRASOUND (OUTPATIENT)
Dept: OBGYN CLINIC | Facility: CLINIC | Age: 35
End: 2020-06-04
Payer: COMMERCIAL

## 2020-06-04 VITALS — TEMPERATURE: 99.8 F

## 2020-06-04 VITALS
WEIGHT: 174 LBS | HEIGHT: 71 IN | SYSTOLIC BLOOD PRESSURE: 118 MMHG | DIASTOLIC BLOOD PRESSURE: 74 MMHG | BODY MASS INDEX: 24.36 KG/M2

## 2020-06-04 DIAGNOSIS — Z98.890 HISTORY OF ELECTIVE ABORTION: ICD-10-CM

## 2020-06-04 DIAGNOSIS — N92.6 IRREGULAR BLEEDING: ICD-10-CM

## 2020-06-04 DIAGNOSIS — N92.6 IRREGULAR MENSES: ICD-10-CM

## 2020-06-04 DIAGNOSIS — R93.89 THICKENED ENDOMETRIUM: ICD-10-CM

## 2020-06-04 DIAGNOSIS — N93.9 ABNORMAL UTERINE BLEEDING (AUB): Primary | ICD-10-CM

## 2020-06-04 DIAGNOSIS — Z97.5 IUD (INTRAUTERINE DEVICE) IN PLACE: Primary | ICD-10-CM

## 2020-06-04 PROCEDURE — 3008F BODY MASS INDEX DOCD: CPT | Performed by: OBSTETRICS & GYNECOLOGY

## 2020-06-04 PROCEDURE — 76830 TRANSVAGINAL US NON-OB: CPT | Performed by: OBSTETRICS & GYNECOLOGY

## 2020-06-04 PROCEDURE — 99214 OFFICE O/P EST MOD 30 MIN: CPT | Performed by: OBSTETRICS & GYNECOLOGY

## 2020-06-04 PROCEDURE — 1036F TOBACCO NON-USER: CPT | Performed by: OBSTETRICS & GYNECOLOGY

## 2020-06-15 ENCOUNTER — LAB (OUTPATIENT)
Dept: LAB | Facility: HOSPITAL | Age: 35
End: 2020-06-15
Attending: INTERNAL MEDICINE
Payer: COMMERCIAL

## 2020-06-15 DIAGNOSIS — R93.89 THICKENED ENDOMETRIUM: ICD-10-CM

## 2020-06-15 DIAGNOSIS — E06.3 HYPOTHYROIDISM DUE TO HASHIMOTO'S THYROIDITIS: ICD-10-CM

## 2020-06-15 DIAGNOSIS — E55.9 VITAMIN D DEFICIENCY: ICD-10-CM

## 2020-06-15 DIAGNOSIS — E03.8 HYPOTHYROIDISM DUE TO HASHIMOTO'S THYROIDITIS: ICD-10-CM

## 2020-06-15 DIAGNOSIS — N92.6 IRREGULAR BLEEDING: ICD-10-CM

## 2020-06-15 LAB
25(OH)D3 SERPL-MCNC: 53.9 NG/ML (ref 30–100)
B-HCG SERPL-ACNC: <2 MIU/ML
ERYTHROCYTE [DISTWIDTH] IN BLOOD BY AUTOMATED COUNT: 12.2 % (ref 11.6–15.1)
HCT VFR BLD AUTO: 40.1 % (ref 34.8–46.1)
HGB BLD-MCNC: 13.5 G/DL (ref 11.5–15.4)
MCH RBC QN AUTO: 30.3 PG (ref 26.8–34.3)
MCHC RBC AUTO-ENTMCNC: 33.7 G/DL (ref 31.4–37.4)
MCV RBC AUTO: 90 FL (ref 82–98)
PLATELET # BLD AUTO: 246 THOUSANDS/UL (ref 149–390)
PMV BLD AUTO: 10.7 FL (ref 8.9–12.7)
RBC # BLD AUTO: 4.45 MILLION/UL (ref 3.81–5.12)
T4 FREE SERPL-MCNC: 1.23 NG/DL (ref 0.76–1.46)
TSH SERPL DL<=0.05 MIU/L-ACNC: 1.09 UIU/ML (ref 0.36–3.74)
WBC # BLD AUTO: 5.28 THOUSAND/UL (ref 4.31–10.16)

## 2020-06-15 PROCEDURE — 84443 ASSAY THYROID STIM HORMONE: CPT

## 2020-06-15 PROCEDURE — 82306 VITAMIN D 25 HYDROXY: CPT

## 2020-06-15 PROCEDURE — 36415 COLL VENOUS BLD VENIPUNCTURE: CPT

## 2020-06-15 PROCEDURE — 84439 ASSAY OF FREE THYROXINE: CPT

## 2020-06-15 PROCEDURE — 85027 COMPLETE CBC AUTOMATED: CPT

## 2020-06-15 PROCEDURE — 84702 CHORIONIC GONADOTROPIN TEST: CPT

## 2020-06-16 ENCOUNTER — TELEPHONE (OUTPATIENT)
Dept: ENDOCRINOLOGY | Facility: CLINIC | Age: 35
End: 2020-06-16

## 2020-07-07 DIAGNOSIS — E06.3 HYPOTHYROIDISM DUE TO HASHIMOTO'S THYROIDITIS: ICD-10-CM

## 2020-07-07 DIAGNOSIS — E03.8 HYPOTHYROIDISM DUE TO HASHIMOTO'S THYROIDITIS: ICD-10-CM

## 2020-07-07 RX ORDER — LEVOTHYROXINE SODIUM 137 MCG
TABLET ORAL
Qty: 30 TABLET | Refills: 3 | Status: SHIPPED | OUTPATIENT
Start: 2020-07-07 | End: 2020-09-30 | Stop reason: DRUGHIGH

## 2020-07-20 ENCOUNTER — OFFICE VISIT (OUTPATIENT)
Dept: URGENT CARE | Facility: CLINIC | Age: 35
End: 2020-07-20
Payer: COMMERCIAL

## 2020-07-20 VITALS
RESPIRATION RATE: 16 BRPM | SYSTOLIC BLOOD PRESSURE: 102 MMHG | DIASTOLIC BLOOD PRESSURE: 63 MMHG | HEART RATE: 55 BPM | WEIGHT: 174 LBS | OXYGEN SATURATION: 98 % | TEMPERATURE: 99.5 F | HEIGHT: 71 IN | BODY MASS INDEX: 24.36 KG/M2

## 2020-07-20 DIAGNOSIS — S91.311A LACERATION OF DORSUM OF FOOT, RIGHT, INITIAL ENCOUNTER: Primary | ICD-10-CM

## 2020-07-20 PROCEDURE — 99213 OFFICE O/P EST LOW 20 MIN: CPT | Performed by: PREVENTIVE MEDICINE

## 2020-07-20 NOTE — PROGRESS NOTES
[facility]        NAME: Luis Hough is a 36 y o  female  : 1985    MRN: [id number]  DATE: 2020  TIME: 3:16 PM    Assessment and Plan   Laceration of dorsum of foot, right, initial encounter [S91 311A]  1  Laceration of dorsum of foot, right, initial encounter           Patient Instructions       Follow up with PCP in 3-5 days  Proceed to  ER if symptoms worsen  Chief Complaint     Chief Complaint   Patient presents with    Wound     Today pt stepped on a charanjit wire  TDAP is up to date  Pain reported #4/10  History of Present Illness       A day or 2 ago she stepped on a charanjit wire causing a small laceration and dorsum of foot and possible foreign body      Review of Systems   Review of Systems   Skin: Positive for wound           Current Medications       Current Outpatient Medications:     SYNTHROID 137 MCG tablet, TAKE 1 TABLET BY MOUTH EVERY DAY, Disp: 30 tablet, Rfl: 3    acetaminophen (TYLENOL) 325 mg tablet, Every 4 hours for mild pain (Patient not taking: Reported on 2020), Disp: 30 tablet, Rfl: 0    Cholecalciferol (VITAMIN D3) 50 MCG (2000 UT) capsule, Take 1 capsule (2,000 Units total) by mouth daily (Patient not taking: Reported on 2020), Disp: , Rfl:     valACYclovir (VALTREX) 500 mg tablet, Take 1 tablet (500 mg total) by mouth 2 (two) times a day for 3 days For 3 Days with outbreak, Disp: 30 tablet, Rfl: 1    Current Allergies     Allergies as of 2020    (No Known Allergies)            The following portions of the patient's history were reviewed and updated as appropriate: allergies, current medications, past family history, past medical history, past social history, past surgical history and problem list      Past Medical History:   Diagnosis Date    Abnormal Pap smear of cervix     Anemia     Depression     Dermatitis      2 para 2     Herpes     Hirsutism     last assessed: 14    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 thyroiditis     Mild cervical dysplasia     Pregnancy     last assessed: 3/28/17    Subclinical hypothyroidism     last assessed: 12/29/14    Urinary tract infection     Varicella     Vitamin D deficiency        Past Surgical History:   Procedure Laterality Date    COLPOSCOPY VULVA W/ BIOPSY      10/1/07 - MALLORY-1 noted at 5:00 and 12:00 ECC was negative    COMBINED HYSTEROSCOPY DIAGNOSTIC / D&C  10/2014    polyp removal    DILATION AND CURETTAGE OF UTERUS      WISDOM TOOTH EXTRACTION         Family History   Problem Relation Age of Onset    Stroke Mother     Depression Mother     Thyroid disease Mother     Depression Maternal Aunt     Diabetes Maternal Aunt     Heart attack Paternal Uncle     Diabetes Other          Medications have been verified  Objective   /63 (BP Location: Left arm, Patient Position: Sitting)   Pulse 55   Temp 99 5 °F (37 5 °C) (Tympanic)   Resp 16   Ht 5' 11" (1 803 m)   Wt 78 9 kg (174 lb)   SpO2 98%   BMI 24 27 kg/m²        Physical Exam     Physical Exam   Skin:   Very small laceration dorsum of the right foot under the great toe  The laceration is only about an 8th of an inch long and just through the epidermis    The wound was opened with splinter forceps and no foreign bodies were found

## 2020-09-07 DIAGNOSIS — F41.9 ANXIETY: ICD-10-CM

## 2020-09-07 RX ORDER — LEVOTHYROXINE SODIUM 112 MCG
TABLET ORAL
Qty: 90 TABLET | Refills: 4 | Status: SHIPPED | OUTPATIENT
Start: 2020-09-07 | End: 2021-02-22 | Stop reason: SDUPTHER

## 2020-09-15 ENCOUNTER — TELEPHONE (OUTPATIENT)
Dept: OBGYN CLINIC | Facility: CLINIC | Age: 35
End: 2020-09-15

## 2020-09-15 NOTE — TELEPHONE ENCOUNTER
Patient called with questions about Sono  Answered all questions, reminded about Ibuprofen, reminded about mask  Gave reassurance that Rubio King and Dr Hector West perform the procedure expediently and proficiently

## 2020-09-17 ENCOUNTER — PROCEDURE VISIT (OUTPATIENT)
Dept: OBGYN CLINIC | Facility: CLINIC | Age: 35
End: 2020-09-17
Payer: COMMERCIAL

## 2020-09-17 ENCOUNTER — ULTRASOUND (OUTPATIENT)
Dept: OBGYN CLINIC | Facility: CLINIC | Age: 35
End: 2020-09-17
Payer: COMMERCIAL

## 2020-09-17 DIAGNOSIS — R93.89 THICKENED ENDOMETRIUM: ICD-10-CM

## 2020-09-17 DIAGNOSIS — N92.6 IRREGULAR MENSES: ICD-10-CM

## 2020-09-17 DIAGNOSIS — Z97.5 IUD (INTRAUTERINE DEVICE) IN PLACE: Primary | ICD-10-CM

## 2020-09-17 DIAGNOSIS — N93.9 ABNORMAL UTERINE BLEEDING (AUB): Primary | ICD-10-CM

## 2020-09-17 LAB — SL AMB POCT URINE HCG: NORMAL

## 2020-09-17 PROCEDURE — 58340 CATHETER FOR HYSTEROGRAPHY: CPT | Performed by: OBSTETRICS & GYNECOLOGY

## 2020-09-17 PROCEDURE — 76831 ECHO EXAM UTERUS: CPT | Performed by: OBSTETRICS & GYNECOLOGY

## 2020-09-17 PROCEDURE — 88305 TISSUE EXAM BY PATHOLOGIST: CPT | Performed by: PATHOLOGY

## 2020-09-17 PROCEDURE — 99213 OFFICE O/P EST LOW 20 MIN: CPT | Performed by: OBSTETRICS & GYNECOLOGY

## 2020-09-17 PROCEDURE — 81025 URINE PREGNANCY TEST: CPT | Performed by: OBSTETRICS & GYNECOLOGY

## 2020-09-17 PROCEDURE — 76830 TRANSVAGINAL US NON-OB: CPT | Performed by: OBSTETRICS & GYNECOLOGY

## 2020-09-17 PROCEDURE — 58100 BIOPSY OF UTERUS LINING: CPT | Performed by: OBSTETRICS & GYNECOLOGY

## 2020-09-17 NOTE — PROGRESS NOTES
Assessment/Plan   Diagnoses and all orders for this visit:    IUD (intrauterine device) in place    Thickened endometrium    Irregular menses    1  Irregular menses with thickened endometrium-bleeding has become more normal since IUD was inserted on 4/6/20  Endometrium still measures 12-13 mm  Sonohysterogram was done today with no focal findings noted  IUD position is good  Endometrial biopsy was done and we will inform the patient of the findings  She will call or return with any menometrorrhagia  Currently, bleeding occurs lasting 5-7 days, generally quite light  It is mostly at monthly intervals  Previous lab 6/15/20 with normal TSH, negative hCG and normal CBC  2  Mirena IUD-in good position, placed 4/6/20   3  Left ovarian cyst- 2 7 cm complex cyst noted, likely consistent with hemorrhagic cyst   Patient denies any pain and no adnexal tenderness was noted on exam   She was counseled about this  To follow-up in 3 months time for ultrasound and office visit with me  4  Possible adenomyosis-some impression and of this noted on ultrasound today  We will follow symptoms  5  Status post termination 3/4/20  Patient is in a better place emotionally, now seeing her previous sports psychologist   My support was given  6  Hypothyroidism-most recent TSH was normal   7  History HSV-has current prescription for Valtrex to take b i d  For 3 days with any outbreaks  8  History of depression/anxiety-much improved as noted above  She will continue with her sports psychologist specialist   Follow-up 3 months for ultrasound and office visit with me  Subjective   Patient ID: Stephanie Elizabeth is a 36 y o  female  There were no vitals filed for this visit  Patient was seen today for sonohysterogram with endometrial biopsy and office visit  Please see assessment plan and procedure note for details        The following portions of the patient's history were reviewed and updated as appropriate: allergies, current medications, past family history, past medical history, past social history, past surgical history and problem list   Past Medical History:   Diagnosis Date    Abnormal Pap smear of cervix     Anemia     Depression     Dermatitis      2 para 2     Herpes     Hirsutism     last assessed: 14    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 thyroiditis     Mild cervical dysplasia     Pregnancy     last assessed: 3/28/17    Subclinical hypothyroidism     last assessed: 14    Urinary tract infection     Varicella     Vitamin D deficiency      Past Surgical History:   Procedure Laterality Date    COLPOSCOPY VULVA W/ BIOPSY      10/1/07 - MALLORY-1 noted at 5:00 and 12:00 ECC was negative    COMBINED HYSTEROSCOPY DIAGNOSTIC / D&C  10/2014    polyp removal    DILATION AND CURETTAGE OF UTERUS      WISDOM TOOTH EXTRACTION       OB History    Para Term  AB Living   3 2 2   1 2   SAB TAB Ectopic Multiple Live Births         0 2      # Outcome Date GA Lbr Trevor/2nd Weight Sex Delivery Anes PTL Lv   3 Term 17 39w0d / 00:59 3799 g (8 lb 6 oz) F Vag-Spont EPI N AQUILES   2 Term 09/03/15 39w2d  3856 g (8 lb 8 oz) M Vag-Spont EPI N AQUILES   1 AB                Current Outpatient Medications:     acetaminophen (TYLENOL) 325 mg tablet, Every 4 hours for mild pain (Patient not taking: Reported on 2020), Disp: 30 tablet, Rfl: 0    Cholecalciferol (VITAMIN D3) 50 MCG (2000 UT) capsule, Take 1 capsule (2,000 Units total) by mouth daily (Patient not taking: Reported on 2020), Disp: , Rfl:     Synthroid 112 MCG tablet, TAKE 1 TABLET BY MOUTH DAILY, Disp: 90 tablet, Rfl: 4    SYNTHROID 137 MCG tablet, TAKE 1 TABLET BY MOUTH EVERY DAY, Disp: 30 tablet, Rfl: 3    valACYclovir (VALTREX) 500 mg tablet, Take 1 tablet (500 mg total) by mouth 2 (two) times a day for 3 days For 3 Days with outbreak, Disp: 30 tablet, Rfl: 1  No Known Allergies  Social History     Socioeconomic History    Marital status: /Civil Union     Spouse name: Not on file    Number of children: 2    Years of education: Not on file    Highest education level: Not on file   Occupational History    Not on file   Social Needs    Financial resource strain: Not on file    Food insecurity     Worry: Not on file     Inability: Not on file    Transportation needs     Medical: Not on file     Non-medical: Not on file   Tobacco Use    Smoking status: Never Smoker    Smokeless tobacco: Never Used   Substance and Sexual Activity    Alcohol use: Yes     Comment: social drinker as per allscripts    Drug use: No    Sexual activity: Yes     Partners: Male     Birth control/protection: I U D  Lifestyle    Physical activity     Days per week: Not on file     Minutes per session: Not on file    Stress: Not on file   Relationships    Social connections     Talks on phone: Not on file     Gets together: Not on file     Attends Roman [employer] service: Not on file     Active member of club or organization: Not on file     Attends meetings of clubs or organizations: Not on file     Relationship status: Not on file    Intimate partner violence     Fear of current or ex partner: Not on file     Emotionally abused: Not on file     Physically abused: Not on file     Forced sexual activity: Not on file   Other Topics Concern    Not on file   Social History Narrative    Caffeine use    [employer] affiliation [employer]    Uses seatbelts     Family History   Problem Relation Age of Onset    Stroke Mother     Depression Mother     Thyroid disease Mother     Depression Maternal Aunt     Diabetes Maternal Aunt     Heart attack Paternal Uncle     Diabetes Other        Review of Systems   Constitutional: Negative for chills, diaphoresis, fatigue and fever  Respiratory: Negative for apnea, cough, chest tightness, shortness of breath and wheezing  Cardiovascular: Negative for chest pain, palpitations and leg swelling     Gastrointestinal: Negative for abdominal distention, abdominal pain, anal bleeding, constipation, diarrhea, nausea, rectal pain and vomiting  Genitourinary: Positive for vaginal bleeding  Negative for difficulty urinating, dyspareunia, dysuria, frequency, hematuria, menstrual problem, pelvic pain, urgency, vaginal discharge and vaginal pain  Musculoskeletal: Negative for arthralgias, back pain and myalgias  Skin: Negative for color change and rash  Neurological: Negative for dizziness, syncope, light-headedness, numbness and headaches  Hematological: Negative for adenopathy  Does not bruise/bleed easily  Psychiatric/Behavioral: Negative for dysphoric mood and sleep disturbance  The patient is not nervous/anxious  Objective   Physical Exam    Objective      There were no vitals taken for this visit  General:   alert and oriented, in no acute distress   Neck:    Breast:    Heart:    Lungs:    Abdomen: soft, non-tender, without masses or organomegaly   Vulva: normal   Vagina: Without erythema or lesions or discharge  Normal   Cervix: Without lesions or discharge or cervicitis  No Cervical motion tenderness    IUD string seen at a length of 2 cm   Uterus: top normal size, anteverted, non-tender   Adnexa: no mass, fullness, tenderness   Rectum: deferred    Psych:  Normal mood and affect   Skin:  Without obvious lesions   Eyes: symmetric, with normal movements and reactivity   Musculoskeletal:  Normal muscle tone and movements appreciated

## 2020-09-17 NOTE — PATIENT INSTRUCTIONS
Intrauterine Device   WHAT YOU NEED TO KNOW:   What is an intrauterine device? An intrauterine device (IUD) is a type of birth control that is inserted into your uterus  It is a small, flexible piece of plastic with a string on the end  It is inserted and removed by your healthcare provider  IUDs prevent sperm from reaching or fertilizing an egg  IUDs also prevent a fertilized egg from attaching to the uterus and developing into a fetus  What are the most common types of IUDs? · Copper: This type of IUD slowly releases a small amount of copper into your uterus  This IUD can remain in place for up to 10 years  · Hormone-releasing: This type of IUD slowly releases a small amount of progesterone into your uterus  Progesterone is a hormone that is made by your body to help control your periods  This IUD can remain in place for up to 5 years  What are the advantages of an IUD? · Effective: An IUD is 98% to 99% effective in preventing pregnancy  · Easily removable: The IUD can be removed if you decide to have a baby  You may be able to get pregnant as soon as the IUD is removed  · Immediate:  An IUD protects you from pregnancy right after it is inserted  · Convenient:  You do not have to stop sexual activity to insert it or worry about remembering to take your birth control pill  · Safe:  Copper IUDs are safer for some women than oral birth control pills  Examples include women who smoke or have a history of blood clots  · Health effects:  Hormone-releasing IUDs may decrease certain health problems  Examples include bleeding and cramping that happen with your monthly period  What are the risks of an IUD? · An IUD does not protect you from sexually transmitted infections  You may have cramps during the first weeks after you get the IUD  A copper IUD may cause your monthly period to be heavier or more painful  This is more common within the first 3 months after you get the IUD   You may need to have your IUD removed if your bleeding or pain becomes severe  You may have spotting between periods  · There is a small chance that you could get pregnant  Sometimes the IUD cannot be removed after you get pregnant  This increases your risk of a miscarriage or an ectopic pregnancy  Ectopic pregnancy is when the fertilized egg starts to grow somewhere other than your uterus  There is a small risk of an infection within the first 20 days after the IUD is placed  Infection can lead to pelvic inflammatory disease  This can cause infertility  Your uterus may tear when the IUD is inserted  The IUD may slip part or all of the way out of your uterus  How is the IUD inserted? · The IUD is usually inserted during your monthly period  This may help decrease the amount of discomfort you have during the procedure  It also helps ensure that you are not pregnant  You will be asked to lie down and place your feet in stirrups  Your healthcare provider will gently insert a speculum into your vagina  This is the same tool used during a pap smear  The speculum allows your healthcare provider to see inside your vagina to your cervix  The cervix is the opening of your uterus  · Your healthcare provider will clean your cervix with an antiseptic solution to prevent infection  You may be given numbing medicine  A long plastic tube is gently passed through your cervix and into your uterus  This tube has the IUD inside of it  The IUD is pushed out of the tube and into your uterus  You may have cramps as the IUD is inserted  The tube is removed after the IUD is in place  How can I make sure my IUD is still in place? An IUD has a string made of plastic thread  One to 2 inches of this string hangs into your vagina  You cannot see this string, and it will not cause problems when you have sex  Check your IUD string every 3 days for the first 3 months after it is inserted  After that, check the string after each monthly period   Do the following to check the placement of your IUD:  · Wash your hands with soap and warm water  Dry them with a clean towel  · Bend your knees and squat low to the ground  · Gently put your index finger inside your vagina  The cervix is at the top of the vagina and feels like the tip of your nose  Feel for the IUD string  Do not pull on the string  You should not be able to feel the firm plastic of the IUD itself  · Wash your hands after you check your IUD string  Where can I find more information? · [facility] [address] , [address] [place]  Phone: [phone]  Web Address: https://[employer]/  org  When should I contact my healthcare provider? · You think you are pregnant  · You bleed after you have sex  · You have pain during sex  · You cannot feel the IUD string, the string feels longer, or you feel the plastic of the IUD itself  · You have vaginal discharge that is green, yellow, or has a foul odor  · You have questions or concerns about your condition or care  When should I seek immediate care? · You have severe pain or bleeding during your period  · You have a fever and severe abdominal pain  CARE AGREEMENT:   You have the right to help plan your care  Learn about your health condition and how it may be treated  Discuss treatment options with your caregivers to decide what care you want to receive  You always have the right to refuse treatment  The above information is an  only  It is not intended as medical advice for individual conditions or treatments  Talk to your doctor, nurse or pharmacist before following any medical regimen to see if it is safe and effective for you  © 2017 [employer] Information is for End User's use only and may not be sold, redistributed or otherwise used for commercial purposes   All illustrations and images included in CareNotes® are the copyrighted property of A D A M , Inc  or Misael Alves

## 2020-09-17 NOTE — PROGRESS NOTES
AMB US Pelvic Non OB    Date/Time: 9/17/2020 9:27 AM  Performed by: Deonte Conway  Authorized by: Eliberto Aase, MD     Procedure details:     Technique:  Transvaginal US, Non-OB    Position: lithotomy exam    Uterine findings:     Length (cm): 9 43    Height (cm):  4 91    Width (cm):  6 37    Endometrial stripe: identified      Endometrium thickness (mm):  13 7  Left ovary findings:     Left ovary:  Visualized    Length (cm): 3 71    Height (cm): 3 13    Width (cm): 3 03  Right ovary findings:     Right ovary:  Visualized    Length (cm): 3 29    Height (cm): 1 37    Width (cm): 1 54  Other findings:     Free pelvic fluid: not identified      Free peritoneal fluid: not identified    Post-Procedure Details:     Impression:  Anteverted uterus is inhomogeneous throughout without fibroids suggestive of adenomyosis  The endometrium is slightly thickened  There is an IUD within the endometrium  The left ovary contains a complex cystic mass 2 7cm  The right ovary appears within normal limits  Tolerance: Tolerated well, no immediate complications    Complications: no complications    Additional Procedure Comments:      Xhale F8 E8C-RS transvaginal transducer Serial #[id number] was used to perform the examination today and subsequently followed with high level disinfection utilizing Trophon EPR procedure  Ultrasound performed at:     [id number] [address]  Phone:  [phone]  Fax:  [phone]  Sonohysterogram    Date/Time: 9/17/2020 9:28 AM  Performed by: Eliberto Aase, MD  Authorized by: Eliberto Aase, MD     Consent:     Consent obtained:  Verbal and written    Consent given by:  Patient    Patient questions answered: yes      Patient agrees, verbalizes understanding, and wants to proceed: yes      Instructions and paperwork completed: yes    Pre-procedure:     Pre-procedure timeout performed: yes      Prepped with: Betadine    Procedure:     Cervix cleaned and prepped: yes      Catheter inserted: yes      Uterine cavity distended with saline: yes    Post-procedure:     Patient observed: yes      Post procedure instructions given to patient: yes      Patient tolerated procedure well with no complications: yes    Comments:      Sonohysterogram demonstrates the IUD within the endometrium without filling defects     Endometrial biopsy    Date/Time: 9/17/2020 9:29 AM  Performed by: Iva Boyd MD  Authorized by: Iva Boyd MD     Consent:     Consent obtained:  Verbal and written    Consent given by:  Patient    Patient questions answered: yes      Patient agrees, verbalizes understanding, and wants to proceed: yes      Instructions and paperwork completed: yes    Pre-procedure:     Pre-procedure timeout performed: yes    Procedure:     Procedure: endometrial biopsy with Pipelle      A bivalve speculum was placed in the vagina: yes      Specimen collected: specimen collected and sent to pathology      Patient tolerated procedure well with no complications: yes

## 2020-09-29 ENCOUNTER — TELEPHONE (OUTPATIENT)
Dept: LAB | Facility: HOSPITAL | Age: 35
End: 2020-09-29

## 2020-09-30 ENCOUNTER — OFFICE VISIT (OUTPATIENT)
Dept: ENDOCRINOLOGY | Facility: CLINIC | Age: 35
End: 2020-09-30
Payer: COMMERCIAL

## 2020-09-30 VITALS
SYSTOLIC BLOOD PRESSURE: 100 MMHG | WEIGHT: 185 LBS | BODY MASS INDEX: 25.8 KG/M2 | TEMPERATURE: 98.8 F | DIASTOLIC BLOOD PRESSURE: 62 MMHG | HEART RATE: 68 BPM

## 2020-09-30 DIAGNOSIS — E06.3 HYPOTHYROIDISM DUE TO HASHIMOTO'S THYROIDITIS: Primary | ICD-10-CM

## 2020-09-30 DIAGNOSIS — L67.8 ABNORMAL FACIAL HAIR: ICD-10-CM

## 2020-09-30 DIAGNOSIS — E55.9 VITAMIN D DEFICIENCY: ICD-10-CM

## 2020-09-30 DIAGNOSIS — E03.8 HYPOTHYROIDISM DUE TO HASHIMOTO'S THYROIDITIS: Primary | ICD-10-CM

## 2020-09-30 DIAGNOSIS — R63.5 WEIGHT GAIN: ICD-10-CM

## 2020-09-30 PROCEDURE — 99214 OFFICE O/P EST MOD 30 MIN: CPT | Performed by: INTERNAL MEDICINE

## 2020-09-30 PROCEDURE — 1036F TOBACCO NON-USER: CPT | Performed by: INTERNAL MEDICINE

## 2020-11-23 ENCOUNTER — LAB (OUTPATIENT)
Dept: LAB | Facility: HOSPITAL | Age: 35
End: 2020-11-23
Payer: COMMERCIAL

## 2020-11-23 DIAGNOSIS — E06.3 HYPOTHYROIDISM DUE TO HASHIMOTO'S THYROIDITIS: ICD-10-CM

## 2020-11-23 DIAGNOSIS — E03.8 HYPOTHYROIDISM DUE TO HASHIMOTO'S THYROIDITIS: ICD-10-CM

## 2020-11-23 LAB
T4 FREE SERPL-MCNC: 1.22 NG/DL (ref 0.76–1.46)
TSH SERPL DL<=0.05 MIU/L-ACNC: 2.25 UIU/ML (ref 0.36–3.74)

## 2020-11-23 PROCEDURE — 82627 DEHYDROEPIANDROSTERONE: CPT

## 2020-11-23 PROCEDURE — 84439 ASSAY OF FREE THYROXINE: CPT

## 2020-11-23 PROCEDURE — 84402 ASSAY OF FREE TESTOSTERONE: CPT

## 2020-11-23 PROCEDURE — 84403 ASSAY OF TOTAL TESTOSTERONE: CPT

## 2020-11-23 PROCEDURE — 36415 COLL VENOUS BLD VENIPUNCTURE: CPT

## 2020-11-23 PROCEDURE — 84443 ASSAY THYROID STIM HORMONE: CPT

## 2020-11-24 LAB
DHEA-S SERPL-MCNC: 131 UG/DL (ref 84.8–378)
TESTOST FREE SERPL-MCNC: 2.1 PG/ML (ref 0–4.2)
TESTOST SERPL-MCNC: 14 NG/DL (ref 8–48)

## 2020-12-11 ENCOUNTER — ULTRASOUND (OUTPATIENT)
Dept: OBGYN CLINIC | Facility: CLINIC | Age: 35
End: 2020-12-11
Payer: COMMERCIAL

## 2020-12-11 ENCOUNTER — OFFICE VISIT (OUTPATIENT)
Dept: OBGYN CLINIC | Facility: CLINIC | Age: 35
End: 2020-12-11
Payer: COMMERCIAL

## 2020-12-11 VITALS — DIASTOLIC BLOOD PRESSURE: 76 MMHG | WEIGHT: 183 LBS | BODY MASS INDEX: 25.52 KG/M2 | SYSTOLIC BLOOD PRESSURE: 118 MMHG

## 2020-12-11 DIAGNOSIS — N83.202 LEFT OVARIAN CYST: Primary | ICD-10-CM

## 2020-12-11 DIAGNOSIS — N83.209 CYST OF OVARY, UNSPECIFIED LATERALITY: ICD-10-CM

## 2020-12-11 DIAGNOSIS — Z97.5 IUD (INTRAUTERINE DEVICE) IN PLACE: Primary | ICD-10-CM

## 2020-12-11 PROBLEM — R93.89 THICKENED ENDOMETRIUM: Status: RESOLVED | Noted: 2020-06-04 | Resolved: 2020-12-11

## 2020-12-11 PROCEDURE — 76830 TRANSVAGINAL US NON-OB: CPT | Performed by: OBSTETRICS & GYNECOLOGY

## 2020-12-11 PROCEDURE — 99213 OFFICE O/P EST LOW 20 MIN: CPT | Performed by: OBSTETRICS & GYNECOLOGY

## 2021-02-22 DIAGNOSIS — F41.9 ANXIETY: ICD-10-CM

## 2021-02-22 DIAGNOSIS — E06.3 HYPOTHYROIDISM DUE TO HASHIMOTO'S THYROIDITIS: Primary | ICD-10-CM

## 2021-02-22 DIAGNOSIS — E03.8 HYPOTHYROIDISM DUE TO HASHIMOTO'S THYROIDITIS: Primary | ICD-10-CM

## 2021-02-22 RX ORDER — LEVOTHYROXINE SODIUM 112 MCG
112 TABLET ORAL DAILY
Qty: 4 TABLET | Refills: 0 | Status: SHIPPED | OUTPATIENT
Start: 2021-02-22 | End: 2021-03-28

## 2021-02-22 NOTE — TELEPHONE ENCOUNTER
Pt lm in [place] and did not take enough synthroid with her wants 4 tabs [facility] to [facility] # [phone], she states they have a pharmacy there  Lm for pt to cb with pharmacy name tried to search name and it doesn't come up and phone # is to a grocery store   Pt called back [facility] [address]

## 2021-03-05 ENCOUNTER — TELEMEDICINE (OUTPATIENT)
Dept: FAMILY MEDICINE CLINIC | Facility: CLINIC | Age: 36
End: 2021-03-05
Payer: COMMERCIAL

## 2021-03-05 VITALS — WEIGHT: 192 LBS | TEMPERATURE: 98.7 F | BODY MASS INDEX: 26.78 KG/M2

## 2021-03-05 DIAGNOSIS — U07.1 COVID-19: Primary | ICD-10-CM

## 2021-03-05 PROCEDURE — 99212 OFFICE O/P EST SF 10 MIN: CPT | Performed by: NURSE PRACTITIONER

## 2021-03-05 PROCEDURE — 3725F SCREEN DEPRESSION PERFORMED: CPT | Performed by: NURSE PRACTITIONER

## 2021-03-05 NOTE — PROGRESS NOTES
COVID-19 Virtual Visit     Assessment/Plan:    Problem List Items Addressed This Visit        Other    COVID-19 - Primary         Disposition:     I recommended continued isolation until at least 24 hours have passed since recovery defined as resolution of fever without the use of fever-reducing medications AND improvement in COVID symptoms AND 10 days have passed since onset of symptoms (or 10 days have passed since date of first positive viral diagnostic test for asymptomatic patients)  This 28-year-old female presents today for positive COVID-19 virus  She will stay home and isolate  She will continue supportive care Tylenol, fluids, rest   All questions answered regarding her on isolation, quarantine of her children her , course of virus  Advised to call or go the emergency room for fever greater than a 101, chest pain, shortness of breath, as these can be signs of COVID-19 pneumonia  She verbalizes understanding  Will follow up on Tuesday, 3/ 9  She will call with any questions or concerns in the interim  I have spent 28 minutes directly with the patient  BMI Counseling: Body mass index is 26 78 kg/m²  The BMI is above normal  Nutrition recommendations include moderation in carbohydrate intake and increasing intake of lean protein  Exercise recommendations include exercising 3-5 times per week  Clean eating and starting to train for triathalon  [facility]    Provider located at [address] [place] [id number]    Recent Visits  Date Type Provider Dept   03/05/21 Telemedicine Lore Pittman, [facility] recent visits within past 7 days and meeting all other requirements     Future Appointments  No visits were found meeting these conditions     Showing future appointments within next 150 days and meeting all other requirements      This virtual check-in was done via [employer] and patient was informed that this is not a secure, HIPAA-compliant platform  She agrees to proceed  Patient agrees to participate in a virtual check in via telephone or video visit instead of presenting to the office to address urgent/immediate medical needs  Patient is aware this is a billable service  After connecting through [facility], the patient was identified by name and date of birth  Brady Bunch was informed that this was a telemedicine visit and that the exam was being conducted confidentially over secure lines  My office door was closed  No one else was in the room  Brady Bunch acknowledged consent and understanding of privacy and security of the telemedicine visit  I informed the patient that I have reviewed her record in Epic and presented the opportunity for her to ask any questions regarding the visit today  The patient agreed to participate  Subjective:   Brady Bunch is a 30 y o  female who has been screened for COVID-19  Symptom change since last report: unchanged  Patient's symptoms include fatigue, malaise, nasal congestion, rhinorrhea, cough, myalgias and headache  Patient denies fever, chills, sore throat, anosmia, loss of taste, shortness of breath, chest tightness, abdominal pain, nausea, vomiting and diarrhea  Mandie Brown has been staying home and has isolated themselves in her home  She is taking care to not share personal items and is cleaning all surfaces that are touched often, like counters, tabletops, and doorknobs using household cleaning sprays or wipes  She is wearing a mask when she leaves her room  Date of symptom onset: 3/3/2021  Date of positive COVID-19 PCR: 3/4/2021    Returned from traveling to [facility] on 2/25  COVID-19 swab on return home was negative  Symptoms began 3/3, retested 3/4 and tested positive        No results found for: EZRA [address]  Past Medical History:   Diagnosis Date    Abnormal Pap smear of cervix     Anemia     Depression     Dermatitis      2 para 2     Herpes     Hirsutism     last assessed: 14    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 thyroiditis     Mild cervical dysplasia     Pregnancy     last assessed: 3/28/17    Subclinical hypothyroidism     last assessed: 14    Urinary tract infection     Varicella     Vitamin D deficiency      Past Surgical History:   Procedure Laterality Date    COLPOSCOPY VULVA W/ BIOPSY      10/1/07 - MALLORY-1 noted at 5:00 and 12:00 ECC was negative    COMBINED HYSTEROSCOPY DIAGNOSTIC / D&C  10/2014    polyp removal    DILATION AND CURETTAGE OF UTERUS      WISDOM TOOTH EXTRACTION       Current Outpatient Medications   Medication Sig Dispense Refill    acetaminophen (TYLENOL) 325 mg tablet Every 4 hours for mild pain 30 tablet 0    levonorgestrel (MIRENA) 20 MCG/24HR IUD 1 each by Intrauterine route once      Synthroid 112 MCG tablet Take 1 tablet (112 mcg total) by mouth daily 4 tablet 0    Cholecalciferol (VITAMIN D3) 50 MCG (2000 UT) capsule Take 1 capsule (2,000 Units total) by mouth daily (Patient not taking: Reported on 3/5/2021)      valACYclovir (VALTREX) 500 mg tablet Take 1 tablet (500 mg total) by mouth 2 (two) times a day for 3 days For 3 Days with outbreak 30 tablet 1     No current facility-administered medications for this visit  No Known Allergies    Review of Systems   Constitutional: Positive for appetite change (decreased) and fatigue  Negative for chills and fever  HENT: Positive for congestion and rhinorrhea  Negative for sore throat  Respiratory: Positive for cough  Negative for chest tightness and shortness of breath  Cardiovascular: Positive for chest pain (mild chest discomfort )  Gastrointestinal: Negative for abdominal pain, diarrhea, nausea and vomiting  Musculoskeletal: Positive for myalgias     Neurological: Positive for headaches         "foggy head "     Objective:    Vitals:    21 0817   Temp: 98 7 °F (37 1 °C)   Weight: 87 1 kg (192 lb)       Physical Exam  Constitutional:       General: She is not in acute distress  Appearance: Normal appearance  She is not ill-appearing, toxic-appearing or diaphoretic  HENT:      Head: Normocephalic and atraumatic  Pulmonary:      Effort: Pulmonary effort is normal  No respiratory distress  Comments: No cough, no tachypnea  Neurological:      Mental Status: She is alert  Psychiatric:         Mood and Affect: Mood normal        VIRTUAL VISIT DISCLAIMER    Christie Cruz acknowledges that she has consented to an online visit or consultation  She understands that the online visit is based solely on information provided by her, and that, in the absence of a face-to-face physical evaluation by the physician, the diagnosis she receives is both limited and provisional in terms of accuracy and completeness  This is not intended to replace a full medical face-to-face evaluation by the physician  Christie Cruz understands and accepts these terms

## 2021-03-06 PROBLEM — U07.1 COVID-19: Status: ACTIVE | Noted: 2021-03-06

## 2021-03-09 ENCOUNTER — TELEMEDICINE (OUTPATIENT)
Dept: FAMILY MEDICINE CLINIC | Facility: CLINIC | Age: 36
End: 2021-03-09
Payer: COMMERCIAL

## 2021-03-09 DIAGNOSIS — U07.1 COVID-19: Primary | ICD-10-CM

## 2021-03-09 PROCEDURE — 1036F TOBACCO NON-USER: CPT | Performed by: NURSE PRACTITIONER

## 2021-03-09 PROCEDURE — 99213 OFFICE O/P EST LOW 20 MIN: CPT | Performed by: NURSE PRACTITIONER

## 2021-03-09 NOTE — PROGRESS NOTES
COVID-19 Virtual Visit     Assessment/Plan:    Problem List Items Addressed This Visit        Other    COVID-19 - Primary         Disposition:     I recommended continued isolation until at least 24 hours have passed since recovery defined as resolution of fever without the use of fever-reducing medications AND improvement in COVID symptoms AND 10 days have passed since onset of symptoms (or 10 days have passed since date of first positive viral diagnostic test for asymptomatic patients)  COVID-19 symptoms improving  No fevers  Continue to self isolate  May return to work and discontinue isolation on 3/15  She will call with any worsening of symptoms, or questions/concerns  I have spent 10 minutes directly with the patient  [facility]    Provider located at [address] [place] [id number]    Recent Visits  Date Type Provider Dept   03/05/21 Telemedicine Lore Stark, [address] [place] [address] recent visits within past 7 days and meeting all other requirements     Today's Visits  Date Type Provider Dept   03/09/21 Telemedicine Lore Stark, [address] [place] [address] today's visits and meeting all other requirements     Future Appointments  No visits were found meeting these conditions  Showing future appointments within next 150 days and meeting all other requirements      This virtual check-in was done via [employer] and patient was informed that this is not a secure, HIPAA-compliant platform  She agrees to proceed  Patient agrees to participate in a virtual check in via telephone or video visit instead of presenting to the office to address urgent/immediate medical needs  Patient is aware this is a billable service  After connecting through [facility], the patient was identified by name and date of birth   Nida Ryan was informed that this was a telemedicine visit and that the exam was being conducted confidentially over secure lines  My office door was closed  No one else was in the room  Christie Cruz acknowledged consent and understanding of privacy and security of the telemedicine visit  I informed the patient that I have reviewed her record in Epic and presented the opportunity for her to ask any questions regarding the visit today  The patient agreed to participate  Subjective:   Christie Cruz is a 30 y o  female who has been screened for COVID-19  Symptom change since last report: improving  Patient's symptoms include fatigue (improving, starting to do some work from home), cough, shortness of breath (slighlty winded with exertion at times ), myalgias and headache (mild, significantly better)  Patient denies fever, chills, malaise, congestion, rhinorrhea, sore throat, anosmia, loss of taste, chest tightness, abdominal pain, nausea, vomiting and diarrhea  Marina Strauss has been staying home and has isolated themselves in her home  She is taking care to not share personal items and is cleaning all surfaces that are touched often, like counters, tabletops, and doorknobs using household cleaning sprays or wipes  She is wearing a mask when she leaves her room       Date of symptom onset: 3/3/2021  Date of positive COVID-19 PCR: 3/4/2021    No results found for: [facility], [facility], [address]  Past Medical History:   Diagnosis Date    Abnormal Pap smear of cervix     Anemia     Depression     Dermatitis      2 para 2     Herpes     Hirsutism     last assessed: 14    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 thyroiditis     Mild cervical dysplasia     Pregnancy     last assessed: 3/28/17    Subclinical hypothyroidism     last assessed: 14    Urinary tract infection     Varicella     Vitamin D deficiency      Past Surgical History:   Procedure Laterality Date    COLPOSCOPY VULVA W/ BIOPSY      10/1/07 - MALLORY-1 noted at 5:00 and 12:00 ECC was negative    COMBINED HYSTEROSCOPY DIAGNOSTIC / D&C  10/2014    polyp removal    DILATION AND CURETTAGE OF UTERUS      WISDOM TOOTH EXTRACTION       Current Outpatient Medications   Medication Sig Dispense Refill    acetaminophen (TYLENOL) 325 mg tablet Every 4 hours for mild pain 30 tablet 0    levonorgestrel (MIRENA) 20 MCG/24HR IUD 1 each by Intrauterine route once      Synthroid 112 MCG tablet Take 1 tablet (112 mcg total) by mouth daily 4 tablet 0    Cholecalciferol (VITAMIN D3) 50 MCG (2000 UT) capsule Take 1 capsule (2,000 Units total) by mouth daily (Patient not taking: Reported on 3/5/2021)      valACYclovir (VALTREX) 500 mg tablet Take 1 tablet (500 mg total) by mouth 2 (two) times a day for 3 days For 3 Days with outbreak (Patient not taking: Reported on 3/9/2021) 30 tablet 1     No current facility-administered medications for this visit  No Known Allergies    Review of Systems   Constitutional: Positive for fatigue (improving, starting to do some work from home)  Negative for chills and fever  HENT: Negative for congestion, rhinorrhea and sore throat  Respiratory: Positive for cough and shortness of breath (slighlty winded with exertion at times  )  Negative for chest tightness  Cardiovascular:        Chest feels mildly heavy at times  Gastrointestinal: Negative for abdominal pain, diarrhea, nausea and vomiting  Musculoskeletal: Positive for myalgias  Neurological: Positive for headaches (mild, significantly better)  Objective: There were no vitals filed for this visit  Physical Exam  Constitutional:       General: She is not in acute distress  Appearance: Normal appearance  She is not ill-appearing, toxic-appearing or diaphoretic  Comments: Looks better compared to last virtual visit  HENT:      Head: Normocephalic and atraumatic  Pulmonary:      Effort: Pulmonary effort is normal  No respiratory distress        Comments: No cough, no tachypnea, no audible wheezes, no dyspnea with conversation  Neurological:      Mental Status: She is alert  Psychiatric:         Mood and Affect: Mood normal        VIRTUAL VISIT DISCLAIMER    Vanessa Lei acknowledges that she has consented to an online visit or consultation  She understands that the online visit is based solely on information provided by her, and that, in the absence of a face-to-face physical evaluation by the physician, the diagnosis she receives is both limited and provisional in terms of accuracy and completeness  This is not intended to replace a full medical face-to-face evaluation by the physician  Vanessa Lei understands and accepts these terms

## 2021-03-24 ENCOUNTER — HOSPITAL ENCOUNTER (EMERGENCY)
Facility: HOSPITAL | Age: 36
Discharge: HOME | End: 2021-03-24
Attending: EMERGENCY MEDICINE | Admitting: EMERGENCY MEDICINE
Payer: COMMERCIAL

## 2021-03-24 ENCOUNTER — APPOINTMENT (EMERGENCY)
Dept: RADIOLOGY | Facility: HOSPITAL | Age: 36
End: 2021-03-24
Payer: COMMERCIAL

## 2021-03-24 VITALS
WEIGHT: 197 LBS | OXYGEN SATURATION: 100 % | SYSTOLIC BLOOD PRESSURE: 127 MMHG | HEIGHT: 71 IN | RESPIRATION RATE: 16 BRPM | TEMPERATURE: 98.1 F | BODY MASS INDEX: 27.58 KG/M2 | DIASTOLIC BLOOD PRESSURE: 60 MMHG | HEART RATE: 58 BPM

## 2021-03-24 DIAGNOSIS — Z86.16 HISTORY OF COVID-19: ICD-10-CM

## 2021-03-24 DIAGNOSIS — R07.9 CHEST PAIN: Primary | ICD-10-CM

## 2021-03-24 LAB
ANION GAP SERPL CALCULATED.3IONS-SCNC: 5 MMOL/L (ref 4–13)
BASOPHILS # BLD AUTO: 0.03 THOUSANDS/ΜL (ref 0–0.1)
BASOPHILS NFR BLD AUTO: 0 % (ref 0–1)
BUN SERPL-MCNC: 18 MG/DL (ref 5–25)
CALCIUM SERPL-MCNC: 9.2 MG/DL (ref 8.3–10.1)
CHLORIDE SERPL-SCNC: 108 MMOL/L (ref 100–108)
CO2 SERPL-SCNC: 28 MMOL/L (ref 21–32)
CREAT SERPL-MCNC: 1.09 MG/DL (ref 0.6–1.3)
D DIMER PPP FEU-MCNC: <0.27 UG/ML FEU
EOSINOPHIL # BLD AUTO: 0.18 THOUSAND/ΜL (ref 0–0.61)
EOSINOPHIL NFR BLD AUTO: 2 % (ref 0–6)
ERYTHROCYTE [DISTWIDTH] IN BLOOD BY AUTOMATED COUNT: 12.4 % (ref 11.6–15.1)
EXT PREG TEST URINE: NEGATIVE
EXT. CONTROL ED NAV: NORMAL
GFR SERPL CREATININE-BSD FRML MDRD: 66 ML/MIN/1.73SQ M
GLUCOSE SERPL-MCNC: 73 MG/DL (ref 65–140)
HCT VFR BLD AUTO: 40.2 % (ref 34.8–46.1)
HGB BLD-MCNC: 13.8 G/DL (ref 11.5–15.4)
IMM GRANULOCYTES # BLD AUTO: 0.01 THOUSAND/UL (ref 0–0.2)
IMM GRANULOCYTES NFR BLD AUTO: 0 % (ref 0–2)
LYMPHOCYTES # BLD AUTO: 2.57 THOUSANDS/ΜL (ref 0.6–4.47)
LYMPHOCYTES NFR BLD AUTO: 33 % (ref 14–44)
MCH RBC QN AUTO: 30.8 PG (ref 26.8–34.3)
MCHC RBC AUTO-ENTMCNC: 34.3 G/DL (ref 31.4–37.4)
MCV RBC AUTO: 90 FL (ref 82–98)
MONOCYTES # BLD AUTO: 0.49 THOUSAND/ΜL (ref 0.17–1.22)
MONOCYTES NFR BLD AUTO: 6 % (ref 4–12)
NEUTROPHILS # BLD AUTO: 4.45 THOUSANDS/ΜL (ref 1.85–7.62)
NEUTS SEG NFR BLD AUTO: 59 % (ref 43–75)
NRBC BLD AUTO-RTO: 0 /100 WBCS
PLATELET # BLD AUTO: 247 THOUSANDS/UL (ref 149–390)
PMV BLD AUTO: 10.4 FL (ref 8.9–12.7)
POTASSIUM SERPL-SCNC: 3.6 MMOL/L (ref 3.5–5.3)
RBC # BLD AUTO: 4.48 MILLION/UL (ref 3.81–5.12)
SODIUM SERPL-SCNC: 141 MMOL/L (ref 136–145)
TROPONIN I SERPL-MCNC: <0.02 NG/ML
WBC # BLD AUTO: 7.73 THOUSAND/UL (ref 4.31–10.16)

## 2021-03-24 PROCEDURE — 85379 FIBRIN DEGRADATION QUANT: CPT | Performed by: EMERGENCY MEDICINE

## 2021-03-24 PROCEDURE — 96374 THER/PROPH/DIAG INJ IV PUSH: CPT

## 2021-03-24 PROCEDURE — 80048 BASIC METABOLIC PNL TOTAL CA: CPT | Performed by: EMERGENCY MEDICINE

## 2021-03-24 PROCEDURE — 81025 URINE PREGNANCY TEST: CPT | Performed by: EMERGENCY MEDICINE

## 2021-03-24 PROCEDURE — 99285 EMERGENCY DEPT VISIT HI MDM: CPT | Performed by: EMERGENCY MEDICINE

## 2021-03-24 PROCEDURE — 71045 X-RAY EXAM CHEST 1 VIEW: CPT

## 2021-03-24 PROCEDURE — 99285 EMERGENCY DEPT VISIT HI MDM: CPT

## 2021-03-24 PROCEDURE — 36415 COLL VENOUS BLD VENIPUNCTURE: CPT | Performed by: EMERGENCY MEDICINE

## 2021-03-24 PROCEDURE — 84484 ASSAY OF TROPONIN QUANT: CPT | Performed by: EMERGENCY MEDICINE

## 2021-03-24 PROCEDURE — 85025 COMPLETE CBC W/AUTO DIFF WBC: CPT | Performed by: EMERGENCY MEDICINE

## 2021-03-24 PROCEDURE — 93005 ELECTROCARDIOGRAM TRACING: CPT

## 2021-03-24 RX ORDER — KETOROLAC TROMETHAMINE 30 MG/ML
15 INJECTION, SOLUTION INTRAMUSCULAR; INTRAVENOUS ONCE
Status: COMPLETED | OUTPATIENT
Start: 2021-03-24 | End: 2021-03-24

## 2021-03-24 RX ORDER — SODIUM CHLORIDE 9 MG/ML
3 INJECTION INTRAVENOUS
Status: DISCONTINUED | OUTPATIENT
Start: 2021-03-24 | End: 2021-03-24 | Stop reason: HOSPADM

## 2021-03-24 RX ADMIN — KETOROLAC TROMETHAMINE 15 MG: 30 INJECTION, SOLUTION INTRAMUSCULAR at 19:30

## 2021-03-24 NOTE — ED ATTENDING ATTESTATION
3/24/2021  IJuancarlos MD, saw and evaluated the patient  I have discussed the patient with the resident/non-physician practitioner and agree with the resident's/non-physician practitioner's findings, Plan of Care, and MDM as documented in the resident's/non-physician practitioner's note, except where noted  All available labs and Radiology studies were reviewed  I was present for key portions of any procedure(s) performed by the resident/non-physician practitioner and I was immediately available to provide assistance  At this point I agree with the current assessment done in the Emergency Department  I have conducted an independent evaluation of this patient a history and physical is as follows:    ED Course     81-year-old female, diagnosed with COVID on March 4, 2021, presenting to the emergency department for evaluation chest discomfort and shortness of breath  Patient states that since having COVID, she has continued to have a dry nonproductive cough  The patient has felt slightly run down, and is attempting to increase her activity  Patient states that last evening she developed a fairly abrupt onset left-sided inframammary chest pain which was described as sharp, made worse with movement and with deep inspiration, and lasted for approximately 10 minutes  Discomfort subsequently resolved, however this morning she woke with a pressure-like sensation like something is being applied to her chest associated with a sensation like she is not getting enough of a deep breath  The patient states that the sensation is a heaviness that has a pulsating waxing waning character to it  Patient found that she was increasingly dyspneic on exertion today  No change in cough frequency or character  No fever  Patient reports that she had felt a little lightheaded yesterday  No abdominal pain, nausea, vomiting, diarrhea    Patient notes she has been having a little bit of discomfort in the superior medial aspect of the left calf  Ten systems reviewed negative except as noted  Assessment and plan:  Patient is resting comfortably on a stretcher no acute respiratory distress  Patient is able to speak in multiple word sentences for a period of time, but then will take water to recruitment breaths  Head is normocephalic and atraumatic  Eyelids lashes are normal   No scleral icterus  Neck is supple  No JVD  Lungs are clear to auscultation bilaterally with no wheezes rales or rhonchi  Heart is regular rate rhythm with no murmurs rubs or gallops  Abdomen is nondistended, soft and nontender  Extremities are unremarkable in appearance  There is no calf tenderness  Negative Homans sign bilaterally  Distal pulses are equal and symmetric in all 4 limbs  71-year-old female presenting to the emergency department for evaluation of chest discomfort described as pressure with shortness of breath  Given history of recent COVID infection, consider pericarditis, myocarditis, pulmonary embolism  Patient is otherwise healthy without cardiac risk factors and so acute coronary syndrome is less likely  Labs Reviewed   TROPONIN I - Normal       Result Value Ref Range Status    Troponin I <0 02  <=0 04 ng/mL Final    Comment: Siemens Chemistry analyzer 99% cutoff is > 0 04 ng/mL in network labs     o cTnI 99% cutoff is useful only when applied to patients in the clinical setting of myocardial ischemia   o cTnI 99% cutoff should be interpreted in the context of clinical history, ECG findings and possibly cardiac imaging to establish correct diagnosis  o cTnI 99% cutoff may be suggestive but clearly not indicative of a coronary event without the clinical setting of myocardial ischemia  D-DIMER, QUANTITATIVE - Normal    D-Dimer, Quant <0 27  <0 50 ug/ml FEU Final    Comment: Reference and upper limits to exclude DVT and PE are the same  Do not use to exclude if clinical symptoms are present    Pregnant women:  1st trimester:  <0 22 - 1 06 ug/ml FEU  2nd trimester:  <0 22 - 1 88 ug/ml FEU  3rd trimester:   0 24 - 3 28 ug/ml FEU    Note: Normal ranges may not apply to patients who are transgender, non-binary, or whose legal sex, sex at birth, and gender identity differ  POCT PREGNANCY, URINE - Normal    EXT PREG TEST UR (Ref: Negative) Negative   Final    Control Valid   Final   CBC AND DIFFERENTIAL    WBC 7 73  4 31 - 10 16 Thousand/uL Final    RBC 4 48  3 81 - 5 12 Million/uL Final    Hemoglobin 13 8  11 5 - 15 4 g/dL Final    Hematocrit 40 2  34 8 - 46 1 % Final    MCV 90  82 - 98 fL Final    MCH 30 8  26 8 - 34 3 pg Final    MCHC 34 3  31 4 - 37 4 g/dL Final    RDW 12 4  11 6 - 15 1 % Final    MPV 10 4  8 9 - 12 7 fL Final    Platelets 419  977 - 390 Thousands/uL Final    nRBC 0  /100 WBCs Final    Neutrophils Relative 59  43 - 75 % Final    Immat GRANS % 0  0 - 2 % Final    Lymphocytes Relative 33  14 - 44 % Final    Monocytes Relative 6  4 - 12 % Final    Eosinophils Relative 2  0 - 6 % Final    Basophils Relative 0  0 - 1 % Final    Neutrophils Absolute 4 45  1 85 - 7 62 Thousands/µL Final    Immature Grans Absolute 0 01  0 00 - 0 20 Thousand/uL Final    Lymphocytes Absolute 2 57  0 60 - 4 47 Thousands/µL Final    Monocytes Absolute 0 49  0 17 - 1 22 Thousand/µL Final    Eosinophils Absolute 0 18  0 00 - 0 61 Thousand/µL Final    Basophils Absolute 0 03  0 00 - 0 10 Thousands/µL Final   BASIC METABOLIC PANEL    Sodium 677  136 - 145 mmol/L Final    Potassium 3 6  3 5 - 5 3 mmol/L Final    Chloride 108  100 - 108 mmol/L Final    CO2 28  21 - 32 mmol/L Final    ANION GAP 5  4 - 13 mmol/L Final    BUN 18  5 - 25 mg/dL Final    Creatinine 1 09  0 60 - 1 30 mg/dL Final    Comment: Standardized to IDMS reference method    Glucose 73  65 - 140 mg/dL Final    Comment: If the patient is fasting, the ADA then defines impaired fasting glucose as > 100 mg/dL and diabetes as > or equal to 123 mg/dL    Specimen collection should occur prior to Sulfasalazine administration due to the potential for falsely depressed results  Specimen collection should occur prior to Sulfapyridine administration due to the potential for falsely elevated results  Calcium 9 2  8 3 - 10 1 mg/dL Final    eGFR 66  ml/min/1 73sq m Final    Narrative:     [place] guidelines for Chronic Kidney Disease (CKD):     Stage 1 with normal or high GFR (GFR > 90 mL/min/1 73 square meters)    Stage 2 Mild CKD (GFR = 60-89 mL/min/1 73 square meters)    Stage 3A Moderate CKD (GFR = 45-59 mL/min/1 73 square meters)    Stage 3B Moderate CKD (GFR = 30-44 mL/min/1 73 square meters)    Stage 4 Severe CKD (GFR = 15-29 mL/min/1 73 square meters)    Stage 5 End Stage CKD (GFR <15 mL/min/1 73 square meters)  Note: GFR calculation is accurate only with a steady state creatinine       X-ray chest 1 view portable    (Results Pending)   Chest x-ray independently visualized by myself demonstrates no acute cardiopulmonary disease          Critical Care Time  Procedures

## 2021-03-25 LAB
ATRIAL RATE: 60 BPM
P AXIS: 55 DEGREES
PR INTERVAL: 132 MS
QRS AXIS: 87 DEGREES
QRSD INTERVAL: 88 MS
QT INTERVAL: 412 MS
QTC INTERVAL: 412 MS
T WAVE AXIS: 62 DEGREES
VENTRICULAR RATE: 60 BPM

## 2021-03-25 PROCEDURE — 93010 ELECTROCARDIOGRAM REPORT: CPT | Performed by: INTERNAL MEDICINE

## 2021-03-26 ENCOUNTER — OFFICE VISIT (OUTPATIENT)
Dept: FAMILY MEDICINE CLINIC | Facility: CLINIC | Age: 36
End: 2021-03-26
Payer: COMMERCIAL

## 2021-03-26 DIAGNOSIS — R07.9 CHEST PAIN, UNSPECIFIED TYPE: Primary | ICD-10-CM

## 2021-03-26 PROCEDURE — 99214 OFFICE O/P EST MOD 30 MIN: CPT | Performed by: FAMILY MEDICINE

## 2021-03-26 RX ORDER — ALBUTEROL SULFATE 90 UG/1
AEROSOL, METERED RESPIRATORY (INHALATION)
Qty: 1 INHALER | Refills: 0 | Status: SHIPPED | OUTPATIENT
Start: 2021-03-26 | End: 2021-09-24 | Stop reason: ALTCHOICE

## 2021-03-27 PROBLEM — R07.9 CHEST PAIN: Status: ACTIVE | Noted: 2021-03-27

## 2021-03-27 NOTE — PROGRESS NOTES
Virtual Regular Visit      Assessment/Plan:    Problem List Items Addressed This Visit        Other    Chest pain - Primary       Chest pain  Etiology unknown at this time  Patient was recommended to initiate Pepcid over-the-counter 20 mg once daily for possibility of reflux  She was also given prescription for ProAir inhaler to use 2 puffs q 6 hours p r n  for any chest tightness to see if this improves her symptoms  She will proceed with echocardiogram when she returns from her [place] trip  She is aware that she should experience any worsening of symptoms while in [place] she should seek immediate attention at local hospital or urgent care center  Relevant Medications    albuterol (ProAir HFA) 90 mcg/act inhaler               Reason for visit is   Chief Complaint   Patient presents with    Virtual Brief Visit     could not review medical history pt did not answer         Encounter provider Marie Jang MD    Provider located at [address] [place] [phone]      Recent Visits  Date Type Provider Dept   03/26/21 Office Visit Marie Jang MD [facility] recent visits within past 7 days and meeting all other requirements     Future Appointments  No visits were found meeting these conditions  Showing future appointments within next 150 days and meeting all other requirements        The patient was identified by name and date of birth  Christie Cruz was informed that this is a telemedicine visit and that the visit is being conducted through [employer] and patient was informed that this is not a secure, HIPAA-compliant platform  She agrees to proceed     My office door was closed  No one else was in the room  She acknowledged consent and understanding of privacy and security of the video platform  The patient has agreed to participate and understands they can discontinue the visit at any time      Patient is aware this is a billable service  Subjective  Adina Lovell is a 30 y o  female        Patient having telemedicine visit with complaint of some chest tightness  Initially patient was seen in the emergency room earlier this week with pain that was described as sharp under left ribcage area  Patient was evaluated had EKG chest x-ray and D-dimer that were all within normal limits  Of note patient contracted COVID 19 infection approximately 2-3 weeks ago  Patient was recommended by emergency room to have echocardiogram which she has scheduled for the week of   Patient is scheduled to fly to [place] next week to participate in some golfing  She denies any fevers or wheezing at this time  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         Past Medical History:   Diagnosis Date    Abnormal Pap smear of cervix     Anemia     Depression     Dermatitis      2 para 2     Herpes     Hirsutism     last assessed: 14    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 thyroiditis     Mild cervical dysplasia     Pregnancy     last assessed: 3/28/17    Subclinical hypothyroidism     last assessed: 14    Urinary tract infection     Varicella     Vitamin D deficiency        Past Surgical History:   Procedure Laterality Date    COLPOSCOPY VULVA W/ BIOPSY      10/1/07 - MALLORY-1 noted at 5:00 and 12:00 ECC was negative    COMBINED HYSTEROSCOPY DIAGNOSTIC / D&C  10/2014    polyp removal    DILATION AND CURETTAGE OF UTERUS      WISDOM TOOTH EXTRACTION         Current Outpatient Medications   Medication Sig Dispense Refill    acetaminophen (TYLENOL) 325 mg tablet Every 4 hours for mild pain 30 tablet 0    albuterol (ProAir HFA) 90 mcg/act inhaler Inhale 2 puffs q 6 hrs prn sob 1 Inhaler 0    Cholecalciferol (VITAMIN D3) 50 MCG (2000 UT) capsule Take 1 capsule (2,000 Units total) by mouth daily (Patient not taking: Reported on 3/5/2021)      levonorgestrel (MIRENA) 20 MCG/24HR IUD 1 each by Intrauterine route once      Synthroid 112 MCG tablet Take 1 tablet (112 mcg total) by mouth daily 4 tablet 0     No current facility-administered medications for this visit  No Known Allergies    Review of Systems   Constitutional: Negative  HENT: Negative  Respiratory: Positive for chest tightness  Negative for cough, shortness of breath and wheezing  Cardiovascular: Negative  Gastrointestinal: Negative  Genitourinary: Negative  Musculoskeletal: Negative  Neurological: Negative  Psychiatric/Behavioral: Negative  Video Exam    There were no vitals filed for this visit  Physical Exam  Constitutional:       General: She is not in acute distress  Appearance: Normal appearance  She is not ill-appearing  Eyes:      General:         Right eye: No discharge  Left eye: No discharge  Extraocular Movements: Extraocular movements intact  Conjunctiva/sclera: Conjunctivae normal       Pupils: Pupils are equal, round, and reactive to light  Pulmonary:      Effort: Pulmonary effort is normal  No respiratory distress  Neurological:      General: No focal deficit present  Mental Status: She is alert and oriented to person, place, and time  Psychiatric:         Mood and Affect: Mood normal          Behavior: Behavior normal          Thought Content: Thought content normal          Judgment: Judgment normal           I spent 25 minutes with patient today in which greater than 50% of the time was spent in counseling/coordination of care regarding [id number] acknowledges that she has consented to an online visit or consultation   She understands that the online visit is based solely on information provided by her, and that, in the absence of a face-to-face physical evaluation by the physician, the diagnosis she receives is both limited and provisional in terms of accuracy and completeness  This is not intended to replace a full medical face-to-face evaluation by the physician  Albert Ramirez understands and accepts these terms

## 2021-03-27 NOTE — ASSESSMENT & PLAN NOTE
Chest pain  Etiology unknown at this time  Patient was recommended to initiate Pepcid over-the-counter 20 mg once daily for possibility of reflux  She was also given prescription for ProAir inhaler to use 2 puffs q 6 hours p r n  for any chest tightness to see if this improves her symptoms  She will proceed with echocardiogram when she returns from her [place] trip  She is aware that she should experience any worsening of symptoms while in [place] she should seek immediate attention at local hospital or urgent care center

## 2021-03-27 NOTE — ED PROVIDER NOTES
History  Chief Complaint   Patient presents with    Chest Pain     Pt c o sharp chest pain starting yesterday  Pt describes the pain similar to a side sticker but in the middle of her chest  Pt recently had COVID and c o cough and SOB with exertion     80-year-old female history of positive COVID test 3/4, presents with chest pain and shortness of breath  Patient states that since COVID infection has not quite returned to baseline  Has had general intermittent chest heaviness but what brings her in today is acute left-sided sharp chest pain, transient episode last night while resting  States that she has difficult time taking a full breath  Feels she has reduced exercise tolerance but wonders if she is simply just not back in her normal physical condition  Patient is generally healthy, 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 Taking?    Cholecalciferol (VITAMIN D3) 50 MCG (2000) capsule Not Taking at Unknown time  No No   Sig: Take 1 capsule (2,000 Units total) by mouth daily   Patient not taking: Reported on 3/5/2021   Synthroid 112 MCG tablet   No Yes   Sig: Take 1 tablet (112 mcg total) by mouth daily   acetaminophen (TYLENOL) 325 mg tablet  Self No Yes   Sig: Every 4 hours for mild pain   levonorgestrel (MIRENA) 20 MCG/24HR IUD   Yes Yes   Si each by Intrauterine route once      Facility-Administered Medications: None       Past Medical History:   Diagnosis Date    Abnormal Pap smear of cervix     Anemia     Depression     Dermatitis      2 para 2     Herpes     Hirsutism     last assessed: 14    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 thyroiditis     Mild cervical dysplasia     Pregnancy     last assessed: 3/28/17    Subclinical hypothyroidism     last assessed: 14    Urinary tract infection     Varicella     Vitamin D deficiency        Past Surgical History:   Procedure Laterality Date    COLPOSCOPY VULVA W/ BIOPSY      10/1/07 - MALLORY-1 noted at 5:00 and 12:00 ECC was negative    COMBINED HYSTEROSCOPY DIAGNOSTIC / D&C  10/2014    polyp removal    DILATION AND CURETTAGE OF UTERUS      WISDOM TOOTH EXTRACTION         Family History   Problem Relation Age of Onset    Stroke Mother     Depression Mother     Thyroid disease Mother     Depression Maternal Aunt     Diabetes Maternal Aunt     Heart attack Paternal Uncle     Diabetes Other      I have reviewed and agree with the history as documented  E-Cigarette/Vaping    E-Cigarette Use Never User      E-Cigarette/Vaping Substances    Nicotine No     Flavoring No      Social History     Tobacco Use    Smoking status: Never Smoker    Smokeless tobacco: Never Used   Substance Use Topics    Alcohol use: Yes     Comment: social drinker as per allscripts    Drug use: No        Review of Systems   Constitutional: Negative for chills and fever  HENT: Negative for ear pain, sinus pain and sore throat  Eyes: Negative for pain  Respiratory: Positive for shortness of breath  Cardiovascular: Positive for chest pain  Gastrointestinal: Negative for abdominal pain, diarrhea, nausea and vomiting  Genitourinary: Negative for difficulty urinating and flank pain  Musculoskeletal: Negative for back pain and neck pain  Neurological: Negative for headaches  All other systems reviewed and are negative        Physical Exam  ED Triage Vitals   Temperature Pulse Respirations Blood Pressure SpO2   03/24/21 1759 03/24/21 1757 03/24/21 1757 03/24/21 1757 03/24/21 1757   98 1 °F (36 7 °C) 55 16 126/59 100 %      Temp Source Heart Rate Source Patient Position - Orthostatic VS BP Location FiO2 (%)   03/24/21 1759 03/24/21 1757 03/24/21 1945 03/24/21 1757 --   Oral Monitor Lying Right arm       Pain Score       03/24/21 1757       5             Orthostatic Vital Signs  Vitals: 03/24/21 1757 03/24/21 1945 03/24/21 2000   BP: 126/59 126/62 127/60   Pulse: 55 (!) 50 58   Patient Position - Orthostatic VS:  Lying Lying       Physical Exam  Vitals signs and nursing note reviewed  Constitutional:       General: She is not in acute distress  Appearance: She is well-developed  She is not ill-appearing, toxic-appearing or diaphoretic  HENT:      Head: Normocephalic  Nose: Nose normal    Eyes:      General:         Right eye: No discharge  Left eye: No discharge  Extraocular Movements: Extraocular movements intact  Conjunctiva/sclera: Conjunctivae normal    Neck:      Musculoskeletal: Normal range of motion and neck supple  Cardiovascular:      Rate and Rhythm: Normal rate  Pulses:           Radial pulses are 2+ on the right side and 2+ on the left side  Heart sounds: Normal heart sounds  Pulmonary:      Effort: Pulmonary effort is normal  No respiratory distress  Breath sounds: Normal breath sounds  No stridor  No decreased breath sounds  Abdominal:      General: There is no distension  Palpations: Abdomen is soft  Tenderness: There is no abdominal tenderness  There is no guarding or rebound  Musculoskeletal:      Right lower leg: She exhibits no tenderness  No edema  Left lower leg: She exhibits no tenderness  No edema  Skin:     General: Skin is warm and dry  Capillary Refill: Capillary refill takes less than 2 seconds  Neurological:      Mental Status: She is alert and oriented to person, place, and time  Cranial Nerves: No cranial nerve deficit  Psychiatric:         Mood and Affect: Mood is anxious           Behavior: Behavior normal          ED Medications  Medications   ketorolac (TORADOL) injection 15 mg (15 mg Intravenous Given 3/24/21 1930)       Diagnostic Studies  Results Reviewed     Procedure Component Value Units Date/Time    D-dimer, quantitative [[id number]]  (Normal) Collected: 03/24/21 1929    Lab Status: Final result Specimen: Blood from Arm, Left Updated: 03/24/21 2005     D-Dimer, Quant <0 27 ug/ml FEU     Troponin I [[id number]]  (Normal) Collected: 03/24/21 1929    Lab Status: Final result Specimen: Blood from Arm, Left Updated: 03/24/21 2003     Troponin I <0 02 ng/mL     Basic metabolic panel [[id number]] Collected: 03/24/21 1929    Lab Status: Final result Specimen: Blood from Arm, Left Updated: 03/24/21 2000     Sodium 141 mmol/L      Potassium 3 6 mmol/L      Chloride 108 mmol/L      CO2 28 mmol/L      ANION GAP 5 mmol/L      BUN 18 mg/dL      Creatinine 1 09 mg/dL      Glucose 73 mg/dL      Calcium 9 2 mg/dL      eGFR 66 ml/min/1 73sq m     Narrative:      [place] guidelines for Chronic Kidney Disease (CKD):     Stage 1 with normal or high GFR (GFR > 90 mL/min/1 73 square meters)    Stage 2 Mild CKD (GFR = 60-89 mL/min/1 73 square meters)    Stage 3A Moderate CKD (GFR = 45-59 mL/min/1 73 square meters)    Stage 3B Moderate CKD (GFR = 30-44 mL/min/1 73 square meters)    Stage 4 Severe CKD (GFR = 15-29 mL/min/1 73 square meters)    Stage 5 End Stage CKD (GFR <15 mL/min/1 73 square meters)  Note: GFR calculation is accurate only with a steady state creatinine    CBC and differential [[id number]] Collected: 03/24/21 1929    Lab Status: Final result Specimen: Blood from Arm, Left Updated: 03/24/21 1943     WBC 7 73 Thousand/uL      RBC 4 48 Million/uL      Hemoglobin 13 8 g/dL      Hematocrit 40 2 %      MCV 90 fL      MCH 30 8 pg      MCHC 34 3 g/dL      RDW 12 4 %      MPV 10 4 fL      Platelets 557 Thousands/uL      nRBC 0 /100 WBCs      Neutrophils Relative 59 %      Immat GRANS % 0 %      Lymphocytes Relative 33 %      Monocytes Relative 6 %      Eosinophils Relative 2 %      Basophils Relative 0 %      Neutrophils Absolute 4 45 Thousands/µL      Immature Grans Absolute 0 01 Thousand/uL      Lymphocytes Absolute 2 57 Thousands/µL      Monocytes Absolute 0 49 Thousand/µL      Eosinophils Absolute 0 18 Thousand/µL      Basophils Absolute 0 03 Thousands/µL     POCT pregnancy, urine [[id number]]  (Normal) Resulted: 03/24/21 1941    Lab Status: Final result Updated: 03/24/21 1941     EXT PREG TEST UR (Ref: Negative) Negative     Control Valid                 X-ray chest 1 view portable   ED Interpretation by Marily Welch MD (03/24 2110)   CXR wet read - no free air under diaphragm, no pneumothorax, no obvious new infiltrate, cardiac silhouette WNL  No prior unavailable for comparison  Final Result by Karlos Buckner MD (03/24 2150)      No acute cardiopulmonary disease                    Workstation performed: [id number]               Procedures  POC Cardiac US    Date/Time: 3/27/2021 12:58 AM  Performed by: Marily Welch MD  Authorized by: Marily Welch MD     Patient location:  ED  Other Assisting Provider: No    Procedure details:     Exam Type:  Diagnostic and educational    Indications: chest pain      Assessment / Evaluation for: cardiac function, pericardial effusion and right heart strain (suspected pulmonary embolism)      Exam Type: initial exam      Image quality: limited diagnostic      Image availability:  Images available in PACS  Patient Details:     Cardiac Rhythm:  Regular    Mechanical ventilation: No    Cardiac findings:     Views obtained: parasternal long axis and parasternal short axis      Pericardial effusion: absent      Wall motion: normal      LV systolic function: normal      RV dilation: none            ED Course             HEART Risk Score      Most Recent Value   Heart Score Risk Calculator   History  0 Filed at: 03/24/2021 1902   ECG  0 Filed at: 03/24/2021 1902   Age  0 Filed at: 03/24/2021 1902   Risk Factors  0 Filed at: 03/24/2021 1902   Troponin  0 Filed at: 03/24/2021 1902   HEART Score  0 Filed at: 03/24/2021 1902                      SBIRT 20yo+      Most Recent Value   SBIRT (25 yo +)   In order to provide better care to our patients, we are screening all of our patients for alcohol and drug use  Would it be okay to ask you these screening questions? No Filed at: 03/24/2021 1804                [facility]  Number of Diagnoses or Management Options  Chest pain:   History of COVID-19:   Diagnosis management comments: Labs and nsaid, reevaluate  Overall plan to discharge  PCP follow-up, return precautions discussed  Patient appreciative and in agreement with plan  Disposition  Final diagnoses:   Chest pain   History of COVID-19     Time reflects when diagnosis was documented in both MDM as applicable and the Disposition within this note     Time User Action Codes Description Comment    3/24/2021  9:10 PM Sandria Manifold Add [R07 9] Chest pain     3/24/2021  9:11 PM Sandria Manifold Add [Z86 16] History of COVID-19       ED Disposition     ED Disposition Condition Date/Time Comment    Discharge Stable Wed Mar 24, 2021  9:08 PM Nik Koch discharge to home/self care              Follow-up Information     Follow up With Specialties Details Why Contact Info Additional Information    Hansel Zaragoza MD Family Medicine Schedule an appointment as soon as possible for a visit  For follow up regarding your symptoms [address] [facility] 56       [address] [place] [address] 34 [facility] Emergency Department Emergency Medicine Go to  If symptoms worsen [address]  [address] [facility] 64 [facility], [address], [address], [address] [facility]          Discharge Medication List as of 3/24/2021  9:14 PM      CONTINUE these medications which have NOT CHANGED    Details   acetaminophen (TYLENOL) 325 mg tablet Every 4 hours for mild pain, No Print      Cholecalciferol (VITAMIN D3) 50 MCG (2000 UT) capsule Take 1 capsule (2,000 Units total) by mouth daily, Starting Mon 12/16/2019, No Print      levonorgestrel (MIRENA) 20 MCG/24HR IUD 1 each by Intrauterine route once, Historical Med      Synthroid 112 MCG tablet Take 1 tablet (112 mcg total) by mouth daily, Starting Mon 2/22/2021, Normal           Outpatient Discharge Orders   Echo complete with contrast if indicated   Standing Status: Future Standing Exp  Date: 03/24/25       PDMP Review     None           ED Provider  Attending physically available and evaluated Nely Stauffer I managed the patient along with the ED Attending      Electronically Signed by         Kaye Garnica MD  03/27/21 4390       Daryl Newell MD  03/28/21 4175

## 2021-03-28 ENCOUNTER — PATIENT MESSAGE (OUTPATIENT)
Dept: ENDOCRINOLOGY | Facility: CLINIC | Age: 36
End: 2021-03-28

## 2021-03-28 DIAGNOSIS — E03.8 HYPOTHYROIDISM DUE TO HASHIMOTO'S THYROIDITIS: ICD-10-CM

## 2021-03-28 DIAGNOSIS — E06.3 HYPOTHYROIDISM DUE TO HASHIMOTO'S THYROIDITIS: ICD-10-CM

## 2021-03-28 RX ORDER — LEVOTHYROXINE SODIUM 112 MCG
TABLET ORAL
Qty: 30 TABLET | Refills: 12 | Status: SHIPPED | OUTPATIENT
Start: 2021-03-28 | End: 2021-03-31 | Stop reason: SDUPTHER

## 2021-03-31 DIAGNOSIS — E03.8 HYPOTHYROIDISM DUE TO HASHIMOTO'S THYROIDITIS: ICD-10-CM

## 2021-03-31 DIAGNOSIS — E06.3 HYPOTHYROIDISM DUE TO HASHIMOTO'S THYROIDITIS: ICD-10-CM

## 2021-03-31 RX ORDER — LEVOTHYROXINE SODIUM 112 MCG
112 TABLET ORAL DAILY
Qty: 90 TABLET | Refills: 0 | Status: SHIPPED | OUTPATIENT
Start: 2021-03-31 | End: 2021-07-16 | Stop reason: SDUPTHER

## 2021-04-01 ENCOUNTER — TELEPHONE (OUTPATIENT)
Dept: ENDOCRINOLOGY | Facility: CLINIC | Age: 36
End: 2021-04-01

## 2021-04-02 ENCOUNTER — TELEPHONE (OUTPATIENT)
Dept: OTHER | Facility: OTHER | Age: 36
End: 2021-04-02

## 2021-04-02 NOTE — TELEPHONE ENCOUNTER
Pt called requesting to page on call because she is having chest pains again, she stated she is on vacation in [place]  She felt more comfortable speaking to someone within the practice  She said she is supposed to go for an ECHO soon  She is concerned and she isnt sure if she should go to the ER while she is on vacation

## 2021-04-04 ENCOUNTER — TELEPHONE (OUTPATIENT)
Dept: FAMILY MEDICINE CLINIC | Facility: CLINIC | Age: 36
End: 2021-04-04

## 2021-04-05 NOTE — TELEPHONE ENCOUNTER
Patient called Friday 4/2 stating that she was experiencing chest pain the lasted for couple of minutes  She was seen in the office last week for evaluation however she is currently in [place]  She is coming back to [place] on Monday and is asking what she should do  I have instructed her to go to the emergency room for further evaluation as she describes the chest pain as sharp  She is agreeable and will proceed to the local ER  She will have records sent from ER to our office for further review as well

## 2021-04-06 ENCOUNTER — APPOINTMENT (OUTPATIENT)
Dept: LAB | Facility: HOSPITAL | Age: 36
End: 2021-04-06
Attending: INTERNAL MEDICINE
Payer: COMMERCIAL

## 2021-04-06 DIAGNOSIS — E55.9 VITAMIN D DEFICIENCY: ICD-10-CM

## 2021-04-06 DIAGNOSIS — E06.3 HYPOTHYROIDISM DUE TO HASHIMOTO'S THYROIDITIS: ICD-10-CM

## 2021-04-06 DIAGNOSIS — E03.8 HYPOTHYROIDISM DUE TO HASHIMOTO'S THYROIDITIS: ICD-10-CM

## 2021-04-06 LAB
25(OH)D3 SERPL-MCNC: 32 NG/ML (ref 30–100)
T4 FREE SERPL-MCNC: 1.06 NG/DL (ref 0.76–1.46)
TSH SERPL DL<=0.05 MIU/L-ACNC: 2.1 UIU/ML (ref 0.36–3.74)

## 2021-04-06 PROCEDURE — 84439 ASSAY OF FREE THYROXINE: CPT

## 2021-04-06 PROCEDURE — 84443 ASSAY THYROID STIM HORMONE: CPT

## 2021-04-06 PROCEDURE — 82306 VITAMIN D 25 HYDROXY: CPT

## 2021-04-06 PROCEDURE — 36415 COLL VENOUS BLD VENIPUNCTURE: CPT

## 2021-04-07 ENCOUNTER — OFFICE VISIT (OUTPATIENT)
Dept: ENDOCRINOLOGY | Facility: CLINIC | Age: 36
End: 2021-04-07
Payer: COMMERCIAL

## 2021-04-07 ENCOUNTER — HOSPITAL ENCOUNTER (OUTPATIENT)
Dept: NON INVASIVE DIAGNOSTICS | Facility: HOSPITAL | Age: 36
Discharge: HOME | End: 2021-04-07
Attending: EMERGENCY MEDICINE
Payer: COMMERCIAL

## 2021-04-07 VITALS
WEIGHT: 195 LBS | DIASTOLIC BLOOD PRESSURE: 80 MMHG | TEMPERATURE: 97.5 F | HEIGHT: 71 IN | BODY MASS INDEX: 27.3 KG/M2 | SYSTOLIC BLOOD PRESSURE: 106 MMHG | HEART RATE: 58 BPM

## 2021-04-07 DIAGNOSIS — E03.8 HYPOTHYROIDISM DUE TO HASHIMOTO'S THYROIDITIS: Primary | ICD-10-CM

## 2021-04-07 DIAGNOSIS — Z86.16 HISTORY OF COVID-19: ICD-10-CM

## 2021-04-07 DIAGNOSIS — E06.3 HYPOTHYROIDISM DUE TO HASHIMOTO'S THYROIDITIS: Primary | ICD-10-CM

## 2021-04-07 DIAGNOSIS — E55.9 VITAMIN D DEFICIENCY: ICD-10-CM

## 2021-04-07 DIAGNOSIS — R07.9 CHEST PAIN: ICD-10-CM

## 2021-04-07 PROCEDURE — 99213 OFFICE O/P EST LOW 20 MIN: CPT | Performed by: PHYSICIAN ASSISTANT

## 2021-04-07 PROCEDURE — 93306 TTE W/DOPPLER COMPLETE: CPT

## 2021-04-07 PROCEDURE — 3008F BODY MASS INDEX DOCD: CPT | Performed by: INTERNAL MEDICINE

## 2021-04-07 PROCEDURE — 93306 TTE W/DOPPLER COMPLETE: CPT | Performed by: INTERNAL MEDICINE

## 2021-04-07 RX ORDER — ASPIRIN 81 MG/1
81 TABLET, CHEWABLE ORAL DAILY
COMMUNITY
End: 2021-09-24 | Stop reason: ALTCHOICE

## 2021-04-07 NOTE — PROGRESS NOTES
Patient Progress Note    CC: hypothyroidism     Referring Provider  Rosalia Hernandez Md  [address] [place] [address],  [address]     History of Present Illness:     Soledad Moore is a 30 y o  female on the phone for follow-up of hypothyroidism secondary to Hashimoto's thyroiditis  She is currently taking Synthroid 112 mcg 1 tablet daily on an empty stomach 1 hour before breakfast and at least 4 hours apart from supplements  Patient is tolerating medication well  Most recent TSH normal at 2 100 and free T4 normal at 1 06  She is not taking iodine, biotin or kelp supplementation  No family history of thyroid cancer  She denies history of external radiation therapy to head, neck, or chest area  She was sick with [place] in 2021  She had multiple ER visits since then for intermittent chest pain / chest tightness and intermittent shortness of breath  Workup is being done by PCP but so far cardiac workup has been negative  She has noted some weight gain but reports she was eating more the last couple months and recently her exercise as been limited as her PCP advised to avoid vigorous activity until chest pain improves        Vitamin D deficiency: she is not taking vitamin D3 2000 IU daily     Patient Active Problem List   Diagnosis    Anxiety    Hashimoto's disease    Herpes simplex infection    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 thyroiditis    Vitamin D deficiency    Anxiety disorder    Anxiety and depression    Irregular menses    History of elective     IUD (intrauterine device) in place    COVID-19    Chest pain     Past Medical History:   Diagnosis Date    Abnormal Pap smear of cervix     Anemia     Depression     Dermatitis      2 para 2     Herpes     Hirsutism     last assessed: 14    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 thyroiditis     Mild cervical dysplasia     Pregnancy     last assessed: 3/28/17    Subclinical hypothyroidism     last assessed: 12/29/14    Urinary tract infection     Varicella     Vitamin D deficiency       Past Surgical History:   Procedure Laterality Date    COLPOSCOPY VULVA W/ BIOPSY      10/1/07 - MALLORY-1 noted at 5:00 and 12:00 ECC was negative    COMBINED HYSTEROSCOPY DIAGNOSTIC / D&C  10/2014    polyp removal    DILATION AND CURETTAGE OF UTERUS      WISDOM TOOTH EXTRACTION        Family History   Problem Relation Age of Onset    Stroke Mother     Depression Mother     Thyroid disease Mother     Depression Maternal Aunt     Diabetes Maternal Aunt     Heart attack Paternal Uncle     Diabetes Other      Social History     Tobacco Use    Smoking status: Never Smoker    Smokeless tobacco: Never Used   Substance Use Topics    Alcohol use: Yes     Comment: social drinker as per allscripts     No Known Allergies  Current Outpatient Medications   Medication Sig Dispense Refill    aspirin 81 mg chewable tablet Chew 81 mg daily      levonorgestrel (MIRENA) 20 MCG/24HR IUD 1 each by Intrauterine route once      Synthroid 112 MCG tablet Take 1 tablet (112 mcg total) by mouth daily 90 tablet 0    acetaminophen (TYLENOL) 325 mg tablet Every 4 hours for mild pain (Patient not taking: Reported on 4/7/2021) 30 tablet 0    albuterol (ProAir HFA) 90 mcg/act inhaler Inhale 2 puffs q 6 hrs prn sob (Patient not taking: Reported on 4/7/2021) 1 Inhaler 0    Cholecalciferol (VITAMIN D3) 50 MCG (2000 UT) capsule Take 1 capsule (2,000 Units total) by mouth daily (Patient not taking: Reported on 3/5/2021)       No current facility-administered medications for this visit  Review of Systems   Constitutional: Negative for activity change, appetite change, fatigue and unexpected weight change  HENT: Negative for trouble swallowing  Eyes: Negative for visual disturbance  Respiratory: Positive for shortness of breath (intermittent, only with episodes of chest pain)      Cardiovascular: Positive for chest pain (intermittent, sharp pain)  Negative for palpitations  Gastrointestinal: Negative for constipation and diarrhea  Endocrine: Negative for cold intolerance and heat intolerance  Musculoskeletal: Negative  Skin: Negative  Neurological: Negative for tremors  Psychiatric/Behavioral: The patient is nervous/anxious  Physical Exam:  Body mass index is 27 2 kg/m²  /80   Pulse 58   Temp 97 5 °F (36 4 °C) (Tympanic)   Ht 5' 11" (1 803 m)   Wt 88 5 kg (195 lb)   BMI 27 20 kg/m²    Wt Readings from Last 3 Encounters:   04/07/21 88 5 kg (195 lb)   03/24/21 89 4 kg (197 lb)   03/05/21 87 1 kg (192 lb)       Physical Exam  Vitals signs and nursing note reviewed  Constitutional:       Appearance: She is well-developed  HENT:      Head: Normocephalic  Eyes:      General: No scleral icterus  Pupils: Pupils are equal, round, and reactive to light  Neck:      Musculoskeletal: Neck supple  Thyroid: No thyromegaly  Cardiovascular:      Rate and Rhythm: Normal rate and regular rhythm  Pulses:           Radial pulses are 2+ on the right side and 2+ on the left side  Heart sounds: No murmur  Pulmonary:      Effort: Pulmonary effort is normal  No respiratory distress  Breath sounds: Normal breath sounds  No wheezing  Skin:     General: Skin is warm and dry  Neurological:      Mental Status: She is alert  Deep Tendon Reflexes: Reflexes are normal and symmetric  Patient's shoes and socks were not removed  Labs:   Results for Abhinav Lozano (MRN [id number]) as of 4/7/2021 09:30   Ref  Range 2/14/2020 14:00 6/15/2020 13:32 11/23/2020 07:15 4/6/2021 07:42   TSH 3RD GENERATON Latest Ref Range: 0 358 - 3 740 uIU/mL 5 160 (H) 1 090 2 250 2 100   Free T4 Latest Ref Range: 0 76 - 1 46 ng/dL 1 07 1 23 1 22 1 06   Results for Abhinav Lozano (MRN [id number]) as of 4/7/2021 09:30   Ref   Range 6/15/2020 13:32 4/6/2021 07:42   Vit D, 25-Hydroxy Latest Ref Range: 30 0 - 100 0 ng/mL 53 9 32 0       Plan:    Diagnoses and all orders for this visit:    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 thyroiditis  TFTs normal, TSH 2 100 and free T4 1 06  Continue current dose of levothyroxine  Repeat TFTs prior to next visit   -     T4, free; Future  -     TSH, 3rd generation; Future    Vitamin D deficiency  Vitamin D low normal at 32 (dropped from 48  9)  Take vitamin D3 2000 IU daily  -     Vitamin D 25 hydroxy; Future        Discussed with the patient and all questions fully answered  She will call me if any problems arise      Counseled patient on diagnostic results, prognosis, risk and benefit of treatment options, instruction for management, importance of treatment compliance, risk  factor reduction and impressions      Rahel Banks PA-C

## 2021-04-09 ENCOUNTER — TELEPHONE (OUTPATIENT)
Dept: FAMILY MEDICINE CLINIC | Facility: CLINIC | Age: 36
End: 2021-04-09

## 2021-04-09 DIAGNOSIS — R07.9 CHEST PAIN, UNSPECIFIED TYPE: Primary | ICD-10-CM

## 2021-04-09 NOTE — TELEPHONE ENCOUNTER
Patient called wanted to know the results of her echo, she said that while she was in [facility] last week she did have to go to the emergency room last Friday, the doctor there said that she needed to see a cardiologist  They ran 2 ekg's on her and 2 sets of blood work, she also said that at one point her heart rate was between 33-37 and she said that her normal is usually around 48-55 she did say once she got up it did increase to about 60

## 2021-04-09 NOTE — TELEPHONE ENCOUNTER
Echocardiogram was read as unremarkable with good heart motion  If she still has experiencing any symptoms I would recommend consultation with 1 of the [facility] cardiologist   I will place order in epic and patient may call for appointment    Please provide phone number

## 2021-04-12 ENCOUNTER — TELEPHONE (OUTPATIENT)
Dept: ENDOCRINOLOGY | Facility: CLINIC | Age: 36
End: 2021-04-12

## 2021-04-12 NOTE — TELEPHONE ENCOUNTER
----- Message from Sarbjit Pennington MD sent at 4/6/2021  2:32 PM EDT -----  Covering for Dr Issac Matos     Thyroid function tests, vitamin-D within normal limits

## 2021-04-16 ENCOUNTER — CONSULT (OUTPATIENT)
Dept: CARDIOLOGY CLINIC | Facility: CLINIC | Age: 36
End: 2021-04-16
Payer: COMMERCIAL

## 2021-04-16 VITALS
WEIGHT: 193.2 LBS | HEART RATE: 41 BPM | BODY MASS INDEX: 26.95 KG/M2 | SYSTOLIC BLOOD PRESSURE: 116 MMHG | DIASTOLIC BLOOD PRESSURE: 66 MMHG

## 2021-04-16 DIAGNOSIS — R00.1 SINUS BRADYCARDIA: ICD-10-CM

## 2021-04-16 DIAGNOSIS — R00.2 PALPITATIONS: ICD-10-CM

## 2021-04-16 DIAGNOSIS — R07.9 CHEST PAIN, UNSPECIFIED TYPE: Primary | ICD-10-CM

## 2021-04-16 PROCEDURE — 93000 ELECTROCARDIOGRAM COMPLETE: CPT | Performed by: INTERNAL MEDICINE

## 2021-04-16 PROCEDURE — 99242 OFF/OP CONSLTJ NEW/EST SF 20: CPT | Performed by: INTERNAL MEDICINE

## 2021-04-16 PROCEDURE — 1036F TOBACCO NON-USER: CPT | Performed by: INTERNAL MEDICINE

## 2021-04-16 NOTE — PROGRESS NOTES
Cardiology Consultation     Prasad Pantoja  [id number]  1985  [address] [place] [id number]    Reason for visit: New patient evaluation for atypical chest pain, palpitations and bradycardia    1  Chest pain, unspecified type  Ambulatory referral to Cardiology    POCT ECG       HPI: The patient is a 30year old female with a hx of hypothyroidism  She had COVID in early march  She developed rather severe sharp left sided pain worse with a deep breath on 3/23  She went to [address] [place] [address] and had negative ECG, troponin, D Dimer and XRAY  She went to [place] and had similar chest pain (sharp with later pressure and palpitations)    She had normal testing again    The symptoms have subsided gradually  She has been doing moderate workouts and feels fine    She denies exertional chest pain or SOB  She denies edema  She does get some dizziness  She does not have the palpitations for a few days   She was noted to have bradycardia in the hospital       Patient Active Problem List   Diagnosis    Anxiety    Hashimoto's disease    Herpes simplex infection    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 thyroiditis    Vitamin D deficiency    Anxiety disorder    Anxiety and depression    Irregular menses    History of elective     IUD (intrauterine device) in place    COVID-19    Chest pain     Past Medical History:   Diagnosis Date    Abnormal Pap smear of cervix     Anemia     Depression     Dermatitis      2 para 2     Herpes     Hirsutism     last assessed: 14    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 thyroiditis     Mild cervical dysplasia     Pregnancy     last assessed: 3/28/17    Subclinical hypothyroidism     last assessed: 14    Urinary tract infection     Varicella     Vitamin D deficiency      Social History     Socioeconomic History    Marital status: /Civil Union     Spouse name: Not on file    Number of children: 2    Years of education: Not on file    Highest education level: Not on file   Occupational History    Not on file   Social Needs    Financial resource strain: Not on file    Food insecurity     Worry: Not on file     Inability: Not on file    Transportation needs     Medical: Not on file     Non-medical: Not on file   Tobacco Use    Smoking status: Never Smoker    Smokeless tobacco: Never Used   Substance and Sexual Activity    Alcohol use: Yes     Comment: social drinker as per allscripts    Drug use: No    Sexual activity: Yes     Partners: Male     Birth control/protection: I U D     Lifestyle    Physical activity     Days per week: Not on file     Minutes per session: Not on file    Stress: Not on file   Relationships    Social connections     Talks on phone: Not on file     Gets together: Not on file     Attends [employer] service: Not on file     Active member of club or organization: Not on file     Attends meetings of clubs or organizations: Not on file     Relationship status: Not on file    Intimate partner violence     Fear of current or ex partner: Not on file     Emotionally abused: Not on file     Physically abused: Not on file     Forced sexual activity: Not on file   Other Topics Concern    Not on file   Social History Narrative    Caffeine use    [employer] affiliation [employer]    Uses seatbelts      Family History   Problem Relation Age of Onset    Stroke Mother     Depression Mother     Thyroid disease Mother     Depression Maternal Aunt     Diabetes Maternal Aunt     Heart attack Paternal Uncle     Diabetes Other      Past Surgical History:   Procedure Laterality Date    COLPOSCOPY VULVA W/ BIOPSY      10/1/07 - MALLORY-1 noted at 5:00 and 12:00 ECC was negative    COMBINED HYSTEROSCOPY DIAGNOSTIC / D&C  10/2014    polyp removal    DILATION AND CURETTAGE OF UTERUS      WISDOM TOOTH EXTRACTION         Current Outpatient Medications:     Cholecalciferol (VITAMIN D3) 50 MCG (2000 UT) capsule, Take 1 capsule (2,000 Units total) by mouth daily, Disp: , Rfl:     levonorgestrel (MIRENA) 20 MCG/24HR IUD, 1 each by Intrauterine route once, Disp: , Rfl:     Synthroid 112 MCG tablet, Take 1 tablet (112 mcg total) by mouth daily, Disp: 90 tablet, Rfl: 0    acetaminophen (TYLENOL) 325 mg tablet, Every 4 hours for mild pain (Patient not taking: Reported on 4/7/2021), Disp: 30 tablet, Rfl: 0    albuterol (ProAir HFA) 90 mcg/act inhaler, Inhale 2 puffs q 6 hrs prn sob (Patient not taking: Reported on 4/7/2021), Disp: 1 Inhaler, Rfl: 0    aspirin 81 mg chewable tablet, Chew 81 mg daily, Disp: , Rfl:   No Known Allergies  Vitals:    04/16/21 1222   BP: 116/66   BP Location: Right arm   Patient Position: Sitting   Cuff Size: Large   Pulse: (!) 41   Weight: 87 6 kg (193 lb 3 2 oz)       Review of Systems:  Review of Systems   Constitutional: Negative for activity change, appetite change, fatigue and unexpected weight change  Respiratory: Positive for cough and shortness of breath  Negative for wheezing  Cardiovascular: Positive for chest pain and palpitations  Negative for leg swelling  Gastrointestinal: Positive for constipation  Negative for abdominal pain, blood in stool and diarrhea  Genitourinary: Negative for frequency and hematuria  Musculoskeletal: Negative for arthralgias and back pain  Neurological: Positive for dizziness  Physical Exam:  Vitals:    04/16/21 1222   BP: 116/66   BP Location: Right arm   Patient Position: Sitting   Cuff Size: Large   Pulse: (!) 41   Weight: 87 6 kg (193 lb 3 2 oz)       Physical Exam  Constitutional:       General: She is not in acute distress  Appearance: She is not ill-appearing  HENT:      Head: Normocephalic and atraumatic  Mouth/Throat:      Mouth: Mucous membranes are moist       Pharynx: No oropharyngeal exudate or posterior oropharyngeal erythema     Eyes: General: No scleral icterus  Conjunctiva/sclera: Conjunctivae normal    Neck:      Musculoskeletal: Neck supple  Thyroid: No thyroid mass or thyromegaly  Vascular: Normal carotid pulses  No carotid bruit or JVD  Cardiovascular:      Rate and Rhythm: Bradycardia present  Pulses: Normal pulses  Heart sounds: No murmur  No friction rub  No gallop  Pulmonary:      Breath sounds: Normal breath sounds  No wheezing, rhonchi or rales  Abdominal:      Palpations: Abdomen is soft  There is no hepatomegaly, splenomegaly or mass  Tenderness: There is no abdominal tenderness  Musculoskeletal:         General: No swelling  Neurological:      Mental Status: She is alert  Discussion/Summary:  1  Chest pain  Atypical   Suspect musculoskeletal but in light the fact she had COVID-19 infection 2 weeks prior to the onset we must consider pericarditis  The history for that is very atypical   I think we have essentially ruled out coronary ischemia but would like to do a stress test to reassure patient is fine to resume her normal vigorous workouts  Will also be able to see her heart rate response exercise  I have ordered a CRP and sed rate at this time  Will review this and stress testing  2  Sinus bradycardia  Likely related to conditioning +element of sinus node disease  Would do Holter monitor to see heart rate trends  Will also look for how heart rate response exercise on treadmill  3  Palpitations  Part of symptom complex  Can of ST gait this with Holter monitor      FU by phone      Charna Riedel, MD

## 2021-05-03 ENCOUNTER — HOSPITAL ENCOUNTER (OUTPATIENT)
Dept: NON INVASIVE DIAGNOSTICS | Facility: CLINIC | Age: 36
Discharge: HOME | End: 2021-05-03
Payer: COMMERCIAL

## 2021-05-03 DIAGNOSIS — R00.2 PALPITATIONS: ICD-10-CM

## 2021-05-03 DIAGNOSIS — R07.9 CHEST PAIN, UNSPECIFIED TYPE: ICD-10-CM

## 2021-05-03 DIAGNOSIS — R00.1 SINUS BRADYCARDIA: ICD-10-CM

## 2021-05-03 PROCEDURE — 93226 XTRNL ECG REC<48 HR SCAN A/R: CPT

## 2021-05-03 PROCEDURE — 93017 CV STRESS TEST TRACING ONLY: CPT

## 2021-05-03 PROCEDURE — 93018 CV STRESS TEST I&R ONLY: CPT | Performed by: INTERNAL MEDICINE

## 2021-05-03 PROCEDURE — 93016 CV STRESS TEST SUPVJ ONLY: CPT | Performed by: INTERNAL MEDICINE

## 2021-05-03 PROCEDURE — 93225 XTRNL ECG REC<48 HRS REC: CPT

## 2021-05-04 LAB
CHEST PAIN STATEMENT: NORMAL
MAX DIASTOLIC BP: 88 MMHG
MAX HEART RATE: 164 BPM
MAX PREDICTED HEART RATE: 186 BPM
MAX. SYSTOLIC BP: 138 MMHG
PROTOCOL NAME: NORMAL
REASON FOR TERMINATION: NORMAL
TARGET HR FORMULA: NORMAL
TEST INDICATION: NORMAL
TIME IN EXERCISE PHASE: NORMAL

## 2021-05-07 PROCEDURE — 93227 XTRNL ECG REC<48 HR R&I: CPT | Performed by: INTERNAL MEDICINE

## 2021-05-12 ENCOUNTER — TELEPHONE (OUTPATIENT)
Dept: CARDIOLOGY CLINIC | Facility: CLINIC | Age: 36
End: 2021-05-12

## 2021-05-12 NOTE — TELEPHONE ENCOUNTER
Pt called to leave a message for Dr Aries Cole that she is goona have labs done that he requested  She wanted him to know that she has  an ache/tenderness/pain  in the same area which he examined  Please advise  Thanks

## 2021-05-13 ENCOUNTER — APPOINTMENT (OUTPATIENT)
Dept: LAB | Facility: HOSPITAL | Age: 36
End: 2021-05-13
Attending: INTERNAL MEDICINE
Payer: COMMERCIAL

## 2021-05-13 ENCOUNTER — TELEPHONE (OUTPATIENT)
Dept: FAMILY MEDICINE CLINIC | Facility: CLINIC | Age: 36
End: 2021-05-13

## 2021-05-13 DIAGNOSIS — R00.2 PALPITATIONS: Primary | ICD-10-CM

## 2021-05-13 DIAGNOSIS — R07.9 CHEST PAIN, UNSPECIFIED TYPE: ICD-10-CM

## 2021-05-13 DIAGNOSIS — R00.2 PALPITATIONS: ICD-10-CM

## 2021-05-13 LAB
ANION GAP SERPL CALCULATED.3IONS-SCNC: 7 MMOL/L (ref 4–13)
BUN SERPL-MCNC: 16 MG/DL (ref 5–25)
CALCIUM SERPL-MCNC: 9 MG/DL (ref 8.3–10.1)
CHLORIDE SERPL-SCNC: 104 MMOL/L (ref 100–108)
CO2 SERPL-SCNC: 28 MMOL/L (ref 21–32)
CREAT SERPL-MCNC: 0.92 MG/DL (ref 0.6–1.3)
CRP SERPL HS-MCNC: 0.91 MG/L
ERYTHROCYTE [SEDIMENTATION RATE] IN BLOOD: 1 MM/HOUR (ref 0–19)
GFR SERPL CREATININE-BSD FRML MDRD: 81 ML/MIN/1.73SQ M
GLUCOSE SERPL-MCNC: 82 MG/DL (ref 65–140)
POTASSIUM SERPL-SCNC: 4.3 MMOL/L (ref 3.5–5.3)
SODIUM SERPL-SCNC: 139 MMOL/L (ref 136–145)

## 2021-05-13 PROCEDURE — 86141 C-REACTIVE PROTEIN HS: CPT | Performed by: INTERNAL MEDICINE

## 2021-05-13 PROCEDURE — 36415 COLL VENOUS BLD VENIPUNCTURE: CPT

## 2021-05-13 PROCEDURE — 80048 BASIC METABOLIC PNL TOTAL CA: CPT

## 2021-05-13 PROCEDURE — 85652 RBC SED RATE AUTOMATED: CPT

## 2021-05-13 NOTE — TELEPHONE ENCOUNTER
Please contact patient  I have not seen her personally since  2018  She was evaluated by [facility] for video visits for COVID and then referred to Cardiology by Dr Tiffanie Cantor  I think it would be valuable for her to come for an annual physical/ checkup in the office to have all her concerns addressed and will decide on appropriate referrals      Thank you

## 2021-05-13 NOTE — TELEPHONE ENCOUNTER
Called pt    still with chest pain  Kwadwo Corbett left sided under breast  palpable   overall better  Await blood work    non cardiac    doubt pericarditis or pleuritis but will see    will add BMP     might consider CT scan to rule out dissection (VERY unlikely  Kwadwo Corbett )  Kwadwo Corbett will discuss after blood work

## 2021-05-13 NOTE — TELEPHONE ENCOUNTER
Patient called and stated that she has had COVID back in March and has been seeing a Cardiologist but is still having chest pains  The cardiologist has been running tests and he also stated that this could be one of the long term side effects from [address] [place] [address] Street and suggested to call PCP to see about Long term [facility]  She wants to know what she needs to do to be able to make an appointment for them?   Please call to advise

## 2021-05-19 NOTE — TELEPHONE ENCOUNTER
3rd Call  Called pt- N/A- VM is full, unable to leave a message  Task printed and mailed to pt's home

## 2021-05-21 ENCOUNTER — OFFICE VISIT (OUTPATIENT)
Dept: FAMILY MEDICINE CLINIC | Facility: CLINIC | Age: 36
End: 2021-05-21
Payer: COMMERCIAL

## 2021-05-21 ENCOUNTER — TELEPHONE (OUTPATIENT)
Dept: FAMILY MEDICINE CLINIC | Facility: CLINIC | Age: 36
End: 2021-05-21

## 2021-05-21 VITALS
WEIGHT: 201.4 LBS | HEIGHT: 71 IN | OXYGEN SATURATION: 99 % | TEMPERATURE: 97.8 F | RESPIRATION RATE: 20 BRPM | HEART RATE: 76 BPM | BODY MASS INDEX: 28.19 KG/M2 | SYSTOLIC BLOOD PRESSURE: 120 MMHG | DIASTOLIC BLOOD PRESSURE: 90 MMHG

## 2021-05-21 DIAGNOSIS — R07.9 CHEST PAIN, UNSPECIFIED TYPE: Primary | ICD-10-CM

## 2021-05-21 DIAGNOSIS — U09.9 POST-ACUTE SEQUELAE OF COVID-19 (PASC): ICD-10-CM

## 2021-05-21 PROCEDURE — 99213 OFFICE O/P EST LOW 20 MIN: CPT | Performed by: NURSE PRACTITIONER

## 2021-05-21 PROCEDURE — 3008F BODY MASS INDEX DOCD: CPT | Performed by: NURSE PRACTITIONER

## 2021-05-21 PROCEDURE — 1036F TOBACCO NON-USER: CPT | Performed by: NURSE PRACTITIONER

## 2021-05-21 NOTE — PROGRESS NOTES
FAMILY PRACTICE OFFICE VISIT       NAME: Javon Carranza  AGE: 30 y o  SEX: female       : 1985        MRN: [id number]    DATE: 2021  TIME: 10:17 AM    Assessment and Plan     Problem List Items Addressed This Visit     None            There are no Patient Instructions on file for this visit  No diagnosis found  Chief Complaint     Chief Complaint   Patient presents with    Follow-up     Pt is here for post covid chest pain, weight gain       History of Present Illness     Chest pain still present  Tender, left next to sternal region  After COVID 2 episodes of chest pain here and [facility] ED  Denies anxiety  Wakes from sleeping, reading, driving, random  Cardiology, blood work, stress, test, and holter  Pain gone for one week, then returned  Usually works out  Not able to exercise due to chest pain  Weigh last this high when pregnant  Mentally struggling  She is exercigin,   Back wards c around breast      cryign tired of dealing with this  Chest pain started week after out of quarantine  Not short of breath just pain  Sees sports psycologist wants per week  Does not want to do group therapy  Wants to feel better  Unhappy eats  Afraid to work out, chest hurts  Miss triathlon she does every year  Missing out on friends  No edeam left breast under sore  Hands hurt, eating too much gluten  Eats clean usually and drinks water                Review of Systems   Review of Systems    Active Problem List     Patient Active Problem List   Diagnosis    Anxiety    Hashimoto's disease    Herpes simplex infection    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 thyroiditis    Vitamin D deficiency    Anxiety disorder    Anxiety and depression    Irregular menses    History of elective     IUD (intrauterine device) in place    COVID-19    Chest pain    Sinus bradycardia       Past Medical History:  Past Medical History: Diagnosis Date    Abnormal Pap smear of cervix     Anemia     Depression     Dermatitis      2 para 2     Herpes     Hirsutism     last assessed: 14    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 thyroiditis     Mild cervical dysplasia     Pregnancy     last assessed: 3/28/17    Subclinical hypothyroidism     last assessed: 14    Urinary tract infection     Varicella     Vitamin D deficiency        Past Surgical History:  Past Surgical History:   Procedure Laterality Date    COLPOSCOPY VULVA W/ BIOPSY      10/1/07 - MALLORY-1 noted at 5:00 and 12:00 ECC was negative    COMBINED HYSTEROSCOPY DIAGNOSTIC / D&C  10/2014    polyp removal    DILATION AND CURETTAGE OF UTERUS      WISDOM TOOTH EXTRACTION         Family History:  Family History   Problem Relation Age of Onset    Stroke Mother     Depression Mother     Thyroid disease Mother     Depression Maternal Aunt     Diabetes Maternal Aunt     Heart attack Paternal Uncle     Diabetes Other        Social History:  Social History     Socioeconomic History    Marital status: /Civil Union     Spouse name: Not on file    Number of children: 2    Years of education: Not on file    Highest education level: Not on file   Occupational History    Not on file   Social Needs    Financial resource strain: Not on file    Food insecurity     Worry: Not on file     Inability: Not on file   iovation needs     Medical: Not on file     Non-medical: Not on file   Tobacco Use    Smoking status: Never Smoker    Smokeless tobacco: Never Used   Substance and Sexual Activity    Alcohol use: Yes     Comment: social drinker as per allscripts    Drug use: No    Sexual activity: Yes     Partners: Male     Birth control/protection: I U D     Lifestyle    Physical activity     Days per week: Not on file     Minutes per session: Not on file    Stress: Not on file   Relationships    Social connections     Talks on phone: Not on file     Gets together: Not on file     Attends [employer] service: Not on file     Active member of club or organization: Not on file     Attends meetings of clubs or organizations: Not on file     Relationship status: Not on file    Intimate partner violence     Fear of current or ex partner: Not on file     Emotionally abused: Not on file     Physically abused: Not on file     Forced sexual activity: Not on file   Other Topics Concern    Not on file   Social History Narrative    Caffeine use    [employer] affiliation [employer]    Uses seatbelts       I have reviewed the patient's medical history in detail; there are no changes to the history as noted in the electronic medical record  Objective     Vitals:    05/21/21 1007   BP: 120/90   Pulse: 76   Resp: 20   Temp: 97 8 °F (36 6 °C)   TempSrc: Temporal   SpO2: 99%   Weight: 91 4 kg (201 lb 6 4 oz)   Height: 5' 11" (1 803 m)     Wt Readings from Last 3 Encounters:   05/21/21 91 4 kg (201 lb 6 4 oz)   04/16/21 87 6 kg (193 lb 3 2 oz)   04/07/21 88 5 kg (195 lb)     Physical Exam       ALLERGIES:  No Known Allergies    Current Medications     Current Outpatient Medications   Medication Sig Dispense Refill    Cholecalciferol (VITAMIN D3) 50 MCG (2000 UT) capsule Take 1 capsule (2,000 Units total) by mouth daily      levonorgestrel (MIRENA) 20 MCG/24HR IUD 1 each by Intrauterine route once      Synthroid 112 MCG tablet Take 1 tablet (112 mcg total) by mouth daily 90 tablet 0    acetaminophen (TYLENOL) 325 mg tablet Every 4 hours for mild pain (Patient not taking: Reported on 4/7/2021) 30 tablet 0    albuterol (ProAir HFA) 90 mcg/act inhaler Inhale 2 puffs q 6 hrs prn sob (Patient not taking: Reported on 4/7/2021) 1 Inhaler 0    aspirin 81 mg chewable tablet Chew 81 mg daily       No current facility-administered medications for this visit            Health Maintenance     Health Maintenance   Topic Date Due    COVID-19 Vaccine (1) Never done    Annual Physical 05/22/2021    Influenza Vaccine (Season Ended) 09/01/2021    Depression Remission PHQ  03/05/2022    BMI: Followup Plan  03/06/2022    BMI: Adult  04/16/2022    Cervical Cancer Screening  02/09/2023    DTaP,Tdap,and Td Vaccines (3 - Td) 07/11/2027    HIV Screening  Completed    Pneumococcal Vaccine: Pediatrics (0 to 5 Years) and At-Risk Patients (6 to 59 Years)  Aged Out    HIB Vaccine  Aged Out    Hepatitis B Vaccine  Aged Out    IPV Vaccine  Aged Out    Hepatitis A Vaccine  Aged Out    Meningococcal ACWY Vaccine  Aged Out    HPV Vaccine  Aged [employer] History   Administered Date(s) Administered    Influenza Quadrivalent Preservative Free 3 years and older IM 10/28/2015, 01/19/2017    Influenza, injectable, quadrivalent, preservative free 0 5 mL 11/06/2018    Influenza, recombinant, quadrivalent,injectable, preservative free 12/03/2019    Influenza, seasonal, injectable 01/19/2017    Tdap 07/06/2015, 07/11/2017       YOUNG Wade

## 2021-05-21 NOTE — PROGRESS NOTES
Patient Name: Richard Han     : 1985     MRN: [id number]    Assessment/Plan:    Problem List Items Addressed This Visit        Other    Chest pain - Primary    Relevant Orders    Ambulatory referral to Physical Therapy      Other Visit Diagnoses     Post-acute sequelae of COVID-19 ([facility])        Relevant Orders    Ambulatory referral to Physical Therapy        Discussion:     Referrals recommended:  Physical therapy    Other management recommendations:     Fatigue, poor endurance, and functional status:   Encouraged adequate rest, proper hydration/nutrition, and good sleep hygiene  Referral to physical therapy  Psychological & emotional issues:   Patient was assessed for anxiety, depression, and/or PTSD  This 29-year-old female presents today for persistent chest pain that started approximately 1 week after completing isolation for COVID-19 virus in 2021  She has had extensive workup in 2 emergency department visits and cardiology follow-up  She has had unremarkable stress test, Holter monitor, ECG, chest x-ray, troponin, D-dimer, CRP, Sed rate  She is frustrated pain is persisting  Is interfering with her daily activities, especially getting back to her normal intensity of workouts  She was crying in office today due to frustration  She denies anxiety or depression, just wants to feel better  Declines COVID-19 group therapy  She is agreeable to attend physical therapy  She will call for any persisting pain  I spent 23 minutes directly with the patient during this visit     Subjective:    COVID-19 Infection:  Date of symptom onset: 3/3/2021  Date of positive test: 3/4/2021    Initial symptoms with acute illness:  Initial symptoms included: cough, rhinorrhea, shortness of breath, chest tightness (heaviness), nasal congestion, headache, fatigue and malaise   Patient denied: fever, chills, sore throat, diarrhea, muscle aches and loss of smell    New or persistent symptoms:  Patient complains of: poor endurance and chest discomfort  Patient denies: fatigue, weakness, shortness of breath, cough, altered taste, altered smell, anxiety, depression, PTSD, poor concentration, memory problems, joint pain, headache, dry eyes, dry mouth, difficulty swallowing, rhinitis, appetite change, dizziness, muscle aches, insomnia, alopecia, sweating, diarrhea and nausea  Patient rates severity of current symptoms as moderate  Inpatient treatment:      Was patient hospitalized?: No      Outpatient treatment:      Did patient receive monoclonal antibody therapy?: No    - Other therapies patient received: albuterol inhaler  Anatoliy Contreras  is a 61-year-old female presenting today for persistent chest pain after COVID-19 virus infection  She had COVID-19 virus in early March 2021  Approximately 1 weeks after completing isolation, she developed chest pain  She had 2 emergency room visits for chest pain  One locally, and 1 when she was on vacation in [place]  She has had extensive workup and has been evaluated by Cardiology  All testing has been negative including stress test, Holter monitor, ECG, D-dimer, troponin  She continues to experience intermittent, random left chest pain  She does have tenderness to palpation of left chest wall  Pain occurs at any time  She wakes up from sleep with pain, may occur with reading, driving  She likes to do intense workouts, but is afraid to return to her normal intensity of workout, due to chest pain  Because of this has gained weight  Also notes she eats more when she is upset  She is upset that this pain is persisting and interfering with  Daily activities  She traditionally completes a triathlon every spring with her friends, is frustrated she is not shape enough this year to participate  Denies anxiety  Not short of breath just pain  Has counselor she sees once weekly      Review of Systems   Constitutional: Negative for appetite change and fatigue  HENT: Negative for rhinorrhea and trouble swallowing  Respiratory: Negative for cough and shortness of breath  Gastrointestinal: Negative for diarrhea and nausea  Musculoskeletal: Negative for arthralgias and myalgias  Neurological: Negative for dizziness, weakness and headaches  Psychiatric/Behavioral: Negative for decreased concentration, depression and sleep disturbance  The patient is not nervous/anxious  Patient Active Problem List   Diagnosis    Anxiety    Hashimoto's disease    Herpes simplex infection    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 thyroiditis    Vitamin D deficiency    Anxiety disorder    Anxiety and depression    Irregular menses    History of elective     IUD (intrauterine device) in place    COVID-19    Chest pain    Sinus bradycardia     Social History     Tobacco Use    Smoking status: Never Smoker    Smokeless tobacco: Never Used   Substance Use Topics    Alcohol use: Yes     Comment: social drinker as per allscripts    Drug use: No      Objective:  /90   Pulse 76   Temp 97 8 °F (36 6 °C) (Temporal)   Resp 20   Ht 5' 11" (1 803 m)   Wt 91 4 kg (201 lb 6 4 oz)   SpO2 99%   BMI 28 09 kg/m²      Physical Exam  Vitals signs and nursing note reviewed  Constitutional:       General: She is not in acute distress  Appearance: Normal appearance  She is not ill-appearing, toxic-appearing or diaphoretic  HENT:      Head: Normocephalic and atraumatic  Cardiovascular:      Rate and Rhythm: Normal rate and regular rhythm  Heart sounds: No murmur  Pulmonary:      Effort: Pulmonary effort is normal  No respiratory distress  Breath sounds: Normal breath sounds  No wheezing or rales  Chest:      Comments: Tenderness to palpation 2nd, 3rd, 4th intercostal space, along left sternal border, and underneath left breast    Musculoskeletal:      Right lower leg: No edema  Left lower leg: No edema  Skin:     General: Skin is warm and dry  Findings: No rash  Neurological:      Mental Status: She is alert  Gait: Gait normal    Psychiatric:         Mood and Affect: Affect is tearful (crying)           Lore Vasquez

## 2021-05-25 ENCOUNTER — TELEPHONE (OUTPATIENT)
Dept: FAMILY MEDICINE CLINIC | Facility: CLINIC | Age: 36
End: 2021-05-25

## 2021-05-25 NOTE — TELEPHONE ENCOUNTER
Patient called and stated that she was seen on Friday  And now she is having sinus pressure, forehead hurts and feels like she now has a sinus infection  She has a cough and congestion in her chest   She is going away today and would like to know if she can have something sent to the pharmacy or if she needs an appointment    Please call to advise

## 2021-07-16 ENCOUNTER — TELEPHONE (OUTPATIENT)
Dept: CARDIOLOGY CLINIC | Facility: CLINIC | Age: 36
End: 2021-07-16

## 2021-07-16 DIAGNOSIS — E03.8 HYPOTHYROIDISM DUE TO HASHIMOTO'S THYROIDITIS: ICD-10-CM

## 2021-07-16 DIAGNOSIS — E06.3 HYPOTHYROIDISM DUE TO HASHIMOTO'S THYROIDITIS: ICD-10-CM

## 2021-07-16 RX ORDER — LEVOTHYROXINE SODIUM 112 MCG
112 TABLET ORAL DAILY
Qty: 90 TABLET | Refills: 1 | Status: SHIPPED | OUTPATIENT
Start: 2021-07-16 | End: 2022-01-16

## 2021-07-16 NOTE — TELEPHONE ENCOUNTER
Pt calling with update LM, pt still having symptoms but is improving  Pt states today is worst day in a long time but is still ok  Will follow up with PCP, they did bring up covid clinic and oxygen therapy to pt and she is awaiting phone call back from PCP office  Pt was asking about possible CT scan that was discussed  If you have any questions or concerns please call pt

## 2021-07-17 NOTE — TELEPHONE ENCOUNTER
Will contact her    doubt this is an aortic issue since it has been going for months but hopefully will be able to get more hx if she picks up and decide on CT scan or not

## 2021-07-27 ENCOUNTER — IMMUNIZATIONS (OUTPATIENT)
Dept: FAMILY MEDICINE CLINIC | Facility: HOSPITAL | Age: 36
End: 2021-07-27

## 2021-07-27 DIAGNOSIS — Z23 ENCOUNTER FOR IMMUNIZATION: Primary | ICD-10-CM

## 2021-07-27 PROCEDURE — 0001A SARS-COV-2 / COVID-19 MRNA VACCINE (PFIZER-BIONTECH) 30 MCG: CPT

## 2021-07-27 PROCEDURE — 91300 SARS-COV-2 / COVID-19 MRNA VACCINE (PFIZER-BIONTECH) 30 MCG: CPT

## 2021-07-29 ENCOUNTER — ANNUAL EXAM (OUTPATIENT)
Dept: OBGYN CLINIC | Facility: CLINIC | Age: 36
End: 2021-07-29
Payer: COMMERCIAL

## 2021-07-29 VITALS
WEIGHT: 192 LBS | BODY MASS INDEX: 26.01 KG/M2 | HEIGHT: 72 IN | DIASTOLIC BLOOD PRESSURE: 80 MMHG | SYSTOLIC BLOOD PRESSURE: 116 MMHG

## 2021-07-29 DIAGNOSIS — Z01.419 WOMEN'S ANNUAL ROUTINE GYNECOLOGICAL EXAMINATION: Primary | ICD-10-CM

## 2021-07-29 DIAGNOSIS — Z97.5 IUD (INTRAUTERINE DEVICE) IN PLACE: ICD-10-CM

## 2021-07-29 PROCEDURE — G0145 SCR C/V CYTO,THINLAYER,RESCR: HCPCS | Performed by: OBSTETRICS & GYNECOLOGY

## 2021-07-29 PROCEDURE — 3008F BODY MASS INDEX DOCD: CPT | Performed by: OBSTETRICS & GYNECOLOGY

## 2021-07-29 PROCEDURE — G0476 HPV COMBO ASSAY CA SCREEN: HCPCS | Performed by: OBSTETRICS & GYNECOLOGY

## 2021-07-29 PROCEDURE — 99395 PREV VISIT EST AGE 18-39: CPT | Performed by: OBSTETRICS & GYNECOLOGY

## 2021-07-29 PROCEDURE — 1036F TOBACCO NON-USER: CPT | Performed by: OBSTETRICS & GYNECOLOGY

## 2021-07-29 NOTE — PATIENT INSTRUCTIONS
Intrauterine Device   WHAT YOU NEED TO KNOW:   What is an intrauterine device (IUD)? An IUD is a type of birth control that is inserted into your uterus  It is a small, flexible piece of plastic with a string on the end  It is inserted and removed by your healthcare provider  IUDs prevent sperm from reaching or fertilizing an egg  IUDs also prevent a fertilized egg from attaching to the uterus and developing into a fetus  What are the most common types of IUDs? Your healthcare provider will recommend the type of IUD that is right for you  This is based on your age and if you have had a child  If you have not had a child, a smaller IUD will be used  · A copper IUD  slowly releases a small amount of copper into your uterus  This IUD can remain in place for up to 10 years  · A hormone-releasing IUD  slowly releases a small amount of progesterone into your uterus  Progesterone is a hormone that is made by your body to help control your periods  This IUD can remain in place for 3 to 5 years  What are the advantages of an IUD? · An IUD is 98% to 99% effective in preventing pregnancy  · The IUD can be removed by your healthcare provider if you decide to have a baby  You may be able to get pregnant as soon as the IUD is removed  · An IUD protects you from pregnancy right after it is inserted  · You do not have to stop sexual activity to insert it  You do not have to remember to take your birth control pill  · Copper IUDs are safer for some women than oral birth control pills  Examples include women who smoke or have a history of blood clots  · Hormone-releasing IUDs may decrease certain health problems  Examples include bleeding and cramping that happen with your monthly period  What are the disadvantages of an IUD? · There is a small chance that you could get pregnant  Sometimes the IUD cannot be removed after you get pregnant   This increases your risk of a miscarriage or an ectopic pregnancy  Ectopic pregnancy is when the fertilized egg starts to grow somewhere other than your uterus  · An IUD does not protect you from sexually transmitted infections  · You may have cramps during the first weeks after you get the IUD  · A copper IUD may cause your monthly period to be heavier or more painful  This is more common within the first 3 months after you get the IUD  You may need to have your IUD removed if your bleeding or pain becomes severe  You may have spotting between periods  · There is a small risk of an infection within the first 20 days after the IUD is placed  Infection can lead to pelvic inflammatory disease  This can cause infertility  · Your uterus may tear when the IUD is inserted  The IUD may slip part or all of the way out of your uterus  How is the IUD inserted? · The IUD is usually inserted during your monthly period  This may help decrease the amount of discomfort you have during the procedure  It also helps make sure that you are not pregnant  You will be asked to lie down and place your feet in stirrups  Your healthcare provider will gently insert a speculum into your vagina  This is the same tool used during a Pap smear  The speculum allows your healthcare provider to see inside your vagina to your cervix  The cervix is the opening of your uterus  · Your healthcare provider will clean your cervix with an antiseptic solution to prevent infection  You may be given numbing medicine  A long plastic tube is gently passed through your cervix and into your uterus  This tube has the IUD inside of it  The IUD is pushed out of the tube and into your uterus  You may have cramps as the IUD is inserted  The tube is removed after the IUD is in place  How can I make sure my IUD is still in place? An IUD has a string made of plastic thread  One to 2 inches of this string hangs into your vagina   You cannot see this string, and it should not cause problems when you have sex  Check your IUD string every 3 days for the first 3 months after it is inserted  After that, check the string after each monthly period  Do the following to check the placement of your IUD:  · Wash your hands with soap and warm water  Dry them with a clean towel  · Bend your knees and squat low to the ground  · Gently put your index finger inside your vagina  The cervix is at the top of the vagina and feels like the tip of your nose  Feel for the IUD string  Do not pull on the string  You should not be able to feel the firm plastic of the IUD itself  · Wash your hands after you check your IUD string  Where can I find more information? · [facility] [address] , [address] [place]  Phone: 7- [phone] - [phone]  Web Address: https://[employer]    When should I seek immediate care? · You have severe pain or bleeding during your period  · You have a fever and severe abdominal pain  When should I call my doctor? · You think you are pregnant  · The IUD has come out  · You have bleeding from your vagina after you have sex, and it is not your period  · You have pain during sex  · You cannot feel the IUD string, the string feels longer, or you feel the plastic of the IUD itself  · You have vaginal discharge that is green, yellow, or has a foul odor  · You have questions or concerns about your condition or care  CARE AGREEMENT:   You have the right to help plan your care  Learn about your health condition and how it may be treated  Discuss treatment options with your healthcare providers to decide what care you want to receive  You always have the right to refuse treatment  The above information is an  only  It is not intended as medical advice for individual conditions or treatments  Talk to your doctor, nurse or pharmacist before following any medical regimen to see if it is safe and effective for you    © [address] [place] [address] 2021 Information is for End User's use only and may not be sold, redistributed or otherwise used for commercial purposes   All illustrations and images included in CareNotes® are the copyrighted property of A D A M , Inc  or [address] [place], [place] [address]joseph david
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
## 2021-07-30 LAB
HPV HR 12 DNA CVX QL NAA+PROBE: NEGATIVE
HPV16 DNA CVX QL NAA+PROBE: NEGATIVE
HPV18 DNA CVX QL NAA+PROBE: NEGATIVE

## 2021-08-03 LAB
LAB AP GYN PRIMARY INTERPRETATION: NORMAL
Lab: NORMAL

## 2021-08-17 ENCOUNTER — IMMUNIZATIONS (OUTPATIENT)
Dept: FAMILY MEDICINE CLINIC | Facility: HOSPITAL | Age: 36
End: 2021-08-17

## 2021-08-17 DIAGNOSIS — Z23 ENCOUNTER FOR IMMUNIZATION: Primary | ICD-10-CM

## 2021-08-17 PROCEDURE — 0002A SARS-COV-2 / COVID-19 MRNA VACCINE (PFIZER-BIONTECH) 30 MCG: CPT

## 2021-08-17 PROCEDURE — 91300 SARS-COV-2 / COVID-19 MRNA VACCINE (PFIZER-BIONTECH) 30 MCG: CPT

## 2021-09-24 ENCOUNTER — OFFICE VISIT (OUTPATIENT)
Dept: FAMILY MEDICINE CLINIC | Facility: CLINIC | Age: 36
End: 2021-09-24
Payer: COMMERCIAL

## 2021-09-24 VITALS
WEIGHT: 196.8 LBS | DIASTOLIC BLOOD PRESSURE: 60 MMHG | OXYGEN SATURATION: 98 % | BODY MASS INDEX: 26.66 KG/M2 | HEIGHT: 72 IN | RESPIRATION RATE: 18 BRPM | TEMPERATURE: 97.8 F | HEART RATE: 57 BPM | SYSTOLIC BLOOD PRESSURE: 100 MMHG

## 2021-09-24 DIAGNOSIS — M54.50 ACUTE BILATERAL LOW BACK PAIN WITHOUT SCIATICA: ICD-10-CM

## 2021-09-24 DIAGNOSIS — R07.89 ATYPICAL CHEST PAIN: Primary | ICD-10-CM

## 2021-09-24 DIAGNOSIS — K13.79 MOUTH SORE: ICD-10-CM

## 2021-09-24 PROCEDURE — 99213 OFFICE O/P EST LOW 20 MIN: CPT | Performed by: NURSE PRACTITIONER

## 2021-10-06 ENCOUNTER — EVALUATION (OUTPATIENT)
Dept: PHYSICAL THERAPY | Facility: CLINIC | Age: 36
End: 2021-10-06
Payer: COMMERCIAL

## 2021-10-06 DIAGNOSIS — R07.89 ATYPICAL CHEST PAIN: ICD-10-CM

## 2021-10-06 DIAGNOSIS — M54.50 ACUTE BILATERAL LOW BACK PAIN WITHOUT SCIATICA: ICD-10-CM

## 2021-10-06 PROCEDURE — 97162 PT EVAL MOD COMPLEX 30 MIN: CPT | Performed by: PHYSICAL THERAPIST

## 2021-10-06 PROCEDURE — 97112 NEUROMUSCULAR REEDUCATION: CPT | Performed by: PHYSICAL THERAPIST

## 2021-10-08 ENCOUNTER — TELEPHONE (OUTPATIENT)
Dept: ENDOCRINOLOGY | Facility: CLINIC | Age: 36
End: 2021-10-08

## 2021-10-08 ENCOUNTER — OFFICE VISIT (OUTPATIENT)
Dept: PHYSICAL THERAPY | Facility: CLINIC | Age: 36
End: 2021-10-08
Payer: COMMERCIAL

## 2021-10-08 DIAGNOSIS — M54.50 ACUTE BILATERAL LOW BACK PAIN WITHOUT SCIATICA: Primary | ICD-10-CM

## 2021-10-08 DIAGNOSIS — R07.89 ATYPICAL CHEST PAIN: ICD-10-CM

## 2021-10-08 PROCEDURE — 97530 THERAPEUTIC ACTIVITIES: CPT | Performed by: PHYSICAL THERAPIST

## 2021-10-08 PROCEDURE — 97140 MANUAL THERAPY 1/> REGIONS: CPT | Performed by: PHYSICAL THERAPIST

## 2021-10-08 PROCEDURE — 97110 THERAPEUTIC EXERCISES: CPT | Performed by: PHYSICAL THERAPIST

## 2021-10-11 ENCOUNTER — OFFICE VISIT (OUTPATIENT)
Dept: PHYSICAL THERAPY | Facility: CLINIC | Age: 36
End: 2021-10-11
Payer: COMMERCIAL

## 2021-10-11 ENCOUNTER — LAB (OUTPATIENT)
Dept: LAB | Facility: HOSPITAL | Age: 36
End: 2021-10-11
Payer: COMMERCIAL

## 2021-10-11 DIAGNOSIS — E55.9 VITAMIN D DEFICIENCY: ICD-10-CM

## 2021-10-11 DIAGNOSIS — E06.3 HYPOTHYROIDISM DUE TO HASHIMOTO'S THYROIDITIS: ICD-10-CM

## 2021-10-11 DIAGNOSIS — M54.50 ACUTE BILATERAL LOW BACK PAIN WITHOUT SCIATICA: Primary | ICD-10-CM

## 2021-10-11 DIAGNOSIS — E03.8 HYPOTHYROIDISM DUE TO HASHIMOTO'S THYROIDITIS: ICD-10-CM

## 2021-10-11 DIAGNOSIS — R07.89 ATYPICAL CHEST PAIN: ICD-10-CM

## 2021-10-11 LAB
25(OH)D3 SERPL-MCNC: 37.8 NG/ML (ref 30–100)
T4 FREE SERPL-MCNC: 0.95 NG/DL (ref 0.76–1.46)
TSH SERPL DL<=0.05 MIU/L-ACNC: 1.7 UIU/ML (ref 0.36–3.74)

## 2021-10-11 PROCEDURE — 97112 NEUROMUSCULAR REEDUCATION: CPT | Performed by: PHYSICAL THERAPIST

## 2021-10-11 PROCEDURE — 84439 ASSAY OF FREE THYROXINE: CPT

## 2021-10-11 PROCEDURE — 36415 COLL VENOUS BLD VENIPUNCTURE: CPT

## 2021-10-11 PROCEDURE — 82306 VITAMIN D 25 HYDROXY: CPT

## 2021-10-11 PROCEDURE — 97140 MANUAL THERAPY 1/> REGIONS: CPT | Performed by: PHYSICAL THERAPIST

## 2021-10-11 PROCEDURE — 84443 ASSAY THYROID STIM HORMONE: CPT

## 2021-10-11 PROCEDURE — 97530 THERAPEUTIC ACTIVITIES: CPT | Performed by: PHYSICAL THERAPIST

## 2021-10-13 ENCOUNTER — OFFICE VISIT (OUTPATIENT)
Dept: ENDOCRINOLOGY | Facility: CLINIC | Age: 36
End: 2021-10-13
Payer: COMMERCIAL

## 2021-10-13 VITALS
HEART RATE: 86 BPM | WEIGHT: 200 LBS | TEMPERATURE: 97.7 F | SYSTOLIC BLOOD PRESSURE: 118 MMHG | BODY MASS INDEX: 27.12 KG/M2 | DIASTOLIC BLOOD PRESSURE: 72 MMHG

## 2021-10-13 DIAGNOSIS — E06.3 HYPOTHYROIDISM DUE TO HASHIMOTO'S THYROIDITIS: Primary | ICD-10-CM

## 2021-10-13 DIAGNOSIS — F41.9 ANXIETY: ICD-10-CM

## 2021-10-13 DIAGNOSIS — E55.9 VITAMIN D DEFICIENCY: ICD-10-CM

## 2021-10-13 DIAGNOSIS — E03.8 HYPOTHYROIDISM DUE TO HASHIMOTO'S THYROIDITIS: Primary | ICD-10-CM

## 2021-10-13 PROCEDURE — 99214 OFFICE O/P EST MOD 30 MIN: CPT | Performed by: INTERNAL MEDICINE

## 2021-10-14 ENCOUNTER — OFFICE VISIT (OUTPATIENT)
Dept: PHYSICAL THERAPY | Facility: CLINIC | Age: 36
End: 2021-10-14
Payer: COMMERCIAL

## 2021-10-14 DIAGNOSIS — R07.89 ATYPICAL CHEST PAIN: ICD-10-CM

## 2021-10-14 DIAGNOSIS — M54.50 ACUTE BILATERAL LOW BACK PAIN WITHOUT SCIATICA: Primary | ICD-10-CM

## 2021-10-14 PROCEDURE — 97140 MANUAL THERAPY 1/> REGIONS: CPT | Performed by: PHYSICAL THERAPIST

## 2021-10-14 PROCEDURE — 97112 NEUROMUSCULAR REEDUCATION: CPT | Performed by: PHYSICAL THERAPIST

## 2021-10-19 ENCOUNTER — OFFICE VISIT (OUTPATIENT)
Dept: PHYSICAL THERAPY | Facility: CLINIC | Age: 36
End: 2021-10-19
Payer: COMMERCIAL

## 2021-10-19 DIAGNOSIS — M54.50 ACUTE BILATERAL LOW BACK PAIN WITHOUT SCIATICA: Primary | ICD-10-CM

## 2021-10-19 PROCEDURE — 97112 NEUROMUSCULAR REEDUCATION: CPT | Performed by: PHYSICAL THERAPIST

## 2021-10-19 PROCEDURE — 97140 MANUAL THERAPY 1/> REGIONS: CPT | Performed by: PHYSICAL THERAPIST

## 2021-10-21 ENCOUNTER — OFFICE VISIT (OUTPATIENT)
Dept: PHYSICAL THERAPY | Facility: CLINIC | Age: 36
End: 2021-10-21
Payer: COMMERCIAL

## 2021-10-21 ENCOUNTER — OFFICE VISIT (OUTPATIENT)
Dept: FAMILY MEDICINE CLINIC | Facility: CLINIC | Age: 36
End: 2021-10-21
Payer: COMMERCIAL

## 2021-10-21 VITALS
OXYGEN SATURATION: 98 % | HEART RATE: 51 BPM | BODY MASS INDEX: 28.04 KG/M2 | DIASTOLIC BLOOD PRESSURE: 60 MMHG | WEIGHT: 207 LBS | HEIGHT: 72 IN | SYSTOLIC BLOOD PRESSURE: 100 MMHG | TEMPERATURE: 97.8 F | RESPIRATION RATE: 18 BRPM

## 2021-10-21 DIAGNOSIS — N64.4 BREAST PAIN: ICD-10-CM

## 2021-10-21 DIAGNOSIS — M54.50 ACUTE BILATERAL LOW BACK PAIN WITHOUT SCIATICA: Primary | ICD-10-CM

## 2021-10-21 DIAGNOSIS — Z86.16 PERSONAL HISTORY OF COVID-19: ICD-10-CM

## 2021-10-21 DIAGNOSIS — R63.5 WEIGHT GAIN: ICD-10-CM

## 2021-10-21 DIAGNOSIS — G89.29 CHRONIC RIGHT-SIDED LOW BACK PAIN WITHOUT SCIATICA: ICD-10-CM

## 2021-10-21 DIAGNOSIS — E06.3 HYPOTHYROIDISM DUE TO HASHIMOTO'S THYROIDITIS: ICD-10-CM

## 2021-10-21 DIAGNOSIS — Z00.00 ENCOUNTER FOR WELLNESS EXAMINATION IN ADULT: Primary | ICD-10-CM

## 2021-10-21 DIAGNOSIS — E03.8 HYPOTHYROIDISM DUE TO HASHIMOTO'S THYROIDITIS: ICD-10-CM

## 2021-10-21 DIAGNOSIS — R07.89 ATYPICAL CHEST PAIN: ICD-10-CM

## 2021-10-21 DIAGNOSIS — R07.9 CHEST PAIN, UNSPECIFIED TYPE: ICD-10-CM

## 2021-10-21 DIAGNOSIS — M54.50 CHRONIC RIGHT-SIDED LOW BACK PAIN WITHOUT SCIATICA: ICD-10-CM

## 2021-10-21 DIAGNOSIS — M94.0 COSTOCHONDRITIS: ICD-10-CM

## 2021-10-21 DIAGNOSIS — F41.9 ANXIETY DISORDER, UNSPECIFIED TYPE: ICD-10-CM

## 2021-10-21 PROCEDURE — 97112 NEUROMUSCULAR REEDUCATION: CPT | Performed by: PHYSICAL THERAPIST

## 2021-10-21 PROCEDURE — 1036F TOBACCO NON-USER: CPT | Performed by: FAMILY MEDICINE

## 2021-10-21 PROCEDURE — 3008F BODY MASS INDEX DOCD: CPT | Performed by: FAMILY MEDICINE

## 2021-10-21 PROCEDURE — 97140 MANUAL THERAPY 1/> REGIONS: CPT | Performed by: PHYSICAL THERAPIST

## 2021-10-21 PROCEDURE — 99395 PREV VISIT EST AGE 18-39: CPT | Performed by: FAMILY MEDICINE

## 2021-10-21 RX ORDER — MELOXICAM 15 MG/1
TABLET ORAL
Qty: 30 TABLET | Refills: 1 | Status: SHIPPED | OUTPATIENT
Start: 2021-10-21 | End: 2021-11-19

## 2021-10-25 ENCOUNTER — OFFICE VISIT (OUTPATIENT)
Dept: PHYSICAL THERAPY | Facility: CLINIC | Age: 36
End: 2021-10-25
Payer: COMMERCIAL

## 2021-10-25 DIAGNOSIS — R07.89 ATYPICAL CHEST PAIN: ICD-10-CM

## 2021-10-25 DIAGNOSIS — M54.50 ACUTE BILATERAL LOW BACK PAIN WITHOUT SCIATICA: Primary | ICD-10-CM

## 2021-10-25 PROCEDURE — 97110 THERAPEUTIC EXERCISES: CPT | Performed by: PHYSICAL THERAPIST

## 2021-10-25 PROCEDURE — 97530 THERAPEUTIC ACTIVITIES: CPT | Performed by: PHYSICAL THERAPIST

## 2021-10-25 PROCEDURE — 97140 MANUAL THERAPY 1/> REGIONS: CPT | Performed by: PHYSICAL THERAPIST

## 2021-10-26 ENCOUNTER — HOSPITAL ENCOUNTER (OUTPATIENT)
Dept: ULTRASOUND IMAGING | Facility: CLINIC | Age: 36
Discharge: HOME | End: 2021-10-26
Payer: COMMERCIAL

## 2021-10-26 ENCOUNTER — HOSPITAL ENCOUNTER (OUTPATIENT)
Dept: MAMMOGRAPHY | Facility: CLINIC | Age: 36
Discharge: HOME | End: 2021-10-26
Payer: COMMERCIAL

## 2021-10-26 VITALS — BODY MASS INDEX: 28.04 KG/M2 | HEIGHT: 72 IN | WEIGHT: 207 LBS

## 2021-10-26 DIAGNOSIS — N64.4 BREAST PAIN: ICD-10-CM

## 2021-10-26 PROBLEM — G89.29 CHRONIC RIGHT-SIDED LOW BACK PAIN WITHOUT SCIATICA: Status: ACTIVE | Noted: 2021-10-26

## 2021-10-26 PROBLEM — F41.9 ANXIETY AND DEPRESSION: Status: RESOLVED | Noted: 2018-09-02 | Resolved: 2021-10-26

## 2021-10-26 PROBLEM — F32.A ANXIETY AND DEPRESSION: Status: RESOLVED | Noted: 2018-09-02 | Resolved: 2021-10-26

## 2021-10-26 PROBLEM — M54.50 CHRONIC RIGHT-SIDED LOW BACK PAIN WITHOUT SCIATICA: Status: ACTIVE | Noted: 2021-10-26

## 2021-10-26 PROBLEM — M94.0 COSTOCHONDRITIS: Status: ACTIVE | Noted: 2021-10-26

## 2021-10-26 PROCEDURE — G0279 TOMOSYNTHESIS, MAMMO: HCPCS

## 2021-10-26 PROCEDURE — 77066 DX MAMMO INCL CAD BI: CPT

## 2021-10-26 PROCEDURE — 76642 ULTRASOUND BREAST LIMITED: CPT

## 2021-10-27 ENCOUNTER — OFFICE VISIT (OUTPATIENT)
Dept: PHYSICAL THERAPY | Facility: CLINIC | Age: 36
End: 2021-10-27
Payer: COMMERCIAL

## 2021-10-27 DIAGNOSIS — M54.50 ACUTE BILATERAL LOW BACK PAIN WITHOUT SCIATICA: Primary | ICD-10-CM

## 2021-10-27 PROCEDURE — 97140 MANUAL THERAPY 1/> REGIONS: CPT | Performed by: PHYSICAL THERAPIST

## 2021-10-27 PROCEDURE — 97110 THERAPEUTIC EXERCISES: CPT | Performed by: PHYSICAL THERAPIST

## 2021-10-27 PROCEDURE — 97530 THERAPEUTIC ACTIVITIES: CPT | Performed by: PHYSICAL THERAPIST

## 2021-11-01 ENCOUNTER — APPOINTMENT (OUTPATIENT)
Dept: PHYSICAL THERAPY | Facility: CLINIC | Age: 36
End: 2021-11-01
Payer: COMMERCIAL

## 2021-11-02 ENCOUNTER — OFFICE VISIT (OUTPATIENT)
Dept: PHYSICAL THERAPY | Facility: CLINIC | Age: 36
End: 2021-11-02
Payer: COMMERCIAL

## 2021-11-02 DIAGNOSIS — R07.89 ATYPICAL CHEST PAIN: ICD-10-CM

## 2021-11-02 DIAGNOSIS — M54.50 ACUTE BILATERAL LOW BACK PAIN WITHOUT SCIATICA: Primary | ICD-10-CM

## 2021-11-02 PROCEDURE — 97530 THERAPEUTIC ACTIVITIES: CPT | Performed by: PHYSICAL THERAPIST

## 2021-11-02 PROCEDURE — 97110 THERAPEUTIC EXERCISES: CPT | Performed by: PHYSICAL THERAPIST

## 2021-11-02 PROCEDURE — 97140 MANUAL THERAPY 1/> REGIONS: CPT | Performed by: PHYSICAL THERAPIST

## 2021-11-03 ENCOUNTER — APPOINTMENT (OUTPATIENT)
Dept: PHYSICAL THERAPY | Facility: CLINIC | Age: 36
End: 2021-11-03
Payer: COMMERCIAL

## 2021-11-08 ENCOUNTER — APPOINTMENT (OUTPATIENT)
Dept: PHYSICAL THERAPY | Facility: CLINIC | Age: 36
End: 2021-11-08
Payer: COMMERCIAL

## 2021-11-09 ENCOUNTER — TELEPHONE (OUTPATIENT)
Dept: FAMILY MEDICINE CLINIC | Facility: CLINIC | Age: 36
End: 2021-11-09

## 2021-11-09 DIAGNOSIS — R82.90 ABNORMAL URINE ODOR: Primary | ICD-10-CM

## 2021-11-10 ENCOUNTER — EVALUATION (OUTPATIENT)
Dept: PHYSICAL THERAPY | Facility: CLINIC | Age: 36
End: 2021-11-10
Payer: COMMERCIAL

## 2021-11-10 DIAGNOSIS — M54.50 ACUTE BILATERAL LOW BACK PAIN WITHOUT SCIATICA: Primary | ICD-10-CM

## 2021-11-10 PROCEDURE — 97112 NEUROMUSCULAR REEDUCATION: CPT | Performed by: PHYSICAL THERAPIST

## 2021-11-15 ENCOUNTER — APPOINTMENT (OUTPATIENT)
Dept: LAB | Facility: HOSPITAL | Age: 36
End: 2021-11-15
Payer: COMMERCIAL

## 2021-11-15 ENCOUNTER — HOSPITAL ENCOUNTER (OUTPATIENT)
Dept: RADIOLOGY | Facility: HOSPITAL | Age: 36
Discharge: HOME | End: 2021-11-15
Payer: COMMERCIAL

## 2021-11-15 DIAGNOSIS — M54.50 CHRONIC RIGHT-SIDED LOW BACK PAIN WITHOUT SCIATICA: ICD-10-CM

## 2021-11-15 DIAGNOSIS — G89.29 CHRONIC RIGHT-SIDED LOW BACK PAIN WITHOUT SCIATICA: ICD-10-CM

## 2021-11-15 DIAGNOSIS — E55.9 VITAMIN D DEFICIENCY: ICD-10-CM

## 2021-11-15 DIAGNOSIS — R63.5 WEIGHT GAIN: ICD-10-CM

## 2021-11-15 LAB
BACTERIA UR QL AUTO: NORMAL /HPF
BILIRUB UR QL STRIP: NEGATIVE
CLARITY UR: CLEAR
COLOR UR: YELLOW
GLUCOSE UR STRIP-MCNC: NEGATIVE MG/DL
HGB UR QL STRIP.AUTO: NEGATIVE
HYALINE CASTS #/AREA URNS LPF: NORMAL /LPF
KETONES UR STRIP-MCNC: NEGATIVE MG/DL
LEUKOCYTE ESTERASE UR QL STRIP: NEGATIVE
NITRITE UR QL STRIP: NEGATIVE
NON-SQ EPI CELLS URNS QL MICRO: NORMAL /HPF
PH UR STRIP.AUTO: 7 [PH]
PROT UR STRIP-MCNC: NEGATIVE MG/DL
RBC #/AREA URNS AUTO: NORMAL /HPF
SP GR UR STRIP.AUTO: 1.01 (ref 1–1.03)
UROBILINOGEN UR QL STRIP.AUTO: 0.2 E.U./DL
WBC #/AREA URNS AUTO: NORMAL /HPF

## 2021-11-15 PROCEDURE — 81001 URINALYSIS AUTO W/SCOPE: CPT

## 2021-11-15 PROCEDURE — 72110 X-RAY EXAM L-2 SPINE 4/>VWS: CPT

## 2021-11-15 PROCEDURE — 87086 URINE CULTURE/COLONY COUNT: CPT

## 2021-11-16 ENCOUNTER — TELEPHONE (OUTPATIENT)
Dept: FAMILY MEDICINE CLINIC | Facility: CLINIC | Age: 36
End: 2021-11-16

## 2021-11-16 ENCOUNTER — APPOINTMENT (OUTPATIENT)
Dept: PHYSICAL THERAPY | Facility: CLINIC | Age: 36
End: 2021-11-16
Payer: COMMERCIAL

## 2021-11-16 LAB — BACTERIA UR CULT: NORMAL

## 2021-11-19 ENCOUNTER — OFFICE VISIT (OUTPATIENT)
Dept: FAMILY MEDICINE CLINIC | Facility: CLINIC | Age: 36
End: 2021-11-19
Payer: COMMERCIAL

## 2021-11-19 VITALS
SYSTOLIC BLOOD PRESSURE: 118 MMHG | DIASTOLIC BLOOD PRESSURE: 60 MMHG | RESPIRATION RATE: 16 BRPM | TEMPERATURE: 97.7 F | BODY MASS INDEX: 27.5 KG/M2 | WEIGHT: 203 LBS | HEIGHT: 72 IN | OXYGEN SATURATION: 99 % | HEART RATE: 62 BPM

## 2021-11-19 DIAGNOSIS — M54.50 CHRONIC RIGHT-SIDED LOW BACK PAIN WITHOUT SCIATICA: ICD-10-CM

## 2021-11-19 DIAGNOSIS — Z23 NEED FOR INFLUENZA VACCINATION: ICD-10-CM

## 2021-11-19 DIAGNOSIS — L75.0 ABNORMAL BODY ODOR: ICD-10-CM

## 2021-11-19 DIAGNOSIS — R07.89 OTHER CHEST PAIN: Primary | ICD-10-CM

## 2021-11-19 DIAGNOSIS — G89.29 CHRONIC RIGHT-SIDED LOW BACK PAIN WITHOUT SCIATICA: ICD-10-CM

## 2021-11-19 PROCEDURE — 99213 OFFICE O/P EST LOW 20 MIN: CPT | Performed by: FAMILY MEDICINE

## 2021-11-19 PROCEDURE — 3008F BODY MASS INDEX DOCD: CPT | Performed by: FAMILY MEDICINE

## 2021-11-19 PROCEDURE — 1036F TOBACCO NON-USER: CPT | Performed by: FAMILY MEDICINE

## 2021-11-19 PROCEDURE — 90686 IIV4 VACC NO PRSV 0.5 ML IM: CPT

## 2021-11-19 PROCEDURE — 90471 IMMUNIZATION ADMIN: CPT

## 2021-11-19 RX ORDER — PANTOPRAZOLE SODIUM 40 MG/1
40 TABLET, DELAYED RELEASE ORAL DAILY
Qty: 30 TABLET | Refills: 2 | Status: SHIPPED | OUTPATIENT
Start: 2021-11-19 | End: 2021-12-17

## 2021-11-21 PROBLEM — L75.0 ABNORMAL BODY ODOR: Status: ACTIVE | Noted: 2021-11-21

## 2021-11-21 PROBLEM — R07.89 OTHER CHEST PAIN: Chronic | Status: ACTIVE | Noted: 2021-03-27

## 2021-11-21 PROBLEM — M94.0 COSTOCHONDRITIS: Status: RESOLVED | Noted: 2021-10-26 | Resolved: 2021-11-21

## 2021-11-22 ENCOUNTER — OFFICE VISIT (OUTPATIENT)
Dept: PHYSICAL THERAPY | Facility: CLINIC | Age: 36
End: 2021-11-22
Payer: COMMERCIAL

## 2021-11-22 ENCOUNTER — APPOINTMENT (OUTPATIENT)
Dept: LAB | Facility: CLINIC | Age: 36
End: 2021-11-22
Payer: COMMERCIAL

## 2021-11-22 DIAGNOSIS — M54.50 ACUTE BILATERAL LOW BACK PAIN WITHOUT SCIATICA: Primary | ICD-10-CM

## 2021-11-22 DIAGNOSIS — L75.0 ABNORMAL BODY ODOR: ICD-10-CM

## 2021-11-22 LAB
25(OH)D3 SERPL-MCNC: 25.6 NG/ML (ref 30–100)
ALBUMIN SERPL BCP-MCNC: 4.1 G/DL (ref 3.5–5)
ALP SERPL-CCNC: 43 U/L (ref 46–116)
ALT SERPL W P-5'-P-CCNC: 22 U/L (ref 12–78)
ANION GAP SERPL CALCULATED.3IONS-SCNC: 5 MMOL/L (ref 4–13)
AST SERPL W P-5'-P-CCNC: 17 U/L (ref 5–45)
BASOPHILS # BLD AUTO: 0.03 THOUSANDS/ΜL (ref 0–0.1)
BASOPHILS NFR BLD AUTO: 1 % (ref 0–1)
BILIRUB SERPL-MCNC: 0.97 MG/DL (ref 0.2–1)
BUN SERPL-MCNC: 13 MG/DL (ref 5–25)
CALCIUM SERPL-MCNC: 9 MG/DL (ref 8.3–10.1)
CHLORIDE SERPL-SCNC: 108 MMOL/L (ref 100–108)
CHOLEST SERPL-MCNC: 175 MG/DL
CO2 SERPL-SCNC: 26 MMOL/L (ref 21–32)
CORTIS AM PEAK SERPL-MCNC: 9.2 UG/DL (ref 4.2–22.4)
CREAT SERPL-MCNC: 0.95 MG/DL (ref 0.6–1.3)
EOSINOPHIL # BLD AUTO: 0.04 THOUSAND/ΜL (ref 0–0.61)
EOSINOPHIL NFR BLD AUTO: 1 % (ref 0–6)
ERYTHROCYTE [DISTWIDTH] IN BLOOD BY AUTOMATED COUNT: 12.8 % (ref 11.6–15.1)
EST. AVERAGE GLUCOSE BLD GHB EST-MCNC: 105 MG/DL
GFR SERPL CREATININE-BSD FRML MDRD: 78 ML/MIN/1.73SQ M
GLUCOSE P FAST SERPL-MCNC: 83 MG/DL (ref 65–99)
HBA1C MFR BLD: 5.3 %
HCT VFR BLD AUTO: 40.5 % (ref 34.8–46.1)
HDLC SERPL-MCNC: 63 MG/DL
HGB BLD-MCNC: 13.4 G/DL (ref 11.5–15.4)
IMM GRANULOCYTES # BLD AUTO: 0.01 THOUSAND/UL (ref 0–0.2)
IMM GRANULOCYTES NFR BLD AUTO: 0 % (ref 0–2)
LDLC SERPL CALC-MCNC: 106 MG/DL (ref 0–100)
LYMPHOCYTES # BLD AUTO: 1.69 THOUSANDS/ΜL (ref 0.6–4.47)
LYMPHOCYTES NFR BLD AUTO: 41 % (ref 14–44)
MCH RBC QN AUTO: 30 PG (ref 26.8–34.3)
MCHC RBC AUTO-ENTMCNC: 33.1 G/DL (ref 31.4–37.4)
MCV RBC AUTO: 91 FL (ref 82–98)
MONOCYTES # BLD AUTO: 0.24 THOUSAND/ΜL (ref 0.17–1.22)
MONOCYTES NFR BLD AUTO: 6 % (ref 4–12)
NEUTROPHILS # BLD AUTO: 2.1 THOUSANDS/ΜL (ref 1.85–7.62)
NEUTS SEG NFR BLD AUTO: 51 % (ref 43–75)
NRBC BLD AUTO-RTO: 0 /100 WBCS
PLATELET # BLD AUTO: 239 THOUSANDS/UL (ref 149–390)
PMV BLD AUTO: 10.2 FL (ref 8.9–12.7)
POTASSIUM SERPL-SCNC: 3.9 MMOL/L (ref 3.5–5.3)
PROT SERPL-MCNC: 7.6 G/DL (ref 6.4–8.2)
RBC # BLD AUTO: 4.47 MILLION/UL (ref 3.81–5.12)
SODIUM SERPL-SCNC: 139 MMOL/L (ref 136–145)
TRIGL SERPL-MCNC: 28 MG/DL
VIT B12 SERPL-MCNC: 337 PG/ML (ref 100–900)
WBC # BLD AUTO: 4.11 THOUSAND/UL (ref 4.31–10.16)

## 2021-11-22 PROCEDURE — 82533 TOTAL CORTISOL: CPT

## 2021-11-22 PROCEDURE — 85025 COMPLETE CBC W/AUTO DIFF WBC: CPT

## 2021-11-22 PROCEDURE — 80053 COMPREHEN METABOLIC PANEL: CPT

## 2021-11-22 PROCEDURE — 83036 HEMOGLOBIN GLYCOSYLATED A1C: CPT

## 2021-11-22 PROCEDURE — 97530 THERAPEUTIC ACTIVITIES: CPT | Performed by: PHYSICAL THERAPIST

## 2021-11-22 PROCEDURE — 82306 VITAMIN D 25 HYDROXY: CPT

## 2021-11-22 PROCEDURE — 82607 VITAMIN B-12: CPT

## 2021-11-22 PROCEDURE — 36415 COLL VENOUS BLD VENIPUNCTURE: CPT

## 2021-11-22 PROCEDURE — 80061 LIPID PANEL: CPT

## 2021-11-24 ENCOUNTER — TELEPHONE (OUTPATIENT)
Dept: FAMILY MEDICINE CLINIC | Facility: CLINIC | Age: 36
End: 2021-11-24

## 2021-11-24 ENCOUNTER — TELEPHONE (OUTPATIENT)
Dept: ENDOCRINOLOGY | Facility: CLINIC | Age: 36
End: 2021-11-24

## 2021-11-24 DIAGNOSIS — K21.9 GASTROESOPHAGEAL REFLUX DISEASE, UNSPECIFIED WHETHER ESOPHAGITIS PRESENT: Primary | ICD-10-CM

## 2021-12-07 ENCOUNTER — EVALUATION (OUTPATIENT)
Dept: PHYSICAL THERAPY | Facility: CLINIC | Age: 36
End: 2021-12-07
Payer: COMMERCIAL

## 2021-12-07 DIAGNOSIS — M54.50 ACUTE BILATERAL LOW BACK PAIN WITHOUT SCIATICA: Primary | ICD-10-CM

## 2021-12-07 PROCEDURE — 97112 NEUROMUSCULAR REEDUCATION: CPT | Performed by: PHYSICAL THERAPIST

## 2021-12-16 ENCOUNTER — CONSULT (OUTPATIENT)
Dept: GASTROENTEROLOGY | Facility: CLINIC | Age: 36
End: 2021-12-16
Payer: COMMERCIAL

## 2021-12-16 VITALS
BODY MASS INDEX: 27.55 KG/M2 | TEMPERATURE: 97.8 F | WEIGHT: 203.4 LBS | SYSTOLIC BLOOD PRESSURE: 106 MMHG | DIASTOLIC BLOOD PRESSURE: 66 MMHG | HEIGHT: 72 IN

## 2021-12-16 DIAGNOSIS — K21.9 GASTROESOPHAGEAL REFLUX DISEASE, UNSPECIFIED WHETHER ESOPHAGITIS PRESENT: ICD-10-CM

## 2021-12-16 DIAGNOSIS — R10.12 ABDOMINAL WALL PAIN IN LEFT UPPER QUADRANT: Primary | ICD-10-CM

## 2021-12-16 PROCEDURE — 99244 OFF/OP CNSLTJ NEW/EST MOD 40: CPT | Performed by: INTERNAL MEDICINE

## 2021-12-16 RX ORDER — KETOCONAZOLE 20 MG/ML
SHAMPOO TOPICAL
COMMUNITY
Start: 2021-11-15

## 2021-12-16 RX ORDER — AMMONIUM LACTATE 12 G/100G
CREAM TOPICAL
COMMUNITY
Start: 2021-11-15

## 2021-12-17 ENCOUNTER — OFFICE VISIT (OUTPATIENT)
Dept: FAMILY MEDICINE CLINIC | Facility: CLINIC | Age: 36
End: 2021-12-17
Payer: COMMERCIAL

## 2021-12-17 VITALS
WEIGHT: 206 LBS | TEMPERATURE: 97.6 F | BODY MASS INDEX: 27.9 KG/M2 | SYSTOLIC BLOOD PRESSURE: 100 MMHG | HEART RATE: 58 BPM | HEIGHT: 72 IN | DIASTOLIC BLOOD PRESSURE: 60 MMHG | RESPIRATION RATE: 16 BRPM | OXYGEN SATURATION: 97 %

## 2021-12-17 DIAGNOSIS — R07.89 OTHER CHEST PAIN: Chronic | ICD-10-CM

## 2021-12-17 DIAGNOSIS — J06.9 UPPER RESPIRATORY TRACT INFECTION, UNSPECIFIED TYPE: Primary | ICD-10-CM

## 2021-12-17 PROCEDURE — 3725F SCREEN DEPRESSION PERFORMED: CPT | Performed by: FAMILY MEDICINE

## 2021-12-17 PROCEDURE — 99213 OFFICE O/P EST LOW 20 MIN: CPT | Performed by: FAMILY MEDICINE

## 2021-12-17 PROCEDURE — U0005 INFEC AGEN DETEC AMPLI PROBE: HCPCS | Performed by: FAMILY MEDICINE

## 2021-12-17 PROCEDURE — 1036F TOBACCO NON-USER: CPT | Performed by: FAMILY MEDICINE

## 2021-12-17 PROCEDURE — U0003 INFECTIOUS AGENT DETECTION BY NUCLEIC ACID (DNA OR RNA); SEVERE ACUTE RESPIRATORY SYNDROME CORONAVIRUS 2 (SARS-COV-2) (CORONAVIRUS DISEASE [COVID-19]), AMPLIFIED PROBE TECHNIQUE, MAKING USE OF HIGH THROUGHPUT TECHNOLOGIES AS DESCRIBED BY CMS-2020-01-R: HCPCS | Performed by: FAMILY MEDICINE

## 2021-12-17 PROCEDURE — 3008F BODY MASS INDEX DOCD: CPT | Performed by: FAMILY MEDICINE

## 2021-12-17 RX ORDER — AZITHROMYCIN 250 MG/1
TABLET, FILM COATED ORAL
Qty: 6 TABLET | Refills: 0 | Status: SHIPPED | OUTPATIENT
Start: 2021-12-17 | End: 2021-12-21

## 2021-12-17 RX ORDER — ALBUTEROL SULFATE 90 UG/1
2 AEROSOL, METERED RESPIRATORY (INHALATION) EVERY 4 HOURS PRN
Qty: 18 G | Refills: 1 | Status: SHIPPED | OUTPATIENT
Start: 2021-12-17

## 2021-12-17 RX ORDER — GABAPENTIN 100 MG/1
CAPSULE ORAL
Qty: 90 CAPSULE | Refills: 1 | Status: SHIPPED | OUTPATIENT
Start: 2021-12-17

## 2021-12-18 LAB — SARS-COV-2 RNA RESP QL NAA+PROBE: NEGATIVE

## 2022-01-12 ENCOUNTER — TELEMEDICINE (OUTPATIENT)
Dept: FAMILY MEDICINE CLINIC | Facility: CLINIC | Age: 37
End: 2022-01-12
Payer: COMMERCIAL

## 2022-01-12 DIAGNOSIS — R07.89 OTHER CHEST PAIN: Primary | Chronic | ICD-10-CM

## 2022-01-12 PROCEDURE — 1036F TOBACCO NON-USER: CPT | Performed by: FAMILY MEDICINE

## 2022-01-12 PROCEDURE — 99213 OFFICE O/P EST LOW 20 MIN: CPT | Performed by: FAMILY MEDICINE

## 2022-01-12 RX ORDER — MAGNESIUM 200 MG
1 TABLET ORAL DAILY
COMMUNITY

## 2022-01-12 NOTE — PROGRESS NOTES
Virtual Regular Visit    Verification of patient location:    Patient is located in the following state in which I hold an active license [place]      Assessment/Plan:    Problem List Items Addressed This Visit        Other    Other chest pain - Primary (Chronic)     Significant improvement with no recurrences of symptoms within past 3 to 4 weeks  Patient has not started gabapentin yet  She feels better on regimen of vitamin-D and vitamin B12  Recurrence of left chest wall pain today, lasting 15- 20 seconds, no associated symptoms, self limited  Continue monitoring  I advised patient to hold off gabapentin since her symptoms have significantly improved  Continue regimen of vitamins  Add Co Q10 200 mg daily  Patient will contact me if symptoms worsen or recur  She can start gabapentin at any time  Follow-up p r n  Reason for visit is   Chief Complaint   Patient presents with    Follow-up     Chest pain this am-sharp pain- Lasting 5-10 seconds but episodes have been decreasing since December     Virtual Regular Visit        Encounter provider Stanley Munroe MD    Provider located at [address] [place] [id number]      Recent Visits  Date Type Provider Dept   01/12/22 Telemedicine Stanley Munroe MD [id number] recent visits within past 7 days and meeting all other requirements  Future Appointments  No visits were found meeting these conditions  Showing future appointments within next 150 days and meeting all other requirements       The patient was identified by name and date of birth  Colleen Kaplan was informed that this is a telemedicine visit and that the visit is being conducted through [address] and patient was informed this is a secure, HIPAA-complaint platform  She agrees to proceed     My office door was closed  No one else was in the room    She acknowledged consent and understanding of privacy and security of the video platform  The patient has agreed to participate and understands they can discontinue the visit at any time  Patient is aware this is a billable service  Subjective  Gricelda Zee is a 29 y o  female    Patient presents for video visit  She reports overall significant improvement of chest pain  She has not experienced any symptoms within past 3 to 4 weeks weeks since last office visit when she presented with symptoms of URI was treated with albuterol and Zithromax Z-Elkin  She had very transient sharp left lower costochondral pain earlier today that lasted 5 to 10 seconds, it was not associated with any exertion  No dyspnea, palpitations or dizziness  Patient has not started gabapentin so far  Patient has been regimen of vitamin D3 and B12 and believes that they have been very helpful  No daily prescription medications at present time aside from Synthroid           Past Medical History:   Diagnosis Date    Abnormal Pap smear of cervix     Anemia     Depression     Dermatitis      2 para 2     Hashimoto's disease     Herpes     Hirsutism     last assessed: 14    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 thyroiditis     Mild cervical dysplasia     Pregnancy     last assessed: 3/28/17    Subclinical hypothyroidism     last assessed: 14    Urinary tract infection     Varicella     Vitamin D deficiency        Past Surgical History:   Procedure Laterality Date    COLPOSCOPY VULVA W/ BIOPSY      10/1/07 - MALLORY-1 noted at 5:00 and 12:00 ECC was negative    COMBINED HYSTEROSCOPY DIAGNOSTIC / D&C  10/2014    polyp removal    DILATION AND CURETTAGE OF UTERUS      WISDOM TOOTH EXTRACTION         Current Outpatient Medications   Medication Sig Dispense Refill    ammonium lactate (LAC-HYDRIN) 12 % cream       Cholecalciferol (VITAMIN D3) 50 MCG (2000 UT) capsule Take 1 capsule (2,000 Units total) by mouth daily      Cyanocobalamin (Vitamin B-12) 1000 MCG SUBL Place 1 tablet under the tongue in the morning      econazole nitrate 1 % cream       ketoconazole (NIZORAL) 2 % shampoo       levonorgestrel (MIRENA) 20 MCG/24HR IUD 1 each by Intrauterine route once      Synthroid 112 MCG tablet Take 1 tablet (112 mcg total) by mouth daily 90 tablet 1    albuterol (PROVENTIL HFA,VENTOLIN HFA) 90 mcg/act inhaler Inhale 2 puffs every 4 (four) hours as needed for shortness of breath (Chest tightness, cough) (Patient not taking: Reported on 1/12/2022 ) 18 g 1    gabapentin (NEURONTIN) 100 mg capsule Take 1 capsule at bedtime for 3 days then increase dose to 2 capsules at bedtime for 3 days, then increase dose up to 3 capsules at bedtime if needed (Patient not taking: Reported on 1/12/2022 ) 90 capsule 1     No current facility-administered medications for this visit  No Known Allergies    Review of Systems   Constitutional: Negative  HENT: Negative  Respiratory: Negative  Cardiovascular: Negative  Neurological: Negative  Hematological: Negative  Psychiatric/Behavioral: Negative  Video Exam    Vitals:       Physical Exam  Constitutional:       Appearance: Normal appearance  HENT:      Head: Normocephalic and atraumatic  Pulmonary:      Effort: Pulmonary effort is normal    Neurological:      General: No focal deficit present  Mental Status: She is alert and oriented to person, place, and time  Psychiatric:         Mood and Affect: Mood normal          Behavior: Behavior normal          Thought Content: Thought content normal           I spent 12 minutes directly with the patient during this visit    VIRTUAL VISIT [facility] verbally agrees to participate in [employer]   Pt is aware that [employer] could be limited without vital signs or the ability to perform a full hands-on physical Rochelle Peralta understands she or the provider may request at any time to terminate the video visit and request the patient to seek care or treatment in person

## 2022-01-12 NOTE — ASSESSMENT & PLAN NOTE
Significant improvement with no recurrences of symptoms within past 3 to 4 weeks  Patient has not started gabapentin yet  She feels better on regimen of vitamin-D and vitamin B12  Recurrence of left chest wall pain today, lasting 15- 20 seconds, no associated symptoms, self limited  Continue monitoring  I advised patient to hold off gabapentin since her symptoms have significantly improved  Continue regimen of vitamins  Add Co Q10 200 mg daily  Patient will contact me if symptoms worsen or recur  She can start gabapentin at any time  Follow-up p r n

## 2022-03-21 ENCOUNTER — OFFICE VISIT (OUTPATIENT)
Dept: PHYSICAL THERAPY | Facility: CLINIC | Age: 37
End: 2022-03-21
Payer: COMMERCIAL

## 2022-03-21 DIAGNOSIS — U09.9 POST-ACUTE SEQUELAE OF COVID-19 (PASC): ICD-10-CM

## 2022-03-21 DIAGNOSIS — R07.9 CHEST PAIN, UNSPECIFIED TYPE: ICD-10-CM

## 2022-03-21 DIAGNOSIS — S93.492D SPRAIN OF ANTERIOR TALOFIBULAR LIGAMENT OF LEFT ANKLE, SUBSEQUENT ENCOUNTER: Primary | ICD-10-CM

## 2022-03-21 PROCEDURE — 97161 PT EVAL LOW COMPLEX 20 MIN: CPT | Performed by: PHYSICAL THERAPIST

## 2022-03-21 PROCEDURE — 97110 THERAPEUTIC EXERCISES: CPT | Performed by: PHYSICAL THERAPIST

## 2022-03-21 NOTE — PROGRESS NOTES
PT Evaluation     Today's date: 3/21/2022  Patient name: Richard Han  : 1985  MRN: [id number]  Referring provider: Bloa Gamino PT  Dx:   Encounter Diagnosis     ICD-10-CM    1  Sprain of anterior talofibular ligament of left ankle, subsequent encounter  S93 492D                   Assessment  Assessment details: Richard Han is a 29 y o  female who presents s/p L ankle sprain  Pt has decreased L ankle strength and ROM as well as continued swelling  Pt currently has difficulty with prolonged standing/walking, difficulty with stair negotiation, unable to participate in recreation (working out)  Pt would benefit from skilled PT to address the above impairments and to return to pain free function  Impairments: abnormal or restricted ROM, impaired physical strength, lacks appropriate home exercise program and pain with function  Functional limitations: difficulty with prolonged standing/walking, difficulty with stair negotiation, unable to participate in recreation (working out)  Symptom irritability: moderateUnderstanding of Dx/Px/POC: good   Prognosis: good    Goals  1  Pt will be independent with HEP upon DC  2  Pt's L ankle strength will be 5/5 t/o allow for independent ambulation  3  Pt's L ankle ROM will be WNL to allow for stair negotiation with ease  4  Pt's pain will be no more than 2/10 to allow for return to recreation        Plan  Patient would benefit from: skilled physical therapy  Planned modality interventions: low level laser therapy  Planned therapy interventions: joint mobilization, manual therapy, neuromuscular re-education, patient education, therapeutic activities, therapeutic exercise, strengthening and home exercise program  Frequency: 2x week  Duration in visits: 12  Duration in weeks: 6  Treatment plan discussed with: patient        Subjective Evaluation    History of Present Illness  Date of onset: 2022  Mechanism of injury: Pt was looking at a house and mis stepped down a step and inverted her ankle  Pt reports that she immediately swelled up  X-rays and MRI were unremarkable  Pt went to see an ortho Dr Rachell Padgett was non weightbearing for 10 days and is now in a CAM boot  She has been full weightbearing in a CAM for the past 2 weeks  Pt presents to OP PT  Not a recurrent problem   Quality of life: good    Pain  Current pain ratin  At best pain ratin  At worst pain rating: 10  Location: L ankle  Quality: sharp and dull ache  Relieving factors: rest  Aggravating factors: standing and walking  Progression: improved      Diagnostic Tests  X-ray: normal  MRI studies: normal  Treatments  No previous or current treatments  Patient Goals  Patient goals for therapy: decreased pain and return to sport/leisure activities          Objective     Tenderness   Left Ankle/Foot   Tenderness in the fibula and lateral malleolus       Active Range of Motion   Left Ankle/Foot   Dorsiflexion (ke): 0 degrees   Plantar flexion: WFL  Inversion: 30 degrees   Eversion: 15 degrees     Strength/Myotome Testing     Left Ankle/Foot   Dorsiflexion: 4  Plantar flexion: 3  Inversion: 4-  Eversion: 4-  Great toe flexion: 5  Great toe extension: 5    Swelling   Left Ankle/Foot   Figure 8: 60 cm    Right Ankle/Foot   Figure 8: 56 cm             Precautions: WBAT      Manuals 3/21            PROM             Fib head mobs             STJ glides             STM for swelling prn             Laser             Neuro Re-Ed             SLS             Biodex balance             Toe taps on cones             Cone star formation             Aeromat              Heel/toe walking                          Ther Ex             Upright bike             Wall stretches (gastroc/soleus) 3x30" ea            Seated HR/TR x15            Seated wobble board             Ankle tband             Isometrics             In/ev sweep                                                                 Ther Activity Step ups (f/l)             BOSU step ups             Eccentric leg lowering             SL sit to stand

## 2022-03-23 ENCOUNTER — OFFICE VISIT (OUTPATIENT)
Dept: PHYSICAL THERAPY | Facility: CLINIC | Age: 37
End: 2022-03-23
Payer: COMMERCIAL

## 2022-03-23 DIAGNOSIS — S93.492D SPRAIN OF ANTERIOR TALOFIBULAR LIGAMENT OF LEFT ANKLE, SUBSEQUENT ENCOUNTER: Primary | ICD-10-CM

## 2022-03-23 PROCEDURE — 97140 MANUAL THERAPY 1/> REGIONS: CPT | Performed by: PHYSICAL THERAPIST

## 2022-03-23 PROCEDURE — 97110 THERAPEUTIC EXERCISES: CPT | Performed by: PHYSICAL THERAPIST

## 2022-03-23 NOTE — PROGRESS NOTES
Daily Note     Today's date: 3/23/2022  Patient name: Richard Han  : 1985  MRN: [id number]  Referring provider: Bola Gamino PT  Dx:   Encounter Diagnosis     ICD-10-CM    1  Sprain of anterior talofibular ligament of left ankle, subsequent encounter  N64 316D                   Subjective: Pt reports less swelling  Objective: See treatment diary below      Assessment: Discussed transitioning into sneaker since pain levels have decreased and [facility]  Will progress to standing activities NV  Plan: Continue per plan of care        Precautions: WBAT      Manuals 3/21 3/23           PROM  MD           Fib head mobari COBOS           TCJ dany COBOS           STM for swelling prn             Laser  16W 4'           Neuro Re-Ed             SLS             Biodex balance             Toe taps on cones             Cone star formation             Aeromat              Heel/toe walking                          Ther Ex             Upright bike  6'           Wall stretches (gastroc/soleus) 3x30" ea HEP           Seated HR/TR x15 HEP           Seated wobble board (f/s/cir)  x20 ea           Ankle tband  GTB x20ea           Isometrics             In/ev sweep                                                                 Ther Activity             Step ups (f/l)             BOSU step ups             Eccentric leg lowering             SL sit to stand

## 2022-03-28 ENCOUNTER — OFFICE VISIT (OUTPATIENT)
Dept: PHYSICAL THERAPY | Facility: CLINIC | Age: 37
End: 2022-03-28
Payer: COMMERCIAL

## 2022-03-28 DIAGNOSIS — S93.492D SPRAIN OF ANTERIOR TALOFIBULAR LIGAMENT OF LEFT ANKLE, SUBSEQUENT ENCOUNTER: Primary | ICD-10-CM

## 2022-03-28 PROCEDURE — 97110 THERAPEUTIC EXERCISES: CPT | Performed by: PHYSICAL THERAPIST

## 2022-03-28 PROCEDURE — 97140 MANUAL THERAPY 1/> REGIONS: CPT | Performed by: PHYSICAL THERAPIST

## 2022-03-28 NOTE — PROGRESS NOTES
Daily Note     Today's date: 3/28/2022  Patient name: Anatoliy Contreras  : 1985  MRN: [id number]  Referring provider: Zachariah Vergara, PT  Dx:   Encounter Diagnosis     ICD-10-CM    1  Sprain of anterior talofibular ligament of left ankle, subsequent encounter  S93 492D                   Subjective: Pt reports she has started to wean out of the CAM boot and is wearing her sneaker for appx 5 hours per day but not continuous  She did not bring her shoe to today's appt  Objective: See treatment diary below      Assessment: Tolerated treatment well  Patient would benefit from continued PT      Plan: Progress treatment as tolerated    Instructed patient to bring her shoe to next visit to allow progression of program      Precautions: WBAT      Manuals 3/21 3/23 3/28          PROM  MD MIRANDA          Fib head mobs  MD RUTH saenz MD, KT          [facility] for swelling prn             Laser  16W 4' 16W 4'          Neuro Re-Ed             SLS             Biodex balance             Toe taps on cones             Cone star formation             Aeromat              Heel/toe walking                          Ther Ex             Upright bike  6' 6'          Wall stretches (gastroc/soleus) 3x30" ea HEP           Seated HR/TR x15 HEP           Seated wobble board (f/s/cir)  x20 ea x20 ea          Ankle tband  GTB x20ea GTB x20 ea          Isometrics             In/ev sweep                                                                 Ther Activity             Step ups (f/l)             BOSU step ups             Eccentric leg lowering             SL sit to stand

## 2022-04-04 ENCOUNTER — OFFICE VISIT (OUTPATIENT)
Dept: PHYSICAL THERAPY | Facility: CLINIC | Age: 37
End: 2022-04-04
Payer: COMMERCIAL

## 2022-04-04 DIAGNOSIS — S93.492D SPRAIN OF ANTERIOR TALOFIBULAR LIGAMENT OF LEFT ANKLE, SUBSEQUENT ENCOUNTER: Primary | ICD-10-CM

## 2022-04-04 PROCEDURE — 97530 THERAPEUTIC ACTIVITIES: CPT | Performed by: PHYSICAL THERAPIST

## 2022-04-04 PROCEDURE — 97140 MANUAL THERAPY 1/> REGIONS: CPT | Performed by: PHYSICAL THERAPIST

## 2022-04-04 PROCEDURE — 97112 NEUROMUSCULAR REEDUCATION: CPT | Performed by: PHYSICAL THERAPIST

## 2022-04-04 NOTE — PROGRESS NOTES
Daily Note     Today's date: 2022  Patient name: Aissatou Chakraborty  : 1985  MRN: [id number]  Referring provider: Carlos Gviens, PT  Dx:   Encounter Diagnosis     ICD-10-CM    1  Sprain of anterior talofibular ligament of left ankle, subsequent encounter  S97 077D                   Subjective: Pt reports that she's been out of the boot much more recently  Pt reports soreness only  Objective: See treatment diary below      Assessment: Pt had good tolerance to all standing activities  No increase in pain levels noted  Minor swelling over lateral malleolus  Plan: Continue per plan of care        Precautions: WBAT      Manuals 3/21 3/23 3/28 4         PROM  MD RUTH COBOS         Fib head mobs  MD MIRANDA          TCDELIA saenz MD, KT, MD         STM for swelling prn             Laser  16W 4' 16W 4' 16W 4'         Neuro Re-Ed             SLS    2x30"         Biodex balance    Catch lvl 7 4'         Toe taps on cones    On airex x20         Cone star formation             Aeromat              Heel/toe walking    x2 laps ea         Wobble board- standing    x20 ea         Ther Ex             Upright bike  6' 6' DC         Wall stretches (gastroc/soleus) 3x30" ea HEP           Seated HR/TR x15 HEP           Seated wobble board (f/s/cir)  x20 ea x20 ea          Ankle tband  GTB x20ea GTB x20 ea HEP                                                                                       Ther Activity             Elliptical    6'         Step ups (f/l)    8"x20 ea         BOSU step ups    x20 ea         Eccentric leg lowering             SL sit to stand             Side stepping with tband around arches

## 2022-04-06 ENCOUNTER — OFFICE VISIT (OUTPATIENT)
Dept: PHYSICAL THERAPY | Facility: CLINIC | Age: 37
End: 2022-04-06
Payer: COMMERCIAL

## 2022-04-06 DIAGNOSIS — S93.492D SPRAIN OF ANTERIOR TALOFIBULAR LIGAMENT OF LEFT ANKLE, SUBSEQUENT ENCOUNTER: Primary | ICD-10-CM

## 2022-04-06 PROCEDURE — 97112 NEUROMUSCULAR REEDUCATION: CPT | Performed by: PHYSICAL THERAPIST

## 2022-04-06 PROCEDURE — 97530 THERAPEUTIC ACTIVITIES: CPT | Performed by: PHYSICAL THERAPIST

## 2022-04-06 PROCEDURE — 97140 MANUAL THERAPY 1/> REGIONS: CPT | Performed by: PHYSICAL THERAPIST

## 2022-04-06 NOTE — PROGRESS NOTES
Daily Note     Today's date: 2022  Patient name: Jose Bass  : 1985  MRN: [id number]  Referring provider: Anne Borden PT  Dx:   Encounter Diagnosis     ICD-10-CM    1  Sprain of anterior talofibular ligament of left ankle, subsequent encounter  S94 345D                   Subjective: "I"m a little sore and a little swollen  I did the bike for 20 minutes "      Objective: See treatment diary below      Assessment: Pt had good tolerance to progression  Slight soreness noted t/o session  Moderate swelling at lateral malleolus  Discussed using compression sock and icing after biking/TM walking  Plan: Continue per plan of care        Precautions: WBAT      Manuals 3/21 3/23 3/28 4/4 4/6        PROM  MD RUTH COBOS         Fib head mobs  MD MIRANDA          TCJ dany COBOS KT, MD, MD        STM for swelling prn             Laser  16W 4' 16W 4' 16W 4' 16W 4'        Neuro Re-Ed             SLS    2x30" On airex 2x30"        Biodex balance    Catch lvl 7 4' Catch lvl 7 4'        Toe taps on cones    On airex x20 On airex x20        Cone star formation     x5        Aeromat              Heel/toe walking    x2 laps ea x2 laps        Wobble board- standing    x20 ea x20ea        Ther Ex             Upright bike  6' 6' DC         Wall stretches (gastroc/soleus) 3x30" ea HEP           Seated HR/TR x15 HEP           Seated wobble board (f/s/cir)  x20 ea x20 ea          Ankle tband  GTB x20ea GTB x20 ea HEP                                                                                       Ther Activity             Elliptical    6' 6'        Step ups (f/l/cross)    8"x20 ea 8"x20 ea        BOSU step ups    x20 ea x20ea        Eccentric leg lowering             SL sit to stand             Side stepping with tband around arches             Upside down BOSU squats     x20        Lunges over airex     x10ea

## 2022-04-11 ENCOUNTER — OFFICE VISIT (OUTPATIENT)
Dept: PHYSICAL THERAPY | Facility: CLINIC | Age: 37
End: 2022-04-11
Payer: COMMERCIAL

## 2022-04-11 DIAGNOSIS — S93.492D SPRAIN OF ANTERIOR TALOFIBULAR LIGAMENT OF LEFT ANKLE, SUBSEQUENT ENCOUNTER: Primary | ICD-10-CM

## 2022-04-11 PROCEDURE — 97140 MANUAL THERAPY 1/> REGIONS: CPT | Performed by: PHYSICAL THERAPIST

## 2022-04-11 PROCEDURE — 97112 NEUROMUSCULAR REEDUCATION: CPT | Performed by: PHYSICAL THERAPIST

## 2022-04-11 PROCEDURE — 97530 THERAPEUTIC ACTIVITIES: CPT | Performed by: PHYSICAL THERAPIST

## 2022-04-11 NOTE — PROGRESS NOTES
Daily Note     Today's date: 2022  Patient name: Kevin Brandon  : 1985  MRN: [id number]  Referring provider: Michelle Kaye, PT  Dx:   Encounter Diagnosis     ICD-10-CM    1  Sprain of anterior talofibular ligament of left ankle, subsequent encounter  J16 806D                   Subjective: Pt reports she was wearing heels yesterday and is now having pain today  She feels like she took a step back  Objective: See treatment diary below      Assessment: Tolerated treatment fair  Patient with pain during standing balance board, modified with weight bearing on BLE  Minimal progression today due to increased pain  Plan: Continue per plan of care        Precautions: WBAT      Manuals 3/21 3/23 3/28 4/4 4/6 4/11       PROM  MD RUTH COBOS         Fib head mobs  MD MIRANDA          [place] saenz MD, KT, MD, MD, KT       [facility] for swelling prn             Laser  16W 4' 16W 4' 16W 4' 16W 4' 16W 4'       Neuro Re-Ed             SLS    2x30" On airex 2x30" On airex 2x30"       Biodex balance    Catch lvl 7 4' Catch lvl 7 4' Catch lvl 7 4'       Toe taps on cones    On airex x20 On airex x20 On airex, 5 cones x10 ea       Cone star formation     x5        Aeromat              Heel/toe walking    x2 laps ea x2 laps x2 laps       Wobble board- standing    x20 ea x20ea x20 ea       Ther Ex             Upright bike  6' 6' DC         Wall stretches (gastroc/soleus) 3x30" ea HEP           Seated HR/TR x15 HEP           Seated wobble board (f/s/cir)  x20 ea x20 ea          Ankle tband  GTB x20ea GTB x20 ea HEP                                                                                       Ther Activity             Elliptical    6' 6' 8'       Step ups (f/l/cross)    8"x20 ea 8"x20 ea BOSU x20 ea       BOSU step ups    x20 ea x20ea x20 ea F/L       Eccentric leg lowering             SL sit to stand             Side stepping with tband around arches             Upside down BOSU squats     x20 x20       Lunges over airex     x10ea x20

## 2022-04-13 ENCOUNTER — OFFICE VISIT (OUTPATIENT)
Dept: PHYSICAL THERAPY | Facility: CLINIC | Age: 37
End: 2022-04-13
Payer: COMMERCIAL

## 2022-04-13 DIAGNOSIS — S93.492D SPRAIN OF ANTERIOR TALOFIBULAR LIGAMENT OF LEFT ANKLE, SUBSEQUENT ENCOUNTER: Primary | ICD-10-CM

## 2022-04-13 PROCEDURE — 97112 NEUROMUSCULAR REEDUCATION: CPT | Performed by: PHYSICAL THERAPIST

## 2022-04-13 PROCEDURE — 97530 THERAPEUTIC ACTIVITIES: CPT | Performed by: PHYSICAL THERAPIST

## 2022-04-13 PROCEDURE — 97140 MANUAL THERAPY 1/> REGIONS: CPT | Performed by: PHYSICAL THERAPIST

## 2022-04-13 NOTE — PROGRESS NOTES
Daily Note     Today's date: 2022  Patient name: Kerrie Tom  : 1985  MRN: [id number]  Referring provider: Albert Mckeon PT  Dx:   Encounter Diagnosis     ICD-10-CM    1  Sprain of anterior talofibular ligament of left ankle, subsequent encounter  L22 675F                   Subjective: Pt reports having some residual soreness and swelling today  Objective: See treatment diary below      Assessment: Pt had good tolerance to progression of program  Advised to use compression sleeve today due to swelling  Able to jog without increased pain  Plan: Continue per plan of care        Precautions: WBAT      Manuals 3/21 3/23 3/28 4/4 4/6 4/11 4/13      PROM  MD RUTH COBOS         Fib head mobs  MD RUTH saenz MD, KT, MD, MD, KT, MD      STM for swelling prn             Laser  16W 4' 16W 4' 16W 4' 16W 4' 16W 4' 16W 4'      Neuro Re-Ed             SLS    2x30" On airex 2x30" On airex 2x30"       Biodex balance    Catch lvl 7 4' Catch lvl 7 4' Catch lvl 7 4' Catch lvl 2 4'      Toe taps on cones    On airex x20 On airex x20 On airex, 5 cones x10 ea       Cone star formation     x5        Aeromat              Heel/toe walking    x2 laps ea x2 laps x2 laps SL HR x20      Wobble board- standing    x20 ea x20ea x20 ea x20ea      SLS on foam with tband rot       GTB x15ea      Ther Ex             Upright bike  6' 6' DC         Wall stretches (gastroc/soleus) 3x30" ea HEP           Seated HR/TR x15 HEP           Seated wobble board (f/s/cir)  x20 ea x20 ea          Ankle tband  GTB x20ea GTB x20 ea HEP                                                                                       Ther Activity             Elliptical    6' 6' 8' 5'      TM       Jog 5'      Step ups (f/l/cross)    8"x20 ea 8"x20 ea BOSU x20 ea Step overs 8" x15      BOSU step ups    x20 ea x20ea x20 ea F/L Lunges x20      Eccentric leg lowering             SL sit to stand       x15      Side stepping with tband around arches             Upside down BOSU squats     x20 x20 x20      Lunges over airex     x10ea x20 walking lunges x3 laps      BOSU squat with jump       x15                   BOSU hop overs       x20

## 2022-04-20 ENCOUNTER — APPOINTMENT (OUTPATIENT)
Dept: PHYSICAL THERAPY | Facility: CLINIC | Age: 37
End: 2022-04-20
Payer: COMMERCIAL

## 2022-04-27 ENCOUNTER — OFFICE VISIT (OUTPATIENT)
Dept: PHYSICAL THERAPY | Facility: CLINIC | Age: 37
End: 2022-04-27
Payer: COMMERCIAL

## 2022-04-27 DIAGNOSIS — S93.492D SPRAIN OF ANTERIOR TALOFIBULAR LIGAMENT OF LEFT ANKLE, SUBSEQUENT ENCOUNTER: Primary | ICD-10-CM

## 2022-04-27 PROCEDURE — 97530 THERAPEUTIC ACTIVITIES: CPT | Performed by: PHYSICAL THERAPIST

## 2022-04-27 NOTE — PROGRESS NOTES
Daily Note     Today's date: 2022  Patient name: Ajay Solomon  : 1985  MRN: [id number]  Referring provider: Brenda Rico, PT  Dx:   Encounter Diagnosis     ICD-10-CM    1  Sprain of anterior talofibular ligament of left ankle, subsequent encounter  F29 070D                   Subjective: "It's still a little achy  The most i've run is 10 minutes "      Objective: See treatment diary below      Assessment: Pt had good tolerance to progression of jumping activities with no increase in pain  Pt reported fatigue only  Will f/u in 2 weeks  Plan: Continue per plan of care        Precautions: WBAT      Manuals 3/21 3/23 3/28 4/4 4/6 4/11 4/13 4/27     PROM  MD RUTH COBOS         Fib head mobs  MD RUTH saenz MD, KT, MD, MD, KT, MD      STM for swelling prn             Laser  16W 4' 16W 4' 16W 4' 16W 4' 16W 4' 16W 4'      Neuro Re-Ed             SLS    2x30" On airex 2x30" On airex 2x30"       Biodex balance    Catch lvl 7 4' Catch lvl 7 4' Catch lvl 7 4' Catch lvl 2 4'      Toe taps on cones    On airex x20 On airex x20 On airex, 5 cones x10 ea       Cone star formation     x5        Aeromat              Heel/toe walking    x2 laps ea x2 laps x2 laps SL HR x20 SL HR x20     Wobble board- standing    x20 ea x20ea x20 ea x20ea      SLS on foam with tband rot       GTB x15ea GTB x15ea     Ther Ex             Upright bike  6' 6' DC         Wall stretches (gastroc/soleus) 3x30" ea HEP           Seated HR/TR x15 HEP           Seated wobble board (f/s/cir)  x20 ea x20 ea          Ankle tband  GTB x20ea GTB x20 ea HEP                                                                                       Ther Activity             Elliptical    6' 6' 8' 5'      TM       Jog 5' Jog 10' walk 5'     Step ups (f/l/cross)    8"x20 ea 8"x20 ea BOSU x20 ea Step overs 8" x15 Step overs 8" x15     BOSU step ups    x20 ea x20ea x20 ea F/L Lunges x20 Split squat jumps x10ea     Reverse/curtsy lunges with slider x20ea     Side stepping with tband around arches             Upside down BOSU squats     x20 x20 x20 x20     Lunge with drive hop        A51     Lunges over airex     x10ea x20 walking lunges x3 laps      BOSU squat with jump       x15 x20     SL hops/line hops (f/l)        x20 ea     BOSU runs        x40     SL trampoline hops with ball toss        x20     BOSU hop overs       x20 x20

## 2022-05-09 ENCOUNTER — APPOINTMENT (OUTPATIENT)
Dept: PHYSICAL THERAPY | Facility: CLINIC | Age: 37
End: 2022-05-09
Payer: COMMERCIAL

## 2022-05-16 ENCOUNTER — OFFICE VISIT (OUTPATIENT)
Dept: PHYSICAL THERAPY | Facility: CLINIC | Age: 37
End: 2022-05-16
Payer: COMMERCIAL

## 2022-05-16 DIAGNOSIS — S93.492D SPRAIN OF ANTERIOR TALOFIBULAR LIGAMENT OF LEFT ANKLE, SUBSEQUENT ENCOUNTER: Primary | ICD-10-CM

## 2022-05-16 PROCEDURE — 97530 THERAPEUTIC ACTIVITIES: CPT | Performed by: PHYSICAL THERAPIST

## 2022-05-16 NOTE — PROGRESS NOTES
Daily Note     Today's date: 2022  Patient name: Kerrie Tom  : 1985  MRN: [id number]  Referring provider: Albert Mckeon, PT  Dx:   Encounter Diagnosis     ICD-10-CM    1  Sprain of anterior talofibular ligament of left ankle, subsequent encounter  S92 828D                   Subjective: "I've been running and I feel pretty good "      Objective: See treatment diary below      Assessment: Pt had good tolerance to progression of program  No increase in pain  Pt to transition to independent HEP at this time  Plan: No further skilled PT required       Precautions: WBAT      Manuals 3/21 3/23 3/28 4/4 4/6 4/11 4/13 4/27 5/16    PROM  MD RUTH COBOS         Fib head mobs  MD RUTH saenz MD, KT, MD, MD, KT, MD      STM for swelling prn             Laser  16W 4' 16W 4' 16W 4' 16W 4' 16W 4' 16W 4'      Neuro Re-Ed             SLS    2x30" On airex 2x30" On airex 2x30"       Biodex balance    Catch lvl 7 4' Catch lvl 7 4' Catch lvl 7 4' Catch lvl 2 4'      Toe taps on cones    On airex x20 On airex x20 On airex, 5 cones x10 ea       Cone star formation     x5        Aeromat              Heel/toe walking    x2 laps ea x2 laps x2 laps SL HR x20 SL HR x20     Wobble board- standing    x20 ea x20ea x20 ea x20ea      SLS on foam with tband rot       GTB x15ea GTB x15ea     Ther Ex             Upright bike  6' 6' DC         Wall stretches (gastroc/soleus) 3x30" ea HEP           Seated HR/TR x15 HEP           Seated wobble board (f/s/cir)  x20 ea x20 ea          Ankle tband  GTB x20ea GTB x20 ea HEP                                                                                       Ther Activity             Elliptical    6' 6' 8' 5'      TM       Jog 5' Jog 10' walk 5'     Step ups (f/l/cross)    8"x20 ea 8"x20 ea BOSU x20 ea Step overs 8" x15 Step overs 8" x15     BOSU step ups    x20 ea x20ea x20 ea F/L Lunges x20 Split squat jumps x10ea Split squat jumps x10ea    Reverse/curtsy lunges with slider        x20ea x20    Agility ladder         Sl hops/grapevine x2 laps ea    Upside down BOSU squats     x20 x20 x20 x20     Lunge with drive hop        O94 x20ea    Lunges over airex     x10ea x20 walking lunges x3 laps  BOSU lunge hop x20 ea    BOSU squat with jump       x15 x20 x20    SL hops/line hops (f/l)        x20 ea x20ea    BOSU runs        x40     Jump downs          trampoline x10    SL trampoline hops with ball toss        x20 x20    BOSU hop overs       x20 x20 x40

## 2022-06-02 ENCOUNTER — NEW PATIENT (OUTPATIENT)
Dept: URBAN - METROPOLITAN AREA CLINIC 6 | Facility: CLINIC | Age: 37
End: 2022-06-02

## 2022-06-02 DIAGNOSIS — Z01.00: ICD-10-CM

## 2022-06-02 PROCEDURE — 92004 COMPRE OPH EXAM NEW PT 1/>: CPT

## 2022-06-02 PROCEDURE — 92015 DETERMINE REFRACTIVE STATE: CPT

## 2022-06-02 ASSESSMENT — KERATOMETRY
OS_K2POWER_DIOPTERS: 44.00
OD_K1POWER_DIOPTERS: 42.50
OD_AXISANGLE_DEGREES: 011
OD_K2POWER_DIOPTERS: 43.00
OD_AXISANGLE2_DEGREES: 101
OS_AXISANGLE2_DEGREES: 116
OS_K1POWER_DIOPTERS: 42.00
OS_AXISANGLE_DEGREES: 026

## 2022-06-02 ASSESSMENT — VISUAL ACUITY
OS_SC: 20/80
OS_SC: J1
OD_SC: 20/100
OD_SC: J1+

## 2022-06-02 ASSESSMENT — TONOMETRY
OD_IOP_MMHG: 14
OS_IOP_MMHG: 14

## 2022-06-09 ENCOUNTER — CONTACT LENS FITTING (OUTPATIENT)
Dept: URBAN - METROPOLITAN AREA CLINIC 6 | Facility: CLINIC | Age: 37
End: 2022-06-09

## 2022-06-09 DIAGNOSIS — H52.11: ICD-10-CM

## 2022-06-09 DIAGNOSIS — H52.202: ICD-10-CM

## 2022-06-09 PROCEDURE — 92310 CONTACT LENS FITTING OU: CPT

## 2022-06-09 ASSESSMENT — KERATOMETRY
OD_AXISANGLE_DEGREES: 011
OS_K2POWER_DIOPTERS: 44.00
OD_K2POWER_DIOPTERS: 43.00
OD_AXISANGLE2_DEGREES: 101
OS_AXISANGLE2_DEGREES: 116
OS_K1POWER_DIOPTERS: 42.00
OS_AXISANGLE_DEGREES: 026
OD_K1POWER_DIOPTERS: 42.50

## 2022-06-10 ENCOUNTER — TELEPHONE (OUTPATIENT)
Dept: ENDOCRINOLOGY | Facility: CLINIC | Age: 37
End: 2022-06-10

## 2022-06-14 ENCOUNTER — LAB (OUTPATIENT)
Dept: LAB | Facility: HOSPITAL | Age: 37
End: 2022-06-14
Attending: INTERNAL MEDICINE
Payer: COMMERCIAL

## 2022-06-14 DIAGNOSIS — E03.8 HYPOTHYROIDISM DUE TO HASHIMOTO'S THYROIDITIS: ICD-10-CM

## 2022-06-14 DIAGNOSIS — E06.3 HYPOTHYROIDISM DUE TO HASHIMOTO'S THYROIDITIS: ICD-10-CM

## 2022-06-14 LAB
T4 FREE SERPL-MCNC: 1.07 NG/DL (ref 0.76–1.46)
TSH SERPL DL<=0.05 MIU/L-ACNC: 2.19 UIU/ML (ref 0.45–4.5)

## 2022-06-14 PROCEDURE — 84439 ASSAY OF FREE THYROXINE: CPT

## 2022-06-14 PROCEDURE — 84443 ASSAY THYROID STIM HORMONE: CPT

## 2022-06-14 PROCEDURE — 36415 COLL VENOUS BLD VENIPUNCTURE: CPT

## 2022-06-15 ENCOUNTER — OFFICE VISIT (OUTPATIENT)
Dept: ENDOCRINOLOGY | Facility: CLINIC | Age: 37
End: 2022-06-15
Payer: COMMERCIAL

## 2022-06-15 VITALS
SYSTOLIC BLOOD PRESSURE: 118 MMHG | TEMPERATURE: 97.6 F | BODY MASS INDEX: 27.26 KG/M2 | WEIGHT: 201 LBS | DIASTOLIC BLOOD PRESSURE: 76 MMHG | HEART RATE: 41 BPM

## 2022-06-15 DIAGNOSIS — E06.3 HYPOTHYROIDISM DUE TO HASHIMOTO'S THYROIDITIS: Primary | ICD-10-CM

## 2022-06-15 DIAGNOSIS — E03.8 HYPOTHYROIDISM DUE TO HASHIMOTO'S THYROIDITIS: Primary | ICD-10-CM

## 2022-06-15 DIAGNOSIS — E55.9 VITAMIN D DEFICIENCY: ICD-10-CM

## 2022-06-15 PROCEDURE — 99214 OFFICE O/P EST MOD 30 MIN: CPT | Performed by: INTERNAL MEDICINE

## 2022-06-15 PROCEDURE — 1036F TOBACCO NON-USER: CPT | Performed by: INTERNAL MEDICINE

## 2022-06-15 RX ORDER — LEVOTHYROXINE SODIUM 112 MCG
112 TABLET ORAL DAILY
Qty: 90 TABLET | Refills: 3 | Status: SHIPPED | OUTPATIENT
Start: 2022-06-15

## 2022-06-15 NOTE — PROGRESS NOTES
Woody Ramos 29 y o  female MRN: [id number]    Encounter: [id number]      Assessment/Plan     Assessment: This is a 29y o -year-old female with hypothyroidism  Plan:  Diagnoses and all orders for this visit:    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 thyroiditis  Lab Results   Component Value Date    [id number] 2 190 06/14/2022    TSH 1 98 06/06/2019   TSH is 2 1  Free T4 is normal  Continue current dose of Synthroid 112 mcg daily, on empty stomach 7 days a week  Follow-up in 10 months  Educated to call for blood work as she will need adjustment if she loses more than 10 lb or gains more than 10 lb  Also she should call us if she gets pregnant as she will need adjustment in Synthroid dose    -     T4, free; Future  -     TSH, 3rd generation; Future  -     Synthroid 112 MCG tablet; Take 1 tablet (112 mcg total) by mouth daily    Vitamin D deficiency  Take vitamin-D 3 supplementation 2000 International Units daily in summer and spring  Take vitamin-D 3 4000 International Units daily in 1 tear and fall  Obtain vitamin-D before next appointment  -     Vitamin D 25 hydroxy; Future        CC:   Hypothyroidism, vitamin-D deficiency    History of Present Illness     HPI:    Woody Ramos 76-year-old woman with medical history of hypothyroidism secondary to Hashimoto thyroiditis managed on brand Synthroid is here for follow-up  She also has history of vitamin-D deficiency  She takes brand Synthroid 112  mcg daily before breakfast and denies missing doses  She has been seeing [place] once a week  She denies heat intolerance or cold intolerance, excessive weight gain or weight loss  For vitamin-D deficiency, she takes vitamin-D 3 4000 International Units daily during winter season, at present not taking any vitamin-D supplementation  She has history of hirsutism, previous adrenal and ovarian workup was negative, for hyperandrogenism as well as elevated DHEA-S    Her DHEA-S was 131, testosterone 14 and free testosterone was 2 1  She exercises regularly, eat healthy food  Lab Results   Component Value Date    HGBA1C 5 3 2021     Lab Results   Component Value Date    [id number] 2 190 2022    TSH 1 98 2019     Wt Readings from Last 3 Encounters:   06/15/22 91 2 kg (201 lb)   21 93 4 kg (206 lb)   21 92 3 kg (203 lb 6 4 oz)     Temp Readings from Last 3 Encounters:   06/15/22 97 6 °F (36 4 °C)   21 97 6 °F (36 4 °C) (Temporal)   21 97 8 °F (36 6 °C) (Tympanic)     BP Readings from Last 3 Encounters:   06/15/22 118/76   21 100/60   21 106/66     Pulse Readings from Last 3 Encounters:   06/15/22 (!) 41   21 58   21 62       Review of Systems   Constitutional: Negative for activity change, diaphoresis, fatigue, fever and unexpected weight change  HENT: Negative  Eyes: Negative for visual disturbance  Respiratory: Negative for cough, chest tightness and shortness of breath  Cardiovascular: Negative for chest pain, palpitations and leg swelling  Gastrointestinal: Negative for abdominal pain, constipation, diarrhea, nausea and vomiting  Endocrine: Negative for cold intolerance, heat intolerance, polydipsia, polyphagia and polyuria  Genitourinary: Negative for dysuria, enuresis, frequency and urgency  Musculoskeletal: Negative for arthralgias and myalgias  Skin: Negative for pallor, rash and wound  Allergic/Immunologic: Negative  Neurological: Negative for dizziness, tremors, weakness and numbness  Hematological: Negative  Psychiatric/Behavioral: Negative          Historical Information   Past Medical History:   Diagnosis Date    Abnormal Pap smear of cervix     Anemia     Depression     Dermatitis      2 para 2     Hashimoto's disease 2017    Herpes     Hirsutism     last assessed: 14    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 thyroiditis     Mild cervical dysplasia     Pregnancy     last assessed: 3/28/17    Subclinical hypothyroidism     last assessed: 12/29/14    Urinary tract infection     Varicella     Vitamin D deficiency      Past Surgical History:   Procedure Laterality Date    COLPOSCOPY VULVA W/ BIOPSY      10/1/07 - MALLORY-1 noted at 5:00 and 12:00 ECC was negative    COMBINED HYSTEROSCOPY DIAGNOSTIC / D&C  10/2014    polyp removal    DILATION AND CURETTAGE OF UTERUS      WISDOM TOOTH EXTRACTION       Social History   Social History     Substance and Sexual Activity   Alcohol Use Yes    Comment: social drinker as per [employer]scripts     Social History     Substance and Sexual Activity   Drug Use No     Social History     Tobacco Use   Smoking Status Never Smoker   Smokeless Tobacco Never Used     Family History:   Family History   Problem Relation Age of Onset    Stroke Mother     Depression Mother     Thyroid disease Mother     Depression Maternal Aunt     Diabetes Maternal Aunt     Heart attack Paternal Uncle     Diabetes Other        Meds/Allergies   Current Outpatient Medications   Medication Sig Dispense Refill    Cholecalciferol (VITAMIN D3) 50 MCG (2000 UT) capsule Take 1 capsule (2,000 Units total) by mouth daily      Cyanocobalamin (Vitamin B-12) 1000 MCG SUBL Place 1 tablet under the tongue in the morning      levonorgestrel (MIRENA) 20 MCG/24HR IUD 1 each by Intrauterine route once      Synthroid 112 MCG tablet Take 1 tablet (112 mcg total) by mouth daily 90 tablet 3    albuterol (PROVENTIL HFA,VENTOLIN HFA) 90 mcg/act inhaler Inhale 2 puffs every 4 (four) hours as needed for shortness of breath (Chest tightness, cough) (Patient not taking: Reported on 1/12/2022 ) 18 g 1    ammonium lactate (LAC-HYDRIN) 12 % cream       econazole nitrate 1 % cream       gabapentin (NEURONTIN) 100 mg capsule Take 1 capsule at bedtime for 3 days then increase dose to 2 capsules at bedtime for 3 days, then increase dose up to 3 capsules at bedtime if needed (Patient not taking: Reported on 1/12/2022 ) 90 capsule 1    ketoconazole (NIZORAL) 2 % shampoo        No current facility-administered medications for this visit  No Known Allergies    Objective   Vitals: Blood pressure 118/76, pulse (!) 41, temperature 97 6 °F (36 4 °C), weight 91 2 kg (201 lb), currently breastfeeding  Physical Exam  Vitals reviewed  Constitutional:       General: She is not in acute distress  Appearance: Normal appearance  She is not ill-appearing  HENT:      Head: Normocephalic and atraumatic  Nose: Nose normal    Eyes:      Extraocular Movements: Extraocular movements intact  Conjunctiva/sclera: Conjunctivae normal    Cardiovascular:      Rate and Rhythm: Normal rate and regular rhythm  Pulses: Normal pulses  Heart sounds: Normal heart sounds  Pulmonary:      Effort: Pulmonary effort is normal  No respiratory distress  Breath sounds: Normal breath sounds  Musculoskeletal:         General: Normal range of motion  Cervical back: Normal range of motion and neck supple  Right lower leg: No edema  Left lower leg: No edema  Neurological:      General: No focal deficit present  Mental Status: She is alert and oriented to person, place, and time  Psychiatric:         Mood and Affect: Mood normal          Behavior: Behavior normal          The history was obtained from the review of the chart, patient  Lab Results:   Lab Results   Component Value Date/Time    TSH 3RD GENERATON 2 190 06/14/2022 10:33 AM    TSH 3RD GENERATON 1 700 10/11/2021 07:03 AM    Free T4 1 07 06/14/2022 10:33 AM    Free T4 0 95 10/11/2021 07:03 AM       Imaging Studies:       I have personally reviewed pertinent reports  Portions of the record may have been created with voice recognition software  Occasional wrong word or "sound a like" substitutions may have occurred due to the inherent limitations of voice recognition software   Read the chart carefully and recognize, using context, where substitutions have occurred

## 2022-06-16 ENCOUNTER — CONTACT LENS CHECK (OUTPATIENT)
Dept: URBAN - METROPOLITAN AREA CLINIC 6 | Facility: CLINIC | Age: 37
End: 2022-06-16

## 2022-06-16 DIAGNOSIS — H52.202: ICD-10-CM

## 2022-06-16 DIAGNOSIS — H52.13: ICD-10-CM

## 2022-06-16 PROCEDURE — 92310 CONTACT LENS FITTING OU: CPT

## 2022-06-16 ASSESSMENT — KERATOMETRY
OS_K1POWER_DIOPTERS: 42.00
OS_AXISANGLE_DEGREES: 026
OD_AXISANGLE_DEGREES: 011
OD_K2POWER_DIOPTERS: 43.00
OS_AXISANGLE2_DEGREES: 116
OD_AXISANGLE2_DEGREES: 101
OS_K2POWER_DIOPTERS: 44.00
OD_K1POWER_DIOPTERS: 42.50

## 2022-06-16 ASSESSMENT — VISUAL ACUITY
OD_CC: 20/20-2
OS_CC: J1+
OS_CC: 20/20-2
OD_CC: J1+

## 2022-08-02 ENCOUNTER — ANNUAL EXAM (OUTPATIENT)
Dept: OBGYN CLINIC | Facility: CLINIC | Age: 37
End: 2022-08-02
Payer: COMMERCIAL

## 2022-08-02 VITALS
DIASTOLIC BLOOD PRESSURE: 68 MMHG | HEIGHT: 71 IN | WEIGHT: 202.2 LBS | SYSTOLIC BLOOD PRESSURE: 116 MMHG | BODY MASS INDEX: 28.31 KG/M2

## 2022-08-02 DIAGNOSIS — Z01.419 WOMEN'S ANNUAL ROUTINE GYNECOLOGICAL EXAMINATION: Primary | ICD-10-CM

## 2022-08-02 DIAGNOSIS — Z97.5 IUD (INTRAUTERINE DEVICE) IN PLACE: ICD-10-CM

## 2022-08-02 PROCEDURE — 99395 PREV VISIT EST AGE 18-39: CPT | Performed by: OBSTETRICS & GYNECOLOGY

## 2022-08-02 PROCEDURE — 0503F POSTPARTUM CARE VISIT: CPT | Performed by: OBSTETRICS & GYNECOLOGY

## 2022-08-02 NOTE — PROGRESS NOTES
Assessment/Plan   Diagnoses and all orders for this visit:    Women's annual routine gynecological examination    IUD (intrauterine device) in place    Other orders  -     Prenatal Vit-Fe Fumarate-FA (PRENATAL VITAMINS PO); Take by mouth    1  Yearly exam-Pap smear deferred, self-breast awareness reviewed  2  Mirena IUD-in good position on exam today  IUD string seen at a length of 1 5-2 cm  She will call or return with any issues  3  Secondary oligomenorrhea-continues with rare bleeding on Mirena IUD  To call or return with any issues  She has had resolution of irregular menses and thickened endometrium that were noted previously  4  Possible adenomyosis-noted on previous ultrasound  She denies any heavy bleeding or cramps, to call with any such symptoms  5  Status post termination-3/4/20    6  History of HSV-in the past, took Valtrex 500 b i d  For 3 days with outbreaks  Has not had any outbreaks for years now, to call with any issues  7  History of depression/anxiety-continues with her sports psychologist, sees him between 2-4 times per month  She is doing well with this continued counseling, off of medications  8  Other-discussed possibly having another child  It seems like she is considering it, her  seems to be less interested in proceeding in that fashion  She will continue this discussion  Should she wish to get pregnant, would suggest she do this in the next few years given advanced maternal age and genetic/fertility concerns  9  Other-status post laser hair removal in her bikini line and axilla  She does note change in her body odor since COVID  Wondering if this might represent some change sense of smell, as her  does not recognize any odor  Children are Oscar[place] Delay, to be age 9 soon, and vera to be age 11 next week  Dietary and exercise were discussed  Patient has had a series of injuries which have impacted her ability to exercise more regularly    Follow-up 1 year for yearly exam as needed  Subjective   Patient ID: Annemarie Miranda is a 29 y o  female      Vitals:    22 1351   BP: 116/68     Patient was seen today for yearly exam   Please see assessment plan for details      The following portions of the patient's history were reviewed and updated as appropriate: allergies, current medications, past family history, past medical history, past social history, past surgical history and problem list   Past Medical History:   Diagnosis Date    Abnormal Pap smear of cervix     Anemia     Depression     Dermatitis      2 para 2     Hashimoto's disease 2017    Herpes     Hirsutism     last assessed: 14    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 thyroiditis     Mild cervical dysplasia     Pregnancy     last assessed: 3/28/17    Subclinical hypothyroidism     last assessed: 14    Urinary tract infection     Varicella     Vitamin D deficiency      Past Surgical History:   Procedure Laterality Date    COLPOSCOPY VULVA W/ BIOPSY      10/1/07 - MALLORY-1 noted at 5:00 and 12:00 ECC was negative    COMBINED HYSTEROSCOPY DIAGNOSTIC / D&C  10/2014    polyp removal    DILATION AND CURETTAGE OF UTERUS      WISDOM TOOTH EXTRACTION       OB History    Para Term  AB Living   3 2 2   1 2   SAB IAB Ectopic Multiple Live Births         0 2      # Outcome Date GA Lbr Trevor/2nd Weight Sex Delivery Anes PTL Lv   3 Term 17 39w0d / 00:59 3799 g (8 lb 6 oz) F Vag-Spont EPI N AQUILES   2 Term 09/03/15 39w2d  3856 g (8 lb 8 oz) M Vag-Spont EPI N AQUILES   1 AB                Current Outpatient Medications:     Cholecalciferol (VITAMIN D3) 50 MCG (2000 UT) capsule, Take 1 capsule (2,000 Units total) by mouth daily, Disp: , Rfl:     levonorgestrel (MIRENA) 20 MCG/24HR IUD, 1 each by Intrauterine route once, Disp: , Rfl:     Prenatal Vit-Fe Fumarate-FA (PRENATAL VITAMINS PO), Take by mouth, Disp: , Rfl:     Synthroid 112 MCG tablet, Take 1 tablet (112 mcg total) by mouth daily, Disp: 90 tablet, Rfl: 3    albuterol (PROVENTIL HFA,VENTOLIN HFA) 90 mcg/act inhaler, Inhale 2 puffs every 4 (four) hours as needed for shortness of breath (Chest tightness, cough) (Patient not taking: Reported on 1/12/2022 ), Disp: 18 g, Rfl: 1    ammonium lactate (LAC-HYDRIN) 12 % cream, , Disp: , Rfl:     Cyanocobalamin (Vitamin B-12) 1000 MCG SUBL, Place 1 tablet under the tongue in the morning (Patient not taking: Reported on 8/2/2022), Disp: , Rfl:     econazole nitrate 1 % cream, , Disp: , Rfl:     gabapentin (NEURONTIN) 100 mg capsule, Take 1 capsule at bedtime for 3 days then increase dose to 2 capsules at bedtime for 3 days, then increase dose up to 3 capsules at bedtime if needed (Patient not taking: Reported on 1/12/2022 ), Disp: 90 capsule, Rfl: 1    ketoconazole (NIZORAL) 2 % shampoo, , Disp: , Rfl:   No Known Allergies  Social History     Socioeconomic History    Marital status: /Civil Union     Spouse name: None    Number of children: 2    Years of education: None    Highest education level: None   Occupational History    None   Tobacco Use    Smoking status: Never Smoker    Smokeless tobacco: Never Used   Vaping Use    Vaping Use: Never used   Substance and Sexual Activity    Alcohol use: Yes     Comment: social drinker as per allscripts    Drug use: No    Sexual activity: Yes     Partners: Male     Birth control/protection: I U D     Other Topics Concern    None   Social History Narrative    Caffeine use    Hindu affiliation Buddhism    Uses seatbelts     Social Determinants of Health     Financial Resource Strain: Not on file   Food Insecurity: Not on file   Transportation Needs: Not on file   Physical Activity: Not on file   Stress: Not on file   Social Connections: Not on file   Intimate Partner Violence: Not on file   Housing Stability: Not on file     Family History   Problem Relation Age of Onset    Stroke Mother     Depression Mother    [employer] Thyroid disease Mother     Depression Maternal Aunt     Diabetes Maternal Aunt     Heart attack Paternal Uncle     Diabetes Other        Review of Systems   Constitutional: Negative for chills, diaphoresis, fatigue and fever  Respiratory: Negative for apnea, cough, chest tightness, shortness of breath and wheezing  Cardiovascular: Negative for chest pain, palpitations and leg swelling  Gastrointestinal: Negative for abdominal distention, abdominal pain, anal bleeding, constipation, diarrhea, nausea, rectal pain and vomiting  Genitourinary: Negative for difficulty urinating, dyspareunia, dysuria, frequency, hematuria, menstrual problem, pelvic pain, urgency, vaginal bleeding, vaginal discharge and vaginal pain  Musculoskeletal: Negative for arthralgias, back pain and myalgias  Skin: Negative for color change and rash  Neurological: Negative for dizziness, syncope, light-headedness, numbness and headaches  Hematological: Negative for adenopathy  Does not bruise/bleed easily  Psychiatric/Behavioral: Negative for dysphoric mood and sleep disturbance  The patient is not nervous/anxious  Objective   Physical Exam  OBGyn Exam     Objective      /68 (BP Location: Left arm, Patient Position: Sitting)   Ht 5' 10 5" (1 791 m)   Wt 91 7 kg (202 lb 3 2 oz)   LMP 06/24/2022   BMI 28 60 kg/m²     General:   alert and oriented, in no acute distress   Neck: normal to inspection and palpation   Breast: normal appearance, no masses or tenderness   Heart:    Lungs:    Abdomen: soft, non-tender, without masses or organomegaly   Vulva: normal   Vagina: Without erythema or lesions or discharge  Normal   Cervix: Without lesions or discharge or cervicitis  No Cervical motion tenderness  IUD string seen at a length of 1 5-2 cm      Uterus: top normal size, anteverted, non-tender   Adnexa: no mass, fullness, tenderness   Rectum: negative    Psych:  Normal mood and affect   Skin:  Without obvious lesions   Eyes: symmetric, with normal movements and reactivity   Musculoskeletal:  Normal muscle tone and movements appreciated

## 2022-11-10 ENCOUNTER — IMMUNIZATIONS (OUTPATIENT)
Dept: FAMILY MEDICINE CLINIC | Facility: CLINIC | Age: 37
End: 2022-11-10

## 2022-11-10 DIAGNOSIS — Z23 INFLUENZA VACCINE NEEDED: Primary | ICD-10-CM

## 2023-02-15 ENCOUNTER — TELEPHONE (OUTPATIENT)
Dept: GYNECOLOGY | Facility: CLINIC | Age: 38
End: 2023-02-15

## 2023-02-15 NOTE — TELEPHONE ENCOUNTER
Talked to pt    I advised her that she should make sure that she wants the IUD removed and should not reschedule if she is not sure    She said it is her  that is being indecisive and she will tell him that he needs to make a descision tonight    patient will call back tomorrow    She knows that we do not want to dismiss her from the practice and that this could result if she misses another appointment  Fili Good

## 2023-02-15 NOTE — TELEPHONE ENCOUNTER
Please contact patient  Has canceled/rescheduled IUD removal 6 times in a row  We cannot continue in this manner     Continued cancellation will result in dismissal from the practice

## 2023-02-22 ENCOUNTER — TELEPHONE (OUTPATIENT)
Dept: GYNECOLOGY | Facility: CLINIC | Age: 38
End: 2023-02-22

## 2023-02-22 NOTE — TELEPHONE ENCOUNTER
Called and left a message for her and followed up and told her that if she does not want the IUD out, she needs to cx her appt at least 1 lazaro prior  Deyanira Carranza

## 2023-03-11 ENCOUNTER — TELEPHONE (OUTPATIENT)
Dept: LAB | Facility: HOSPITAL | Age: 38
End: 2023-03-11

## 2023-03-13 ENCOUNTER — TELEPHONE (OUTPATIENT)
Dept: LAB | Facility: HOSPITAL | Age: 38
End: 2023-03-13

## 2023-03-26 ENCOUNTER — HOSPITAL ENCOUNTER (EMERGENCY)
Facility: HOSPITAL | Age: 38
Discharge: HOME | End: 2023-03-26
Attending: EMERGENCY MEDICINE

## 2023-03-26 VITALS
HEART RATE: 60 BPM | OXYGEN SATURATION: 97 % | DIASTOLIC BLOOD PRESSURE: 77 MMHG | TEMPERATURE: 99 F | SYSTOLIC BLOOD PRESSURE: 125 MMHG | RESPIRATION RATE: 18 BRPM

## 2023-03-26 DIAGNOSIS — L25.9 CONTACT DERMATITIS: Primary | ICD-10-CM

## 2023-03-26 DIAGNOSIS — T14.8XXA BLISTER: ICD-10-CM

## 2023-03-26 RX ORDER — DOXYCYCLINE HYCLATE 100 MG/1
100 CAPSULE ORAL ONCE
Status: COMPLETED | OUTPATIENT
Start: 2023-03-26 | End: 2023-03-26

## 2023-03-26 RX ORDER — DOXYCYCLINE HYCLATE 100 MG/1
100 CAPSULE ORAL 2 TIMES DAILY
Qty: 13 CAPSULE | Refills: 0 | Status: SHIPPED | OUTPATIENT
Start: 2023-03-26 | End: 2023-04-02

## 2023-03-26 RX ADMIN — MUPIROCIN: 20 OINTMENT TOPICAL at 21:14

## 2023-03-26 RX ADMIN — DOXYCYCLINE 100 MG: 100 CAPSULE ORAL at 21:14

## 2023-03-27 NOTE — ED ATTENDING ATTESTATION
3/26/2023  IJonathan MD, saw and evaluated the patient  I have discussed the patient with the resident/non-physician practitioner and agree with the resident's/non-physician practitioner's findings, Plan of Care, and MDM as documented in the resident's/non-physician practitioner's note, except where noted  All available labs and Radiology studies were reviewed  I was present for key portions of any procedure(s) performed by the resident/non-physician practitioner and I was immediately available to provide assistance  At this point I agree with the current assessment done in the Emergency Department  I have conducted an independent evaluation of this patient a history and physical is as follows:    ED Course         Critical Care Time  Procedures    41 yo female with hx of mrsa, having blister on left middle finger and concerned for infection  Pt with some fluid drainage  No fever  Vss, afebrile, lungs cta, rrr, left middle finger with small area of erythema as noted in pic  Mupirocin, po abx

## 2023-03-27 NOTE — DISCHARGE INSTRUCTIONS
"You have been evaluated in the Emergency Department today for a rash/skin infection  Please take your prescribed antibiotics as directed for the full course of the medication  You may take ibuprofen every 6 hours or every 6 hours as needed for pain  Please follow up with your primary care physician as soon as possible  If you do not have a primary doctor, you can call \"[facility]\" to schedule an appointment with one  Please refrain from washing your hands as much as this will make your wounds worse       Return to the Emergency Department if you experience recurrent vomiting, fevers greater than 100 4F, increase in area of redness, warmth around the area, foul smelling discharge from the area, increased tenderness around the area, or any other concerning symptoms  "

## 2023-03-27 NOTE — ED PROVIDER NOTES
History  Chief Complaint   Patient presents with   • Hand Problem     Pt reports having new puppy, increased washing of hands  Started to have cuts to knuckles from excessive hand washing  Started with cut to L middle finger, stated got hot to touch and worse in pain  Developed open wound on R back of hand and R pointer finger  Has history of MRSA/Staph in left leg back in august of 2008  Concern for repeat infection  Patient is a 60-year-old female, past medical history including MRSA, who presents to the emergency department for a rash to her hands  Patient states that she got a new puppy recently who was recently diagnosed with roundworm  She states she has been excessively washing her hands using Karina dish soap  She now has started to develop blisters on her fingers  She has additionally started to use Neosporin and an antiseptic spray on her wounds  Over the past day one of the wounds on her middle left finger has become red and painful  She is concerned that this may be MRSA as she has a history of such  She presents for further evaluation  No fevers or chills  No other complaints or concerns at this time  Prior to Admission Medications   Prescriptions Last Dose Informant Patient Reported? Taking?    Cholecalciferol (VITAMIN D3) 50 MCG (2000 UT) capsule   No No   Sig: Take 1 capsule (2,000 Units total) by mouth daily   Cyanocobalamin (Vitamin B-12) 1000 MCG SUBL   Yes No   Sig: Place 1 tablet under the tongue in the morning   Patient not taking: Reported on 8/2/2022   Prenatal Vit-Fe Fumarate-FA (PRENATAL VITAMINS PO)   Yes No   Sig: Take by mouth   Synthroid 112 MCG tablet   No No   Sig: Take 1 tablet (112 mcg total) by mouth daily   albuterol (PROVENTIL HFA,VENTOLIN HFA) 90 mcg/act inhaler   No No   Sig: Inhale 2 puffs every 4 (four) hours as needed for shortness of breath (Chest tightness, cough)   Patient not taking: Reported on 1/12/2022    ammonium lactate (LAC-HYDRIN) 12 % cream Yes No   econazole nitrate 1 % cream   Yes No   gabapentin (NEURONTIN) 100 mg capsule   No No   Sig: Take 1 capsule at bedtime for 3 days then increase dose to 2 capsules at bedtime for 3 days, then increase dose up to 3 capsules at bedtime if needed   Patient not taking: Reported on 2022    ketoconazole (NIZORAL) 2 % shampoo   Yes No   levonorgestrel (MIRENA) 20 MCG/24HR IUD   Yes No   Si each by Intrauterine route once      Facility-Administered Medications: None       Past Medical History:   Diagnosis Date   • Abnormal Pap smear of cervix    • Anemia    • Depression    • Dermatitis    •  2 para 2    • Hashimoto's disease 2017   • Herpes    • Hirsutism     last assessed: 14   • 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 thyroiditis    • Mild cervical dysplasia    • Pregnancy     last assessed: 3/28/17   • Subclinical hypothyroidism     last assessed: 14   • Urinary tract infection    • Varicella    • Vitamin D deficiency        Past Surgical History:   Procedure Laterality Date   • COLPOSCOPY VULVA W/ BIOPSY      10/1/07 - MALLORY-1 noted at 5:00 and 12:00 ECC was negative   • COMBINED HYSTEROSCOPY DIAGNOSTIC / D&C  10/2014    polyp removal   • DILATION AND CURETTAGE OF UTERUS     • WISDOM TOOTH EXTRACTION         Family History   Problem Relation Age of Onset   • Stroke Mother    • Depression Mother    • Thyroid disease Mother    • Depression Maternal Aunt    • Diabetes Maternal Aunt    • Heart attack Paternal Uncle    • Diabetes Other      I have reviewed and agree with the history as documented      E-Cigarette/Vaping   • E-Cigarette Use Never User      E-Cigarette/Vaping Substances   • Nicotine No    • THC No    • CBD No    • Flavoring No    • Other No    • Unknown No      Social History     Tobacco Use   • Smoking status: Never   • Smokeless tobacco: Never   Vaping Use   • Vaping Use: Never used   Substance Use Topics   • Alcohol use: Yes     Comment: social drinker as per allscripts   • Drug use: No        Review of Systems   Constitutional: Negative for chills and fever  Skin: Positive for color change, rash and wound  All other systems reviewed and are negative  Physical Exam  ED Triage Vitals [03/26/23 2019]   Temperature Pulse Respirations Blood Pressure SpO2   99 °F (37 2 °C) 60 18 125/77 97 %      Temp Source Heart Rate Source Patient Position - Orthostatic VS BP Location FiO2 (%)   Oral Monitor Sitting Left arm --      Pain Score       --             Orthostatic Vital Signs  Vitals:    03/26/23 2019   BP: 125/77   Pulse: 60   Patient Position - Orthostatic VS: Sitting       Physical Exam  Vitals and nursing note reviewed  Constitutional:       General: She is not in acute distress  Appearance: She is well-developed  She is not diaphoretic  HENT:      Head: Normocephalic and atraumatic  Right Ear: External ear normal       Left Ear: External ear normal       Nose: Nose normal    Eyes:      General: Lids are normal  No scleral icterus  Pulmonary:      Effort: Pulmonary effort is normal  No respiratory distress  Musculoskeletal:         General: No deformity  Normal range of motion  Cervical back: Normal range of motion and neck supple  Skin:     General: Skin is warm and dry  Comments: There is a small blister/scab to the dorsum of the right second PIP  There is a small circular wound to the dorsum of the proximal second metacarpal     There is a tender, erythematous, circular wound to the lateral third PIP  No signs of flexor tenosynovitis   Neurological:      General: No focal deficit present  Mental Status: She is alert     Psychiatric:         Mood and Affect: Mood normal          Behavior: Behavior normal                      ED Medications  Medications   mupirocin (BACTROBAN) 2 % ointment ( Topical Given 3/26/23 2114)   doxycycline hyclate (VIBRAMYCIN) capsule 100 mg (100 mg Oral Given 3/26/23 2114)       Diagnostic Studies  Results Reviewed "None                 No orders to display         Procedures  Procedures      ED Course                                       Medical Decision Making  Patient is a 40 y o  female who presents to the ED for a rash to her hands  Patient is nontoxic, well-appearing  Vitals are stable  Rash is consistent with contact dermatitis, possibly superimposed infection (left third digit), likely from excessive handwashing  History and exam findings not consistent with dangerous etiologies of rash such as SJS/TEN, or secondary dangerous causes such as petechial rashes from thrombocytopenia or rickettsial infections  Patient looks well, nontoxic and is tolerating oral intake; no neurologic signs or symptoms; no headache or photophobia or neck pain; no evidence of systemic infection; afebrile; appropriate for initial o/p tx; will discharge    Bactroban and doxycycline given in the emergency department  Prescription for doxycycline sent to pharmacy  Return precautions discussed  Patient verbalized understanding and agreed to plan of care  Plan: Symptomatic treatment, D/C with PCP follow up PRN               Portions of the record may have been created with voice recognition software  Occasional wrong word or \"sound a like\" substitutions may have occurred due to the inherent limitations of voice recognition software  Read the chart carefully and recognize, using context, where substitutions have occurred  Blister: acute illness or injury  Contact dermatitis: acute illness or injury  Risk  Prescription drug management  Disposition  Final diagnoses:   Contact dermatitis   Blister     Time reflects when diagnosis was documented in both MDM as applicable and the Disposition within this note     Time User Action Codes Description Comment    3/26/2023  9:05 PM Clearance Gareth Goldberg [L25 9] Contact dermatitis     3/26/2023  9:05 PM [address], [address]       ED Disposition     ED Disposition " Discharge    Condition   Stable    Date/Time   Sun Mar 26, 2023  9:04 PM    Comment   [facility] discharge to home/self care  Follow-up Information     Follow up With Specialties Details Why Contact Info Additional Information    Blanka Acosta MD [facility]   [address] [phone]       [address] [place] [address] Emergency Department Emergency Medicine  As needed [address] [id number]  [address] [facility] Emergency Department, [address], [address] [phone]          Patient's Medications   Discharge Prescriptions    DOXYCYCLINE HYCLATE (VIBRAMYCIN) 100 MG CAPSULE    Take 1 capsule (100 mg total) by mouth 2 (two) times a day for 7 days       Start Date: 3/26/2023 End Date: 4/2/2023       Order Dose: 100 mg       Quantity: 13 capsule    Refills: 0    MUPIROCIN (BACTROBAN) 2 % OINTMENT    Apply topically 3 (three) times a day       Start Date: 3/26/2023 End Date: --       Order Dose: --       Quantity: 15 g    Refills: 0     No discharge procedures on file  PDMP Review     None           ED Provider  Attending physically available and evaluated Mayda Parlier Barbara CARROLL managed the patient along with the ED Attending      Electronically Signed by         Leonor Barnett DO  03/26/23 5294

## 2023-03-29 ENCOUNTER — LAB (OUTPATIENT)
Dept: LAB | Facility: HOSPITAL | Age: 38
End: 2023-03-29
Attending: INTERNAL MEDICINE

## 2023-03-29 DIAGNOSIS — E06.3 HYPOTHYROIDISM DUE TO HASHIMOTO'S THYROIDITIS: ICD-10-CM

## 2023-03-29 DIAGNOSIS — E03.8 HYPOTHYROIDISM DUE TO HASHIMOTO'S THYROIDITIS: ICD-10-CM

## 2023-03-29 DIAGNOSIS — E55.9 VITAMIN D DEFICIENCY: ICD-10-CM

## 2023-03-29 LAB
25(OH)D3 SERPL-MCNC: 26.5 NG/ML (ref 30–100)
T4 FREE SERPL-MCNC: 0.96 NG/DL (ref 0.76–1.46)
TSH SERPL DL<=0.05 MIU/L-ACNC: 3.97 UIU/ML (ref 0.45–4.5)

## 2023-05-15 DIAGNOSIS — E03.8 HYPOTHYROIDISM DUE TO HASHIMOTO'S THYROIDITIS: ICD-10-CM

## 2023-05-15 DIAGNOSIS — E06.3 HYPOTHYROIDISM DUE TO HASHIMOTO'S THYROIDITIS: ICD-10-CM

## 2023-05-15 RX ORDER — LEVOTHYROXINE SODIUM 125 MCG
125 TABLET ORAL DAILY
Qty: 30 TABLET | Refills: 5 | Status: SHIPPED | OUTPATIENT
Start: 2023-05-15

## 2023-05-16 ENCOUNTER — TELEPHONE (OUTPATIENT)
Dept: ENDOCRINOLOGY | Facility: CLINIC | Age: 38
End: 2023-05-16

## 2023-06-22 ENCOUNTER — OFFICE VISIT (OUTPATIENT)
Dept: FAMILY MEDICINE CLINIC | Facility: CLINIC | Age: 38
End: 2023-06-22
Payer: COMMERCIAL

## 2023-06-22 VITALS
BODY MASS INDEX: 28.55 KG/M2 | WEIGHT: 201.8 LBS | TEMPERATURE: 98 F | DIASTOLIC BLOOD PRESSURE: 80 MMHG | SYSTOLIC BLOOD PRESSURE: 120 MMHG | RESPIRATION RATE: 18 BRPM

## 2023-06-22 DIAGNOSIS — R09.89 SYMPTOMS OF UPPER RESPIRATORY INFECTION (URI): ICD-10-CM

## 2023-06-22 DIAGNOSIS — J02.9 PHARYNGITIS, UNSPECIFIED ETIOLOGY: Primary | ICD-10-CM

## 2023-06-22 LAB
SARS-COV-2 AG UPPER RESP QL IA: NEGATIVE
VALID CONTROL: NORMAL

## 2023-06-22 PROCEDURE — 87147 CULTURE TYPE IMMUNOLOGIC: CPT | Performed by: FAMILY MEDICINE

## 2023-06-22 PROCEDURE — 87811 SARS-COV-2 COVID19 W/OPTIC: CPT | Performed by: FAMILY MEDICINE

## 2023-06-22 PROCEDURE — 99213 OFFICE O/P EST LOW 20 MIN: CPT | Performed by: FAMILY MEDICINE

## 2023-06-22 PROCEDURE — 87070 CULTURE OTHR SPECIMN AEROBIC: CPT | Performed by: FAMILY MEDICINE

## 2023-06-22 RX ORDER — AMOXICILLIN 500 MG/1
500 TABLET, FILM COATED ORAL 3 TIMES DAILY
Qty: 30 TABLET | Refills: 0 | Status: SHIPPED | OUTPATIENT
Start: 2023-06-22 | End: 2023-07-02

## 2023-06-22 NOTE — PROGRESS NOTES
Name: Micky Gregory      : 1985      MRN: [id number]  Encounter Provider: Sivan Burger MD  Encounter Date: 2023   Encounter department: [address] [place] [address]     1  Pharyngitis, unspecified etiology  -     Throat culture  -     amoxicillin (AMOXIL) 500 MG tablet; Take 1 tablet (500 mg total) by mouth 3 (three) times a day for 10 days    2  Symptoms of upper respiratory infection (URI)  -     POCT Rapid Covid Ag        Patient presents for evaluation of URI symptoms within past 4 to 5 days associated with fever, chills and persistent sore throat  COVID test in the office is negative  Throat culture is pending  Lung exam is clear  I advised patient to start empiric amoxicillin 500 mg 3 times daily for 10 days  Follow-up pending updates on throat culture results  Advised Tylenol, ibuprofen, fluids, rest   Subjective      URI symptoms since ,23   No COVID testing at home for the patient, but her daughter with same s/o has reportedly tested negative for COVID and strep  Patient c/o persistent ST, chills,bodyaches, mild cough just started last 2 days- infrequent, no SOB or wheezing  some chest tightness today  Fever is still persisting - up to 101 last night   Throat pain is the worse,appetite is decreased,awallowing is painful- patient is able to swallow and drink    Sore Throat   Associated symptoms include coughing and headaches  Fever  Associated symptoms include chills, coughing, fatigue, headaches, myalgias and a sore throat  Headache    Review of Systems   Constitutional: Positive for appetite change, chills and fatigue  HENT: Positive for sore throat  Respiratory: Positive for cough and chest tightness  As per HPI   Cardiovascular: Negative  Gastrointestinal: Negative  Musculoskeletal: Positive for myalgias  Neurological: Positive for headaches         Past Medical History:   Diagnosis Date   • Abnormal Pap smear of cervix    • Anemia    • Depression    • Dermatitis    •  2 para 2    • Hashimoto's disease 2017   • Herpes    • Hirsutism     last assessed: 14   • 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 thyroiditis    • Mild cervical dysplasia    • Pregnancy     last assessed: 3/28/17   • Subclinical hypothyroidism     last assessed: 14   • Urinary tract infection    • Varicella    • Vitamin D deficiency      Past Surgical History:   Procedure Laterality Date   • COLPOSCOPY VULVA W/ BIOPSY      10/1/07 - MALLORY-1 noted at 5:00 and 12:00 ECC was negative   • COMBINED HYSTEROSCOPY DIAGNOSTIC / D&C  10/2014    polyp removal   • DILATION AND CURETTAGE OF UTERUS     • WISDOM TOOTH EXTRACTION       Family History   Problem Relation Age of Onset   • Stroke Mother    • Depression Mother    • Thyroid disease Mother    • Depression Maternal Aunt    • Diabetes Maternal Aunt    • Heart attack Paternal Uncle    • Diabetes Other      Social History     Socioeconomic History   • Marital status: /Civil Union     Spouse name: None   • Number of children: 2   • Years of education: None   • Highest education level: None   Occupational History   • None   Tobacco Use   • Smoking status: Never   • Smokeless tobacco: Never   Vaping Use   • Vaping Use: Never used   Substance and Sexual Activity   • Alcohol use: Yes     Comment: social drinker as per allscripts   • Drug use: No   • Sexual activity: Yes     Partners: Male     Birth control/protection: I U D     Other Topics Concern   • None   Social History Narrative    Caffeine use    Zoroastrian affiliation Episcopalian    Uses seatbelts     Social Determinants of Health     Financial Resource Strain: Not on file   Food Insecurity: Not on file   Transportation Needs: Not on file   Physical Activity: Not on file   Stress: Not on file   Social Connections: Not on file   Intimate Partner Violence: Not on file   Housing Stability: Not on file     Current Outpatient Medications on File Prior to Visit   Medication Sig   • Cholecalciferol (Vitamin D3) 50 MCG (2000 UT) capsule Take 4000 IU daily   • levonorgestrel (MIRENA) 20 MCG/24HR IUD 1 each by Intrauterine route once   • mupirocin (BACTROBAN) 2 % ointment Apply topically 3 (three) times a day   • Prenatal Vit-Fe Fumarate-FA (PRENATAL VITAMINS PO) Take by mouth   • Synthroid 125 MCG tablet Take 1 tablet (125 mcg total) by mouth daily   • albuterol (PROVENTIL HFA,VENTOLIN HFA) 90 mcg/act inhaler Inhale 2 puffs every 4 (four) hours as needed for shortness of breath (Chest tightness, cough) (Patient not taking: Reported on 1/12/2022)   • Cyanocobalamin (Vitamin B-12) 1000 MCG SUBL Place 1 tablet under the tongue in the morning (Patient not taking: Reported on 8/2/2022)     No Known Allergies  Immunization History   Administered Date(s) Administered   • COVID-19 PFIZER VACCINE 0 3 ML IM 07/27/2021, 08/17/2021   • Influenza Quadrivalent Preservative Free 3 years and older IM 10/28/2015, 01/19/2017   • Influenza, injectable, quadrivalent, preservative free 0 5 mL 11/06/2018, 11/19/2021, 11/10/2022   • Influenza, recombinant, quadrivalent,injectable, preservative free 12/03/2019   • Influenza, seasonal, injectable 01/19/2017   • Tdap 07/06/2015, 07/11/2017       Objective     /80 (BP Location: Left arm, Patient Position: Sitting, Cuff Size: Standard)   Temp 98 °F (36 7 °C) (Temporal)   Resp 18   Wt 91 5 kg (201 lb 12 8 oz)   LMP  (LMP Unknown)   Breastfeeding No   BMI 28 55 kg/m²     Physical Exam  Vitals and nursing note reviewed  Constitutional:       General: She is not in acute distress  Appearance: She is well-developed  She is not ill-appearing  HENT:      Head: Normocephalic and atraumatic  Right Ear: Tympanic membrane normal       Left Ear: Tympanic membrane normal       Nose: Mucosal edema present  Mouth/Throat:      Mouth: Mucous membranes are moist       Pharynx: Uvula midline  Posterior oropharyngeal erythema present   No oropharyngeal exudate  Tonsils: No tonsillar exudate or tonsillar abscesses  2+ on the right  2+ on the left  Eyes:      Conjunctiva/sclera: Conjunctivae normal    Cardiovascular:      Rate and Rhythm: Normal rate and regular rhythm  Heart sounds: Normal heart sounds  Pulmonary:      Effort: Pulmonary effort is normal  No respiratory distress  Breath sounds: Normal breath sounds  No wheezing or rales  Musculoskeletal:      Cervical back: Neck supple  Neurological:      General: No focal deficit present  Mental Status: She is alert and oriented to person, place, and time  Cranial Nerves: No cranial nerve deficit  Deep Tendon Reflexes: Reflexes are normal and symmetric     Psychiatric:         Behavior: Behavior normal        Results for orders placed or performed in visit on 06/22/23   Throat culture    Specimen: Throat   Result Value Ref Range    Throat Culture 3+ Growth of Beta Hemolytic Streptococcus Group C (A)    POCT Rapid Covid Ag   Result Value Ref Range    POCT SARS-CoV-2 Ag Negative Negative    VALID CONTROL Valid        Mart Aguiar MD

## 2023-06-25 LAB — BACTERIA THROAT CULT: ABNORMAL

## 2023-07-21 DIAGNOSIS — E03.8 HYPOTHYROIDISM DUE TO HASHIMOTO'S THYROIDITIS: ICD-10-CM

## 2023-07-21 DIAGNOSIS — E06.3 HYPOTHYROIDISM DUE TO HASHIMOTO'S THYROIDITIS: ICD-10-CM

## 2023-07-21 RX ORDER — LEVOTHYROXINE SODIUM 125 MCG
125 TABLET ORAL DAILY
Qty: 30 TABLET | Refills: 5 | Status: SHIPPED | OUTPATIENT
Start: 2023-07-21

## 2023-07-21 NOTE — TELEPHONE ENCOUNTER
Pt l/m stating she now uses the [facility] in [facility] and asked that Rx be sent to them.

## 2023-08-03 ENCOUNTER — ANNUAL EXAM (OUTPATIENT)
Dept: GYNECOLOGY | Facility: CLINIC | Age: 38
End: 2023-08-03
Payer: COMMERCIAL

## 2023-08-03 VITALS
HEIGHT: 71 IN | DIASTOLIC BLOOD PRESSURE: 66 MMHG | BODY MASS INDEX: 28.39 KG/M2 | WEIGHT: 202.8 LBS | SYSTOLIC BLOOD PRESSURE: 110 MMHG

## 2023-08-03 DIAGNOSIS — Z97.5 IUD (INTRAUTERINE DEVICE) IN PLACE: ICD-10-CM

## 2023-08-03 DIAGNOSIS — Z01.419 WOMEN'S ANNUAL ROUTINE GYNECOLOGICAL EXAMINATION: Primary | ICD-10-CM

## 2023-08-03 PROCEDURE — 99395 PREV VISIT EST AGE 18-39: CPT | Performed by: OBSTETRICS & GYNECOLOGY

## 2023-08-03 NOTE — PROGRESS NOTES
Assessment/Plan   Diagnoses and all orders for this visit:    Women's annual routine gynecological examination    IUD (intrauterine device) in place    1. yearly exam- Pap smear done with reflex HPV, self breast awareness reviewed  2. Mirena IUD- placed 4/6/2020. IUD string seen at a length of 1.5 cm. Patient does plan to remove this in the near future. She was counseled in detail about this process. Encouraged to take ibuprofen prior to the procedure. Return for this when she wishes to do so. 3.  Secondary oligomenorrhea-notes rare bleeding on Mirena IUD. She could expect return of menstrual flow when IUD is removed. 4.  Possible adenomyosis-noted on previous ultrasound. 5. history of HSV- no recent outbreaks. Did take Valtrex in the past for 3 days twice daily with outbreaks, no recent need for this. 6.  History of depression/anxiety-continues to see her sports psychologist weekly with good results. She continues with cognitive therapy, no medications. 7.  Status post laser hair removal in bikini and axillary areas and other areas. Good healing is noted. 8. other- during getting pregnant. To return for IUD removal when she wishes to do so. To review preconceptual information in detail going forward. She wishes to have Pap smear done first to make sure everything is okay. If this is normal, she will plan the removal of IUD shortly after that. Follow-up near future for IUD removal.  Otherwise, follow-up 1 year for yearly exam.    Subjective   Patient ID: Zander Hoffman is a 40 y.o. female. Vitals:    08/03/23 0946   BP: 110/66     Patient was seen today for yearly exam.  Please see assessment plan for details.       The following portions of the patient's history were reviewed and updated as appropriate: allergies, current medications, past family history, past medical history, past social history, past surgical history and problem list.  Past Medical History:   Diagnosis Date   • Abnormal Pap smear of cervix    • Anemia    • Depression    • Dermatitis    •  2 para 2    • Hashimoto's disease 2017   • Herpes    • Hirsutism     last assessed: 14   • 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 thyroiditis    • Mild cervical dysplasia    • Pregnancy     last assessed: 3/28/17   • Subclinical hypothyroidism     last assessed: 14   • Urinary tract infection    • Varicella    • Vitamin D deficiency      Past Surgical History:   Procedure Laterality Date   • COLPOSCOPY VULVA W/ BIOPSY      10/1/07 - MALLORY-1 noted at 5:00 and 12:00 ECC was negative   • COMBINED HYSTEROSCOPY DIAGNOSTIC / D&C  10/2014    polyp removal   • DILATION AND CURETTAGE OF UTERUS     • WISDOM TOOTH EXTRACTION       OB History    Para Term  AB Living   3 2 2   1 2   SAB IAB Ectopic Multiple Live Births         0 2      # Outcome Date GA Lbr Trevor/2nd Weight Sex Delivery Anes PTL Lv   3 Term 17 39w0d / 00:59 3799 g (8 lb 6 oz) F Vag-Spont EPI N AQUILES   2 Term 09/03/15 39w2d  3856 g (8 lb 8 oz) M Vag-Spont EPI N AQUILES   1 AB                Current Outpatient Medications:   •  Cholecalciferol (Vitamin D3) 50 MCG (2000 UT) capsule, Take 4000 IU daily, Disp: , Rfl:   •  levonorgestrel (MIRENA) 20 MCG/24HR IUD, 1 each by Intrauterine route once, Disp: , Rfl:   •  Prenatal Vit-Fe Fumarate-FA (PRENATAL VITAMINS PO), Take by mouth, Disp: , Rfl:   •  Synthroid 125 MCG tablet, Take 1 tablet (125 mcg total) by mouth daily, Disp: 30 tablet, Rfl: 5  •  mupirocin (BACTROBAN) 2 % ointment, Apply topically 3 (three) times a day (Patient not taking: Reported on 8/3/2023), Disp: 15 g, Rfl: 0  No Known Allergies  Social History     Socioeconomic History   • Marital status: /Civil Union     Spouse name: None   • Number of children: 2   • Years of education: None   • Highest education level: None   Occupational History   • None   Tobacco Use   • Smoking status: Never   • Smokeless tobacco: Never   Vaping Use   • Vaping Use: Never used   Substance and Sexual Activity   • Alcohol use: Yes     Comment: social drinker as per allscripts   • Drug use: No   • Sexual activity: Yes     Partners: Male     Birth control/protection: I.U.D. Other Topics Concern   • None   Social History Narrative    Caffeine use    Mandaeism affiliation Jain    Uses seatbelts     Social Determinants of Health     Financial Resource Strain: Not on file   Food Insecurity: Not on file   Transportation Needs: Not on file   Physical Activity: Not on file   Stress: Not on file   Social Connections: Not on file   Intimate Partner Violence: Not on file   Housing Stability: Not on file     Family History   Problem Relation Age of Onset   • Stroke Mother    • Depression Mother    • Thyroid disease Mother    • Hypertension Father    • Depression Maternal Aunt    • Diabetes Maternal Aunt    • Heart attack Paternal Uncle    • Diabetes Other        Review of Systems   Constitutional: Negative for chills, diaphoresis, fatigue and fever. Respiratory: Negative for apnea, cough, chest tightness, shortness of breath and wheezing. Cardiovascular: Negative for chest pain, palpitations and leg swelling. Gastrointestinal: Negative for abdominal distention, abdominal pain, anal bleeding, constipation, diarrhea, nausea, rectal pain and vomiting. Genitourinary: Negative for difficulty urinating, dyspareunia, dysuria, frequency, hematuria, menstrual problem, pelvic pain, urgency, vaginal bleeding, vaginal discharge and vaginal pain. Musculoskeletal: Negative for arthralgias, back pain and myalgias. Skin: Negative for color change and rash. Neurological: Negative for dizziness, syncope, light-headedness, numbness and headaches. Hematological: Negative for adenopathy. Does not bruise/bleed easily. Psychiatric/Behavioral: Negative for dysphoric mood and sleep disturbance. The patient is not nervous/anxious.         Objective   Physical Exam  OBGyn Exam     Objective /66 (BP Location: Left arm, Patient Position: Sitting)   Ht 5' 11" (1.803 m)   Wt 92 kg (202 lb 12.8 oz)   LMP 07/18/2023   BMI 28.28 kg/m²     General:   alert and oriented, in no acute distress   Neck: normal to inspection and palpation   Breast: normal appearance, no masses or tenderness   Heart:    Lungs:    Abdomen: soft, non-tender, without masses or organomegaly   Vulva: normal   Vagina: Without erythema or lesions or discharge. Normal   Cervix: Without lesions or discharge or cervicitis. No Cervical motion tenderness.   IUD string seen at a length of 1.5 cm   Uterus: top normal size, anteverted, non-tender   Adnexa: no mass, fullness, tenderness   Rectum: negative    Psych:  Normal mood and affect   Skin:  Without obvious lesions   Eyes: symmetric, with normal movements and reactivity   Musculoskeletal:  Normal muscle tone and movements appreciated

## 2023-08-10 LAB
CYTOLOGIST CVX/VAG CYTO: NORMAL
DX ICD CODE: NORMAL
OTHER STN SPEC: NORMAL
OTHER STN SPEC: NORMAL
PATH REPORT.FINAL DX SPEC: NORMAL
SL AMB NOTE:: NORMAL
SL AMB SPECIMEN ADEQUACY: NORMAL
SL AMB TEST METHODOLOGY: NORMAL

## 2023-08-24 ENCOUNTER — PROCEDURE VISIT (OUTPATIENT)
Dept: GYNECOLOGY | Facility: CLINIC | Age: 38
End: 2023-08-24
Payer: COMMERCIAL

## 2023-08-24 VITALS
HEIGHT: 71 IN | WEIGHT: 204.4 LBS | SYSTOLIC BLOOD PRESSURE: 120 MMHG | DIASTOLIC BLOOD PRESSURE: 72 MMHG | BODY MASS INDEX: 28.61 KG/M2

## 2023-08-24 DIAGNOSIS — Z30.432 ENCOUNTER FOR IUD REMOVAL: Primary | ICD-10-CM

## 2023-08-24 PROCEDURE — 58301 REMOVE INTRAUTERINE DEVICE: CPT | Performed by: OBSTETRICS & GYNECOLOGY

## 2023-08-24 NOTE — PROGRESS NOTES
Iud removal    Date/Time: 8/24/2023 2:51 PM    Performed by: Milton Elder MD  Authorized by: Milton Elder MD  Universal Protocol:  Consent: Verbal consent obtained. Written consent obtained. Risks and benefits: risks, benefits and alternatives were discussed  Consent given by: patient  Time out: Immediately prior to procedure a "time out" was called to verify the correct patient, procedure, equipment, support staff and site/side marked as required. Timeout called at: 8/24/2023 2:51 PM.  Patient understanding: patient states understanding of the procedure being performed  Patient consent: the patient's understanding of the procedure matches consent given  Procedure consent: procedure consent matches procedure scheduled  Relevant documents: relevant documents present and verified  Test results: test results available and properly labeled  Patient identity confirmed: verbally with patient      Procedure:     Removed with no complications: yes      Other reason for removal:  Desired fertility  Comments:      IUD string seen at a length of 1.5 cm. Grasped with Ami clamp, removed and 5 seconds without difficulty. Patient tolerated procedure well.

## 2023-08-24 NOTE — PROGRESS NOTES
Assessment/Plan   Diagnoses and all orders for this visit:    Encounter for IUD removal  -     Iud removal    1. Mirena IUD removal- dates 2020, removed today due to desired fertility. She did well in the IUD and she will consider it in the future for contraception. 2.  Secondary oligomenorrhea- noted rare bleeding on Mirena IUD. Should expect return of menses in the near future. 3.  Possible adenomyosis- noted on previous ultrasound, denies any concerns. 4.  History of HSV-no recent outbreaks. Did take Valtrex in the past for 3 days twice daily with any outbreaks. 5. history depression/anxiety-continues to see her sports psychologist with good results. Continues with cognitive therapy as well. 6. status post laser hair removal in bikini and axillary areas-doing well. 7.  Preconceptual- planning to get pregnant. Taking vitamin with folic acid. Recommend avoidance of smoking, drinking, drug use. Recommend up-to-date with vaccinations. Denies any birth defects in her partner's family. Counseled that not deliver babies through this practice anymore, given the OB transition sheet. Follow-up 2024 for yearly exam or as needed. Subjective   Patient ID: Kaylyn Zavala is a 40 y.o. female.     Vitals:    23 1447   BP: 120/72     HPI    The following portions of the patient's history were reviewed and updated as appropriate: allergies, current medications, past family history, past medical history, past social history, past surgical history and problem list.  Past Medical History:   Diagnosis Date   • Abnormal Pap smear of cervix    • Anemia    • Depression    • Dermatitis    •  2 para 2    • Hashimoto's disease 2017   • Herpes    • Hirsutism     last assessed: 14   • 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 thyroiditis    • Mild cervical dysplasia    • Pregnancy     last assessed: 3/28/17   • Subclinical hypothyroidism     last assessed: 14   • Urinary tract infection    • Varicella    • Vitamin D deficiency      Past Surgical History:   Procedure Laterality Date   • COLPOSCOPY VULVA W/ BIOPSY      10/1/07 - MALLORY-1 noted at 5:00 and 12:00 ECC was negative   • COMBINED HYSTEROSCOPY DIAGNOSTIC / D&C  10/2014    polyp removal   • DILATION AND CURETTAGE OF UTERUS     • WISDOM TOOTH EXTRACTION       OB History    Para Term  AB Living   3 2 2   1 2   SAB IAB Ectopic Multiple Live Births         0 2      # Outcome Date GA Lbr Trevor/2nd Weight Sex Delivery Anes PTL Lv   3 Term 17 39w0d / 00:59 3799 g (8 lb 6 oz) F Vag-Spont EPI N AQUILES   2 Term 09/03/15 39w2d  3856 g (8 lb 8 oz) M Vag-Spont EPI N AQUILES   1 AB                Current Outpatient Medications:   •  Cholecalciferol (Vitamin D3) 50 MCG (2000 UT) capsule, Take 4000 IU daily, Disp: , Rfl:   •  levonorgestrel (MIRENA) 20 MCG/24HR IUD, 1 each by Intrauterine route once, Disp: , Rfl:   •  Prenatal Vit-Fe Fumarate-FA (PRENATAL VITAMINS PO), Take by mouth, Disp: , Rfl:   •  Synthroid 125 MCG tablet, Take 1 tablet (125 mcg total) by mouth daily, Disp: 30 tablet, Rfl: 5  •  mupirocin (BACTROBAN) 2 % ointment, Apply topically 3 (three) times a day (Patient not taking: Reported on 8/3/2023), Disp: 15 g, Rfl: 0  No Known Allergies  Social History     Socioeconomic History   • Marital status: /Civil Union     Spouse name: None   • Number of children: 2   • Years of education: None   • Highest education level: None   Occupational History   • None   Tobacco Use   • Smoking status: Never   • Smokeless tobacco: Never   Vaping Use   • Vaping Use: Never used   Substance and Sexual Activity   • Alcohol use: Yes     Comment: social drinker as per allscripts   • Drug use: No   • Sexual activity: Yes     Partners: Male     Birth control/protection: I.U.D.    Other Topics Concern   • None   Social History Narrative    Caffeine use    Mormonism affiliation Adventism    Uses seatbelts     Social Determinants of Health     Financial Resource Strain: Not on file   Food Insecurity: Not on file   Transportation Needs: Not on file   Physical Activity: Not on file   Stress: Not on file   Social Connections: Not on file   Intimate Partner Violence: Not on file   Housing Stability: Not on file     Family History   Problem Relation Age of Onset   • Stroke Mother    • Depression Mother    • Thyroid disease Mother    • Hypertension Father    • Depression Maternal Aunt    • Diabetes Maternal Aunt    • Heart attack Paternal Uncle    • Diabetes Other        Review of Systems    Objective   Physical Exam  OBGyn Exam     Objective      /72 (BP Location: Left arm, Patient Position: Sitting)   Ht 5' 11" (1.803 m)   Wt 92.7 kg (204 lb 6.4 oz)   LMP 07/18/2023   BMI 28.51 kg/m²     General:   alert and oriented, in no acute distress   Neck:    Breast:    Heart:    Lungs:    Abdomen: soft, non-tender, without masses or organomegaly   Vulva: normal   Vagina: Without erythema or lesions or discharge. Normal   Cervix: Without lesions or discharge or cervicitis.   No Cervical motion tenderness   Uterus: top normal size, anteverted, non-tender   Adnexa: no mass, fullness, tenderness   Rectum: deferred    Psych:  Normal mood and affect   Skin:  Without obvious lesions   Eyes: symmetric, with normal movements and reactivity   Musculoskeletal:  Normal muscle tone and movements appreciated

## 2023-09-11 ENCOUNTER — OFFICE VISIT (OUTPATIENT)
Dept: FAMILY MEDICINE CLINIC | Facility: CLINIC | Age: 38
End: 2023-09-11
Payer: COMMERCIAL

## 2023-09-11 VITALS
TEMPERATURE: 98.4 F | RESPIRATION RATE: 16 BRPM | HEART RATE: 80 BPM | DIASTOLIC BLOOD PRESSURE: 60 MMHG | HEIGHT: 71 IN | SYSTOLIC BLOOD PRESSURE: 110 MMHG | OXYGEN SATURATION: 97 % | WEIGHT: 203 LBS | BODY MASS INDEX: 28.42 KG/M2

## 2023-09-11 DIAGNOSIS — J32.9 SINUSITIS, UNSPECIFIED CHRONICITY, UNSPECIFIED LOCATION: Primary | ICD-10-CM

## 2023-09-11 PROCEDURE — 99213 OFFICE O/P EST LOW 20 MIN: CPT | Performed by: NURSE PRACTITIONER

## 2023-09-11 RX ORDER — AMOXICILLIN AND CLAVULANATE POTASSIUM 875; 125 MG/1; MG/1
1 TABLET, FILM COATED ORAL EVERY 12 HOURS SCHEDULED
Qty: 14 TABLET | Refills: 0 | Status: SHIPPED | OUTPATIENT
Start: 2023-09-11 | End: 2023-09-18

## 2023-09-11 NOTE — PROGRESS NOTES
FAMILY PRACTICE OFFICE VISIT       NAME: Gladys Jain  AGE: 40 y.o. SEX: female       : 1985        MRN: [id number]    Assessment and Plan   1. Sinusitis, unspecified chronicity, unspecified location  -     amoxicillin-clavulanate (AUGMENTIN) 875-125 mg per tablet; Take 1 tablet by mouth every 12 (twelve) hours for 7 days       Start Augmentin 1 tablet twice daily with food for 7 days. Call if symptoms are not improving by the end of the week. Chief Complaint     Chief Complaint   Patient presents with   • Cold Like Symptoms     Cough, achey, sore throat, sinus pressure 10 + days       History of Present Illness     Gladys Jain is a 40year old female presenting today for URI symptoms. Pain in upper jaw, teeth. Rhinorrhea, cough, congestion for last 10 days. Achy, tired. Headaches and sinus pressure. Clear to green mucous. Did home COVID-19 test and it was negative. Was taking Dayquil and Nyquil last week, not really helping. No ear pain. Sore throat initially, now resolved. Review of Systems   Review of Systems   Constitutional: Positive for fatigue. Negative for chills, diaphoresis and fever. HENT: Positive for congestion, postnasal drip, rhinorrhea, sinus pressure, sinus pain and sore throat. Negative for ear pain. Respiratory: Positive for cough. Cardiovascular: Negative. Gastrointestinal: Negative. Neurological: Positive for headaches. I have reviewed the patient's medical history in detail; there are no changes to the history as noted in the electronic medical record.     Objective     Vitals:    23 1208   BP: 110/60   Pulse: 80   Resp: 16   Temp: 98.4 °F (36.9 °C)   TempSrc: Temporal   SpO2: 97%   Weight: 92.1 kg (203 lb)   Height: 5' 11" (1.803 m)     Wt Readings from Last 3 Encounters:   23 92.1 kg (203 lb)   23 92.7 kg (204 lb 6.4 oz)   23 92 kg (202 lb 12.8 oz)     Physical Exam  Vitals and nursing note reviewed. Constitutional:       General: She is not in acute distress. Appearance: Normal appearance. She is not ill-appearing. HENT:      Head: Atraumatic. Right Ear: Tympanic membrane normal.      Left Ear: Tympanic membrane normal.      Nose: Congestion and rhinorrhea present. Mouth/Throat:      Comments: Clear to white post nasal drip  Cardiovascular:      Rate and Rhythm: Normal rate and regular rhythm. Heart sounds: No murmur heard. Pulmonary:      Effort: Pulmonary effort is normal.      Breath sounds: Normal breath sounds. Musculoskeletal:      Cervical back: Normal range of motion and neck supple. Right lower leg: No edema. Left lower leg: No edema. Lymphadenopathy:      Cervical: No cervical adenopathy. Neurological:      Mental Status: She is alert. Psychiatric:         Mood and Affect: Mood normal.         Depression Screening and Follow-up Plan: Patient was screened for depression during today's encounter. They screened negative with a PHQ-2 score of 0. ALLERGIES:  No Known Allergies    Current Medications     Current Outpatient Medications   Medication Sig Dispense Refill   • amoxicillin-clavulanate (AUGMENTIN) 875-125 mg per tablet Take 1 tablet by mouth every 12 (twelve) hours for 7 days 14 tablet 0   • Prenatal Vit-Fe Fumarate-FA (PRENATAL VITAMINS PO) Take by mouth     • Synthroid 125 MCG tablet Take 1 tablet (125 mcg total) by mouth daily 30 tablet 5     No current facility-administered medications for this visit.          Health Maintenance     Health Maintenance   Topic Date Due   • Hepatitis C Screening  Never done   • COVID-19 Vaccine (3 - Pfizer series) 10/12/2021   • BMI: Followup Plan  03/06/2022   • Influenza Vaccine (1) 09/01/2023   • Annual Physical  08/03/2024   • Depression Screening  09/11/2024   • BMI: Adult  09/11/2024   • DTaP,Tdap,and Td Vaccines (3 - Td or Tdap) 07/11/2027   • Cervical Cancer Screening  08/03/2028 • HIV Screening  Completed   • Pneumococcal Vaccine: Pediatrics (0 to 5 Years) and At-Risk Patients (6 to 59 Years)  Aged Out   • HIB Vaccine  Aged Out   • IPV Vaccine  Aged Out   • Hepatitis A Vaccine  Aged Out   • Meningococcal ACWY Vaccine  Aged Out   • HPV Vaccine  Aged [employer] History   Administered Date(s) Administered   • COVID-19 PFIZER VACCINE 0.3 ML IM 07/27/2021, 08/17/2021   • INFLUENZA 11/10/2022   • Influenza Quadrivalent Preservative Free 3 years and older IM 10/28/2015, 01/19/2017   • Influenza, injectable, quadrivalent, preservative free 0.5 mL 11/06/2018, 11/19/2021, 11/10/2022   • Influenza, recombinant, quadrivalent,injectable, preservative free 12/03/2019   • Influenza, seasonal, injectable 01/19/2017   • Tdap 07/06/2015, 07/11/2017       YOUNG Riley

## 2023-09-14 ENCOUNTER — TELEPHONE (OUTPATIENT)
Dept: FAMILY MEDICINE CLINIC | Facility: CLINIC | Age: 38
End: 2023-09-14

## 2023-09-14 NOTE — TELEPHONE ENCOUNTER
Pt calling, was seen Monday by Lore and diagnosed with sinusitis. Is on 12 days of being sick with constant cough, states her  is sick too and just had diagnosis of pneumonia after xray. She wants to know if she should be checked for this or just continue with finishing Augmentin.  Please advise, thank you

## 2023-09-14 NOTE — TELEPHONE ENCOUNTER
If patient is improving on Augmentin,and  if she is afebrile-I believe it would be prudent to complete 7-day course of antibiotic prior to proceeding with any further testing or med changes. Augmentin is indicated for treatment of sinusitis or pneumonia, so patient should be covered. She was just seen Monday. If patient would like to come for reevaluation-we are happy to see her.   Thank you

## 2023-10-16 ENCOUNTER — APPOINTMENT (OUTPATIENT)
Dept: LAB | Facility: CLINIC | Age: 38
End: 2023-10-16
Payer: COMMERCIAL

## 2023-10-16 DIAGNOSIS — E06.3 HYPOTHYROIDISM DUE TO HASHIMOTO'S THYROIDITIS: ICD-10-CM

## 2023-10-16 DIAGNOSIS — E03.8 HYPOTHYROIDISM DUE TO HASHIMOTO'S THYROIDITIS: ICD-10-CM

## 2023-10-16 DIAGNOSIS — E55.9 VITAMIN D DEFICIENCY: ICD-10-CM

## 2023-10-16 LAB
25(OH)D3 SERPL-MCNC: 37.4 NG/ML (ref 30–100)
T4 FREE SERPL-MCNC: 1.2 NG/DL (ref 0.61–1.12)
TSH SERPL DL<=0.05 MIU/L-ACNC: 0.14 UIU/ML (ref 0.45–4.5)

## 2023-10-16 PROCEDURE — 84439 ASSAY OF FREE THYROXINE: CPT

## 2023-10-16 PROCEDURE — 36415 COLL VENOUS BLD VENIPUNCTURE: CPT

## 2023-10-16 PROCEDURE — 84443 ASSAY THYROID STIM HORMONE: CPT

## 2023-10-16 PROCEDURE — 82306 VITAMIN D 25 HYDROXY: CPT

## 2023-10-17 ENCOUNTER — OFFICE VISIT (OUTPATIENT)
Dept: ENDOCRINOLOGY | Facility: CLINIC | Age: 38
End: 2023-10-17
Payer: COMMERCIAL

## 2023-10-17 VITALS
DIASTOLIC BLOOD PRESSURE: 76 MMHG | BODY MASS INDEX: 28.31 KG/M2 | SYSTOLIC BLOOD PRESSURE: 124 MMHG | HEART RATE: 50 BPM | WEIGHT: 203 LBS | OXYGEN SATURATION: 97 %

## 2023-10-17 DIAGNOSIS — E53.8 VITAMIN B 12 DEFICIENCY: Primary | ICD-10-CM

## 2023-10-17 DIAGNOSIS — E03.8 HYPOTHYROIDISM DUE TO HASHIMOTO'S THYROIDITIS: ICD-10-CM

## 2023-10-17 DIAGNOSIS — E06.3 HYPOTHYROIDISM DUE TO HASHIMOTO'S THYROIDITIS: ICD-10-CM

## 2023-10-17 DIAGNOSIS — E55.9 VITAMIN D DEFICIENCY: ICD-10-CM

## 2023-10-17 DIAGNOSIS — F41.9 ANXIETY: ICD-10-CM

## 2023-10-17 PROCEDURE — 99214 OFFICE O/P EST MOD 30 MIN: CPT | Performed by: INTERNAL MEDICINE

## 2023-10-17 RX ORDER — LEVOTHYROXINE SODIUM 125 MCG
TABLET ORAL
Qty: 30 TABLET | Refills: 5
Start: 2023-10-17

## 2023-10-17 NOTE — PROGRESS NOTES
Arik Tavares 40 y.o. female MRN: [id number]    Encounter: [id number]      Assessment/Plan     Assessment: This is a 40y.o.-year-old female with hypothyroidism. Plan:    Diagnoses and all orders for this visit:    Vitamin B 12 deficiency  Vitamin B 12 337   Discussed to take Vitamin B12, 500 mcg po daily   Repeat vitamin B12 in 6 weeks   -     Vitamin B12; Future  -     Vitamin B12; Future    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 thyroiditis    Lab Results   Component Value Date    [id number] 0.139 (L) 10/16/2023    TSH 1.98 06/06/2019   TSH is low, goal is 0.5-2.0   Will reduce the dose of Synthroid as following   Repeat TSH and free t4 in 6 weeks   Follow up in 6 months   Educated to call office if she gets pregnant as she will need adjutment in synthroid dose     -     Synthroid 125 MCG tablet; Take one tablet daily from Mon to sat and 1/2 tablet on Sunday  -     T4, free; Future  -     TSH, 3rd generation; Future  -     T4, free; Future  -     TSH, 3rd generation; Future    Vitamin D deficiency  Take vitamin D3, 1000 IU daily   -     Vitamin D 25 hydroxy; Future    Anxiety  Symptoms well controlled          CC:   Hypothyroidism     History of Present Illness     HPI:    Arik Tavares  is 40 yr old woman with medical HX of hypothyroidism, Vitamin D Deficiency is here for follow up.      For hypothyroidism, she takes synthroid 125  mcg po daily, 1 tab 1 hour before breakfast.  She is not on Mirena   She is planning to get pregnant,     She takes synthroid on empty stomach 1 hour before breakfast   She denies missing any doses   Denies taking OTC herbal suppliment, biotin suppliment     Lab Results   Component Value Date    [id number] 0.139 (L) 10/16/2023    TSH 1.98 06/06/2019          Component      Latest Ref Rng 10/16/2023   Free T4      0.61 - 1.12 ng/dL 1.20 (H)    TSH 3RD GENERATON      0.450 - 4.500 uIU/mL 0.139 (L)    Vit D, 25-Hydroxy      30.0 - 100.0 ng/mL 37.4         Wt Readings from Last 3 Encounters:   10/17/23 92.1 kg (203 lb)   23 92.1 kg (203 lb)   23 92.7 kg (204 lb 6.4 oz)      Lab Results   Component Value Date    [id number] 0.139 (L) 10/16/2023    TSH 1.98 2019     Component      Latest Ref Rng 10/16/2023   Free T4      0.61 - 1.12 ng/dL 1.20 (H)    TSH 3RD GENERATON      0.450 - 4.500 uIU/mL 0.139 (L)    Vit D, 25-Hydroxy      30.0 - 100.0 ng/mL 37.4             Review of Systems   Constitutional:  Negative for activity change, diaphoresis, fatigue, fever and unexpected weight change. HENT: Negative. Eyes:  Negative for visual disturbance. Respiratory:  Negative for cough, chest tightness and shortness of breath. Cardiovascular:  Negative for chest pain, palpitations and leg swelling. Gastrointestinal:  Negative for abdominal pain, blood in stool, constipation, diarrhea, nausea and vomiting. Endocrine: Negative for cold intolerance, heat intolerance, polydipsia, polyphagia and polyuria. Genitourinary:  Negative for dysuria, enuresis, frequency and urgency. Musculoskeletal:  Negative for arthralgias and myalgias. Skin:  Negative for pallor, rash and wound. Allergic/Immunologic: Negative. Neurological:  Negative for dizziness, tremors, weakness and numbness. Hematological: Negative. Psychiatric/Behavioral: Negative.          Historical Information   Past Medical History:   Diagnosis Date    Abnormal Pap smear of cervix     Anemia     Depression     Dermatitis      2 para 2     Hashimoto's disease 2017    Herpes     Hirsutism     last assessed: 14    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 thyroiditis     Mild cervical dysplasia     Pregnancy     last assessed: 3/28/17    Subclinical hypothyroidism     last assessed: 14    Urinary tract infection     Varicella     Vitamin D deficiency      Past Surgical History:   Procedure Laterality Date    COLPOSCOPY VULVA W/ BIOPSY      10/1/07 - MALLORY-1 noted at 5:00 and 12:00 ECC was negative    COMBINED HYSTEROSCOPY DIAGNOSTIC / D&C  10/2014    polyp removal    DILATION AND CURETTAGE OF UTERUS      WISDOM TOOTH EXTRACTION       Social History   Social History     Substance and Sexual Activity   Alcohol Use Yes    Comment: social drinker as per allscripts     Social History     Substance and Sexual Activity   Drug Use No     Social History     Tobacco Use   Smoking Status Never   Smokeless Tobacco Never     Family History:   Family History   Problem Relation Age of Onset    Stroke Mother     Depression Mother     Thyroid disease Mother     Hypertension Father     Depression Maternal Aunt     Diabetes Maternal Aunt     Heart attack Paternal Uncle     Diabetes Other        Meds/Allergies   Current Outpatient Medications   Medication Sig Dispense Refill    Prenatal Vit-Fe Fumarate-FA (PRENATAL VITAMINS PO) Take by mouth      Synthroid 125 MCG tablet Take one tablet daily from Mon to sat and 1/2 tablet on Sunday 30 tablet 5     No current facility-administered medications for this visit. No Known Allergies    Objective   Vitals: Blood pressure 124/76, pulse (!) 50, weight 92.1 kg (203 lb), SpO2 97 %, not currently breastfeeding. Physical Exam  Vitals reviewed. Constitutional:       General: She is not in acute distress. Appearance: Normal appearance. She is not ill-appearing. HENT:      Head: Normocephalic and atraumatic. Nose: Nose normal.   Eyes:      Extraocular Movements: Extraocular movements intact. Conjunctiva/sclera: Conjunctivae normal.   Pulmonary:      Effort: No respiratory distress. Musculoskeletal:      Cervical back: Normal range of motion. Neurological:      General: No focal deficit present. Mental Status: She is alert and oriented to person, place, and time. Psychiatric:         Mood and Affect: Mood normal.         Behavior: Behavior normal.         The history was obtained from the review of the chart, patient.     Lab Results:   Lab Results Component Value Date/Time    TSH 3RD GENERATON 0.139 (L) 10/16/2023 12:06 PM    TSH 3RD GENERATON 3.973 03/29/2023 06:43 AM    Free T4 1.20 (H) 10/16/2023 12:06 PM    Free T4 0.96 03/29/2023 06:43 AM       Imaging Studies:       I have personally reviewed pertinent reports. Portions of the record may have been created with voice recognition software. Occasional wrong word or "sound a like" substitutions may have occurred due to the inherent limitations of voice recognition software. Read the chart carefully and recognize, using context, where substitutions have occurred.

## 2023-10-30 ENCOUNTER — TELEPHONE (OUTPATIENT)
Dept: GYNECOLOGY | Facility: CLINIC | Age: 38
End: 2023-10-30

## 2023-10-31 ENCOUNTER — TELEPHONE (OUTPATIENT)
Dept: GYNECOLOGY | Facility: CLINIC | Age: 38
End: 2023-10-31

## 2023-10-31 DIAGNOSIS — N91.2 AMENORRHEA: Primary | ICD-10-CM

## 2023-10-31 NOTE — TELEPHONE ENCOUNTER
Could do CBC and hCG. Would normally get TSH as well, but of endocrinologist is following up then they should take care of that.   Could come for exam if findings persist.

## 2023-10-31 NOTE — TELEPHONE ENCOUNTER
Patient called to report her Cycle started today. She questions if she should still go for a CBC. Per Dr. Lela Quintero, left message for patient that is okay to skip the HCG and CBC. Advised keep track of cycle. Call back if irregular.

## 2023-12-15 ENCOUNTER — TELEPHONE (OUTPATIENT)
Dept: GYNECOLOGY | Facility: CLINIC | Age: 38
End: 2023-12-15

## 2023-12-15 DIAGNOSIS — R30.0 DYSURIA: Primary | ICD-10-CM

## 2023-12-15 NOTE — TELEPHONE ENCOUNTER
Patient called this morning with c/o tingling sensation after voiding, patient stated "it just doesn't feel right."

## 2023-12-15 NOTE — PROGRESS NOTES
Patient called with discomfort with urination. Urinalysis and urine culture were placed into the system, encouraged to get this done. To follow-up accordingly.

## 2023-12-16 ENCOUNTER — LAB (OUTPATIENT)
Dept: LAB | Facility: CLINIC | Age: 38
End: 2023-12-16
Payer: COMMERCIAL

## 2023-12-16 DIAGNOSIS — N91.2 AMENORRHEA: ICD-10-CM

## 2023-12-16 DIAGNOSIS — E03.8 HYPOTHYROIDISM DUE TO HASHIMOTO'S THYROIDITIS: ICD-10-CM

## 2023-12-16 DIAGNOSIS — R30.0 DYSURIA: ICD-10-CM

## 2023-12-16 DIAGNOSIS — E53.8 VITAMIN B 12 DEFICIENCY: ICD-10-CM

## 2023-12-16 DIAGNOSIS — E06.3 HYPOTHYROIDISM DUE TO HASHIMOTO'S THYROIDITIS: ICD-10-CM

## 2023-12-16 LAB
AMORPH URATE CRY URNS QL MICRO: ABNORMAL
B-HCG SERPL-ACNC: <1 MIU/ML (ref 0–5)
BACTERIA UR QL AUTO: ABNORMAL /HPF
BASOPHILS # BLD AUTO: 0.03 THOUSANDS/ÂΜL (ref 0–0.1)
BASOPHILS NFR BLD AUTO: 1 % (ref 0–1)
BILIRUB UR QL STRIP: NEGATIVE
CLARITY UR: CLEAR
COLOR UR: ABNORMAL
EOSINOPHIL # BLD AUTO: 0.09 THOUSAND/ÂΜL (ref 0–0.61)
EOSINOPHIL NFR BLD AUTO: 2 % (ref 0–6)
ERYTHROCYTE [DISTWIDTH] IN BLOOD BY AUTOMATED COUNT: 13 % (ref 11.6–15.1)
GLUCOSE UR STRIP-MCNC: NEGATIVE MG/DL
HCT VFR BLD AUTO: 40.1 % (ref 34.8–46.1)
HGB BLD-MCNC: 13.3 G/DL (ref 11.5–15.4)
HGB UR QL STRIP.AUTO: ABNORMAL
IMM GRANULOCYTES # BLD AUTO: 0 THOUSAND/UL (ref 0–0.2)
IMM GRANULOCYTES NFR BLD AUTO: 0 % (ref 0–2)
KETONES UR STRIP-MCNC: NEGATIVE MG/DL
LEUKOCYTE ESTERASE UR QL STRIP: ABNORMAL
LYMPHOCYTES # BLD AUTO: 2.07 THOUSANDS/ÂΜL (ref 0.6–4.47)
LYMPHOCYTES NFR BLD AUTO: 42 % (ref 14–44)
MCH RBC QN AUTO: 29.8 PG (ref 26.8–34.3)
MCHC RBC AUTO-ENTMCNC: 33.2 G/DL (ref 31.4–37.4)
MCV RBC AUTO: 90 FL (ref 82–98)
MONOCYTES # BLD AUTO: 0.29 THOUSAND/ÂΜL (ref 0.17–1.22)
MONOCYTES NFR BLD AUTO: 6 % (ref 4–12)
NEUTROPHILS # BLD AUTO: 2.42 THOUSANDS/ÂΜL (ref 1.85–7.62)
NEUTS SEG NFR BLD AUTO: 49 % (ref 43–75)
NITRITE UR QL STRIP: POSITIVE
NON-SQ EPI CELLS URNS QL MICRO: ABNORMAL /HPF
NRBC BLD AUTO-RTO: 0 /100 WBCS
PH UR STRIP.AUTO: 6.5 [PH]
PLATELET # BLD AUTO: 259 THOUSANDS/UL (ref 149–390)
PMV BLD AUTO: 11.1 FL (ref 8.9–12.7)
PROT UR STRIP-MCNC: NEGATIVE MG/DL
RBC # BLD AUTO: 4.47 MILLION/UL (ref 3.81–5.12)
RBC #/AREA URNS AUTO: ABNORMAL /HPF
SP GR UR STRIP.AUTO: 1.01 (ref 1–1.03)
T4 FREE SERPL-MCNC: 1.14 NG/DL (ref 0.61–1.12)
TSH SERPL DL<=0.05 MIU/L-ACNC: 0.46 UIU/ML (ref 0.45–4.5)
UROBILINOGEN UR STRIP-ACNC: <2 MG/DL
VIT B12 SERPL-MCNC: 283 PG/ML (ref 180–914)
WBC # BLD AUTO: 4.9 THOUSAND/UL (ref 4.31–10.16)
WBC #/AREA URNS AUTO: ABNORMAL /HPF

## 2023-12-16 PROCEDURE — 84702 CHORIONIC GONADOTROPIN TEST: CPT

## 2023-12-16 PROCEDURE — 87186 SC STD MICRODIL/AGAR DIL: CPT

## 2023-12-16 PROCEDURE — 85025 COMPLETE CBC W/AUTO DIFF WBC: CPT

## 2023-12-16 PROCEDURE — 81001 URINALYSIS AUTO W/SCOPE: CPT

## 2023-12-16 PROCEDURE — 84443 ASSAY THYROID STIM HORMONE: CPT

## 2023-12-16 PROCEDURE — 36415 COLL VENOUS BLD VENIPUNCTURE: CPT

## 2023-12-16 PROCEDURE — 84439 ASSAY OF FREE THYROXINE: CPT

## 2023-12-16 PROCEDURE — 87086 URINE CULTURE/COLONY COUNT: CPT

## 2023-12-16 PROCEDURE — 87077 CULTURE AEROBIC IDENTIFY: CPT

## 2023-12-16 PROCEDURE — 82607 VITAMIN B-12: CPT

## 2023-12-18 DIAGNOSIS — N39.0 URINARY TRACT INFECTION WITHOUT HEMATURIA, SITE UNSPECIFIED: Primary | ICD-10-CM

## 2023-12-18 LAB — BACTERIA UR CULT: ABNORMAL

## 2023-12-18 RX ORDER — SULFAMETHOXAZOLE AND TRIMETHOPRIM 800; 160 MG/1; MG/1
1 TABLET ORAL EVERY 12 HOURS SCHEDULED
Qty: 6 TABLET | Refills: 0 | Status: SHIPPED | OUTPATIENT
Start: 2023-12-18 | End: 2023-12-21

## 2023-12-18 NOTE — PROGRESS NOTES
Patient with E. Coli UTI. Prescription for Bactrim DS twice daily for 3 days sent to pharmacy on file. Will review in detail at follow-up visit tomorrow.

## 2023-12-19 ENCOUNTER — OFFICE VISIT (OUTPATIENT)
Dept: GYNECOLOGY | Facility: CLINIC | Age: 38
End: 2023-12-19
Payer: COMMERCIAL

## 2023-12-19 VITALS — WEIGHT: 196.6 LBS | DIASTOLIC BLOOD PRESSURE: 90 MMHG | BODY MASS INDEX: 27.42 KG/M2 | SYSTOLIC BLOOD PRESSURE: 120 MMHG

## 2023-12-19 DIAGNOSIS — R10.2 PELVIC PAIN: ICD-10-CM

## 2023-12-19 DIAGNOSIS — N39.0 URINARY TRACT INFECTION WITHOUT HEMATURIA, SITE UNSPECIFIED: Primary | ICD-10-CM

## 2023-12-19 DIAGNOSIS — B37.31 VAGINAL CANDIDIASIS: ICD-10-CM

## 2023-12-19 LAB
BV WHIFF TEST VAG QL: NEGATIVE
CLUE CELLS SPEC QL WET PREP: NEGATIVE
PH SMN: ABNORMAL [PH]
SL AMB POCT WET MOUNT: YES
T VAGINALIS VAG QL WET PREP: NEGATIVE
YEAST VAG QL WET PREP: POSITIVE

## 2023-12-19 PROCEDURE — 87210 SMEAR WET MOUNT SALINE/INK: CPT | Performed by: OBSTETRICS & GYNECOLOGY

## 2023-12-19 PROCEDURE — 99214 OFFICE O/P EST MOD 30 MIN: CPT | Performed by: OBSTETRICS & GYNECOLOGY

## 2023-12-19 RX ORDER — FLUCONAZOLE 150 MG/1
150 TABLET ORAL ONCE
Qty: 1 TABLET | Refills: 0 | Status: SHIPPED | OUTPATIENT
Start: 2023-12-19 | End: 2023-12-19

## 2023-12-19 NOTE — PROGRESS NOTES
"Assessment/Plan   Diagnoses and all orders for this visit:    Urinary tract infection without hematuria, site unspecified    Pelvic pain    Vaginal candidiasis  -     POCT wet mount  -     fluconazole (DIFLUCAN) 150 mg tablet; Take 1 tablet (150 mg total) by mouth once for 1 dose        Subjective   Patient ID: Aileen Us is a 38 y.o. female.    Vitals:    12/19/23 1400   BP: 120/90     Patient was seen today for follow-up visit.  Please see assessment and plan for details.    1.  UTI-patient began having discomfort at the end of urination around 12/12/2023.  She called on 12/15/2023.  Urinalysis had positive WBCs and RBCs.  Culture came back with E. coli UTI.  Prescription for Bactrim was sent to pharmacy yesterday, she started taking the first pill this morning.  Would expect resolution of the UTI after this treatment.  She will call or return with any persistent symptoms.  2. yeast-small amount noted on exam and wet prep today.  Treatment options were reviewed.  Electronic prescription for fluconazole 150 mg p.o. x 1 was sent to local pharmacy.  She may take OTC miconazole 3 and then use fluconazole if still with persistent symptoms.  3.  History of secondary oligomenorrhea-resolved after Mirena IUD removal 8/20/2023.  Last 2 menstrual cycles were 42 day intervals, which has been her course throughout her life.  To call with significant amenorrhea.  4. possible adenomyosis-noted on previous ultrasound.  To call with any severe menometrorrhagia or dysmenorrhea.  5.  Depression/anxiety-continues to follow-up with her sports psychologist, who she has followed with since college.  6. status post laser removal of hair in the bikini and axillary areas-doing well.  7. preconceptual-not actively attempting pregnancy at this time, is \"being careful\".  She and her  are still in discussion about having another baby.  She will continue vitamin folic acid.  8.  Groin pain/pelvic pain-likely related to UTI, " with some tenderness over the bladder appreciated on bimanual exam.  This should resolve with treatment.  Recommend call or return if if recurrent or persistent symptoms.  9. other-patient going to [place] over the holidays.  Follow-up 2024 for yearly exam scheduled or as needed.    The following portions of the patient's history were reviewed and updated as appropriate: allergies, current medications, past family history, past medical history, past social history, past surgical history, and problem list.  Past Medical History:   Diagnosis Date    Abnormal Pap smear of cervix     Anemia     Depression     Dermatitis      2 para 2     Hashimoto's disease 2017    Herpes     Hirsutism     last assessed: 14    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 thyroiditis     Mild cervical dysplasia     Pregnancy     last assessed: 3/28/17    Subclinical hypothyroidism     last assessed: 14    Urinary tract infection     Varicella     Vitamin D deficiency      Past Surgical History:   Procedure Laterality Date    COLPOSCOPY VULVA W/ BIOPSY      10/1/07 - MALLORY-1 noted at 5:00 and 12:00 ECC was negative    COMBINED HYSTEROSCOPY DIAGNOSTIC / D&C  10/2014    polyp removal    DILATION AND CURETTAGE OF UTERUS      WISDOM TOOTH EXTRACTION       OB History    Para Term  AB Living   3 2 2   1 2   SAB IAB Ectopic Multiple Live Births         0 2      # Outcome Date GA Lbr Trevor/2nd Weight Sex Delivery Anes PTL Lv   3 Term 17 39w0d / 00:59 3799 g (8 lb 6 oz) F Vag-Spont EPI N AQUILES   2 Term 09/03/15 39w2d  3856 g (8 lb 8 oz) M Vag-Spont EPI N AQUILES   1 AB                Current Outpatient Medications:     Prenatal Vit-Fe Fumarate-FA (PRENATAL VITAMINS PO), Take by mouth, Disp: , Rfl:     sulfamethoxazole-trimethoprim (BACTRIM DS) 800-160 mg per tablet, Take 1 tablet by mouth every 12 (twelve) hours for 3 days, Disp: 6 tablet, Rfl: 0    Synthroid 125 MCG tablet, Take one tablet daily from Mon to sat and   tablet on Sunday, Disp: 30 tablet, Rfl: 5  No Known Allergies  Social History     Socioeconomic History    Marital status: /Civil Union     Spouse name: None    Number of children: 2    Years of education: None    Highest education level: None   Occupational History    None   Tobacco Use    Smoking status: Never    Smokeless tobacco: Never   Vaping Use    Vaping status: Never Used   Substance and Sexual Activity    Alcohol use: Yes     Comment: social drinker as per allscripts    Drug use: No    Sexual activity: Yes     Partners: Male     Birth control/protection: I.U.D.   Other Topics Concern    None   Social History Narrative    Caffeine use    Sabianist affiliation [employer]    Uses seatbelts     Social Determinants of Health     Financial Resource Strain: Not on file   Food Insecurity: Not on file   Transportation Needs: Not on file   Physical Activity: Not on file   Stress: Not on file   Social Connections: Not on file   Intimate Partner Violence: Not on file   Housing Stability: Not on file     Family History   Problem Relation Age of Onset    Stroke Mother     Depression Mother     Thyroid disease Mother     Hypertension Father     Depression Maternal Aunt     Diabetes Maternal Aunt     Heart attack Paternal Uncle     Diabetes Other        Review of Systems   Constitutional:  Negative for chills, diaphoresis, fatigue and fever.   Respiratory:  Negative for apnea, cough, chest tightness, shortness of breath and wheezing.    Cardiovascular:  Negative for chest pain, palpitations and leg swelling.   Gastrointestinal:  Negative for abdominal distention, abdominal pain, anal bleeding, constipation, diarrhea, nausea, rectal pain and vomiting.   Genitourinary:  Positive for dysuria and vaginal discharge. Negative for difficulty urinating, dyspareunia, frequency, hematuria, menstrual problem, pelvic pain, urgency, vaginal bleeding and vaginal pain.   Musculoskeletal:  Negative for arthralgias, back pain  and myalgias.   Skin:  Negative for color change and rash.   Neurological:  Negative for dizziness, syncope, light-headedness, numbness and headaches.   Hematological:  Negative for adenopathy. Does not bruise/bleed easily.   Psychiatric/Behavioral:  Negative for dysphoric mood and sleep disturbance. The patient is not nervous/anxious.        Objective   Physical Exam  OBGyn Exam     Objective      /90 (BP Location: Right arm, Patient Position: Sitting)   Wt 89.2 kg (196 lb 9.6 oz)   BMI 27.42 kg/m²     General:   alert and oriented, in no acute distress   Neck:    Breast:    Heart:    Lungs:    Abdomen: Mildly tender in the bilateral lower quadrant and midline, without guarding or rebound noted.   Vulva: normal   Vagina: Small amount of white discharge, without erythema or lesions noted.   Cervix: Small amount of white discharge, without lesions or cervicitis.  No CMT   Uterus: top normal size, anteverted, non-tender   Adnexa: no mass, fullness, tenderness   Rectum: deferred    Psych:  Normal mood and affect   Skin:  Without obvious lesions   Eyes: symmetric, with normal movements and reactivity   Musculoskeletal:  Normal muscle tone and movements appreciated

## 2023-12-26 DIAGNOSIS — E03.8 HYPOTHYROIDISM DUE TO HASHIMOTO'S THYROIDITIS: ICD-10-CM

## 2023-12-26 DIAGNOSIS — E06.3 HYPOTHYROIDISM DUE TO HASHIMOTO'S THYROIDITIS: ICD-10-CM

## 2023-12-26 RX ORDER — LEVOTHYROXINE SODIUM 125 MCG
TABLET ORAL
Qty: 30 TABLET | Refills: 0 | Status: SHIPPED | OUTPATIENT
Start: 2023-12-26

## 2024-01-08 ENCOUNTER — TELEPHONE (OUTPATIENT)
Dept: GYNECOLOGY | Facility: CLINIC | Age: 39
End: 2024-01-08

## 2024-01-08 NOTE — TELEPHONE ENCOUNTER
Patient called c/o UTI sx are back x 2 days. She took Bactrim and Diflucan.  Her office visit was on 12/19/23. She questions if she needs a refill.  Patient did not answer return call.  Left message for patient that Dr. Sousa would be informed.

## 2024-01-09 NOTE — TELEPHONE ENCOUNTER
Would recommend evaluation.  She did have UTI, but also had yeast.  Need to assess which has recurred.  Thanks

## 2024-01-09 NOTE — TELEPHONE ENCOUNTER
Left message to inform patient and to advise patient to schedule an appointment.  Patient did not answer return calls x 2.

## 2024-01-10 ENCOUNTER — NURSE TRIAGE (OUTPATIENT)
Dept: OTHER | Facility: OTHER | Age: 39
End: 2024-01-10

## 2024-01-10 ENCOUNTER — HOSPITAL ENCOUNTER (EMERGENCY)
Facility: HOSPITAL | Age: 39
Discharge: HOME | End: 2024-01-11
Attending: EMERGENCY MEDICINE
Payer: COMMERCIAL

## 2024-01-10 ENCOUNTER — CONSULT (OUTPATIENT)
Dept: GYNECOLOGY | Facility: CLINIC | Age: 39
End: 2024-01-10
Payer: COMMERCIAL

## 2024-01-10 VITALS
HEART RATE: 63 BPM | OXYGEN SATURATION: 98 % | BODY MASS INDEX: 27.67 KG/M2 | SYSTOLIC BLOOD PRESSURE: 115 MMHG | RESPIRATION RATE: 18 BRPM | DIASTOLIC BLOOD PRESSURE: 59 MMHG | WEIGHT: 198.41 LBS | TEMPERATURE: 98.1 F

## 2024-01-10 VITALS — SYSTOLIC BLOOD PRESSURE: 124 MMHG | WEIGHT: 200.2 LBS | BODY MASS INDEX: 27.92 KG/M2 | DIASTOLIC BLOOD PRESSURE: 80 MMHG

## 2024-01-10 DIAGNOSIS — R10.2 PELVIC PAIN: ICD-10-CM

## 2024-01-10 DIAGNOSIS — N39.0 URINARY TRACT INFECTION WITHOUT HEMATURIA, SITE UNSPECIFIED: Primary | ICD-10-CM

## 2024-01-10 DIAGNOSIS — N39.0 UTI (URINARY TRACT INFECTION): ICD-10-CM

## 2024-01-10 DIAGNOSIS — R30.0 DYSURIA: ICD-10-CM

## 2024-01-10 DIAGNOSIS — B37.31 VAGINAL CANDIDIASIS: ICD-10-CM

## 2024-01-10 DIAGNOSIS — J06.9 URI (UPPER RESPIRATORY INFECTION): Primary | ICD-10-CM

## 2024-01-10 DIAGNOSIS — R10.2 RIGHT ADNEXAL TENDERNESS: ICD-10-CM

## 2024-01-10 PROBLEM — Z97.5 IUD (INTRAUTERINE DEVICE) IN PLACE: Status: RESOLVED | Noted: 2020-05-06 | Resolved: 2024-01-10

## 2024-01-10 LAB
ALBUMIN SERPL BCP-MCNC: 4.6 G/DL (ref 3.5–5)
ALP SERPL-CCNC: 48 U/L (ref 34–104)
ALT SERPL W P-5'-P-CCNC: 15 U/L (ref 7–52)
ANION GAP SERPL CALCULATED.3IONS-SCNC: 7 MMOL/L
AST SERPL W P-5'-P-CCNC: 19 U/L (ref 13–39)
BASOPHILS # BLD AUTO: 0.02 THOUSANDS/ÂΜL (ref 0–0.1)
BASOPHILS NFR BLD AUTO: 0 % (ref 0–1)
BILIRUB SERPL-MCNC: 0.85 MG/DL (ref 0.2–1)
BUN SERPL-MCNC: 13 MG/DL (ref 5–25)
BV WHIFF TEST VAG QL: NEGATIVE
CALCIUM SERPL-MCNC: 9.4 MG/DL (ref 8.4–10.2)
CHLORIDE SERPL-SCNC: 102 MMOL/L (ref 96–108)
CLUE CELLS SPEC QL WET PREP: NEGATIVE
CO2 SERPL-SCNC: 25 MMOL/L (ref 21–32)
CREAT SERPL-MCNC: 0.89 MG/DL (ref 0.6–1.3)
EOSINOPHIL # BLD AUTO: 0.01 THOUSAND/ÂΜL (ref 0–0.61)
EOSINOPHIL NFR BLD AUTO: 0 % (ref 0–6)
ERYTHROCYTE [DISTWIDTH] IN BLOOD BY AUTOMATED COUNT: 12.8 % (ref 11.6–15.1)
GFR SERPL CREATININE-BSD FRML MDRD: 83 ML/MIN/1.73SQ M
GLUCOSE SERPL-MCNC: 98 MG/DL (ref 65–140)
HCT VFR BLD AUTO: 40.9 % (ref 34.8–46.1)
HGB BLD-MCNC: 13.6 G/DL (ref 11.5–15.4)
IMM GRANULOCYTES # BLD AUTO: 0.01 THOUSAND/UL (ref 0–0.2)
IMM GRANULOCYTES NFR BLD AUTO: 0 % (ref 0–2)
LIPASE SERPL-CCNC: 18 U/L (ref 11–82)
LYMPHOCYTES # BLD AUTO: 0.73 THOUSANDS/ÂΜL (ref 0.6–4.47)
LYMPHOCYTES NFR BLD AUTO: 8 % (ref 14–44)
MCH RBC QN AUTO: 29.4 PG (ref 26.8–34.3)
MCHC RBC AUTO-ENTMCNC: 33.3 G/DL (ref 31.4–37.4)
MCV RBC AUTO: 89 FL (ref 82–98)
MONOCYTES # BLD AUTO: 0.38 THOUSAND/ÂΜL (ref 0.17–1.22)
MONOCYTES NFR BLD AUTO: 4 % (ref 4–12)
NEUTROPHILS # BLD AUTO: 8.59 THOUSANDS/ÂΜL (ref 1.85–7.62)
NEUTS SEG NFR BLD AUTO: 88 % (ref 43–75)
NRBC BLD AUTO-RTO: 0 /100 WBCS
PH SMN: 3.5 [PH]
PLATELET # BLD AUTO: 259 THOUSANDS/UL (ref 149–390)
PMV BLD AUTO: 10.4 FL (ref 8.9–12.7)
POTASSIUM SERPL-SCNC: 3.9 MMOL/L (ref 3.5–5.3)
PROT SERPL-MCNC: 7.6 G/DL (ref 6.4–8.4)
RBC # BLD AUTO: 4.62 MILLION/UL (ref 3.81–5.12)
SL AMB  POCT GLUCOSE, UA: NEGATIVE
SL AMB LEUKOCYTE ESTERASE,UA: 15
SL AMB POCT BILIRUBIN,UA: NEGATIVE
SL AMB POCT BLOOD,UA: 7
SL AMB POCT KETONES,UA: 5
SL AMB POCT NITRITE,UA: POSITIVE
SL AMB POCT PH,UA: 5
SL AMB POCT SPECIFIC GRAVITY,UA: 1025
SL AMB POCT URINE PROTEIN: 30
SL AMB POCT UROBILINOGEN: 0.2
SL AMB POCT WET MOUNT: YES
SODIUM SERPL-SCNC: 134 MMOL/L (ref 135–147)
T VAGINALIS VAG QL WET PREP: NEGATIVE
WBC # BLD AUTO: 9.74 THOUSAND/UL (ref 4.31–10.16)
YEAST VAG QL WET PREP: ABNORMAL

## 2024-01-10 PROCEDURE — 87086 URINE CULTURE/COLONY COUNT: CPT | Performed by: OBSTETRICS & GYNECOLOGY

## 2024-01-10 PROCEDURE — 83690 ASSAY OF LIPASE: CPT | Performed by: EMERGENCY MEDICINE

## 2024-01-10 PROCEDURE — 87210 SMEAR WET MOUNT SALINE/INK: CPT | Performed by: OBSTETRICS & GYNECOLOGY

## 2024-01-10 PROCEDURE — 99284 EMERGENCY DEPT VISIT MOD MDM: CPT

## 2024-01-10 PROCEDURE — 87077 CULTURE AEROBIC IDENTIFY: CPT | Performed by: OBSTETRICS & GYNECOLOGY

## 2024-01-10 PROCEDURE — 87186 SC STD MICRODIL/AGAR DIL: CPT | Performed by: OBSTETRICS & GYNECOLOGY

## 2024-01-10 PROCEDURE — 99214 OFFICE O/P EST MOD 30 MIN: CPT | Performed by: OBSTETRICS & GYNECOLOGY

## 2024-01-10 PROCEDURE — 81002 URINALYSIS NONAUTO W/O SCOPE: CPT | Performed by: OBSTETRICS & GYNECOLOGY

## 2024-01-10 PROCEDURE — 85025 COMPLETE CBC W/AUTO DIFF WBC: CPT | Performed by: EMERGENCY MEDICINE

## 2024-01-10 PROCEDURE — 80053 COMPREHEN METABOLIC PANEL: CPT | Performed by: EMERGENCY MEDICINE

## 2024-01-10 PROCEDURE — 81001 URINALYSIS AUTO W/SCOPE: CPT | Performed by: OBSTETRICS & GYNECOLOGY

## 2024-01-10 PROCEDURE — 36415 COLL VENOUS BLD VENIPUNCTURE: CPT

## 2024-01-10 PROCEDURE — 0241U HB NFCT DS VIR RESP RNA 4 TRGT: CPT

## 2024-01-10 RX ORDER — FLUCONAZOLE 150 MG/1
150 TABLET ORAL ONCE
Qty: 2 TABLET | Refills: 0 | Status: SHIPPED | OUTPATIENT
Start: 2024-01-10 | End: 2024-01-10

## 2024-01-10 RX ORDER — NITROFURANTOIN 25; 75 MG/1; MG/1
100 CAPSULE ORAL 2 TIMES DAILY
Qty: 14 CAPSULE | Refills: 0 | Status: SHIPPED | OUTPATIENT
Start: 2024-01-10

## 2024-01-10 NOTE — PROGRESS NOTES
Assessment/Plan   Diagnoses and all orders for this visit:    Urinary tract infection without hematuria, site unspecified  -     POCT urine dip  -     nitrofurantoin (MACROBID) 100 mg capsule; Take 1 capsule (100 mg total) by mouth 2 (two) times a day    Pelvic pain    Dysuria    Vaginal candidiasis  -     POCT wet mount  -     fluconazole (DIFLUCAN) 150 mg tablet; Take 1 tablet (150 mg total) by mouth once for 1 dose Repeat in 1 week    Right adnexal tenderness    1. UTI-urine dip today with positive RBCs and nitrites with leukocytes.  She does note some discomfort with urination, denies frequency with urgency or hematuria.  Was treated last month with Bactrim for E. coli UTI for which it was sensitive.  Given the recurrence of symptoms, we will treat with Macrobid 100 twice daily for 7 days time.  To call return with symptoms.  2.  Yeast-small amount noted on exam and wet prep.  This was noted at last visit as well, she took miconazole 3 with resolution of symptoms.  She never did take fluconazole at that time.  Given the findings and the antibiotic treatment, to treat with fluconazole 150 mg p.o. x 1 with repeat in 1 week time.  3.  History of secondary oligomenorrhea-resolved with Mirena IUD removal.  Cycles are now 32 to 35-day intervals.  4. previous Mirena IUD-removed 8/20/2023.  5. right adnexal discomfort/history of pelvic discomfort-exam with focal tenderness noted toward the right adnexa with possible fullness.  She did note this prior to UTI which was treated on 12/19/2023.  The symptoms resolved as well as the tenderness.  She started noticing discomfort around 1/7/2024, has been increased over the past 12 hours.  Exam today was with more focal discomfort over the right and possible fullness.  To proceed with ultrasound and visit with me to assess and proceed accordingly  6. possible adenomyosis-noted on prior ultrasound  7.  History of depression/anxiety-continues to follow-up with sports  psychologist  8.  Preconceptual-continue with vitamin with folic acid.  She is still discussing having another baby with her .  9. history of laser removal of hair in the bikini and axillary areas-done sometime ago, good healing noted.  Follow-up near future for ultrasound and visit with me or as needed.    Subjective   Patient ID: Aileen Us is a 38 y.o. female.    Vitals:    01/10/24 0958   BP: 124/80     Patient was seen today for follow-up visit.  Please see assessment plan for details.        The following portions of the patient's history were reviewed and updated as appropriate: allergies, current medications, past family history, past medical history, past social history, past surgical history, and problem list.  Past Medical History:   Diagnosis Date    Abnormal Pap smear of cervix     Anemia     Depression     Dermatitis      2 para 2     Hashimoto's disease 2017    Herpes     Hirsutism     last assessed: 14    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 thyroiditis     Mild cervical dysplasia     Pregnancy     last assessed: 3/28/17    Subclinical hypothyroidism     last assessed: 14    Urinary tract infection     Varicella     Vitamin D deficiency      Past Surgical History:   Procedure Laterality Date    COLPOSCOPY VULVA W/ BIOPSY      10/1/07 - MALLORY-1 noted at 5:00 and 12:00 ECC was negative    COMBINED HYSTEROSCOPY DIAGNOSTIC / D&C  10/2014    polyp removal    DILATION AND CURETTAGE OF UTERUS      WISDOM TOOTH EXTRACTION       OB History    Para Term  AB Living   3 2 2   1 2   SAB IAB Ectopic Multiple Live Births         0 2      # Outcome Date GA Lbr Trevor/2nd Weight Sex Delivery Anes PTL Lv   3 Term 17 39w0d / 00:59 3799 g (8 lb 6 oz) F Vag-Spont EPI N AQUILES   2 Term 09/03/15 39w2d  3856 g (8 lb 8 oz) M Vag-Spont EPI N AQUILES   1 AB                Current Outpatient Medications:     fluconazole (DIFLUCAN) 150 mg tablet, Take 1 tablet (150 mg total) by mouth once  for 1 dose Repeat in 1 week, Disp: 2 tablet, Rfl: 0    nitrofurantoin (MACROBID) 100 mg capsule, Take 1 capsule (100 mg total) by mouth 2 (two) times a day, Disp: 14 capsule, Rfl: 0    Prenatal Vit-Fe Fumarate-FA (PRENATAL VITAMINS PO), Take by mouth, Disp: , Rfl:     Synthroid 125 MCG tablet, Take one tablet daily from Mon to sat and 1/2 tablet on Sunday, Disp: 30 tablet, Rfl: 0  No Known Allergies  Social History     Socioeconomic History    Marital status: /Civil Union     Spouse name: None    Number of children: 2    Years of education: None    Highest education level: None   Occupational History    None   Tobacco Use    Smoking status: Never    Smokeless tobacco: Never   Vaping Use    Vaping status: Never Used   Substance and Sexual Activity    Alcohol use: Yes     Comment: social drinker as per allscripts    Drug use: No    Sexual activity: Yes     Partners: Male     Birth control/protection: I.U.D.   Other Topics Concern    None   Social History Narrative    Caffeine use    [employer] affiliation [employer]    Uses seatbelts     Social Determinants of Health     Financial Resource Strain: Not on file   Food Insecurity: Not on file   Transportation Needs: Not on file   Physical Activity: Not on file   Stress: Not on file   Social Connections: Not on file   Intimate Partner Violence: Not on file   Housing Stability: Not on file     Family History   Problem Relation Age of Onset    Stroke Mother     Depression Mother     Thyroid disease Mother     Hypertension Father     Depression Maternal Aunt     Diabetes Maternal Aunt     Heart attack Paternal Uncle     Diabetes Other        Review of Systems   Constitutional:  Negative for chills, diaphoresis, fatigue and fever.   Respiratory:  Negative for apnea, cough, chest tightness, shortness of breath and wheezing.    Cardiovascular:  Negative for chest pain, palpitations and leg swelling.   Gastrointestinal:  Negative for abdominal distention, abdominal  pain, anal bleeding, constipation, diarrhea, nausea, rectal pain and vomiting.   Genitourinary:  Negative for difficulty urinating, dyspareunia, dysuria, frequency, hematuria, menstrual problem, pelvic pain, urgency, vaginal bleeding, vaginal discharge and vaginal pain.   Musculoskeletal:  Negative for arthralgias, back pain and myalgias.   Skin:  Negative for color change and rash.   Neurological:  Negative for dizziness, syncope, light-headedness, numbness and headaches.   Hematological:  Negative for adenopathy. Does not bruise/bleed easily.   Psychiatric/Behavioral:  Negative for dysphoric mood and sleep disturbance. The patient is not nervous/anxious.        Objective   Physical Exam  OBGyn Exam     Objective      /80 (BP Location: Right arm, Patient Position: Sitting)   Wt 90.8 kg (200 lb 3.2 oz)   BMI 27.92 kg/m²     General:   alert and oriented, in no acute distress   Neck:    Breast:    Heart:    Lungs:    Abdomen: Mildly tender to the right, without mass or hernia appreciated.  Minimal guarding, no rebound.   Vulva: normal   Vagina: Moderate amount of brown/white discharge, without erythema or lesions.   Cervix: Moderate amount of brown/white discharge, without lesions or cervicitis.  No CMT.   Uterus: top normal size, anteverted, non-tender   Adnexa: Tender to the right with possible fullness appreciated.  Left was negative.   Rectum: Confirmatory, with some tenderness and possible fullness over the right adnexa.    Psych:  Normal mood and affect   Skin:  Without obvious lesions   Eyes: symmetric, with normal movements and reactivity   Musculoskeletal:  Normal muscle tone and movements appreciated

## 2024-01-11 LAB
BACTERIA UR QL AUTO: ABNORMAL /HPF
BILIRUB UR QL STRIP: NEGATIVE
CLARITY UR: CLEAR
COLOR UR: YELLOW
FLUAV RNA RESP QL NAA+PROBE: NEGATIVE
FLUBV RNA RESP QL NAA+PROBE: NEGATIVE
GLUCOSE UR STRIP-MCNC: NEGATIVE MG/DL
HGB UR QL STRIP.AUTO: ABNORMAL
KETONES UR STRIP-MCNC: NEGATIVE MG/DL
LEUKOCYTE ESTERASE UR QL STRIP: ABNORMAL
MUCOUS THREADS UR QL AUTO: ABNORMAL
NITRITE UR QL STRIP: POSITIVE
NON-SQ EPI CELLS URNS QL MICRO: ABNORMAL /HPF
PH UR STRIP.AUTO: 6 [PH]
PROT UR STRIP-MCNC: ABNORMAL MG/DL
RBC #/AREA URNS AUTO: ABNORMAL /HPF
RSV RNA RESP QL NAA+PROBE: NEGATIVE
SARS-COV-2 RNA RESP QL NAA+PROBE: NEGATIVE
SP GR UR STRIP.AUTO: 1.02 (ref 1–1.03)
UROBILINOGEN UR STRIP-ACNC: <2 MG/DL
WBC #/AREA URNS AUTO: ABNORMAL /HPF

## 2024-01-11 RX ORDER — ACETAMINOPHEN 325 MG/1
650 TABLET ORAL ONCE
Status: COMPLETED | OUTPATIENT
Start: 2024-01-11 | End: 2024-01-11

## 2024-01-11 RX ADMIN — ACETAMINOPHEN 650 MG: 325 TABLET, FILM COATED ORAL at 00:56

## 2024-01-11 NOTE — ED PROVIDER NOTES
"History  Chief Complaint   Patient presents with    Possible UTI     Pt reports seeing gyno today for UTI symptoms, prescribed abx. Pt reports fever starting this afternoon of 103F, lethargy, radiating pain from R flank across abd. Took ibuprofen around 8:30pm.     Abdominal Pain     Aileen Us is a 38 y.o. female with a PMH of Hashimoto's, anemia, depression, vitamin D deficiency presenting to the ED on January 10, 2024 with abdominal pain and UTI symptoms. Patient endorses that for the last 3 days she has had \"a funny feeling\" while urinating.  She states that she was seen by her OB/GYN 2 days who diagnosed her with a urinary tract infection and a yeast infection.  She recently had a UTI in December so provider started her on Bactrim and gave her a one-time dose of Diflucan.  Patient states when she got home she started feeling like she was \"hit by a bus\".  She states she felt very lethargic, had bodyaches, and had to lay down.  She is concerned because she states that her grandfather  of sepsis and wants to make sure there is nothing going on that is life-threatening with her.  Of note patient's daughter is also being seen today for viral symptoms.  Patient denies chest pain, shortness of breath, cough, congestion or any other symptoms at this time.  She states she took Motrin at home around 8 PM.         Abdominal Pain  Associated symptoms: dysuria and fatigue    Associated symptoms: no chills, no diarrhea, no fever, no hematuria, no nausea, no sore throat and no vomiting        Prior to Admission Medications   Prescriptions Last Dose Informant Patient Reported? Taking?   Prenatal Vit-Fe Fumarate-FA (PRENATAL VITAMINS PO)  Self Yes No   Sig: Take by mouth   Synthroid 125 MCG tablet 2024  No Yes   Sig: Take one tablet daily from Mon to sat and 1/2 tablet on    fluconazole (DIFLUCAN) 150 mg tablet   No No   Sig: Take 1 tablet (150 mg total) by mouth once for 1 dose Repeat in 1 week "   nitrofurantoin (MACROBID) 100 mg capsule 2024  No Yes   Sig: Take 1 capsule (100 mg total) by mouth 2 (two) times a day      Facility-Administered Medications: None       Past Medical History:   Diagnosis Date    Abnormal Pap smear of cervix     Anemia     Depression     Dermatitis      2 para 2     Hashimoto's disease 2017    Herpes     Hirsutism     last assessed: 14    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 thyroiditis     Mild cervical dysplasia     Pregnancy     last assessed: 3/28/17    Subclinical hypothyroidism     last assessed: 14    Urinary tract infection     Varicella     Vitamin D deficiency        Past Surgical History:   Procedure Laterality Date    COLPOSCOPY VULVA W/ BIOPSY      10/1/07 - MALLORY-1 noted at 5:00 and 12:00 ECC was negative    COMBINED HYSTEROSCOPY DIAGNOSTIC / D&C  10/2014    polyp removal    DILATION AND CURETTAGE OF UTERUS      WISDOM TOOTH EXTRACTION         Family History   Problem Relation Age of Onset    Stroke Mother     Depression Mother     Thyroid disease Mother     Hypertension Father     Depression Maternal Aunt     Diabetes Maternal Aunt     Heart attack Paternal Uncle     Diabetes Other      I have reviewed and agree with the history as documented.    E-Cigarette/Vaping    E-Cigarette Use Never User      E-Cigarette/Vaping Substances    Nicotine No     THC No     CBD No     Flavoring No     Other No     Unknown No      Social History     Tobacco Use    Smoking status: Never    Smokeless tobacco: Never   Vaping Use    Vaping status: Never Used   Substance Use Topics    Alcohol use: Yes     Comment: social drinker as per allscripts    Drug use: No       Review of Systems   Constitutional:  Positive for activity change and fatigue. Negative for chills and fever.   HENT:  Negative for congestion and sore throat.    Gastrointestinal:  Positive for abdominal pain. Negative for abdominal distention, diarrhea, nausea and vomiting.   Genitourinary:  Positive for  difficulty urinating and dysuria. Negative for frequency, hematuria and urgency.   Musculoskeletal:  Positive for myalgias. Negative for neck pain and neck stiffness.   Skin:  Negative for color change, pallor and rash.   Neurological:  Positive for light-headedness and headaches. Negative for syncope.       Physical Exam  Physical Exam  Vitals and nursing note reviewed.   Constitutional:       General: She is not in acute distress.     Appearance: Normal appearance. She is normal weight. She is not ill-appearing, toxic-appearing or diaphoretic.   HENT:      Head: Normocephalic and atraumatic.      Right Ear: External ear normal.      Left Ear: External ear normal.      Nose: Nose normal. No congestion or rhinorrhea.      Mouth/Throat:      Mouth: Mucous membranes are moist.   Eyes:      General: No scleral icterus.        Right eye: No discharge.         Left eye: No discharge.      Extraocular Movements: Extraocular movements intact.      Conjunctiva/sclera: Conjunctivae normal.   Cardiovascular:      Rate and Rhythm: Normal rate and regular rhythm.      Heart sounds: Normal heart sounds. No murmur heard.     No friction rub. No gallop.   Pulmonary:      Effort: Pulmonary effort is normal. No respiratory distress.      Breath sounds: Normal breath sounds. No wheezing, rhonchi or rales.   Abdominal:      General: Abdomen is flat. Bowel sounds are normal. There is no distension.      Tenderness: There is abdominal tenderness in the right upper quadrant and right lower quadrant. There is no guarding or rebound.   Musculoskeletal:         General: No signs of injury. Normal range of motion.      Cervical back: Normal range of motion and neck supple. No rigidity.   Skin:     General: Skin is warm.      Capillary Refill: Capillary refill takes less than 2 seconds.      Coloration: Skin is not pale.      Findings: No erythema.   Neurological:      General: No focal deficit present.      Mental Status: She is alert and  oriented to person, place, and time. Mental status is at baseline.      Motor: No weakness.      Gait: Gait normal.   Psychiatric:         Mood and Affect: Mood normal.         Behavior: Behavior normal.         Vital Signs  ED Triage Vitals [01/10/24 2228]   Temperature Pulse Respirations Blood Pressure SpO2   98.1 °F (36.7 °C) 63 18 115/59 98 %      Temp Source Heart Rate Source Patient Position - Orthostatic VS BP Location FiO2 (%)   Oral Monitor Sitting Right arm --      Pain Score       --           Vitals:    01/10/24 2228   BP: 115/59   Pulse: 63   Patient Position - Orthostatic VS: Sitting         Visual Acuity      ED Medications  Medications   acetaminophen (TYLENOL) tablet 650 mg (650 mg Oral Given 1/11/24 0056)       Diagnostic Studies  Results Reviewed       Procedure Component Value Units Date/Time    FLU/RSV/COVID - if FLU/RSV clinically relevant [[id number]]  (Normal) Collected: 01/10/24 2346    Lab Status: Final result Specimen: Nares from Nose Updated: 01/11/24 0033     SARS-CoV-2 Negative     INFLUENZA A PCR Negative     INFLUENZA B PCR Negative     RSV PCR Negative    Narrative:      FOR PEDIATRIC PATIENTS - copy/paste COVID Guidelines URL to browser: [email or web address]    SARS-CoV-2 assay is a Nucleic Acid Amplification assay intended for the  qualitative detection of nucleic acid from SARS-CoV-2 in nasopharyngeal  swabs. Results are for the presumptive identification of SARS-CoV-2 RNA.    Positive results are indicative of infection with SARS-CoV-2, the virus  causing COVID-19, but do not rule out bacterial infection or co-infection  with other viruses. Laboratories within the [place] and its  territories are required to report all positive results to the appropriate  public health authorities. Negative results do not preclude SARS-CoV-2  infection and should not be used as the sole basis for treatment or other  patient management  decisions. Negative results must be combined with  clinical observations, patient history, and epidemiological information.  This test has not been FDA cleared or approved.    This test has been authorized by FDA under an Emergency Use Authorization  (EUA). This test is only authorized for the duration of time the  declaration that circumstances exist justifying the authorization of the  emergency use of an in vitro diagnostic tests for detection of SARS-CoV-2  virus and/or diagnosis of COVID-19 infection under section 564(b)(1) of  the Act, 21 U.S.C. 360bbb-3(b)(1), unless the authorization is terminated  or revoked sooner. The test has been validated but independent review by FDA  and CLIA is pending.    Test performed using [employer] GeneXpert: This RT-PCR assay targets N2,  a region unique to SARS-CoV-2. A conserved region in the E-gene was chosen  for pan-Sarbecovirus detection which includes SARS-CoV-2.    According to CMS-2020-01-R, this platform meets the definition of high-throughput technology.    Comprehensive metabolic panel [[id number]]  (Abnormal) Collected: 01/10/24 2236    Lab Status: Final result Specimen: Blood from Arm, Left Updated: 01/10/24 2257     Sodium 134 mmol/L      Potassium 3.9 mmol/L      Chloride 102 mmol/L      CO2 25 mmol/L      ANION GAP 7 mmol/L      BUN 13 mg/dL      Creatinine 0.89 mg/dL      Glucose 98 mg/dL      Calcium 9.4 mg/dL      AST 19 U/L      ALT 15 U/L      Alkaline Phosphatase 48 U/L      Total Protein 7.6 g/dL      Albumin 4.6 g/dL      Total Bilirubin 0.85 mg/dL      eGFR 83 ml/min/1.73sq m     Narrative:      National Kidney Disease Foundation guidelines for Chronic Kidney Disease (CKD):     Stage 1 with normal or high GFR (GFR > 90 mL/min/1.73 square meters)    Stage 2 Mild CKD (GFR = 60-89 mL/min/1.73 square meters)    Stage 3A Moderate CKD (GFR = 45-59 mL/min/1.73 square meters)    Stage 3B Moderate CKD (GFR = 30-44 mL/min/1.73 square meters)    Stage 4 Severe CKD  (GFR = 15-29 mL/min/1.73 square meters)    Stage 5 End Stage CKD (GFR <15 mL/min/1.73 square meters)  Note: GFR calculation is accurate only with a steady state creatinine    Lipase [[id number]]  (Normal) Collected: 01/10/24 2236    Lab Status: Final result Specimen: Blood from Arm, Left Updated: 01/10/24 2257     Lipase 18 u/L     CBC and differential [[id number]]  (Abnormal) Collected: 01/10/24 2236    Lab Status: Final result Specimen: Blood from Arm, Left Updated: 01/10/24 2245     WBC 9.74 Thousand/uL      RBC 4.62 Million/uL      Hemoglobin 13.6 g/dL      Hematocrit 40.9 %      MCV 89 fL      MCH 29.4 pg      MCHC 33.3 g/dL      RDW 12.8 %      MPV 10.4 fL      Platelets 259 Thousands/uL      nRBC 0 /100 WBCs      Neutrophils Relative 88 %      Immat GRANS % 0 %      Lymphocytes Relative 8 %      Monocytes Relative 4 %      Eosinophils Relative 0 %      Basophils Relative 0 %      Neutrophils Absolute 8.59 Thousands/µL      Immature Grans Absolute 0.01 Thousand/uL      Lymphocytes Absolute 0.73 Thousands/µL      Monocytes Absolute 0.38 Thousand/µL      Eosinophils Absolute 0.01 Thousand/µL      Basophils Absolute 0.02 Thousands/µL                    No orders to display              Procedures  Procedures         ED Course  ED Course as of 24 0429   Wed Campbell 10, 2024   2333 Pt has been dx with UTI and yeast infxn by her OBGYN for each and is being treated w Macrobid and diflucan respectively. Also having some body aches and headaches. Concerned she is septic as her grandfather  of that                                SBIRT 22yo+      Flowsheet Row Most Recent Value   Initial Alcohol Screen: US AUDIT-C     1. How often do you have a drink containing alcohol? 0 Filed at: 2024   2. How many drinks containing alcohol do you have on a typical day you are drinking?  0 Filed at: 2024   3b. FEMALE Any Age, or MALE 65+: How often do you have 4 or more drinks on one occassion? 0 Filed at:  01/11/2024 0018   Audit-C Score 0 Filed at: 01/11/2024 0018   DAKSHA: How many times in the past year have you...    Used an illegal drug or used a prescription medication for non-medical reasons? Never Filed at: 01/11/2024 0018                      Medical Decision Making  Patient seen and examined noted to have viral URI, UTI, yeast infection.  Patient was diagnosed at her OB/GYN's office today with a urinary tract infection and a yeast infection.  She got a one-time dose of Diflucan in the office and was started on Bactrim.  Did not repeat urinalysis as she had that done earlier in the day and was already getting the appropriate treatment.  Gave patient Tylenol when she presented and on repeat examination she states she is feeling much better.  She came in because she was concerned she might be septic from her UTI however her labs were all within normal limits and her physical exam was benign.  Patient is afebrile the entire time she was in the emergency department.  After reassurance and patient education patient felt safe to go home.  Strict return precautions were given and patient expressed her understanding.    Differential diagnosis includes but is not limited to URI, bronchitis, pneumonia, flu, RSV, other viral illness, COVID 19, UTI, yeast infection, dehydration.    Patient's labs notable for: Negative flu COVID RSV swab , sodium of 134     Patient appears well, is nontoxic appearing, she expresses understanding and agrees with plan of care at this time.  In light of this patient would benefit from outpatient management.    Patient at time of discharge well-appearing in no acute distress, all questions answered. Patient agreeable to plan.  Patient's vitals, lab/imaging results, diagnosis, and treatment plan were discussed with the patient. All new/changed medications were discussed with patient, specifically, route of administration, how often and when to take, and where they can be picked up. Strict return  "precautions as well as close follow up with PCP was discussed with the patient and the patient was agreeable to my recommendations. Patient verbally acknowledged understanding of the above communications. Strict return precautions were provided. All labs reviewed and utilized in the medical decision making process (if labs were ordered). Portions of the record may have been created with voice recognition software.  Occasional wrong word or \"sound a like\" substitutions may have occurred due to the inherent limitations of voice recognition software.  Read the chart carefully and recognize, using context, where substitutions have occurred.      Amount and/or Complexity of Data Reviewed  Labs: ordered.    Risk  OTC drugs.             Disposition  Final diagnoses:   URI (upper respiratory infection)   UTI (urinary tract infection)     Time reflects when diagnosis was documented in both MDM as applicable and the Disposition within this note       Time User Action Codes Description Comment    1/11/2024 12:39 AM Marilu Roman Add [J06.9] URI (upper respiratory infection)     1/11/2024 12:39 AM Marilu Roman Add [N39.0] UTI (urinary tract infection)           ED Disposition       ED Disposition   Discharge    Condition   Stable    Date/Time   Thu Jan 11, 2024 0039    Comment   Aileen Us discharge to home/self care.                   Follow-up Information       Follow up With Specialties Details Why Contact Info Additional Information    Viviana Inman MD Family Medicine   [address] [place] [address] [id number] [phone]       Bryon Sousa MD Obstetrics and Gynecology, Obstetrics, Gynecology   [address] [place] [id number]  [phone]       [facility] Emergency Medicine  If symptoms worsen, As needed [facility] [address]  [phone] [facility], [address] [facility], " [place], [address]            Discharge Medication List as of 1/11/2024 12:42 AM        CONTINUE these medications which have NOT CHANGED    Details   nitrofurantoin (MACROBID) 100 mg capsule Take 1 capsule (100 mg total) by mouth 2 (two) times a day, Starting Wed 1/10/2024, Normal      Synthroid 125 MCG tablet Take one tablet daily from Mon to sat and 1/2 tablet on Sunday, Normal      Prenatal Vit-Fe Fumarate-FA (PRENATAL VITAMINS PO) Take by mouth, Historical Med           STOP taking these medications       fluconazole (DIFLUCAN) 150 mg tablet Comments:   Reason for Stopping:               No discharge procedures on file.    PDMP Review       None            ED Provider  Electronically Signed by             Marilu Roman PA-C  01/11/24 0428

## 2024-01-11 NOTE — DISCHARGE INSTRUCTIONS
Can alternate tylenol and motrin as needed for body aches/headaches. Continue taking the antibiotic your OBGYN prescribed for your UTI. Increase your fluid intake to help with both urinary and URI symptoms. If your vaginal discharge doesn't resolve please follow up with your OBGYN. Return to the emergency department if you feel lethargic or are difficult to wake or experience shortness of breath.

## 2024-01-11 NOTE — TELEPHONE ENCOUNTER
"Regarding: Possible alergy to medication  ----- Message from Liliana Edwards sent at 1/10/2024  9:03 PM EST -----  \" I was prescribed antibiotics for yeast infection. I feel lethargic and have pain across my stomach, I have a fever of 103, I have the chills and feel like I was hit by a bus. Does the Dr want me to go to the hospital\"    "

## 2024-01-11 NOTE — TELEPHONE ENCOUNTER
"Reason for Disposition  • [1] Fever > 100.0 F (37.8 C) AND [2] new-onset since starting antibiotics    Answer Assessment - Initial Assessment Questions  1. ANTIBIOTIC: \"What antibiotic are you taking?\" \"How many times per day?\"      Nitrofurantoin-twice day    2. DURATION: \"When was the antibiotic started?\"      Today     3. MAIN SYMPTOM: \"What is the main symptom you are concerned about?\"      Fever, chills, lethargic, body aches     4. FEVER: \"Do you have a fever?\" If Yes, ask: \"What is it, how was it measured, and when did it start?\"      Yes, 103 and now 101    5. OTHER SYMPTOMS: \"Do you have any other symptoms?\" (e.g., flank pain, vaginal discharge, blood in urine)  Headache, flank pain, urgency    Diflucan at 1200  Ibuprofen 400 mg at 2030    Protocols used: Urinary Tract Infection on Antibiotic Follow-up Call - Female-ADULT-    "

## 2024-01-13 ENCOUNTER — APPOINTMENT (EMERGENCY)
Dept: CT IMAGING | Facility: HOSPITAL | Age: 39
End: 2024-01-13
Payer: COMMERCIAL

## 2024-01-13 ENCOUNTER — HOSPITAL ENCOUNTER (EMERGENCY)
Facility: HOSPITAL | Age: 39
Discharge: HOME | End: 2024-01-13
Attending: EMERGENCY MEDICINE | Admitting: EMERGENCY MEDICINE
Payer: COMMERCIAL

## 2024-01-13 ENCOUNTER — NURSE TRIAGE (OUTPATIENT)
Dept: OTHER | Facility: OTHER | Age: 39
End: 2024-01-13

## 2024-01-13 VITALS
RESPIRATION RATE: 18 BRPM | DIASTOLIC BLOOD PRESSURE: 54 MMHG | TEMPERATURE: 98.9 F | OXYGEN SATURATION: 97 % | SYSTOLIC BLOOD PRESSURE: 111 MMHG | HEART RATE: 68 BPM

## 2024-01-13 DIAGNOSIS — N20.0 NEPHROLITHIASIS: ICD-10-CM

## 2024-01-13 DIAGNOSIS — N12 PYELONEPHRITIS: Primary | ICD-10-CM

## 2024-01-13 DIAGNOSIS — R10.9 RIGHT FLANK PAIN: ICD-10-CM

## 2024-01-13 LAB
ANION GAP SERPL CALCULATED.3IONS-SCNC: 6 MMOL/L
BACTERIA UR CULT: ABNORMAL
BASOPHILS # BLD AUTO: 0.03 THOUSANDS/ÂΜL (ref 0–0.1)
BASOPHILS NFR BLD AUTO: 1 % (ref 0–1)
BUN SERPL-MCNC: 9 MG/DL (ref 5–25)
CALCIUM SERPL-MCNC: 9.5 MG/DL (ref 8.4–10.2)
CHLORIDE SERPL-SCNC: 101 MMOL/L (ref 96–108)
CO2 SERPL-SCNC: 30 MMOL/L (ref 21–32)
CREAT SERPL-MCNC: 0.76 MG/DL (ref 0.6–1.3)
EOSINOPHIL # BLD AUTO: 0.04 THOUSAND/ÂΜL (ref 0–0.61)
EOSINOPHIL NFR BLD AUTO: 1 % (ref 0–6)
ERYTHROCYTE [DISTWIDTH] IN BLOOD BY AUTOMATED COUNT: 12.8 % (ref 11.6–15.1)
EXT PREGNANCY TEST URINE: NEGATIVE
EXT. CONTROL: NORMAL
GFR SERPL CREATININE-BSD FRML MDRD: 100 ML/MIN/1.73SQ M
GLUCOSE SERPL-MCNC: 90 MG/DL (ref 65–140)
HCT VFR BLD AUTO: 40.2 % (ref 34.8–46.1)
HGB BLD-MCNC: 13.4 G/DL (ref 11.5–15.4)
IMM GRANULOCYTES # BLD AUTO: 0.02 THOUSAND/UL (ref 0–0.2)
IMM GRANULOCYTES NFR BLD AUTO: 0 % (ref 0–2)
LYMPHOCYTES # BLD AUTO: 1.48 THOUSANDS/ÂΜL (ref 0.6–4.47)
LYMPHOCYTES NFR BLD AUTO: 23 % (ref 14–44)
MCH RBC QN AUTO: 29.3 PG (ref 26.8–34.3)
MCHC RBC AUTO-ENTMCNC: 33.3 G/DL (ref 31.4–37.4)
MCV RBC AUTO: 88 FL (ref 82–98)
MONOCYTES # BLD AUTO: 0.56 THOUSAND/ÂΜL (ref 0.17–1.22)
MONOCYTES NFR BLD AUTO: 9 % (ref 4–12)
NEUTROPHILS # BLD AUTO: 4.34 THOUSANDS/ÂΜL (ref 1.85–7.62)
NEUTS SEG NFR BLD AUTO: 66 % (ref 43–75)
NRBC BLD AUTO-RTO: 0 /100 WBCS
PLATELET # BLD AUTO: 221 THOUSANDS/UL (ref 149–390)
PMV BLD AUTO: 9.9 FL (ref 8.9–12.7)
POTASSIUM SERPL-SCNC: 4 MMOL/L (ref 3.5–5.3)
RBC # BLD AUTO: 4.57 MILLION/UL (ref 3.81–5.12)
SODIUM SERPL-SCNC: 137 MMOL/L (ref 135–147)
WBC # BLD AUTO: 6.47 THOUSAND/UL (ref 4.31–10.16)

## 2024-01-13 PROCEDURE — 99284 EMERGENCY DEPT VISIT MOD MDM: CPT

## 2024-01-13 PROCEDURE — 36415 COLL VENOUS BLD VENIPUNCTURE: CPT | Performed by: PHYSICIAN ASSISTANT

## 2024-01-13 PROCEDURE — 96361 HYDRATE IV INFUSION ADD-ON: CPT

## 2024-01-13 PROCEDURE — 80048 BASIC METABOLIC PNL TOTAL CA: CPT | Performed by: PHYSICIAN ASSISTANT

## 2024-01-13 PROCEDURE — 96375 TX/PRO/DX INJ NEW DRUG ADDON: CPT

## 2024-01-13 PROCEDURE — 74176 CT ABD & PELVIS W/O CONTRAST: CPT

## 2024-01-13 PROCEDURE — 85025 COMPLETE CBC W/AUTO DIFF WBC: CPT | Performed by: PHYSICIAN ASSISTANT

## 2024-01-13 PROCEDURE — 96365 THER/PROPH/DIAG IV INF INIT: CPT

## 2024-01-13 PROCEDURE — 99285 EMERGENCY DEPT VISIT HI MDM: CPT | Performed by: EMERGENCY MEDICINE

## 2024-01-13 PROCEDURE — 81025 URINE PREGNANCY TEST: CPT | Performed by: PHYSICIAN ASSISTANT

## 2024-01-13 RX ORDER — KETOROLAC TROMETHAMINE 30 MG/ML
15 INJECTION, SOLUTION INTRAMUSCULAR; INTRAVENOUS ONCE
Status: DISCONTINUED | OUTPATIENT
Start: 2024-01-13 | End: 2024-01-13

## 2024-01-13 RX ORDER — CEPHALEXIN 500 MG/1
500 CAPSULE ORAL EVERY 12 HOURS SCHEDULED
Qty: 20 CAPSULE | Refills: 0 | Status: SHIPPED | OUTPATIENT
Start: 2024-01-13 | End: 2024-01-18

## 2024-01-13 RX ORDER — OXYCODONE HYDROCHLORIDE AND ACETAMINOPHEN 5; 325 MG/1; MG/1
1 TABLET ORAL EVERY 6 HOURS PRN
Qty: 12 TABLET | Refills: 0 | Status: SHIPPED | OUTPATIENT
Start: 2024-01-13 | End: 2024-01-25

## 2024-01-13 RX ORDER — KETOROLAC TROMETHAMINE 30 MG/ML
15 INJECTION, SOLUTION INTRAMUSCULAR; INTRAVENOUS ONCE
Status: COMPLETED | OUTPATIENT
Start: 2024-01-13 | End: 2024-01-13

## 2024-01-13 RX ORDER — CEPHALEXIN 500 MG/1
500 CAPSULE ORAL EVERY 6 HOURS SCHEDULED
Qty: 10 CAPSULE | Refills: 0 | Status: SHIPPED | OUTPATIENT
Start: 2024-01-13 | End: 2024-01-13

## 2024-01-13 RX ADMIN — KETOROLAC TROMETHAMINE 15 MG: 30 INJECTION, SOLUTION INTRAMUSCULAR; INTRAVENOUS at 12:25

## 2024-01-13 RX ADMIN — CEFTRIAXONE SODIUM 1000 MG: 10 INJECTION, POWDER, FOR SOLUTION INTRAVENOUS at 12:59

## 2024-01-13 RX ADMIN — SODIUM CHLORIDE 1000 ML: 0.9 INJECTION, SOLUTION INTRAVENOUS at 12:26

## 2024-01-13 NOTE — DISCHARGE INSTRUCTIONS
Keep your appointment for ultrasound on Thursday to evaluate your right adnexa.    Stop your Macrobid.    Start Keflex 500 mg twice daily for 10 days.

## 2024-01-13 NOTE — ED PROVIDER NOTES
History  Chief Complaint   Patient presents with    Abdominal Pain     Pain in R lower abdomen. Antibiotics for UTI since Wednesday, had fevers since as high as 103.7f. No hx of kidney stones. Also reports neck pain and stiffness and a headache.       Abdominal Pain  Pain location:  R flank  Pain quality: sharp    Pain radiates to:  Groin  Pain severity:  Moderate  Onset quality:  Gradual  Duration:  2 days  Timing:  Intermittent  Progression:  Waxing and waning  Chronicity:  New  Context: awakening from sleep and recent illness    Context: not alcohol use, not diet changes, not eating, not laxative use, not medication withdrawal, not previous surgeries, not recent sexual activity, not recent travel, not retching, not sick contacts, not suspicious food intake and not trauma    Relieved by:  Nothing  Worsened by:  Movement and palpation  Ineffective treatments:  None tried  Associated symptoms: anorexia, chills, dysuria, fatigue, fever and nausea    Associated symptoms: no belching, no chest pain, no constipation, no cough, no diarrhea, no flatus, no hematemesis, no hematochezia, no hematuria, no melena and no sore throat        Prior to Admission Medications   Prescriptions Last Dose Informant Patient Reported? Taking?   Prenatal Vit-Fe Fumarate-FA (PRENATAL VITAMINS PO)  Self Yes No   Sig: Take by mouth   Synthroid 125 MCG tablet   No No   Sig: Take one tablet daily from Mon to sat and 1/2 tablet on    nitrofurantoin (MACROBID) 100 mg capsule   No No   Sig: Take 1 capsule (100 mg total) by mouth 2 (two) times a day      Facility-Administered Medications: None       Past Medical History:   Diagnosis Date    Abnormal Pap smear of cervix     Anemia     Depression     Dermatitis      2 para 2     Hashimoto's disease 2017    Herpes     Hirsutism     last assessed: 14    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 thyroiditis     Mild cervical dysplasia     Pregnancy     last assessed: 3/28/17    Subclinical  hypothyroidism     last assessed: 12/29/14    Urinary tract infection     Varicella     Vitamin D deficiency        Past Surgical History:   Procedure Laterality Date    COLPOSCOPY VULVA W/ BIOPSY      10/1/07 - MALLORY-1 noted at 5:00 and 12:00 ECC was negative    COMBINED HYSTEROSCOPY DIAGNOSTIC / D&C  10/2014    polyp removal    DILATION AND CURETTAGE OF UTERUS      WISDOM TOOTH EXTRACTION         Family History   Problem Relation Age of Onset    Stroke Mother     Depression Mother     Thyroid disease Mother     Hypertension Father     Depression Maternal Aunt     Diabetes Maternal Aunt     Heart attack Paternal Uncle     Diabetes Other      I have reviewed and agree with the history as documented.    E-Cigarette/Vaping    E-Cigarette Use Never User      E-Cigarette/Vaping Substances    Nicotine No     THC No     CBD No     Flavoring No     Other No     Unknown No      Social History     Tobacco Use    Smoking status: Never    Smokeless tobacco: Never   Vaping Use    Vaping status: Never Used   Substance Use Topics    Alcohol use: Yes     Comment: social drinker as per allscripts    Drug use: No       Review of Systems   Constitutional:  Positive for activity change, appetite change, chills, fatigue and fever. Negative for diaphoresis.   HENT:  Negative for congestion, ear discharge, ear pain, mouth sores, postnasal drip, rhinorrhea and sore throat.    Eyes:  Negative for pain, discharge, redness and itching.   Respiratory:  Negative for cough.    Cardiovascular:  Negative for chest pain.   Gastrointestinal:  Positive for abdominal pain, anorexia and nausea. Negative for constipation, diarrhea, flatus, hematemesis, hematochezia and melena.   Genitourinary:  Positive for dysuria, flank pain, frequency and urgency. Negative for hematuria.   Musculoskeletal:  Negative for gait problem.   Skin:  Negative for color change, pallor and rash.   Psychiatric/Behavioral:  Negative for confusion.    All other systems reviewed  and are negative.      Physical Exam  Physical Exam  Vitals and nursing note reviewed.   Constitutional:       General: She is not in acute distress.     Appearance: She is well-developed and normal weight. She is not ill-appearing, toxic-appearing or diaphoretic.   HENT:      Head: Normocephalic.   Cardiovascular:      Rate and Rhythm: Normal rate and regular rhythm.   Pulmonary:      Effort: Pulmonary effort is normal.      Breath sounds: Normal breath sounds.   Abdominal:      Palpations: Abdomen is soft.      Tenderness: There is right CVA tenderness. There is no guarding. Negative signs include Gong's sign, Rovsing's sign and McBurney's sign.   Skin:     General: Skin is warm.      Capillary Refill: Capillary refill takes less than 2 seconds.   Neurological:      Mental Status: She is alert and oriented to person, place, and time.   Psychiatric:         Mood and Affect: Mood normal.         Behavior: Behavior normal.         Vital Signs  ED Triage Vitals   Temperature Pulse Respirations Blood Pressure SpO2   01/13/24 1026 01/13/24 1026 01/13/24 1026 01/13/24 1026 01/13/24 1026   98.9 °F (37.2 °C) 68 18 111/54 97 %      Temp Source Heart Rate Source Patient Position - Orthostatic VS BP Location FiO2 (%)   01/13/24 1026 01/13/24 1026 01/13/24 1026 01/13/24 1026 --   Oral Monitor Sitting Right arm       Pain Score       01/13/24 1225       6           Vitals:    01/13/24 1026   BP: 111/54   Pulse: 68   Patient Position - Orthostatic VS: Sitting         Visual Acuity      ED Medications  Medications   sodium chloride 0.9 % bolus 1,000 mL (0 mL Intravenous Stopped 1/13/24 1326)   ketorolac (TORADOL) injection 15 mg (15 mg Intravenous Given 1/13/24 1225)   ceftriaxone (ROCEPHIN) 1 g/50 mL in dextrose IVPB (0 mg Intravenous Stopped 1/13/24 1329)       Diagnostic Studies  Results Reviewed       Procedure Component Value Units Date/Time    Basic metabolic panel [[id number]] Collected: 01/13/24 1227    Lab Status: Final  result Specimen: Blood from Arm, Right Updated: 01/13/24 1301     Sodium 137 mmol/L      Potassium 4.0 mmol/L      Chloride 101 mmol/L      CO2 30 mmol/L      ANION GAP 6 mmol/L      BUN 9 mg/dL      Creatinine 0.76 mg/dL      Glucose 90 mg/dL      Calcium 9.5 mg/dL      eGFR 100 ml/min/1.73sq m     Narrative:      National Kidney Disease Foundation guidelines for Chronic Kidney Disease (CKD):     Stage 1 with normal or high GFR (GFR > 90 mL/min/1.73 square meters)    Stage 2 Mild CKD (GFR = 60-89 mL/min/1.73 square meters)    Stage 3A Moderate CKD (GFR = 45-59 mL/min/1.73 square meters)    Stage 3B Moderate CKD (GFR = 30-44 mL/min/1.73 square meters)    Stage 4 Severe CKD (GFR = 15-29 mL/min/1.73 square meters)    Stage 5 End Stage CKD (GFR <15 mL/min/1.73 square meters)  Note: GFR calculation is accurate only with a steady state creatinine    CBC and differential [[id number]] Collected: 01/13/24 1227    Lab Status: Final result Specimen: Blood from Arm, Right Updated: 01/13/24 1240     WBC 6.47 Thousand/uL      RBC 4.57 Million/uL      Hemoglobin 13.4 g/dL      Hematocrit 40.2 %      MCV 88 fL      MCH 29.3 pg      MCHC 33.3 g/dL      RDW 12.8 %      MPV 9.9 fL      Platelets 221 Thousands/uL      nRBC 0 /100 WBCs      Neutrophils Relative 66 %      Immat GRANS % 0 %      Lymphocytes Relative 23 %      Monocytes Relative 9 %      Eosinophils Relative 1 %      Basophils Relative 1 %      Neutrophils Absolute 4.34 Thousands/µL      Immature Grans Absolute 0.02 Thousand/uL      Lymphocytes Absolute 1.48 Thousands/µL      Monocytes Absolute 0.56 Thousand/µL      Eosinophils Absolute 0.04 Thousand/µL      Basophils Absolute 0.03 Thousands/µL     POCT pregnancy, urine [[id number]]  (Normal) Resulted: 01/13/24 1218    Lab Status: Final result Updated: 01/13/24 1218     EXT Preg Test, Ur Negative     Control Valid                   CT renal stone study abdomen pelvis without contrast   ED Interpretation by Padmaja Fenton  Gutzweiler, PA-C (01/13 1412)   CT renal stone study abdomen pelvis without contrast  Status: Final result    PACS Images     Show images for CT renal stone study abdomen pelvis without contrast  Study Result    Narrative & Impression  CT ABDOMEN AND PELVIS WITHOUT IV CONTRAST - LOW DOSE RENAL STONE     INDICATION:   Right flank pain has been on antibiotics for UTI since Wednesday with associated high fevers. No history of kidney stones..        COMPARISON: CT abdomen 2/21/2009     TECHNIQUE:  Low radiation dose thin section CT examination of the abdomen and pelvis was performed without intravenous or oral contrast according to a protocol specifically designed to evaluate for urinary tract calculus.  Axial, sagittal, and coronal 2D   reformatted images were created from the source data and submitted for interpretation.  Evaluation for pathology in the abdomen and pelvis that is unrelated to urinary tract calculi is limited.     Radiation dose length product (DLP) for this visit:  288.41 mGy-cm .  This examination, l   skye all CT scans performed [facility], was performed utilizing techniques to minimize radiation dose exposure, including the use of iterative   reconstruction and automated exposure control.     URINARY TRACT FINDINGS:     RIGHT KIDNEY AND URETER: 5mm nonobstructing mid to lower pole renal calculus. No hydronephrosis or hydroureter.     LEFT KIDNEY AND URETER:  No urinary tract calculi.  No hydronephrosis or hydroureter.     URINARY BLADDER:  Unremarkable.        ADDITIONAL FINDINGS:     LOWER CHEST: Minimal bibasilar atelectasis. Otherwise, no clinically significant abnormality identified in the visualized lower chest.     SOLID VISCERA: Limited low radiation dose noncontrast CT evaluation demonstrates no clinically significant abnormality of the imaged portions of the liver, spleen, pancreas, or adrenal glands.     GALLBLADDER/BILIARY TREE:  No calcified gallstones. No  pericholecystic inflammatory change.  No biliary dilatation.     STOMACH AND BOWEL: No smal   l bowel obstruction. Mild stool retention throughout the colon.     APPENDIX:  No findings to suggest appendicitis.     ABDOMINOPELVIC CAVITY:  No ascites.  No pneumoperitoneum.  No lymphadenopathy.     VESSELS: Unremarkable for patient's age.     REPRODUCTIVE ORGANS:  Unremarkable for patient's age.     ABDOMINAL WALL/INGUINAL REGIONS:  Unremarkable.     OSSEOUS STRUCTURES:  No acute fracture or destructive osseous lesion. Bone island in the left iliac wing.     IMPRESSION:        1. Right-sided 5 mm nonobstructing mid to lower pole renal calculus. No hydronephrosis or hydroureter.  2. Mild stool retention throughout the colon.           Resident: LAURIE SPEAR I, the attending radiologist, have reviewed the images and agree with the final report above.     Workstation performed: [id number]               Final Result by Bradley Landon Kocher, MD (01/13 1402)         1. Right-sided 5 mm nonobstructing mid to lower pole renal calculus. No hydronephrosis or hydroureter.   2. Mild stool retention throughout the colon.            Resident: LAURIE SPEAR I, the attending radiologist, have reviewed the images and agree with the final report above.      Workstation performed: [id number]                    Procedures  Procedures         ED Course                               SBIRT 20yo+      Flowsheet Row Most Recent Value   Initial Alcohol Screen: US AUDIT-C     1. How often do you have a drink containing alcohol? 0 Filed at: 01/13/2024 1242   2. How many drinks containing alcohol do you have on a typical day you are drinking?  0 Filed at: 01/13/2024 1242   3b. FEMALE Any Age, or MALE 65+: How often do you have 4 or more drinks on one occassion? 0 Filed at: 01/13/2024 1242   Audit-C Score 0 Filed at: 01/13/2024 1242   DAKSHA: How many times in the past year have you...    Used an illegal drug or used a prescription  medication for non-medical reasons? Never Filed at: 2024 1242                      Medical Decision Making  38-year-old female presents emergency room with a history of recent urinary tract infection.  She was seen in the emergency room.  Her urinalysis was positive for urinary tract infection.  Her urine culture has returned.  She is on Macrobid.  She was seen by her family physician 2 who started her on the Macrobid.  He was concerned about some fullness in her right adnexa.  He has a ultrasound planned for next week on Thursday.  Patient complains of an onset of right flank pain over the past 2 days that has been getting progressively worse.  She complains of associated nausea and anorexia.  She has not had any vomiting.  She did have fevers as high as 103.  She has no history of kidney stones.    Past medical history is positive for urinary tract infection, abnormal Pap smear, anemia, depression, dermatitis,  2 para 2, Hashimoto's disease, herpes, hirsutism, hypothyroidism, mild cervical dysplasia, varicella, vitamin D deficiency    Physical exam-this 38-year-old female is alert and oriented x 3.  She is in no acute distress.  Her neck is supple upon palpation.  Her lungs are clear to auscultation.  Her heart is regular rate and rhythm without murmur.  Her abdomen is soft with tenderness and guarding over the left mid and left upper abdomen.  She has positive CVAT on the right.  There is no lower rebound tenderness.  She has no dependent edema.    Her chart was reviewed from the other day which is positive for urinary tract infection.  It is susceptible to Macrobid.  Macrobid is bacteriostatic.  I am going to change her to a cephalosporin.    Laboratory studies were taken and reviewed.  A CAT scan of the abdomen and pelvis was negative for any acute process.  She does have a 5 mm stone in the right kidney.    Impression  Pyelonephritis  Acute right flank pain  5 mm nephrolithiasis right  kidney    Plan-  Patient is going to change from Macrobid to Keflex.  She will take 500 mg twice daily for 10 days.  She is going to call make a follow-up with her Family physician.  She will keep her appointment for her ultrasound of her pelvis for Thursday.    Amount and/or Complexity of Data Reviewed  Labs: ordered.     Details: Independently evaluated by me  Radiology: ordered and independent interpretation performed.     Details: Interpreted by the radiologist.  I did review the films.  Patient has a 5 mm stone in the right kidney.  There is no hydronephrosis or any ureterolithiasis.    Risk  Prescription drug management.             Disposition  Final diagnoses:   Pyelonephritis   Right flank pain   Nephrolithiasis - Right kidney     Time reflects when diagnosis was documented in both MDM as applicable and the Disposition within this note       Time User Action Codes Description Comment    1/13/2024 12:51 PM Gutzweiler, Julie Add [N12] Pyelonephritis     1/13/2024 12:51 PM Gutzweiler, Julie Add [R10.9] Right flank pain     1/13/2024 12:51 PM Gutzweiler, Julie Add [N20.0] Nephrolithiasis     1/13/2024 12:51 PM Gutzweiler, Julie Modify [N20.0] Nephrolithiasis Right kidney          ED Disposition       ED Disposition   Discharge    Condition   Stable    Date/Time   Sat Jan 13, 2024 1257    Comment   Aileen Us discharge to home/self care.                   Follow-up Information    None         Discharge Medication List as of 1/13/2024 12:57 PM        START taking these medications    Details   oxyCODONE-acetaminophen (PERCOCET) 5-325 mg per tablet Take 1 tablet by mouth every 6 (six) hours as needed for moderate pain for up to 12 days Max Daily Amount: 4 tablets, Starting Sat 1/13/2024, Until Thu 1/25/2024 at 2359, Normal      cephalexin (KEFLEX) 500 mg capsule Take 1 capsule (500 mg total) by mouth every 6 (six) hours for 10 days, Starting Sat 1/13/2024, Until Tue 1/23/2024, Normal           CONTINUE  these medications which have NOT CHANGED    Details   nitrofurantoin (MACROBID) 100 mg capsule Take 1 capsule (100 mg total) by mouth 2 (two) times a day, Starting Wed 1/10/2024, Normal      Prenatal Vit-Fe Fumarate-FA (PRENATAL VITAMINS PO) Take by mouth, Historical Med      Synthroid 125 MCG tablet Take one tablet daily from Mon to sat and 1/2 tablet on Sunday, Normal             No discharge procedures on file.    PDMP Review       None            ED Provider  Electronically Signed by             Julie Lynn Gutzweiler, PA-C  01/13/24 5474

## 2024-01-13 NOTE — TELEPHONE ENCOUNTER
"Started with mild R side pain, urinary frequency and 103 fever on 1/10. Patient stated she felt like she had the flu or like \"she was hit by a bus\". Tested negative for respiratory viruses. On Macrobid for UTI, since 1/10 at 12pm. Fever has continued at 103/102 until it broke this morning. Patient had been taking Tylenol and Ibuprofen for pain. Describes worsening pain on RLQ near her hip, felt through the front and side 6/10. Worsening nausea, weakness, and neck pain. Sending to ER for evaluation.    Regarding: UTI right hip side pain and nausea  ----- Message from Yomaira Tolbert sent at 1/13/2024  9:10 AM EST -----  I am being treated for a UTI and was in the ED on 1/10/24 for a URI and still have pain on right side of hip area and getting sharper with nausea and weakness.    "

## 2024-01-17 NOTE — PROGRESS NOTES
AMB US Pelvic Non OB    Date/Time: 1/18/2024 11:40 AM    Performed by: Bea Luque  Authorized by: Bryon Sousa MD  Universal Protocol:  Consent given by: patient  Patient understanding: patient states understanding of the procedure being performed  Patient identity confirmed: verbally with patient    Procedure details:     SIS Procedure: No    Indications: non-obstetric abdominal pain      Technique:  Transvaginal US, Non-OB    Position: lithotomy exam    Uterine findings:     Length (cm): 7.52    Height (cm):  4.23    Width (cm):  6.7    Endometrial stripe: identified      Endometrium thickness (mm):  4.6  Left ovary findings:     Left ovary:  Visualized    Length (cm): 3.64    Height (cm): 1.6    Width (cm): 2.17  Right ovary findings:     Right ovary:  Visualized    Length (cm): 4.99    Height (cm): 2.13    Width (cm): 2.4  Other findings:     Free pelvic fluid: not identified      Free peritoneal fluid: not identified    Post-Procedure Details:     Impression:  Retroflexed uterus and bilateral ovaries appear within normal limits. No free fluid.     Tolerance:  Tolerated well, no immediate complications    Complications: no complications    Additional Procedure Comments:      Night Out F8 E8C-RS transvaginal transducer Serial # [id number] was used to perform the examination today and subsequently followed with high level disinfection utilizing Trophon EPR procedure.       Ultrasound performed at:     [facility]  [facility] [address] [address]  Phone:  [phone]  Fax:  [phone]

## 2024-01-18 ENCOUNTER — ULTRASOUND (OUTPATIENT)
Dept: GYNECOLOGY | Facility: CLINIC | Age: 39
End: 2024-01-18
Payer: COMMERCIAL

## 2024-01-18 ENCOUNTER — OFFICE VISIT (OUTPATIENT)
Dept: FAMILY MEDICINE CLINIC | Facility: CLINIC | Age: 39
End: 2024-01-18
Payer: COMMERCIAL

## 2024-01-18 VITALS
WEIGHT: 197.8 LBS | HEART RATE: 55 BPM | OXYGEN SATURATION: 97 % | TEMPERATURE: 97.8 F | SYSTOLIC BLOOD PRESSURE: 124 MMHG | RESPIRATION RATE: 18 BRPM | BODY MASS INDEX: 27.69 KG/M2 | DIASTOLIC BLOOD PRESSURE: 82 MMHG | HEIGHT: 71 IN

## 2024-01-18 DIAGNOSIS — N20.0 KIDNEY STONE ON RIGHT SIDE: ICD-10-CM

## 2024-01-18 DIAGNOSIS — R10.9 RIGHT SIDED ABDOMINAL PAIN: Primary | ICD-10-CM

## 2024-01-18 DIAGNOSIS — R10.31 RLQ ABDOMINAL PAIN: Primary | ICD-10-CM

## 2024-01-18 DIAGNOSIS — R10.9 RIGHT FLANK PAIN: ICD-10-CM

## 2024-01-18 DIAGNOSIS — N12 PYELONEPHRITIS: ICD-10-CM

## 2024-01-18 PROCEDURE — 99214 OFFICE O/P EST MOD 30 MIN: CPT | Performed by: FAMILY MEDICINE

## 2024-01-18 PROCEDURE — 76830 TRANSVAGINAL US NON-OB: CPT | Performed by: OBSTETRICS & GYNECOLOGY

## 2024-01-18 RX ORDER — CEPHALEXIN 500 MG/1
500 CAPSULE ORAL 4 TIMES DAILY
Qty: 28 CAPSULE | Refills: 0 | Status: SHIPPED | OUTPATIENT
Start: 2024-01-18 | End: 2024-01-25

## 2024-01-18 RX ORDER — FLUCONAZOLE 150 MG/1
TABLET ORAL
COMMUNITY
Start: 2024-01-10

## 2024-01-18 NOTE — PATIENT INSTRUCTIONS
Please research low oxalate diet and avoid calcium supplements, please add tablespoon of lemon juice to glass of water 2-3 times a day.  Please limit intake of black tea  Please schedule ultrasound of appendix and gallbladder  Please contact me immediately with any recurrences of pain or fever  Dose of cephalexin should be 1 capsule 4 times a day for 7 days starting today  Please repeat urinalysis and culture after completing antibiotic  Please drink plenty of fluids and take frequent bathroom breaks as needed

## 2024-01-18 NOTE — PROGRESS NOTES
Name: Aileen Us      : 1985      MRN: [id number]  Encounter Provider: Viviana Inman MD  Encounter Date: 2024   Encounter department: [facility]    Assessment & Plan     1. Right sided abdominal pain  -     US appendix; Future; Expected date: 2024  -     US right upper quadrant; Future; Expected date: 2024    2. Pyelonephritis  -     cephalexin (KEFLEX) 500 mg capsule; Take 1 capsule (500 mg total) by mouth 4 (four) times a day for 7 days  -     Urine culture  -     Urinalysis with microscopic; Future    3. Right flank pain  -     cephalexin (KEFLEX) 500 mg capsule; Take 1 capsule (500 mg total) by mouth 4 (four) times a day for 7 days    4. Kidney stone on right side    I suspect the patient presents with ongoing symptoms of pyelonephritis that somewhat improved after IV ceftriaxone and lower dose of Keflex 500 mg twice daily as per ER.  She has been afebrile within past few days but is still experiencing intermittent albeit improving right lower quadrant pain.  Incidental finding of 5 mm nonobstructing right kidney stone which is unlikely causing her symptoms.  Patient will increase dose of Keflex to 500 mg 4 times daily for 7 days.  Repeat urine culture after completing antibiotic.  Force fluids.  We discussed low oxalate diet.  Proceed with ultrasound of right upper quadrant and appendix due to lingering symptoms to rule out gallstones and reassess appendix..  Pelvic ultrasound is scheduled today as per GYN.      Patient Instructions   Please research low oxalate diet and avoid calcium supplements, please add tablespoon of lemon juice to glass of water 2-3 times a day.  Please limit intake of black tea  Please schedule ultrasound of appendix and gallbladder  Please contact me immediately with any recurrences of pain or fever  Dose of cephalexin should be 1 capsule 4 times a day for 7 days starting today  Please repeat urinalysis and culture after  completing antibiotic  Please drink plenty of fluids and take frequent bathroom breaks as needed         Subjective     Patient presents for follow-up after recent GYN evaluation and emergency room visit     Dxed with UTI a week ago on Wednesday,1/10/24 (presented with dysuria, RLQ pain, no flank pain hematuria.)    Previous UTI - treated in  GYN- resolved    Started on Abx  by GYN - Macrobid.Developed high fever 103.7 F same night.  Seen in ER Wed night, January tenths,- negative  COVID and flu testing  Fever persisted- low grade- up to Friday with worsening of Right sided abdominal pain with radiation to the right  flank pain  ER eval #2 on 24 - had 1 dose ceftriaxone IV, started on Keflex 500 mg twice daily.  Feels slightly better, pain is diminishing.  Afebrile.  Appetite is decreased, feels fatigued.  Last day of low-grade fever .     Patient is scheduled for pelvic ultrasound at GYN office later today.    CT performed in ED revealed nonobstructing 5 mm kidney stone.  Impression  1. Right-sided 5 mm nonobstructing mid to lower pole renal calculus. No hydronephrosis or hydroureter.   2. Mild stool retention throughout the colon.             Review of Systems   Constitutional:  Positive for fatigue and fever (resolved).        As per HPI   HENT: Negative.     Respiratory: Negative.     Cardiovascular: Negative.    Gastrointestinal:  Positive for abdominal pain. Negative for blood in stool, constipation, diarrhea, nausea and vomiting.   Genitourinary:  Positive for dysuria.        As per HPI       Past Medical History:   Diagnosis Date   • Abnormal Pap smear of cervix    • Anemia    • Depression    • Dermatitis    •  2 para 2    • Hashimoto's disease 2017   • Herpes    • Hirsutism     last assessed: 14   • 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 thyroiditis    • Mild cervical dysplasia    • Pregnancy     last assessed: 3/28/17   • Subclinical hypothyroidism     last  assessed: 12/29/14   • Urinary tract infection    • Varicella    • Vitamin D deficiency      Past Surgical History:   Procedure Laterality Date   • COLPOSCOPY VULVA W/ BIOPSY      10/1/07 - MALLORY-1 noted at 5:00 and 12:00 ECC was negative   • COMBINED HYSTEROSCOPY DIAGNOSTIC / D&C  10/2014    polyp removal   • DILATION AND CURETTAGE OF UTERUS     • WISDOM TOOTH EXTRACTION       Family History   Problem Relation Age of Onset   • Stroke Mother    • Depression Mother    • Thyroid disease Mother    • Hypertension Father    • Depression Maternal Aunt    • Diabetes Maternal Aunt    • Heart attack Paternal Uncle    • Diabetes Other      Social History     Socioeconomic History   • Marital status: /Civil Union     Spouse name: None   • Number of children: 2   • Years of education: None   • Highest education level: None   Occupational History   • None   Tobacco Use   • Smoking status: Never   • Smokeless tobacco: Never   Vaping Use   • Vaping status: Never Used   Substance and Sexual Activity   • Alcohol use: Yes     Comment: social drinker as per allscripts   • Drug use: No   • Sexual activity: Yes     Partners: Male     Birth control/protection: I.U.D.   Other Topics Concern   • None   Social History Narrative    Caffeine use    Rastafari affiliation [employer]    Uses seatbelts     Social Determinants of Health     Financial Resource Strain: Not on file   Food Insecurity: Not on file   Transportation Needs: Not on file   Physical Activity: Not on file   Stress: Not on file   Social Connections: Not on file   Intimate Partner Violence: Not on file   Housing Stability: Not on file     Current Outpatient Medications on File Prior to Visit   Medication Sig   • fluconazole (DIFLUCAN) 150 mg tablet TAKE 1 TABLET ONCE FOR 1 DOSE, THEN REPEAT IN 1 WEEK   • oxyCODONE-acetaminophen (PERCOCET) 5-325 mg per tablet Take 1 tablet by mouth every 6 (six) hours as needed for moderate pain for up to 12 days Max Daily Amount: 4  "tablets   • Prenatal Vit-Fe Fumarate-FA (PRENATAL VITAMINS PO) Take by mouth   • Synthroid 125 MCG tablet Take one tablet daily from Mon to sat and 1/2 tablet on Sunday   • nitrofurantoin (MACROBID) 100 mg capsule Take 1 capsule (100 mg total) by mouth 2 (two) times a day (Patient not taking: Reported on 1/18/2024)     No Known Allergies  Immunization History   Administered Date(s) Administered   • COVID-19 PFIZER VACCINE 0.3 ML IM 07/27/2021, 08/17/2021   • INFLUENZA 11/10/2022   • Influenza Quadrivalent Preservative Free 3 years and older IM 10/28/2015, 01/19/2017   • Influenza, injectable, quadrivalent, preservative free 0.5 mL 11/06/2018, 11/19/2021, 11/10/2022   • Influenza, recombinant, quadrivalent,injectable, preservative free 12/03/2019   • Influenza, seasonal, injectable 01/19/2017   • Tdap 07/06/2015, 07/11/2017       Objective     /82 (BP Location: Left arm, Patient Position: Sitting, Cuff Size: Standard)   Pulse 55   Temp 97.8 °F (36.6 °C) (Temporal)   Resp 18   Ht 5' 11\" (1.803 m)   Wt 89.7 kg (197 lb 12.8 oz)   LMP 01/05/2024 (Exact Date)   SpO2 97%   BMI 27.59 kg/m²     Physical Exam  Vitals and nursing note reviewed.   Constitutional:       General: She is not in acute distress.     Appearance: Normal appearance. She is well-developed. She is not ill-appearing.   HENT:      Head: Normocephalic and atraumatic.   Eyes:      Conjunctiva/sclera: Conjunctivae normal.   Neck:      Thyroid: No thyromegaly.      Vascular: No carotid bruit.   Cardiovascular:      Rate and Rhythm: Normal rate and regular rhythm.      Heart sounds: Normal heart sounds. No murmur heard.  Pulmonary:      Effort: Pulmonary effort is normal. No respiratory distress.      Breath sounds: Normal breath sounds. No wheezing.   Abdominal:      General: Abdomen is flat. There is no abdominal bruit.      Tenderness: There is abdominal tenderness in the right lower quadrant. There is no right CVA tenderness, left CVA " tenderness, guarding or rebound.   Musculoskeletal:         General: Normal range of motion.      Cervical back: Neck supple.   Neurological:      General: No focal deficit present.      Mental Status: She is alert and oriented to person, place, and time.      Cranial Nerves: No cranial nerve deficit.      Coordination: Coordination normal.   Psychiatric:         Mood and Affect: Mood normal.         Behavior: Behavior normal.         Thought Content: Thought content normal.       Viviana Inman MD

## 2024-01-20 PROBLEM — Z86.16 PERSONAL HISTORY OF COVID-19: Status: RESOLVED | Noted: 2021-03-06 | Resolved: 2024-01-20

## 2024-01-20 PROBLEM — N20.0 KIDNEY STONE ON RIGHT SIDE: Status: ACTIVE | Noted: 2024-01-20

## 2024-01-20 PROBLEM — L75.0 ABNORMAL BODY ODOR: Status: RESOLVED | Noted: 2021-11-21 | Resolved: 2024-01-20

## 2024-01-25 ENCOUNTER — APPOINTMENT (OUTPATIENT)
Dept: LAB | Facility: CLINIC | Age: 39
End: 2024-01-25
Payer: COMMERCIAL

## 2024-01-25 DIAGNOSIS — N12 PYELONEPHRITIS: ICD-10-CM

## 2024-01-25 LAB
BACTERIA UR QL AUTO: NORMAL /HPF
BILIRUB UR QL STRIP: NEGATIVE
CLARITY UR: CLEAR
COLOR UR: NORMAL
GLUCOSE UR STRIP-MCNC: NEGATIVE MG/DL
HGB UR QL STRIP.AUTO: NEGATIVE
KETONES UR STRIP-MCNC: NEGATIVE MG/DL
LEUKOCYTE ESTERASE UR QL STRIP: NEGATIVE
NITRITE UR QL STRIP: NEGATIVE
NON-SQ EPI CELLS URNS QL MICRO: NORMAL /HPF
PH UR STRIP.AUTO: 6.5 [PH]
PROT UR STRIP-MCNC: NEGATIVE MG/DL
RBC #/AREA URNS AUTO: NORMAL /HPF
SP GR UR STRIP.AUTO: 1.01 (ref 1–1.03)
UROBILINOGEN UR STRIP-ACNC: <2 MG/DL
WBC #/AREA URNS AUTO: NORMAL /HPF

## 2024-01-25 PROCEDURE — 87086 URINE CULTURE/COLONY COUNT: CPT | Performed by: FAMILY MEDICINE

## 2024-01-25 PROCEDURE — 81001 URINALYSIS AUTO W/SCOPE: CPT

## 2024-01-26 ENCOUNTER — TELEPHONE (OUTPATIENT)
Dept: FAMILY MEDICINE CLINIC | Facility: CLINIC | Age: 39
End: 2024-01-26

## 2024-01-26 DIAGNOSIS — N20.0 KIDNEY STONE ON RIGHT SIDE: Primary | ICD-10-CM

## 2024-01-26 DIAGNOSIS — R30.0 BURNING WITH URINATION: ICD-10-CM

## 2024-01-26 LAB — BACTERIA UR CULT: NORMAL

## 2024-01-26 NOTE — TELEPHONE ENCOUNTER
Called patient, no answer.     LM to call the office back for Dr. Inman message.     Going to send through my chart also.

## 2024-01-26 NOTE — TELEPHONE ENCOUNTER
Patient called and stated that she is still feeling like burning sensation when she urinates but it comes and goes and it was better on Wednesday.  But now today she is feeling that it came back and she is having the urgency and pains but its not as intense on the rt side.  She does have an ultrasound on 1/31/ She stated that its unusual for her to have health problems and she wanted to make sure she was communicating them effectively so you are aware.

## 2024-01-26 NOTE — TELEPHONE ENCOUNTER
Please contact patient.    Urinalysis and urine culture both negative.  There is no evidence of UTI.  I reviewed results of recent pelvic ultrasound which was unremarkable.    I am not able to fully explain patient's symptoms so I recommend to proceed with further evaluation.  We should rule out other possibilities such as interstitial cystitis or overactive bladder.  I also wonder if patient might be passing a smaller kidney stone that was not noted on previous CT.    I recommend to schedule evaluation with [facility] urology, referral placed.    We should also set up follow-up in our office early next week with any available physician/nurse practitioner.  Please ask her to make sure that she is moving her bowels well and is not constipated.  It is also important that she drinks plenty of fluids/water    If patient's symptoms worsen at any time over the weekend that she should proceed to emergency room     Thank you

## 2024-01-29 ENCOUNTER — TELEPHONE (OUTPATIENT)
Age: 39
End: 2024-01-29

## 2024-01-29 ENCOUNTER — NURSE TRIAGE (OUTPATIENT)
Age: 39
End: 2024-01-29

## 2024-01-29 NOTE — TELEPHONE ENCOUNTER
Pt was in ED for kidney stones and is experiencing pain and burning when urinating.    Call transferred to C[facility].

## 2024-01-29 NOTE — TELEPHONE ENCOUNTER
Pt called still having flank pain and discomfort. Reports R side, intermittent in intensity. Last night was a 6 but now a 3. Reports also some burning with urination. Does not feel right she said. Looking for appt. Offered [place] location but too far. Scheduled for 02/09/24 in [place] with . Address reviewed with patient

## 2024-01-31 ENCOUNTER — HOSPITAL ENCOUNTER (OUTPATIENT)
Dept: RADIOLOGY | Facility: HOSPITAL | Age: 39
Discharge: HOME | End: 2024-01-31
Payer: COMMERCIAL

## 2024-01-31 DIAGNOSIS — R10.9 RIGHT SIDED ABDOMINAL PAIN: ICD-10-CM

## 2024-01-31 PROCEDURE — 76705 ECHO EXAM OF ABDOMEN: CPT

## 2024-02-08 ENCOUNTER — OFFICE VISIT (OUTPATIENT)
Dept: GYNECOLOGY | Facility: CLINIC | Age: 39
End: 2024-02-08
Payer: COMMERCIAL

## 2024-02-08 VITALS — BODY MASS INDEX: 27.75 KG/M2 | WEIGHT: 199 LBS | SYSTOLIC BLOOD PRESSURE: 108 MMHG | DIASTOLIC BLOOD PRESSURE: 62 MMHG

## 2024-02-08 DIAGNOSIS — N20.0 KIDNEY STONE ON RIGHT SIDE: ICD-10-CM

## 2024-02-08 DIAGNOSIS — N92.6 IRREGULAR MENSES: ICD-10-CM

## 2024-02-08 DIAGNOSIS — R10.2 PELVIC PAIN: Primary | ICD-10-CM

## 2024-02-08 PROCEDURE — 99213 OFFICE O/P EST LOW 20 MIN: CPT | Performed by: OBSTETRICS & GYNECOLOGY

## 2024-02-08 NOTE — PROGRESS NOTES
Assessment/Plan   Diagnoses and all orders for this visit:    Pelvic pain    Kidney stone on right side    Irregular menses    1. abdominal/pelvic discomfort-has continued with right lower quadrant abdominal/pelvic discomfort for greater than 1 month now.  She states she noted it prior to UTI which was treated 12/19/2023.  It is a pressure type of discomfort localized into the right abdomen/pelvis.  She denies any abdominal wall discomfort.  No mass or hernia was appreciated.  Pelvic ultrasound was unremarkable for any adnexal findings.  Patient was relieved to hear this.  She does have follow-up with urology as noted below.  Also discussed possible abdominal wall/muscle/ligament finding that could be evaluated and treated with possible physical therapy.  She will consider this going forward.  2. kidney stones-noted on previous CAT scan and ultrasound.  It does seem to be right-sided in the kidney pole, not obstructing.  Unclear if this discomfort could represent some type of nephrolithiasis.  She was referred to urology by her primary doctor for this  possibility, has appointment with them tomorrow.  3. previous UTI-treated.  Recent urine culture from 1/25/2024 was negative.  4. previous yeast-denies any current symptoms.  5. previous Mirena IUD-removed 8/20/2023.  6.  History of secondary oligomenorrhea-resolved with Mirena IUD removal.  Menstrual cycles are now 31 to 35 days in length which is somewhat normal for her.  She will call with any menometrorrhagia.  7.  History of depression/anxiety-symptoms have been somewhat increased recently.  Recommend follow-up with her treating doctor and her counselor.  8. possible adenomyosis-has been suggested on previous ultrasounds, although not noted on most recent 1. this presents more commonly with midline discomfort particularly with heavy menses and dysmenorrhea.  She does note some cramping with cycles, but not severe.  The pain she currently has does not seem most  consistent with adenomyosis.  9. preconceptual-still considering.  Follow-up 2024 as scheduled for yearly exam or as needed.      Subjective   Patient ID: Aileen Us is a 38 y.o. female.    Vitals:    24 1540   BP: 108/62     Patient was seen today for follow-up visit.  Please see assessment plan for details.        The following portions of the patient's history were reviewed and updated as appropriate: .  No significant changes noted in past medical history, past surgical history, medications, allergies, social history, family history  Past Medical History:   Diagnosis Date    Abnormal Pap smear of cervix     Anemia     Depression     Dermatitis      2 para 2     Hashimoto's disease 2017    Herpes     Hirsutism     last assessed: 14    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 thyroiditis     Mild cervical dysplasia     Pregnancy     last assessed: 3/28/17    Subclinical hypothyroidism     last assessed: 14    Urinary tract infection     Varicella     Vitamin D deficiency      Past Surgical History:   Procedure Laterality Date    COLPOSCOPY VULVA W/ BIOPSY      10/1/07 - MALLORY-1 noted at 5:00 and 12:00 ECC was negative    COMBINED HYSTEROSCOPY DIAGNOSTIC / D&C  10/2014    polyp removal    DILATION AND CURETTAGE OF UTERUS      WISDOM TOOTH EXTRACTION       OB History    Para Term  AB Living   3 2 2   1 2   SAB IAB Ectopic Multiple Live Births         0 2      # Outcome Date GA Lbr Trevor/2nd Weight Sex Delivery Anes PTL Lv   3 Term 17 39w0d / 00:59 3799 g (8 lb 6 oz) F Vag-Spont EPI N AQUILES   2 Term 09/03/15 39w2d  3856 g (8 lb 8 oz) M Vag-Spont EPI N AQUILES   1 AB                Current Outpatient Medications:     fluconazole (DIFLUCAN) 150 mg tablet, TAKE 1 TABLET ONCE FOR 1 DOSE, THEN REPEAT IN 1 WEEK, Disp: , Rfl:     Prenatal Vit-Fe Fumarate-FA (PRENATAL VITAMINS PO), Take by mouth, Disp: , Rfl:     Synthroid 125 MCG tablet, Take one tablet daily from Mon to sat and  1/2 tablet on Sunday, Disp: 30 tablet, Rfl: 0    nitrofurantoin (MACROBID) 100 mg capsule, Take 1 capsule (100 mg total) by mouth 2 (two) times a day (Patient not taking: Reported on 1/18/2024), Disp: 14 capsule, Rfl: 0  No Known Allergies  Social History     Socioeconomic History    Marital status: /Civil Union     Spouse name: None    Number of children: 2    Years of education: None    Highest education level: None   Occupational History    None   Tobacco Use    Smoking status: Never    Smokeless tobacco: Never   Vaping Use    Vaping status: Never Used   Substance and Sexual Activity    Alcohol use: Yes     Comment: social drinker as per allscripts    Drug use: No    Sexual activity: Yes     Partners: Male     Birth control/protection: I.U.D.   Other Topics Concern    None   Social History Narrative    Caffeine use    Mos[employer] affiliation [employer]    Uses seatbelts     Social Determinants of Health     Financial Resource Strain: Not on file   Food Insecurity: Not on file   Transportation Needs: Not on file   Physical Activity: Not on file   Stress: Not on file   Social Connections: Not on file   Intimate Partner Violence: Not on file   Housing Stability: Not on file     Family History   Problem Relation Age of Onset    Stroke Mother     Depression Mother     Thyroid disease Mother     Hypertension Father     Depression Maternal Aunt     Diabetes Maternal Aunt     Heart attack Paternal Uncle     Diabetes Other        Review of Systems   Constitutional:  Negative for chills, diaphoresis, fatigue and fever.   Respiratory:  Negative for apnea, cough, chest tightness, shortness of breath and wheezing.    Cardiovascular:  Negative for chest pain, palpitations and leg swelling.   Gastrointestinal:  Negative for abdominal distention, abdominal pain, anal bleeding, constipation, diarrhea, nausea, rectal pain and vomiting.   Genitourinary:  Positive for pelvic pain. Negative for difficulty urinating,  dyspareunia, dysuria, frequency, hematuria, menstrual problem, urgency, vaginal bleeding, vaginal discharge and vaginal pain.   Musculoskeletal:  Negative for arthralgias, back pain and myalgias.   Skin:  Negative for color change and rash.   Neurological:  Negative for dizziness, syncope, light-headedness, numbness and headaches.   Hematological:  Negative for adenopathy. Does not bruise/bleed easily.   Psychiatric/Behavioral:  Negative for dysphoric mood and sleep disturbance. The patient is not nervous/anxious.        Objective   Physical Exam  OBGyn Exam     Objective      /62 (BP Location: Left arm, Patient Position: Sitting)   Wt 90.3 kg (199 lb)   LMP 01/05/2024 (Exact Date)   BMI 27.75 kg/m²     General:   alert and oriented, in no acute distress   Neck:    Breast:    Heart:    Lungs:    Abdomen: Soft, mild tenderness noted in the right lower quadrant without any guarding or rebound or masses or hernia.   Vulva:    Vagina:    Cervix:    Uterus:    Adnexa:    Rectum:     Psych:  Normal mood and affect   Skin:  Without obvious lesions   Eyes: symmetric, with normal movements and reactivity   Musculoskeletal:  Normal muscle tone and movements appreciated

## 2024-02-09 ENCOUNTER — OFFICE VISIT (OUTPATIENT)
Dept: UROLOGY | Facility: CLINIC | Age: 39
End: 2024-02-09
Payer: COMMERCIAL

## 2024-02-09 VITALS
OXYGEN SATURATION: 97 % | WEIGHT: 197 LBS | HEIGHT: 71 IN | HEART RATE: 54 BPM | BODY MASS INDEX: 27.58 KG/M2 | DIASTOLIC BLOOD PRESSURE: 70 MMHG | SYSTOLIC BLOOD PRESSURE: 100 MMHG

## 2024-02-09 DIAGNOSIS — N20.0 KIDNEY STONE ON RIGHT SIDE: ICD-10-CM

## 2024-02-09 DIAGNOSIS — R30.0 BURNING WITH URINATION: Primary | ICD-10-CM

## 2024-02-09 LAB
SL AMB  POCT GLUCOSE, UA: NORMAL
SL AMB LEUKOCYTE ESTERASE,UA: NORMAL
SL AMB POCT BILIRUBIN,UA: NORMAL
SL AMB POCT BLOOD,UA: NORMAL
SL AMB POCT CLARITY,UA: CLEAR
SL AMB POCT COLOR,UA: YELLOW
SL AMB POCT KETONES,UA: NORMAL
SL AMB POCT NITRITE,UA: NORMAL
SL AMB POCT PH,UA: 6
SL AMB POCT SPECIFIC GRAVITY,UA: 1.01
SL AMB POCT URINE PROTEIN: NORMAL
SL AMB POCT UROBILINOGEN: 0.2

## 2024-02-09 PROCEDURE — 81002 URINALYSIS NONAUTO W/O SCOPE: CPT

## 2024-02-09 PROCEDURE — 99203 OFFICE O/P NEW LOW 30 MIN: CPT

## 2024-02-09 PROCEDURE — 87086 URINE CULTURE/COLONY COUNT: CPT

## 2024-02-09 RX ORDER — MULTIVITAMIN WITH IRON
50 TABLET ORAL DAILY
COMMUNITY

## 2024-02-09 RX ORDER — CHOLECALCIFEROL (VITAMIN D3) 125 MCG
2000 TABLET ORAL
COMMUNITY

## 2024-02-09 NOTE — PROGRESS NOTES
Office Visit- Urology  Aileen Us 1985 MRN: [id number]      Assessment/Discussion/Plan    38 y.o. female managed by     Nonobstructing Nephrolithiasis   -Non-CT stone study on 1/13/2024 demonstrated a nonobstructing 5 mm mid to lower pole renal calculus  -Discussed with patient that I do not think that this is the etiology of her right-sided abdominal pain as it is nonobstructing and not in the location where it would likely be intermittently obstructed.  Did state that there is an option to have preemptive stone removal but I do not think that this will help with her pain.  She defers consideration at this point in time  -Discussed secondary prevention of nephrolithiasis  -Plan for reevaluation with ultrasound and follow-up in a year.  Patient is aware that she has any significant flank pain or urinary symptoms/gross hematuria to call the office and we will obtain sooner repeat imaging    2.  Right-sided abdominal/pelvic pain  -Patient was previously treated for urinary tract infection with urine culture in December demonstrating growth of E. coli.  -At this point in time I do not think that patient's abdominal pain is urologic based on results of CT study as described above.  We will repeat urine testing to ensure that she does not have urinary tract infection.  -Possibly GI related due to patient's CT finding of mild stool retention/constipation.        Chief Complaint:   Aileen is a 38 y.o. female presenting to the office for initial evaluation for nephrolithiasis        Subjective    Patient is a 38-year-old female with no previous urologic history presents for initial evaluation/establishment of care for nephrolithiasis and right-sided abdominal pain.  She states that in December 2023 she was diagnosed with a UTI with urine culture demonstrating growth of E. coli.  She had urinary tract symptoms such as dysuria as symptoms were persistent.  She did have an episode in January 2024 associated  with fever and abdominal pain.  This is what prompted her to go to the ER On 1/10.  Patient did state that her daughter was having a gastrointestinal  viral infection on the same time.  She had negative flu, COVID, RSV.  Due to abdominal pain she represented to the ER on 2020 CT stone study was obtained and demonstrated a nonobstructing 5 mm right-sided stone with no hydronephrosis or other urologic concerns.  Incidental finding of mild stool retention.  Today in the office patient states that her urinary symptoms have completely resolved.  She has a right lower abdominal/pelvic pain that she rates as is consistently a 1 out of 10 but can be 6 out of 10 when she has an acute episode.  She states that she is very in tune with her body and that something does not feel right.  she presents today to determine if this is possibly year-round in nature.  She had previous evaluation by her gynecologist and PCP.  Ultrasound imaging of the appendix and right upper quadrant did not reveal any acute GI condition.  She denies any gross hematuria      ROS:   Review of Systems   Constitutional: Negative.  Negative for chills, fatigue and fever.   HENT: Negative.     Respiratory:  Negative for shortness of breath.    Cardiovascular:  Negative for chest pain.   Gastrointestinal:  Positive for abdominal pain (Mild right lower abdominal/pelvic) and constipation (Chronically intermittent/irregular).   Endocrine: Negative.    Musculoskeletal: Negative.    Skin: Negative.    Neurological: Negative.  Negative for dizziness and light-headedness.   Hematological: Negative.    Psychiatric/Behavioral: Negative.           Past Medical History  Past Medical History:   Diagnosis Date    Abnormal Pap smear of cervix     Anemia     Depression     Dermatitis      2 para 2     Hashimoto's disease 2017    Herpes     Hirsutism     last assessed: 14    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 thyroiditis     Mild cervical dysplasia      Pregnancy     last assessed: 3/28/17    Subclinical hypothyroidism     last assessed: 12/29/14    Urinary tract infection     Varicella     Vitamin D deficiency        Past Surgical History  Past Surgical History:   Procedure Laterality Date    COLPOSCOPY VULVA W/ BIOPSY      10/1/07 - MALLORY-1 noted at 5:00 and 12:00 ECC was negative    COMBINED HYSTEROSCOPY DIAGNOSTIC / D&C  10/2014    polyp removal    DILATION AND CURETTAGE OF UTERUS      WISDOM TOOTH EXTRACTION         Past Family History  Family History   Problem Relation Age of Onset    Stroke Mother     Depression Mother     Thyroid disease Mother     Hypertension Father     Depression Maternal Aunt     Diabetes Maternal Aunt     Heart attack Paternal Uncle     Diabetes Other        Past Social history  Social History     Socioeconomic History    Marital status: /Civil Union     Spouse name: Not on file    Number of children: 2    Years of education: Not on file    Highest education level: Not on file   Occupational History    Not on file   Tobacco Use    Smoking status: Never    Smokeless tobacco: Never   Vaping Use    Vaping status: Never Used   Substance and Sexual Activity    Alcohol use: Yes     Comment: social drinker as per allscripts    Drug use: No    Sexual activity: Yes     Partners: Male     Birth control/protection: I.U.D.   Other Topics Concern    Not on file   Social History Narrative    Caffeine use    Worship affiliation Spiritism    Uses seatbelts     Social Determinants of Health     Financial Resource Strain: Not on file   Food Insecurity: Not on file   Transportation Needs: Not on file   Physical Activity: Not on file   Stress: Not on file   Social Connections: Not on file   Intimate Partner Violence: Not on file   Housing Stability: Not on file       Current Medications  Current Outpatient Medications   Medication Sig Dispense Refill    Ergocalciferol (Vitamin D2) 50 MCG (2000 UT) TABS Take 2,000 Units by mouth Takes 2 pills  "daily      Prenatal Vit-Fe Fumarate-FA (PRENATAL VITAMINS PO) Take by mouth      Synthroid 125 MCG tablet Take one tablet daily from Mon to sat and 1/2 tablet on Sunday 30 tablet 0    vitamin B-12 (CYANOCOBALAMIN) 50 MCG tablet Take 50 mcg by mouth daily Takes 1 daily      fluconazole (DIFLUCAN) 150 mg tablet TAKE 1 TABLET ONCE FOR 1 DOSE, THEN REPEAT IN 1 WEEK (Patient not taking: Reported on 2/9/2024)      nitrofurantoin (MACROBID) 100 mg capsule Take 1 capsule (100 mg total) by mouth 2 (two) times a day (Patient not taking: Reported on 1/18/2024) 14 capsule 0     No current facility-administered medications for this visit.       Allergies  No Known Allergies    OBJECTIVE    Vitals   Vitals:    02/09/24 0747   BP: 100/70   BP Location: Left arm   Patient Position: Sitting   Cuff Size: Standard   Pulse: (!) 54   SpO2: 97%   Weight: 89.4 kg (197 lb)   Height: 5' 11\" (1.803 m)       PVR:    Physical Exam  Constitutional:       General: She is not in acute distress.     Appearance: Normal appearance. She is normal weight. She is not ill-appearing or toxic-appearing.   HENT:      Head: Normocephalic and atraumatic.   Eyes:      Conjunctiva/sclera: Conjunctivae normal.   Cardiovascular:      Rate and Rhythm: Normal rate.   Pulmonary:      Effort: Pulmonary effort is normal. No respiratory distress.   Skin:     General: Skin is warm and dry.   Neurological:      General: No focal deficit present.      Mental Status: She is alert and oriented to person, place, and time.      Cranial Nerves: No cranial nerve deficit.   Psychiatric:         Mood and Affect: Mood normal.         Behavior: Behavior normal.         Thought Content: Thought content normal.          Labs:     Lab Results   Component Value Date    CREATININE 0.76 01/13/2024      Lab Results   Component Value Date    HGBA1C 5.3 11/22/2021     Lab Results   Component Value Date    CALCIUM 9.5 01/13/2024     (L) 03/13/2017    K 4.0 01/13/2024    CO2 30 " 01/13/2024     01/13/2024    BUN 9 01/13/2024    CREATININE 0.76 01/13/2024       I have personally reviewed all pertinent lab results and reviewed with patient    Imaging   T renal stone study abdomen pelvis without contrast   Status: Final result     PACS Images      Show images for CT renal stone study abdomen pelvis without contrast   Study Result     Narrative & Impression   CT ABDOMEN AND PELVIS WITHOUT IV CONTRAST - LOW DOSE RENAL STONE      INDICATION:   Right flank pain has been on antibiotics for UTI since Wednesday with associated high fevers. No history of kidney stones..         COMPARISON: CT abdomen 2/21/2009      TECHNIQUE:  Low radiation dose thin section CT examination of the abdomen and pelvis was performed without intravenous or oral contrast according to a protocol specifically designed to evaluate for urinary tract calculus.  Axial, sagittal, and coronal 2D    reformatted images were created from the source data and submitted for interpretation.  Evaluation for pathology in the abdomen and pelvis that is unrelated to urinary tract calculi is limited.      Radiation dose length product (DLP) for this visit:  288.41 mGy-cm .  This examination, l   skye all CT scans performed in the [facility], was performed utilizing techniques to minimize radiation dose exposure, including the use of iterative    reconstruction and automated exposure control.      URINARY TRACT FINDINGS:      RIGHT KIDNEY AND URETER: 5mm nonobstructing mid to lower pole renal calculus. No hydronephrosis or hydroureter.      LEFT KIDNEY AND URETER:  No urinary tract calculi.  No hydronephrosis or hydroureter.      URINARY BLADDER:  Unremarkable.         ADDITIONAL FINDINGS:      LOWER CHEST: Minimal bibasilar atelectasis. Otherwise, no clinically significant abnormality identified in the visualized lower chest.      SOLID VISCERA: Limited low radiation dose noncontrast CT evaluation demonstrates no  clinically significant abnormality of the imaged portions of the liver, spleen, pancreas, or adrenal glands.      GALLBLADDER/BILIARY TREE:  No calcified gallstones. No pericholecystic inflammatory change.  No biliary dilatation.      STOMACH AND BOWEL: No smal   l bowel obstruction. Mild stool retention throughout the colon.      APPENDIX:  No findings to suggest appendicitis.      ABDOMINOPELVIC CAVITY:  No ascites.  No pneumoperitoneum.  No lymphadenopathy.      VESSELS: Unremarkable for patient's age.      REPRODUCTIVE ORGANS:  Unremarkable for patient's age.      ABDOMINAL WALL/INGUINAL REGIONS:  Unremarkable.      OSSEOUS STRUCTURES:  No acute fracture or destructive osseous lesion. Bone island in the left iliac wing.      IMPRESSION:         1. Right-sided 5 mm nonobstructing mid to lower pole renal calculus. No hydronephrosis or hydroureter.   2. Mild stool retention throughout the colon.            Resident: LAURIE SPEAR I, the attending radiologist, have reviewed the images and agree with the final report above.      Workstation performed: [id number]         Jacob Kinney PA-C  Date: 2/9/2024 Time: 7:56 AM  [facility]    This note was written using fluency dictation software. Please excuse any resulting minor grammatical errors.

## 2024-02-10 LAB — BACTERIA UR CULT: NORMAL

## 2024-02-14 ENCOUNTER — TELEPHONE (OUTPATIENT)
Dept: UROLOGY | Facility: AMBULATORY SURGERY CENTER | Age: 39
End: 2024-02-14

## 2024-02-14 DIAGNOSIS — E03.8 HYPOTHYROIDISM DUE TO HASHIMOTO'S THYROIDITIS: ICD-10-CM

## 2024-02-14 DIAGNOSIS — E06.3 HYPOTHYROIDISM DUE TO HASHIMOTO'S THYROIDITIS: ICD-10-CM

## 2024-02-14 RX ORDER — LEVOTHYROXINE SODIUM 125 MCG
TABLET ORAL
Qty: 30 TABLET | Refills: 0 | Status: SHIPPED | OUTPATIENT
Start: 2024-02-14

## 2024-02-14 NOTE — TELEPHONE ENCOUNTER
Pt made aware of note below.       ----- Message from Jacob Kinney PA-C sent at 2/13/2024  3:15 PM EST -----  Urine culture negative

## 2024-03-13 DIAGNOSIS — E06.3 HYPOTHYROIDISM DUE TO HASHIMOTO'S THYROIDITIS: ICD-10-CM

## 2024-03-13 DIAGNOSIS — E03.8 HYPOTHYROIDISM DUE TO HASHIMOTO'S THYROIDITIS: ICD-10-CM

## 2024-03-13 RX ORDER — LEVOTHYROXINE SODIUM 125 MCG
TABLET ORAL
Qty: 30 TABLET | Refills: 5 | Status: SHIPPED | OUTPATIENT
Start: 2024-03-13

## 2024-04-15 ENCOUNTER — LAB (OUTPATIENT)
Dept: LAB | Facility: CLINIC | Age: 39
End: 2024-04-15
Payer: COMMERCIAL

## 2024-04-15 ENCOUNTER — TELEPHONE (OUTPATIENT)
Age: 39
End: 2024-04-15

## 2024-04-15 DIAGNOSIS — E55.9 VITAMIN D DEFICIENCY: ICD-10-CM

## 2024-04-15 DIAGNOSIS — E53.8 VITAMIN B 12 DEFICIENCY: ICD-10-CM

## 2024-04-15 DIAGNOSIS — E06.3 HYPOTHYROIDISM DUE TO HASHIMOTO'S THYROIDITIS: ICD-10-CM

## 2024-04-15 DIAGNOSIS — E03.8 HYPOTHYROIDISM DUE TO HASHIMOTO'S THYROIDITIS: ICD-10-CM

## 2024-04-15 LAB
25(OH)D3 SERPL-MCNC: 28.3 NG/ML (ref 30–100)
T4 FREE SERPL-MCNC: 1 NG/DL (ref 0.61–1.12)
TSH SERPL DL<=0.05 MIU/L-ACNC: 2.2 UIU/ML (ref 0.45–4.5)
VIT B12 SERPL-MCNC: 264 PG/ML (ref 180–914)

## 2024-04-15 PROCEDURE — 82607 VITAMIN B-12: CPT

## 2024-04-15 PROCEDURE — 82306 VITAMIN D 25 HYDROXY: CPT

## 2024-04-15 PROCEDURE — 84443 ASSAY THYROID STIM HORMONE: CPT

## 2024-04-15 PROCEDURE — 36415 COLL VENOUS BLD VENIPUNCTURE: CPT

## 2024-04-15 PROCEDURE — 84439 ASSAY OF FREE THYROXINE: CPT

## 2024-04-15 NOTE — TELEPHONE ENCOUNTER
Phone call from patient to see if physician could order labs for Vitamin D. Upon checking the chart noticed that there is a lab order for Vitamin D from 10/17/23 - informed patient.

## 2024-04-17 ENCOUNTER — OFFICE VISIT (OUTPATIENT)
Dept: ENDOCRINOLOGY | Facility: CLINIC | Age: 39
End: 2024-04-17
Payer: COMMERCIAL

## 2024-04-17 VITALS
HEIGHT: 71 IN | DIASTOLIC BLOOD PRESSURE: 70 MMHG | SYSTOLIC BLOOD PRESSURE: 110 MMHG | OXYGEN SATURATION: 99 % | HEART RATE: 40 BPM | WEIGHT: 205 LBS | BODY MASS INDEX: 28.7 KG/M2

## 2024-04-17 DIAGNOSIS — F41.9 ANXIETY: ICD-10-CM

## 2024-04-17 DIAGNOSIS — E06.3 HYPOTHYROIDISM DUE TO HASHIMOTO'S THYROIDITIS: Primary | ICD-10-CM

## 2024-04-17 DIAGNOSIS — E03.8 HYPOTHYROIDISM DUE TO HASHIMOTO'S THYROIDITIS: Primary | ICD-10-CM

## 2024-04-17 DIAGNOSIS — E55.9 VITAMIN D DEFICIENCY: ICD-10-CM

## 2024-04-17 DIAGNOSIS — E53.8 VITAMIN B 12 DEFICIENCY: ICD-10-CM

## 2024-04-17 PROCEDURE — 99214 OFFICE O/P EST MOD 30 MIN: CPT | Performed by: INTERNAL MEDICINE

## 2024-04-17 RX ORDER — LEVOTHYROXINE SODIUM 125 MCG
TABLET ORAL
Qty: 90 TABLET | Refills: 3 | Status: SHIPPED | OUTPATIENT
Start: 2024-04-17

## 2024-04-17 RX ORDER — CHOLECALCIFEROL (VITAMIN D3) 125 MCG
500 CAPSULE ORAL DAILY
Start: 2024-04-17

## 2024-04-17 NOTE — PROGRESS NOTES
Aileen Us 38 y.o. female MRN: [id number]    Encounter: [id number]      Assessment/Plan     Assessment:  This is a 38 y.o.-year-old female with hypothyroidism.    Plan:    Diagnoses and all orders for this visit:    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 thyroiditis  Lab Results   Component Value Date    OQE2YHAMYZWZ 2.197 04/15/2024    TSH 1.98 06/06/2019   Patient is clinically and biochemically euthyroid..  Continue Synthroid 125 mcg daily from Monday through Saturday and half tablet on Sunday  Repeat thyroid blood work in 8 months and follow-up.  Educated patient that if she gets pregnant, she should call us as she will need adjustment in Synthroid dose during first trimester     -     Synthroid 125 MCG tablet; Take one tablet daily from Mon to sat and 1/2 tablet on Sunday  -     T4, free; Future  -     TSH, 3rd generation; Future    Vitamin D deficiency  Take vitamin D3 supplementation 2000 IU daily.  Recent vitamin D is low 29  -     Vitamin D 25 hydroxy; Future    Vitamin B 12 deficiency  Take vitamin B12 500 mcg daily recent vitamin B12 level is low  -     vitamin B-12 (VITAMIN B-12) 500 mcg tablet; Take 1 tablet (500 mcg total) by mouth daily  -     Vitamin B12; Future    Anxiety  Symptoms are well-controlled       CC:     Hypothyroidism, vitamin D deficiency, vitamin B12 deficiency    History of Present Illness     HPI:  Aileen Us is a 38-year-old woman with medical history of hypothyroidism secondary to Hashimoto thyroiditis managed on brand Synthroid is here for follow-up.  She has history of vitamin D deficiency.  She takes brand Synthroid 125mcg daily before breakfast, 6 days a week, on Sunday she takes half tablet     She  denies missing doses.  She does well with brand Synthroid, with no fluctuation in thyroid blood work result  She denies symptoms of hypothyroidism or hyperthyroidism    For vitamin D deficiency, she takes vitamin D3 4000 IU daily, missing doses sometimes  She also has  history of hirsutism previous adrenal and ovarian workup was negative for hyperandrogenism.    She exercises regularly, eats healthy diet  She was also found to have low vitamin B12, currently not taking vitamin B12 supplementation      Component      Latest Ref Rng 4/15/2024   FREE T4      0.61 - 1.12 ng/dL 1.00    TSH 3RD GENERATON      0.450 - 4.500 uIU/mL 2.197    VITAMIN D, 25-HYDROXY      30.0 - 100.0 ng/mL 28.3 (L)    Vitamin B-12      180 - 914 pg/mL 264      Review of Systems    Historical Information   Past Medical History:   Diagnosis Date    Abnormal Pap smear of cervix     Anemia     Depression     Dermatitis      2 para 2     Hashimoto's disease 2017    Herpes     Hirsutism     last assessed: 14    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 thyroiditis     Mild cervical dysplasia     Pregnancy     last assessed: 3/28/17    Subclinical hypothyroidism     last assessed: 14    Urinary tract infection     Varicella     Vitamin D deficiency      Past Surgical History:   Procedure Laterality Date    COLPOSCOPY VULVA W/ BIOPSY      10/1/07 - MALLORY-1 noted at 5:00 and 12:00 ECC was negative    COMBINED HYSTEROSCOPY DIAGNOSTIC / D&C  10/2014    polyp removal    DILATION AND CURETTAGE OF UTERUS      WISDOM TOOTH EXTRACTION       Social History   Social History     Substance and Sexual Activity   Alcohol Use Yes    Comment: social drinker as per allscripts     Social History     Substance and Sexual Activity   Drug Use No     Social History     Tobacco Use   Smoking Status Never   Smokeless Tobacco Never     Family History:   Family History   Problem Relation Age of Onset    Stroke Mother     Depression Mother     Thyroid disease Mother     Hypertension Father     Depression Maternal Aunt     Diabetes Maternal Aunt     Heart attack Paternal Uncle     Diabetes Other        Meds/Allergies   Current Outpatient Medications   Medication Sig Dispense Refill    Ergocalciferol (Vitamin D2) 50 MCG ( UT) TABS  "Take 2,000 Units by mouth Takes 2 pills daily      Prenatal Vit-Fe Fumarate-FA (PRENATAL VITAMINS PO) Take by mouth      Synthroid 125 MCG tablet Take one tablet daily from Mon to sat and 1/2 tablet on Sunday 90 tablet 3    vitamin B-12 (VITAMIN B-12) 500 mcg tablet Take 1 tablet (500 mcg total) by mouth daily      fluconazole (DIFLUCAN) 150 mg tablet TAKE 1 TABLET ONCE FOR 1 DOSE, THEN REPEAT IN 1 WEEK (Patient not taking: Reported on 2/9/2024)      nitrofurantoin (MACROBID) 100 mg capsule Take 1 capsule (100 mg total) by mouth 2 (two) times a day (Patient not taking: Reported on 1/18/2024) 14 capsule 0     No current facility-administered medications for this visit.     No Known Allergies    Objective   Vitals: Blood pressure 110/70, pulse (!) 40, height 5' 11\" (1.803 m), weight 93 kg (205 lb), SpO2 99%, not currently breastfeeding.    Physical Exam  Constitutional:       General: She is not in acute distress.     Appearance: Normal appearance. She is not ill-appearing.   HENT:      Head: Normocephalic and atraumatic.      Nose: Nose normal.   Eyes:      Extraocular Movements: Extraocular movements intact.      Conjunctiva/sclera: Conjunctivae normal.   Pulmonary:      Effort: No respiratory distress.   Musculoskeletal:      Cervical back: Normal range of motion.   Neurological:      General: No focal deficit present.      Mental Status: She is alert and oriented to person, place, and time.   Psychiatric:         Mood and Affect: Mood normal.         Behavior: Behavior normal.         The history was obtained from the review of the chart, patient.    Lab Results:   Lab Results   Component Value Date/Time    TSH 3RD GENERATON 2.197 04/15/2024 11:57 AM    TSH 3RD GENERATON 0.460 12/16/2023 07:45 AM    TSH 3RD GENERATON 0.139 (L) 10/16/2023 12:06 PM    Free T4 1.00 04/15/2024 11:57 AM    Free T4 1.14 (H) 12/16/2023 07:45 AM    Free T4 1.20 (H) 10/16/2023 12:06 PM       Imaging Studies:       I have personally " "reviewed pertinent reports.      Portions of the record may have been created with voice recognition software. Occasional wrong word or \"sound a like\" substitutions may have occurred due to the inherent limitations of voice recognition software. Read the chart carefully and recognize, using context, where substitutions have occurred.    "

## 2024-06-12 ENCOUNTER — TELEPHONE (OUTPATIENT)
Age: 39
End: 2024-06-12

## 2024-06-12 DIAGNOSIS — E55.9 VITAMIN D DEFICIENCY: Primary | ICD-10-CM

## 2024-06-12 NOTE — TELEPHONE ENCOUNTER
She will need to increase her Synthroid dose.   Note below states she takes 125 mcg daily, but her last office visit note states she takes one tablet daily 6 days and 1/2 tablet on 7th day. Please confirm and give instructions accordingly:  If taking 125 mcg 7 days a week then increase dose by taking 1 tablet daily Mon-Sat and 2 tablets on Sunday.   If taking 125 mcg 6 days a week and 1/2 tablet on one day a week, then increase dose by taking 1 tablet Mon-Sat and 1.5 tablets on Sunday.  Please order repeat TSH and free T4 to do in 4 weeks.    She should schedule a f/u for July/August.

## 2024-06-12 NOTE — TELEPHONE ENCOUNTER
Patient calling states that she just found out on Monday that she is pregnant and she said that Dr Mosher told her to let her know if she becomes pregnant in case she needs med adjustment.  Currently take Synthroid 125 mcg 1 tab daily. Please advise.

## 2024-06-13 ENCOUNTER — HOSPITAL ENCOUNTER (EMERGENCY)
Facility: HOSPITAL | Age: 39
Discharge: HOME | End: 2024-06-13
Attending: EMERGENCY MEDICINE
Payer: COMMERCIAL

## 2024-06-13 ENCOUNTER — APPOINTMENT (EMERGENCY)
Dept: ULTRASOUND IMAGING | Facility: HOSPITAL | Age: 39
End: 2024-06-13
Payer: COMMERCIAL

## 2024-06-13 ENCOUNTER — NURSE TRIAGE (OUTPATIENT)
Age: 39
End: 2024-06-13

## 2024-06-13 ENCOUNTER — TELEPHONE (OUTPATIENT)
Age: 39
End: 2024-06-13

## 2024-06-13 VITALS
HEART RATE: 47 BPM | SYSTOLIC BLOOD PRESSURE: 117 MMHG | RESPIRATION RATE: 18 BRPM | DIASTOLIC BLOOD PRESSURE: 71 MMHG | TEMPERATURE: 98.3 F | OXYGEN SATURATION: 99 %

## 2024-06-13 DIAGNOSIS — N93.9 VAGINAL BLEEDING: Primary | ICD-10-CM

## 2024-06-13 DIAGNOSIS — Z32.01 POSITIVE PREGNANCY TEST: ICD-10-CM

## 2024-06-13 LAB
ABO GROUP BLD: NORMAL
ALBUMIN SERPL BCP-MCNC: 4.8 G/DL (ref 3.5–5)
ALP SERPL-CCNC: 43 U/L (ref 34–104)
ALT SERPL W P-5'-P-CCNC: 17 U/L (ref 7–52)
ANION GAP SERPL CALCULATED.3IONS-SCNC: 6 MMOL/L (ref 4–13)
AST SERPL W P-5'-P-CCNC: 22 U/L (ref 13–39)
B-HCG SERPL-ACNC: 11.6 MIU/ML (ref 0–5)
BASOPHILS # BLD AUTO: 0.03 THOUSANDS/ÂΜL (ref 0–0.1)
BASOPHILS NFR BLD AUTO: 0 % (ref 0–1)
BILIRUB SERPL-MCNC: 0.83 MG/DL (ref 0.2–1)
BILIRUB UR QL STRIP: NEGATIVE
BLD GP AB SCN SERPL QL: NEGATIVE
BUN SERPL-MCNC: 17 MG/DL (ref 5–25)
CALCIUM SERPL-MCNC: 9.7 MG/DL (ref 8.4–10.2)
CHLORIDE SERPL-SCNC: 104 MMOL/L (ref 96–108)
CLARITY UR: CLEAR
CO2 SERPL-SCNC: 28 MMOL/L (ref 21–32)
COLOR UR: NORMAL
CREAT SERPL-MCNC: 0.83 MG/DL (ref 0.6–1.3)
EOSINOPHIL # BLD AUTO: 0.06 THOUSAND/ÂΜL (ref 0–0.61)
EOSINOPHIL NFR BLD AUTO: 1 % (ref 0–6)
ERYTHROCYTE [DISTWIDTH] IN BLOOD BY AUTOMATED COUNT: 12.6 % (ref 11.6–15.1)
GFR SERPL CREATININE-BSD FRML MDRD: 90 ML/MIN/1.73SQ M
GLUCOSE SERPL-MCNC: 81 MG/DL (ref 65–140)
GLUCOSE UR STRIP-MCNC: NEGATIVE MG/DL
HCT VFR BLD AUTO: 41.3 % (ref 34.8–46.1)
HGB BLD-MCNC: 13.8 G/DL (ref 11.5–15.4)
HGB UR QL STRIP.AUTO: NEGATIVE
IMM GRANULOCYTES # BLD AUTO: 0.01 THOUSAND/UL (ref 0–0.2)
IMM GRANULOCYTES NFR BLD AUTO: 0 % (ref 0–2)
INR PPP: 1.07 (ref 0.84–1.19)
KETONES UR STRIP-MCNC: NEGATIVE MG/DL
LEUKOCYTE ESTERASE UR QL STRIP: NEGATIVE
LIPASE SERPL-CCNC: 27 U/L (ref 11–82)
LYMPHOCYTES # BLD AUTO: 2.47 THOUSANDS/ÂΜL (ref 0.6–4.47)
LYMPHOCYTES NFR BLD AUTO: 36 % (ref 14–44)
MCH RBC QN AUTO: 29.4 PG (ref 26.8–34.3)
MCHC RBC AUTO-ENTMCNC: 33.4 G/DL (ref 31.4–37.4)
MCV RBC AUTO: 88 FL (ref 82–98)
MONOCYTES # BLD AUTO: 0.5 THOUSAND/ÂΜL (ref 0.17–1.22)
MONOCYTES NFR BLD AUTO: 7 % (ref 4–12)
NEUTROPHILS # BLD AUTO: 3.77 THOUSANDS/ÂΜL (ref 1.85–7.62)
NEUTS SEG NFR BLD AUTO: 56 % (ref 43–75)
NITRITE UR QL STRIP: NEGATIVE
NRBC BLD AUTO-RTO: 0 /100 WBCS
PH UR STRIP.AUTO: 6 [PH]
PLATELET # BLD AUTO: 284 THOUSANDS/UL (ref 149–390)
PMV BLD AUTO: 9.9 FL (ref 8.9–12.7)
POTASSIUM SERPL-SCNC: 3.8 MMOL/L (ref 3.5–5.3)
PROT SERPL-MCNC: 7.7 G/DL (ref 6.4–8.4)
PROT UR STRIP-MCNC: NEGATIVE MG/DL
PROTHROMBIN TIME: 14.5 SECONDS (ref 11.6–14.5)
RBC # BLD AUTO: 4.69 MILLION/UL (ref 3.81–5.12)
RH BLD: POSITIVE
SODIUM SERPL-SCNC: 138 MMOL/L (ref 135–147)
SP GR UR STRIP.AUTO: 1.01 (ref 1–1.03)
SPECIMEN EXPIRATION DATE: NORMAL
UROBILINOGEN UR STRIP-ACNC: <2 MG/DL
WBC # BLD AUTO: 6.84 THOUSAND/UL (ref 4.31–10.16)

## 2024-06-13 PROCEDURE — 36415 COLL VENOUS BLD VENIPUNCTURE: CPT

## 2024-06-13 PROCEDURE — 81003 URINALYSIS AUTO W/O SCOPE: CPT

## 2024-06-13 PROCEDURE — 86900 BLOOD TYPING SEROLOGIC ABO: CPT

## 2024-06-13 PROCEDURE — 99284 EMERGENCY DEPT VISIT MOD MDM: CPT | Performed by: EMERGENCY MEDICINE

## 2024-06-13 PROCEDURE — 85025 COMPLETE CBC W/AUTO DIFF WBC: CPT | Performed by: EMERGENCY MEDICINE

## 2024-06-13 PROCEDURE — 85610 PROTHROMBIN TIME: CPT

## 2024-06-13 PROCEDURE — 86901 BLOOD TYPING SEROLOGIC RH(D): CPT

## 2024-06-13 PROCEDURE — 86850 RBC ANTIBODY SCREEN: CPT

## 2024-06-13 PROCEDURE — 80053 COMPREHEN METABOLIC PANEL: CPT | Performed by: EMERGENCY MEDICINE

## 2024-06-13 PROCEDURE — 84702 CHORIONIC GONADOTROPIN TEST: CPT

## 2024-06-13 PROCEDURE — 83690 ASSAY OF LIPASE: CPT | Performed by: EMERGENCY MEDICINE

## 2024-06-13 PROCEDURE — 76815 OB US LIMITED FETUS(S): CPT

## 2024-06-13 PROCEDURE — 99284 EMERGENCY DEPT VISIT MOD MDM: CPT

## 2024-06-13 NOTE — TELEPHONE ENCOUNTER
Spoke with patient, she is wondering if Dr. Mosher will want her to be seen    Patient is going to the ER for her spotting/clotting

## 2024-06-13 NOTE — TELEPHONE ENCOUNTER
Patient was taking the 125mcg 7 days a week. So she is going to take the 125mcg mon-sat and 2 tablets on Sunday. She will go for her blood work in 4 weeks, but that order needs to be placed. She also wanted to know if she can order a vitamin D level with her lab work to get that checked.     She is pregnant and said she was experiencing some spotting, along with clots the size of a dime. She denies cramping and wanted to make Rahel aware.    Patient needs appointment scheduled for July/August. I tried to schedule but it would not allow me to book the patient. Can she be called back to get her appointment scheduled?

## 2024-06-13 NOTE — ED PROCEDURE NOTE
Procedure  POC Pelvic US    Date/Time: 6/13/2024 5:07 PM    Performed by: Arcelia Uribe MD  Authorized by: Arcelia Uribe MD    Patient location:  ED  Other Assisting Provider: Yes (comment) ([place] [place] MS 4)    Procedure details:     Exam Type:  Educational    Indications: pregnant with vaginal bleeding      Assessment for: free pelvic fluid      Technique:  Transabdominal obstetric (HCG+) exam    Views obtained: uterus (transverse and sagittal) and pouch of Warner      Image quality: non-diagnostic      Image availability:  Video obtained and images available in PACS  Uterine findings:     Intrauterine pregnancy: not identified      Fetal heart rate: not identified    Right adnexa findings:     Right ovary:  Limited quality  Left adnexa findings:     Left ovary:  Limited quality  Other findings:     Free pelvic fluid: not identified      Free peritoneal fluid: not identified                     Arcelia Uribe MD  06/13/24 [id number]

## 2024-06-13 NOTE — TELEPHONE ENCOUNTER
"Pt reporting having a positive pregnancy test on 6/10 patient's LMP 5/7/24, pt now reporting vaginal bleeding when wiping, and also had darker red/brown in color bleeding on the pad about a dime to a quarter in size as per pt report.pt also reporting clots - white in color and about the size of a pea. Pt denies cramping at this time. Pt reporting having a migraine since yesterday, pt reporting today her migraine has improved since yesterday, pt denies taking medications for migraine at this time.      Pt is advised to go to the nearest emergency room to be further evaluated. Patient was notified that she should go to [facility], [facility], [facility] for further evaluation due to those campuses having OBGYN care. Patient stated that she is closer to [facility]. Pt was notified that she should leave now to be further evaluated. Patient stated that she has to arrange , and pt stated she will be arriving in about an hour. Pt was given precautions on when to call 911, pt verbalized understanding. Patient advised again to leave now and go to [facility], pt verbalized understanding.       Reason for Disposition   Passed tissue (e.g., gray-white)    Answer Assessment - Initial Assessment Questions  1. ONSET: \"When did this bleeding start?\"        Pt reporting bleeding today   2. DESCRIPTION: \"Describe the bleeding that you are having.\" \"How much bleeding is there?\"     - SPOTTING: spotting, or pinkish / brownish mucous discharge; does not fill panti-liner or pad     - MILD:  less than 1 pad / hour; less than patient's usual menstrual bleeding    - MODERATE: 1-2 pads / hour; 1 menstrual cup every 6 hours; small-medium blood clots (e.g., pea, grape, small coin)    - SEVERE: soaking 2 or more pads/hour for 2 or more hours; 1 menstrual cup every 2 hours; bleeding not contained by pads or continuous red blood from vagina; large blood clots (e.g., golf ball, large " "coin)         Pt reporting that she had a clot the size of a pea that was white in color, and a size of a dime, pt reporting red in color         3. ABDOMINAL PAIN SEVERITY: If present, ask: \"How bad is it?\"  (e.g., Scale 1-10; mild, moderate, or severe)    - MILD (1-3): doesn't interfere with normal activities, abdomen soft and not tender to touch     - MODERATE (4-7): interferes with normal activities or awakens from sleep, tender to touch     - SEVERE (8-10): excruciating pain, doubled over, unable to do any normal activities      Pt denies       4. PREGNANCY: \"Do you know how many weeks or months pregnant you are?\" \"When was the first day of your last normal menstrual period?\"      Pt unsure.   5. HEMODYNAMIC STATUS: \"Are you weak or feeling lightheaded?\" If Yes, ask: \"Can you stand and walk normally?\"       Pt denies, pt can stand and walk normally       6. OTHER SYMPTOMS: \"What other symptoms are you having with the bleeding?\" (e.g., passed tissue, vaginal discharge, fever, menstrual-type cramps)      Possible passed tissue. Pt denies other symptoms    Protocols used: Pregnancy - Vaginal Bleeding Less Than 20 Weeks EGA-ADULT-[place]    "

## 2024-06-13 NOTE — ED PROVIDER NOTES
History  Chief Complaint   Patient presents with    Vaginal Bleeding     Positive pregnancy test on . +heavy vaginal bleeding starting this morning. LMP was 24. Reports passing possible tissue size of a pea and white in color. ABD pain LLQ. Patient reports increase in tiredness and headache that are unusual for her. Patient reports feeling very lightheaded.      38-year-old female, A2, with a recent positive pregnancy test 4 days ago, presenting to the ED for vaginal bleeding and abdominal cramping for the past 5 hours.  Patient states that her last menstrual period was over a month ago.  She had recently had a positive pregnancy test however this morning felt as if she was having her period again.  Patient sat down on the toilet and passed a large amount of blood with what she describes as white tissue.  Patient does endorse a headache for the past day but was not feeling dizzy or lightheaded at that time and did not have continued bleeding or hemorrhage at the time.  Headache is localized and dull in nature, not described as pounding the worst headache of her life.  Patient continued through her day then went to the bathroom a second time passing another smaller clot.  Again patient denies persistent hemorrhage.  Patient contacted her OBs office who suggested she should come to the ER to be assessed.  At the time of presentation, patient is denying lightheadedness, dizziness, blurred vision, chest pain, palpitations, shortness of breath, difficulty breathing, numbness or tingling arms or legs.  Patient endorses left flank pain that does not radiate to the back or to the groin and is better with palpation.  Patient does not have persistent hemorrhaging however while using the toilet here in the ED did note again another passage of small blood clot.        Prior to Admission Medications   Prescriptions Last Dose Informant Patient Reported? Taking?   Ergocalciferol (Vitamin D2) 50 MCG ( UT) TABS  Self  Yes No   Sig: Take 2,000 Units by mouth Takes 2 pills daily   Prenatal Vit-Fe Fumarate-FA (PRENATAL VITAMINS PO)  Self Yes No   Sig: Take by mouth   Synthroid 125 MCG tablet   No No   Sig: Take one tablet daily from Mon to sat and 1/2 tablet on    fluconazole (DIFLUCAN) 150 mg tablet   Yes No   Sig: TAKE 1 TABLET ONCE FOR 1 DOSE, THEN REPEAT IN 1 WEEK   Patient not taking: Reported on 2024   nitrofurantoin (MACROBID) 100 mg capsule  Self No No   Sig: Take 1 capsule (100 mg total) by mouth 2 (two) times a day   Patient not taking: Reported on 2024   vitamin B-12 (VITAMIN B-12) 500 mcg tablet   No No   Sig: Take 1 tablet (500 mcg total) by mouth daily      Facility-Administered Medications: None       Past Medical History:   Diagnosis Date    Abnormal Pap smear of cervix     Anemia     Depression     Dermatitis      2 para 2     Hashimoto's disease 2017    Herpes     Hirsutism     last assessed: 14    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 thyroiditis     Mild cervical dysplasia     Pregnancy     last assessed: 3/28/17    Subclinical hypothyroidism     last assessed: 14    Urinary tract infection     Varicella     Vitamin D deficiency        Past Surgical History:   Procedure Laterality Date    COLPOSCOPY VULVA W/ BIOPSY      10/1/07 - MALLORY-1 noted at 5:00 and 12:00 ECC was negative    COMBINED HYSTEROSCOPY DIAGNOSTIC / D&C  10/2014    polyp removal    DILATION AND CURETTAGE OF UTERUS      WISDOM TOOTH EXTRACTION         Family History   Problem Relation Age of Onset    Stroke Mother     Depression Mother     Thyroid disease Mother     Hypertension Father     Depression Maternal Aunt     Diabetes Maternal Aunt     Heart attack Paternal Uncle     Diabetes Other      I have reviewed and agree with the history as documented.    E-Cigarette/Vaping    E-Cigarette Use Never User      E-Cigarette/Vaping Substances    Nicotine No     THC No     CBD No     Flavoring No     Other No     Unknown No       Social History     Tobacco Use    Smoking status: Never    Smokeless tobacco: Never   Vaping Use    Vaping status: Never Used   Substance Use Topics    Alcohol use: Yes     Comment: social drinker as per allscripts    Drug use: No        Review of Systems   Constitutional:  Negative for appetite change, chills and fever.   HENT:  Negative for congestion, facial swelling, nosebleeds, sinus pressure, sinus pain and sneezing.    Eyes:  Negative for visual disturbance.   Respiratory:  Negative for chest tightness and shortness of breath.    Cardiovascular:  Negative for chest pain and palpitations.   Gastrointestinal:  Negative for abdominal pain, constipation, diarrhea, nausea and vomiting.   Genitourinary:  Positive for flank pain, hematuria, vaginal bleeding and vaginal discharge. Negative for difficulty urinating, menstrual problem, pelvic pain, urgency and vaginal pain.   Musculoskeletal:  Negative for arthralgias and myalgias.   Neurological:  Positive for headaches. Negative for dizziness, weakness and light-headedness.   Psychiatric/Behavioral:  Negative for agitation. The patient is not nervous/anxious.        Physical Exam  ED Triage Vitals   Temperature Pulse Respirations Blood Pressure SpO2   06/13/24 1617 06/13/24 1617 06/13/24 1617 06/13/24 1617 06/13/24 1617   98.3 °F (36.8 °C) 60 18 133/81 100 %      Temp Source Heart Rate Source Patient Position - Orthostatic VS BP Location FiO2 (%)   06/13/24 1617 06/13/24 1617 06/13/24 1617 06/13/24 1617 --   Oral Right;Monitor Sitting Right arm       Pain Score       06/13/24 1630       4             Orthostatic Vital Signs  Vitals:    06/13/24 1617 06/13/24 1630 06/13/24 2006   BP: 133/81 168/78 117/71   Pulse: 60 55 (!) 47   Patient Position - Orthostatic VS: Sitting  Lying       Physical Exam  Exam conducted with a chaperone present.   Constitutional:       Appearance: Normal appearance.   HENT:      Head: Normocephalic and atraumatic.      Right Ear: External  ear normal.      Left Ear: External ear normal.      Nose: Nose normal.      Mouth/Throat:      Pharynx: Oropharynx is clear.   Eyes:      Extraocular Movements: Extraocular movements intact.      Pupils: Pupils are equal, round, and reactive to light.   Cardiovascular:      Rate and Rhythm: Normal rate and regular rhythm.      Pulses: Normal pulses.      Heart sounds: Normal heart sounds.   Pulmonary:      Effort: Pulmonary effort is normal.      Breath sounds: Normal breath sounds.   Abdominal:      General: Bowel sounds are normal.      Palpations: Abdomen is soft.   Genitourinary:     General: Normal vulva.      Exam position: Knee-chest position.      Pubic Area: No rash.       Vagina: Normal.      Cervix: Cervical bleeding present.      Adnexa: Right adnexa normal and left adnexa normal.      Comments: Blood noted in vaginal vault.  No active bleeding noted.  Musculoskeletal:         General: Normal range of motion.      Cervical back: Normal range of motion.   Skin:     General: Skin is warm.      Capillary Refill: Capillary refill takes less than 2 seconds.   Neurological:      General: No focal deficit present.      Mental Status: She is alert and oriented to person, place, and time.   Psychiatric:         Mood and Affect: Mood normal.         Behavior: Behavior normal.         ED Medications  Medications - No data to display    Diagnostic Studies  Results Reviewed       Procedure Component Value Units Date/Time    hCG, quantitative [[id number]]  (Abnormal) Collected: 06/13/24 1630    Lab Status: Final result Specimen: Blood from Arm, Right Updated: 06/13/24 1750     HCG, Quant 11.6 mIU/mL     Narrative:       Expected Ranges:    HCG results between 5.0 and 25.0 mIU/mL may be indicative of early pregnancy but should be interpreted in light of the total clinical presentation.    HCG can rise to detectable levels in breanna and post menopausal women (0-11.6 mIU/mL).     Approximate               Approximate  HCG  Gestation age          Concentration ( mIU/mL)  _____________          ______________________   Weeks                      HCG values  0.2-1                       5-50  1-2                           2-3                         100-5000  3-4                         500-67678  4-5                         1000-15207  5-6                         [id number]  6-8                         [id number]  8-12                        77487-166531      Protime-INR [[id number]]  (Normal) Collected: 06/13/24 1707    Lab Status: Final result Specimen: Blood from Arm, Right Updated: 06/13/24 1730     Protime 14.5 seconds      INR 1.07    UA w Reflex to Microscopic w Reflex to Culture [[id number]] Collected: 06/13/24 1707    Lab Status: Final result Specimen: Urine, Clean Catch Updated: 06/13/24 1727     Color, UA Light Yellow     Clarity, UA Clear     Specific Gravity, UA 1.011     pH, UA 6.0     Leukocytes, UA Negative     Nitrite, UA Negative     Protein, UA Negative mg/dl      Glucose, UA Negative mg/dl      Ketones, UA Negative mg/dl      Urobilinogen, UA <2.0 mg/dl      Bilirubin, UA Negative     Occult Blood, UA Negative    Lipase [[id number]]  (Normal) Collected: 06/13/24 1630    Lab Status: Final result Specimen: Blood from Arm, Right Updated: 06/13/24 1656     Lipase 27 u/L     Comprehensive metabolic panel [[id number]] Collected: 06/13/24 1630    Lab Status: Final result Specimen: Blood from Arm, Right Updated: 06/13/24 1656     Sodium 138 mmol/L      Potassium 3.8 mmol/L      Chloride 104 mmol/L      CO2 28 mmol/L      ANION GAP 6 mmol/L      BUN 17 mg/dL      Creatinine 0.83 mg/dL      Glucose 81 mg/dL      Calcium 9.7 mg/dL      AST 22 U/L      ALT 17 U/L      Alkaline Phosphatase 43 U/L      Total Protein 7.7 g/dL      Albumin 4.8 g/dL      Total Bilirubin 0.83 mg/dL      eGFR 90 ml/min/1.73sq m     Narrative:      National Kidney Disease Foundation guidelines for Chronic Kidney Disease (CKD):     Stage 1 with  normal or high GFR (GFR > 90 mL/min/1.73 square meters)    Stage 2 Mild CKD (GFR = 60-89 mL/min/1.73 square meters)    Stage 3A Moderate CKD (GFR = 45-59 mL/min/1.73 square meters)    Stage 3B Moderate CKD (GFR = 30-44 mL/min/1.73 square meters)    Stage 4 Severe CKD (GFR = 15-29 mL/min/1.73 square meters)    Stage 5 End Stage CKD (GFR <15 mL/min/1.73 square meters)  Note: GFR calculation is accurate only with a steady state creatinine    CBC and differential [[id number]] Collected: 24 1630    Lab Status: Final result Specimen: Blood from Arm, Right Updated: 24 1640     WBC 6.84 Thousand/uL      RBC 4.69 Million/uL      Hemoglobin 13.8 g/dL      Hematocrit 41.3 %      MCV 88 fL      MCH 29.4 pg      MCHC 33.4 g/dL      RDW 12.6 %      MPV 9.9 fL      Platelets 284 Thousands/uL      nRBC 0 /100 WBCs      Segmented % 56 %      Immature Grans % 0 %      Lymphocytes % 36 %      Monocytes % 7 %      Eosinophils Relative 1 %      Basophils Relative 0 %      Absolute Neutrophils 3.77 Thousands/µL      Absolute Immature Grans 0.01 Thousand/uL      Absolute Lymphocytes 2.47 Thousands/µL      Absolute Monocytes 0.50 Thousand/µL      Eosinophils Absolute 0.06 Thousand/µL      Basophils Absolute 0.03 Thousands/µL                    US OB pregnancy limited with transvaginal   Final Result by Elen Hernandez MD ([id number])   No intrauterine gestation or adnexal mass identified.   Differential remains early IUP, spontaneous  and ectopic pregnancy.  Correlate with serial quantitative BHCG and follow-up ultrasound.            Workstation performed: [id number]               Procedures  Procedures      ED Course  ED Course as of 24 0041   Thu  LIPASE: 27  reassuring    UA w Reflex to Microscopic w Reflex to Culture  Reassuring.  No signs of UTI    CBC and differential  reassuring    Comprehensive metabolic panel  reassuring    Protime-INR  reassuring    IMPRESSION:  No  intrauterine gestation or adnexal mass identified.  Differential remains early IUP, spontaneous  and ectopic pregnancy.  Correlate with serial quantitative BHCG and follow-up ultrasound.           Workstation performed: [id number]         8-hour beta hCG with outpatient OB/GYN follow-up. Suspect most likely miscarriage given low hCG and history of positive urine pregnancy test earlier this week.    US shows:    No intrauterine gestation or adnexal mass identified.  Differential remains early IUP, spontaneous  and ectopic pregnancy. Correlate with serial quantitative BHCG and follow-up ultrasound. 2017                 IMPRESSION:  No intrauterine gestation or adnexal mass identified.  Differential remains early IUP, spontaneous  and ectopic pregnancy. Correlate with serial quantitative BHCG and follow-up ultrasound                                       Medical Decision Making  38-year-old female presenting to the ED with positive pregnancy test and vaginal bleeding.    Differential includes spontaneous , threatened , partial , ectopic pregnancy, UTI, flank pain, menstrual pain    Will complete a CBC, CMP, lipase, hCG, coags, type and screen.  Will also order a transvaginal ultrasound and conduct a bedside ultrasound.    Lab work was not significant for anemia or electrolyte abnormalities.  Beta-hCG was only 11.6.  The transvaginal ultrasound did not show an intrauterine pregnancy or ovarian pathology however still early in the pregnancy this may not have been seen.  A speculum exam was completed with no active bleeding to the cervix.  Cervix was closed and no obvious trauma or deformity.  There was blood in the vaginal vault however no active bleeding noted once blood was removed with swab.  At this time it was decided that patient should follow-up outpatient with OB and continue with a 48-hour beta-hCG to see the direction of the hCG level.  Possible that  patient is too early in pregnancy or that patient had a spontaneous  already and hCG is decreasing.  Patient is comfortable with this plan and comfortable with discharge at this time.  Patient discharged.    Amount and/or Complexity of Data Reviewed  Labs: ordered. Decision-making details documented in ED Course.  Radiology: ordered.          Disposition  Final diagnoses:   Vaginal bleeding   Positive pregnancy test     Time reflects when diagnosis was documented in both MDM as applicable and the Disposition within this note       Time User Action Codes Description Comment    2024  8:02 PM Damir Paiz [N93.9] Vaginal bleeding     2024  8:05 PM Damir Paiz [Z32.01] Positive pregnancy test           ED Disposition       ED Disposition   Discharge    Condition   Stable    Date/Time   Thu 2024  8:05 PM    Comment   Aileen Us discharge to home/self care.                   Follow-up Information       Follow up With Specialties Details Why Contact Info Additional Information    Viviana Inman MD Family Medicine   [address] [place] [id number]  [phone]       [facility]   [address]  [address]  [facility] [address]  [phone] [facility] [place] [address], [address], [place] [address] [phone]            Discharge Medication List as of 2024  8:12 PM        CONTINUE these medications which have NOT CHANGED    Details   Ergocalciferol (Vitamin D2) 50 MCG ( UT) TABS Take 2,000 Units by mouth Takes 2 pills daily, Historical Med      fluconazole (DIFLUCAN) 150 mg tablet TAKE 1 TABLET ONCE FOR 1 DOSE, THEN REPEAT IN 1 WEEK, Historical Med      nitrofurantoin (MACROBID) 100 mg capsule Take 1 capsule (100 mg total) by mouth 2 (two) times a day, Starting Wed 1/10/2024, Normal      Prenatal Vit-Fe Fumarate-FA (PRENATAL VITAMINS PO) Take by mouth,  Historical Med      Synthroid 125 MCG tablet Take one tablet daily from Mon to sat and 1/2 tablet on Sunday, Normal      vitamin B-12 (VITAMIN B-12) 500 mcg tablet Take 1 tablet (500 mcg total) by mouth daily, Starting Wed 4/17/2024, No Print           Outpatient Discharge Orders   hCG, quantitative   Standing Status: Future Standing Exp. Date: 06/13/25       PDMP Review       None             ED Provider  Attending physically available and evaluated Aileen MACARENA Vallejomicah. I managed the patient along with the ED Attending.    Electronically Signed by           Damir Villalta MD  06/14/24 0041

## 2024-06-13 NOTE — TELEPHONE ENCOUNTER
Regarding: clotting while pregnan  ----- Message from Sera DAUGHERTY sent at 6/13/2024  2:16 PM EDT -----  Patient has had recent positive pregnancy test but is clotting.

## 2024-06-13 NOTE — ED ATTENDING ATTESTATION
2024  I, Easton Hatch MD, saw and evaluated the patient. I have discussed the patient with the resident/non-physician practitioner and agree with the resident's/non-physician practitioner's findings, Plan of Care, and MDM as documented in the resident's/non-physician practitioner's note, except where noted. All available labs and Radiology studies were reviewed.  I was present for key portions of any procedure(s) performed by the resident/non-physician practitioner and I was immediately available to provide assistance.       At this point I agree with the current assessment done in the Emergency Department.  I have conducted an independent evaluation of this patient a history and physical is as follows:    38-year-old female about 5 weeks gestation by LMP presenting for evaluation after developing crampy left lower abdominal/pelvic pain and vaginal bleeding today.  Initially passed a large clot and now is still having some spotting.  Feels a little lightheaded.  No syncope.  No chest pain or shortness of breath.  No fevers or chills.  No urinary symptoms.    Minimal left-sided abdominal tenderness without guarding.  Plan labs, UA, serum hCG, pelvic ultrasound to rule out ectopic pregnancy.    ED Course  ED Course as of 24   Thu 2024   1751 HCG QUANTITATIVE(!): 11.6    US shows:    No intrauterine gestation or adnexal mass identified.  Differential remains early IUP, spontaneous  and ectopic pregnancy.  Correlate with serial quantitative BHCG and follow-up ultrasound.     2017 Plan discharge home, 48-hour beta hCG with outpatient OB/GYN follow-up.  Suspect most likely miscarriage given low hCG and history of positive urine pregnancy test earlier this week.         Critical Care Time  Procedures

## 2024-06-14 ENCOUNTER — NURSE TRIAGE (OUTPATIENT)
Age: 39
End: 2024-06-14

## 2024-06-14 NOTE — TELEPHONE ENCOUNTER
"Positive pregnancy test 6/10, period was 1 week late. Went to ER and was instructed to measure HCG levels.  Patient states Dr mentioned a possible miscalculation from period or passed products of conception. If over 11.6 ( last value) concerned for pregnancy. Internal exam was nml, ultrasound showed no embryo or fetus seen in uterus or tube but was told it may be way too soon.  Aileen states bleeding increased to heavy period today, not soaking thru, changing pad every few hours, no clots. Feels tired. Headache very mild and almost gone.     Patient wants to follow up with [facility] to establish ongoing care.  Lab appointment 0700 appointment 6/15.  Advised to monitor bleeding, patient aware to return ER bleeding increases, using more than a pad an hour increased pain or fever or any other concerning symptoms. Patient verbalized understanding.    ESC sent to Dr. Vanesa Cope-Agree with plan to repeat labs approx 48 hours after hcg was done to follow trend.     Reason for Disposition   MILD vaginal bleeding (i.e., less than 1 pad per  hour; less than patient's usual menstrual bleeding; not just spotting)    Answer Assessment - Initial Assessment Questions  1. ONSET: \"When did this bleeding start?\"        6/14  2. DESCRIPTION: \"Describe the bleeding that you are having.\" \"How much bleeding is there?\"     - SPOTTING: spotting, or pinkish / brownish mucous discharge; does not fill panti-liner or pad     - MILD:  less than 1 pad / hour; less than patient's usual menstrual bleeding    - MODERATE: 1-2 pads / hour; 1 menstrual cup every 6 hours; small-medium blood clots (e.g., pea, grape, small coin)    - SEVERE: soaking 2 or more pads/hour for 2 or more hours; 1 menstrual cup every 2 hours; bleeding not contained by pads or continuous red blood from vagina; large blood clots (e.g., golf ball, large coin)       Seen in ER for heavy bleeding, bleeding less now  3. ABDOMINAL PAIN SEVERITY: If present, ask: \"How bad is " "it?\"  (e.g., Scale 1-10; mild, moderate, or severe)    - MILD (1-3): doesn't interfere with normal activities, abdomen soft and not tender to touch     - MODERATE (4-7): interferes with normal activities or awakens from sleep, tender to touch     - SEVERE (8-10): excruciating pain, doubled over, unable to do any normal activities      Non now  4. PREGNANCY: \"Do you know how many weeks or months pregnant you are?\" \"When was the first day of your last normal menstrual period?\"     LMP 5/7  5. HEMODYNAMIC STATUS: \"Are you weak or feeling lightheaded?\" If Yes, ask: \"Can you stand and walk normally?\"       denies  6. OTHER SYMPTOMS: \"What other symptoms are you having with the bleeding?\" (e.g., passed tissue, vaginal discharge, fever, menstrual-type cramps)      Denies now    Protocols used: Pregnancy - Vaginal Bleeding Less Than 20 Weeks EGA-ADULT-OH    "

## 2024-06-14 NOTE — DISCHARGE INSTRUCTIONS
Today we assessed you for your abnormal vaginal bleeding after a positive pregnancy test.  Based on your description of the bleeding and discharge, it sounds similar to that of a possible spontaneous .  We conducted a basic lab workup as well as urinalysis and an ultrasound of your abdomen and uterus.  The ultrasound showed no gestational sac or intrauterine pregnancy.  Your urinalysis was negative for UTI and her other lab work was unremarkable.  Given the timeline of your symptoms however you may either be too soon to see these or as set earlier possibly had a spontaneous .  From here you should follow-up with your OB/GYN and have a beta hCG tested on Saturday.  A prescription has been sent for the hCG testing on Saturday.  The lab closes at noon on Saturday.  Should your symptoms worsen, please know we are here  if you need us.

## 2024-06-14 NOTE — TELEPHONE ENCOUNTER
----- Message from Isha WELLS sent at 6/14/2024  1:10 PM EDT -----  Patient has appt for D and V. She is new and went to the emergency room with vaginal bleeding and passing clots. Her HCG was low and ER was concerned about a possible miscarriage. No CTS.

## 2024-06-15 ENCOUNTER — APPOINTMENT (OUTPATIENT)
Dept: LAB | Facility: CLINIC | Age: 39
End: 2024-06-15
Payer: COMMERCIAL

## 2024-06-15 DIAGNOSIS — N93.9 VAGINAL BLEEDING: ICD-10-CM

## 2024-06-15 DIAGNOSIS — E55.9 VITAMIN D DEFICIENCY: ICD-10-CM

## 2024-06-15 LAB
25(OH)D3 SERPL-MCNC: 36.3 NG/ML (ref 30–100)
B-HCG SERPL-ACNC: 3.2 MIU/ML (ref 0–5)

## 2024-06-15 PROCEDURE — 84702 CHORIONIC GONADOTROPIN TEST: CPT

## 2024-06-15 PROCEDURE — 82306 VITAMIN D 25 HYDROXY: CPT

## 2024-06-15 PROCEDURE — 36415 COLL VENOUS BLD VENIPUNCTURE: CPT

## 2024-06-17 DIAGNOSIS — E06.3 HYPOTHYROIDISM DUE TO HASHIMOTO'S THYROIDITIS: Primary | ICD-10-CM

## 2024-06-17 DIAGNOSIS — E03.8 HYPOTHYROIDISM DUE TO HASHIMOTO'S THYROIDITIS: Primary | ICD-10-CM

## 2024-06-21 ENCOUNTER — OFFICE VISIT (OUTPATIENT)
Age: 39
End: 2024-06-21
Payer: COMMERCIAL

## 2024-06-21 VITALS
WEIGHT: 213.2 LBS | HEIGHT: 71 IN | SYSTOLIC BLOOD PRESSURE: 112 MMHG | BODY MASS INDEX: 29.85 KG/M2 | DIASTOLIC BLOOD PRESSURE: 68 MMHG

## 2024-06-21 DIAGNOSIS — N93.9 ABNORMAL UTERINE BLEEDING (AUB): Primary | ICD-10-CM

## 2024-06-21 PROCEDURE — 99213 OFFICE O/P EST LOW 20 MIN: CPT | Performed by: OBSTETRICS & GYNECOLOGY

## 2024-06-21 RX ORDER — PROGESTERONE 200 MG/1
CAPSULE ORAL
Qty: 60 CAPSULE | Refills: 3 | Status: SHIPPED | OUTPATIENT
Start: 2024-06-21

## 2024-06-21 NOTE — PROGRESS NOTES
"What we talked about today:    There are no Patient Instructions on file for this visit.        Assessment/Plan:    1. Abnormal uterine bleeding (AUB)  -     Progesterone (Prometrium) 200 MG CAPS; Insert 1 capsule VAGINALLY nightly  -     hCG, quantitative; Future  -     Progesterone; Future          Subjective:      Patient ID: Aileen Us is a 38 y.o. female, presents today complaining of preconceptual counseling.      HPI:    Pt wondering about pregnancy.     Had a VIP then had a pregnancy  early this month where she took a test at home 6/10 was positive then a few days later, had bleeding.      Started taking PNVs.        The following portions of the patient's history were reviewed and updated as appropriate: allergies, current medications, past family history, past medical history, past social history, past surgical history, and problem list.      Review of Systems   Constitutional:  Negative for chills, fatigue and fever.   Respiratory: Negative.     Cardiovascular: Negative.    Gastrointestinal: Negative.    Endocrine: Negative.    Genitourinary: Negative.    Musculoskeletal: Negative.    Skin: Negative.    Allergic/Immunologic: Negative.    Neurological: Negative.    Hematological: Negative.    Psychiatric/Behavioral: Negative.             Objective:      /68 (BP Location: Right arm, Patient Position: Sitting, Cuff Size: Standard)   Ht 5' 11\" (1.803 m)   Wt 96.7 kg (213 lb 3.2 oz)   LMP 05/07/2024   BMI 29.74 kg/m²        Physical Exam  Constitutional:       Appearance: Normal appearance.   HENT:      Head: Normocephalic and atraumatic.   Pulmonary:      Effort: Pulmonary effort is normal.   Musculoskeletal:         General: Normal range of motion.      Cervical back: Normal range of motion and neck supple.   Skin:     General: Skin is warm.   Neurological:      Mental Status: She is alert and oriented to person, place, and time.   Psychiatric:         Mood and Affect: Mood normal.         " Behavior: Behavior normal.         Thought Content: Thought content normal.         Judgment: Judgment normal.           GYN exam: deferred.               DAT Oshea MD, FACOG

## 2024-06-21 NOTE — PATIENT INSTRUCTIONS
Continue taking prenatal vitamins.   Start progesterone vaginally on day 20-32.  Take a pregnancy test on day 32.  If negative, stop progesterone and start over.   If positive pregnancy test, pls go to the lab to get beta hCG and prog levels checked,  Patient verbalized understanding of today's discussion with all questions and concerns addressed to her satisfaction.

## 2024-07-08 ENCOUNTER — HOSPITAL ENCOUNTER (EMERGENCY)
Facility: HOSPITAL | Age: 39
Discharge: HOME | End: 2024-07-09
Attending: EMERGENCY MEDICINE | Admitting: EMERGENCY MEDICINE
Payer: COMMERCIAL

## 2024-07-08 ENCOUNTER — APPOINTMENT (EMERGENCY)
Dept: RADIOLOGY | Facility: HOSPITAL | Age: 39
End: 2024-07-08
Payer: COMMERCIAL

## 2024-07-08 VITALS
SYSTOLIC BLOOD PRESSURE: 115 MMHG | OXYGEN SATURATION: 97 % | HEART RATE: 65 BPM | RESPIRATION RATE: 18 BRPM | DIASTOLIC BLOOD PRESSURE: 62 MMHG | TEMPERATURE: 98.2 F

## 2024-07-08 DIAGNOSIS — M25.519 SHOULDER PAIN: ICD-10-CM

## 2024-07-08 DIAGNOSIS — S89.90XA KNEE INJURY: ICD-10-CM
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
## 2024-07-08 DIAGNOSIS — S09.90XA INJURY OF HEAD, INITIAL ENCOUNTER: Primary | ICD-10-CM

## 2024-07-08 LAB
EXT PREGNANCY TEST URINE: NEGATIVE
EXT. CONTROL: NORMAL

## 2024-07-08 PROCEDURE — 73110 X-RAY EXAM OF WRIST: CPT

## 2024-07-08 PROCEDURE — 70486 CT MAXILLOFACIAL W/O DYE: CPT

## 2024-07-08 PROCEDURE — 99284 EMERGENCY DEPT VISIT MOD MDM: CPT

## 2024-07-08 PROCEDURE — 72125 CT NECK SPINE W/O DYE: CPT

## 2024-07-08 PROCEDURE — 99285 EMERGENCY DEPT VISIT HI MDM: CPT | Performed by: EMERGENCY MEDICINE

## 2024-07-08 PROCEDURE — 81025 URINE PREGNANCY TEST: CPT

## 2024-07-08 PROCEDURE — 73030 X-RAY EXAM OF SHOULDER: CPT

## 2024-07-08 PROCEDURE — 70450 CT HEAD/BRAIN W/O DYE: CPT

## 2024-07-08 PROCEDURE — 90715 TDAP VACCINE 7 YRS/> IM: CPT | Performed by: EMERGENCY MEDICINE

## 2024-07-08 PROCEDURE — 90471 IMMUNIZATION ADMIN: CPT

## 2024-07-08 PROCEDURE — 73564 X-RAY EXAM KNEE 4 OR MORE: CPT

## 2024-07-08 RX ORDER — ACETAMINOPHEN 325 MG/1
650 TABLET ORAL ONCE
Status: COMPLETED | OUTPATIENT
Start: 2024-07-08 | End: 2024-07-08

## 2024-07-08 RX ADMIN — ACETAMINOPHEN 650 MG: 325 TABLET, FILM COATED ORAL at 22:05

## 2024-07-08 RX ADMIN — TETANUS TOXOID, REDUCED DIPHTHERIA TOXOID AND ACELLULAR PERTUSSIS VACCINE, ADSORBED 0.5 ML: 5; 2.5; 8; 8; 2.5 SUSPENSION INTRAMUSCULAR at 23:25
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
## 2024-07-09 PROCEDURE — 96372 THER/PROPH/DIAG INJ SC/IM: CPT

## 2024-07-09 RX ORDER — KETOROLAC TROMETHAMINE 30 MG/ML
15 INJECTION, SOLUTION INTRAMUSCULAR; INTRAVENOUS ONCE
Status: COMPLETED | OUTPATIENT
Start: 2024-07-09 | End: 2024-07-09

## 2024-07-09 RX ADMIN — KETOROLAC TROMETHAMINE 15 MG: 30 INJECTION, SOLUTION INTRAMUSCULAR at 00:23
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
## 2024-08-08 ENCOUNTER — ANNUAL EXAM (OUTPATIENT)
Dept: GYNECOLOGY | Facility: CLINIC | Age: 39
End: 2024-08-08
Payer: COMMERCIAL

## 2024-08-08 DIAGNOSIS — Z01.419 WOMEN'S ANNUAL ROUTINE GYNECOLOGICAL EXAMINATION: ICD-10-CM

## 2024-08-08 DIAGNOSIS — N92.6 IRREGULAR MENSES: Primary | ICD-10-CM

## 2024-08-08 LAB — SL AMB POCT URINE HCG: NEGATIVE

## 2024-08-08 PROCEDURE — 81025 URINE PREGNANCY TEST: CPT | Performed by: OBSTETRICS & GYNECOLOGY

## 2024-08-08 PROCEDURE — S0612 ANNUAL GYNECOLOGICAL EXAMINA: HCPCS | Performed by: OBSTETRICS & GYNECOLOGY

## 2024-08-08 NOTE — PROGRESS NOTES
Assessment & Plan   Diagnoses and all orders for this visit:    Women's annual routine gynecological examination    Irregular menses  -     TSH, 3rd generation; Future  -     Prolactin; Future  -     hCG, quantitative; Future  -     Follicle stimulating hormone; Future    1.  Yearly exam-Pap smear deferred, self breast awareness reviewed  2. prior abdominal/pelvic discomfort-denies complaints  3. prior history of kidney stones/UTI/yeast-no current issues  4. prior Mirena IUD-removed 8/20/2023  5. history of secondary oligomenorrhea-was related to Mirena IUD.  After removal 8/20/2023, had cycles 29, 43, 35, and then 30 to 32 days for 4 months prior to LMP 5/7/2024.  After that, had positive pregnancy test at home 6/10/2024.  Went to ER 6/13/2024, hCG was 11.6.  Follow-up hCG 6/15/2024 was negative consistent with miscarriage.  Ultrasound at that time showed no focal findings.  Since then, has had no menses.  ER visit with UPT negative from 7/8/2024.  Urine pregnancy test today was also negative.  Patient is not sure exactly where she is in her cycle and I would concur.  Recommend observation.  She did wish to proceed with lab testing, laboratory sheet given for TSH, hCG, FSH, and prolactin.  She will obtain this and we will proceed accordingly.  6. history of depression/anxiety-overall, is doing well.  Has had some emotional irritability recently.  Recommend follow-up with her sports psychologist as needed.  7.  Possible adenomyosis-suggested on previous ultrasounds.  Denies significant pelvic pain, dyspareunia, or dysmenorrhea  8.  Preconceptual-not using protection.  States that she wishes her  will get a vasectomy, but also is open to pregnancy if it occurs.  She did see Dr. Oshea who recommended progesterone.  Would suggest that she follow ovulation and then when ovulation test is positive, could start progesterone vaginally 2 days after that until either menses or positive pregnancy test.  She will  proceed in that fashion.  Taking vitamin with folic acid.  Other preconceptual issues reviewed previously  Otherwise, follow-up 1 year for yearly exam or as needed.    Subjective   Patient ID: Aileen Us is a 38 y.o. female.    There were no vitals filed for this visit.  Patient was seen today for yearly exam.  Please see assessment plan for details.        The following portions of the patient's history were reviewed and updated as appropriate: allergies, current medications, past family history, past medical history, past social history, past surgical history, and problem list.  Past Medical History:   Diagnosis Date    Abnormal Pap smear of cervix     Anemia     Depression     Dermatitis      2 para 2     Hashimoto's disease 2017    Herpes     Hirsutism     last assessed: 14    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 thyroiditis     Mild cervical dysplasia     Pregnancy     last assessed: 3/28/17    Subclinical hypothyroidism     last assessed: 14    Urinary tract infection     Varicella     Vitamin D deficiency      Past Surgical History:   Procedure Laterality Date    COLPOSCOPY VULVA W/ BIOPSY      10/1/07 - MALLORY-1 noted at 5:00 and 12:00 ECC was negative    COMBINED HYSTEROSCOPY DIAGNOSTIC / D&C  10/2014    polyp removal    DILATION AND CURETTAGE OF UTERUS      WISDOM TOOTH EXTRACTION       OB History    Para Term  AB Living   4 2 2   2 2   SAB IAB Ectopic Multiple Live Births   0 2   1 2      # Outcome Date GA Lbr Trevor/2nd Weight Sex Type Anes PTL Lv   4 IAB 2020           3 Term 17 39w0d / 00:59 3799 g (8 lb 6 oz) F Vag-Spont EPI N AQUILES   2A Term 09/03/15 39w2d  3856 g (8 lb 8 oz) M Vag-Spont EPI N AQUILES   2B IAB      TAB         Complications: History of congenital brain abnormality   1                 Current Outpatient Medications:     Prenatal Vit-Fe Fumarate-FA (PRENATAL VITAMINS PO), Take by mouth, Disp: , Rfl:     Synthroid 125 MCG tablet, Take one tablet  daily from Mon to sat and 1/2 tablet on Sunday, Disp: 90 tablet, Rfl: 3    Ergocalciferol (Vitamin D2) 50 MCG (2000 UT) TABS, Take 2,000 Units by mouth Takes 2 pills daily (Patient not taking: Reported on 8/8/2024), Disp: , Rfl:     Progesterone (Prometrium) 200 MG CAPS, Insert 1 capsule VAGINALLY nightly (Patient not taking: Reported on 8/8/2024), Disp: 60 capsule, Rfl: 3    vitamin B-12 (VITAMIN B-12) 500 mcg tablet, Take 1 tablet (500 mcg total) by mouth daily (Patient not taking: Reported on 8/8/2024), Disp: , Rfl:   No Known Allergies  Social History     Socioeconomic History    Marital status: /Civil Union     Spouse name: None    Number of children: 2    Years of education: None    Highest education level: None   Occupational History    None   Tobacco Use    Smoking status: Never    Smokeless tobacco: Never   Vaping Use    Vaping status: Never Used   Substance and Sexual Activity    Alcohol use: Yes     Alcohol/week: 2.0 standard drinks of alcohol     Types: 1 Glasses of wine, 1 Cans of beer per week     Comment: social    Drug use: No    Sexual activity: Yes     Partners: Male   Other Topics Concern    None   Social History Narrative    Caffeine use    [employer] affiliation [employer]    Uses seatbelts     Social Determinants of Health     Financial Resource Strain: Not on file   Food Insecurity: Not on file   Transportation Needs: Not on file   Physical Activity: Not on file   Stress: Not on file   Social Connections: Not on file   Intimate Partner Violence: Not on file   Housing Stability: Not on file     Family History   Problem Relation Age of Onset    Stroke Mother     Depression Mother     Thyroid disease Mother     Hypertension Father     Depression Maternal Aunt     Diabetes Maternal Aunt     Heart attack Paternal Uncle     Diabetes Other        Review of Systems   Constitutional:  Negative for chills, diaphoresis, fatigue and fever.   Respiratory:  Negative for apnea, cough, chest  tightness, shortness of breath and wheezing.    Cardiovascular:  Negative for chest pain, palpitations and leg swelling.   Gastrointestinal:  Negative for abdominal distention, abdominal pain, anal bleeding, constipation, diarrhea, nausea, rectal pain and vomiting.   Genitourinary:  Negative for difficulty urinating, dyspareunia, dysuria, frequency, hematuria, menstrual problem, pelvic pain, urgency, vaginal bleeding, vaginal discharge and vaginal pain.   Musculoskeletal:  Negative for arthralgias, back pain and myalgias.   Skin:  Negative for color change and rash.   Neurological:  Negative for dizziness, syncope, light-headedness, numbness and headaches.   Hematological:  Negative for adenopathy. Does not bruise/bleed easily.   Psychiatric/Behavioral:  Negative for dysphoric mood and sleep disturbance. The patient is not nervous/anxious.        Objective   Physical Exam  OBGyn Exam     Objective      There were no vitals taken for this visit.    General:   alert and oriented, in no acute distress   Neck: normal to inspection and palpation   Breast: normal appearance, no masses or tenderness   Heart:    Lungs:    Abdomen: soft, non-tender, without masses or organomegaly   Vulva: normal   Vagina: Without erythema or lesions or discharge.  Normal   Cervix: Without lesions or discharge or cervicitis.  No Cervical motion tenderness   Uterus: top normal size, anteverted, non-tender   Adnexa: no mass, fullness, tenderness   Rectum: negative    Psych:  Normal mood and affect   Skin:  Without obvious lesions   Eyes: symmetric, with normal movements and reactivity   Musculoskeletal:  Normal muscle tone and movements appreciated

## 2024-08-10 ENCOUNTER — LAB (OUTPATIENT)
Dept: LAB | Facility: CLINIC | Age: 39
End: 2024-08-10
Payer: COMMERCIAL

## 2024-08-10 DIAGNOSIS — N92.6 IRREGULAR MENSES: ICD-10-CM

## 2024-08-10 DIAGNOSIS — N93.9 ABNORMAL UTERINE BLEEDING (AUB): ICD-10-CM

## 2024-08-10 DIAGNOSIS — E06.3 HYPOTHYROIDISM DUE TO HASHIMOTO'S THYROIDITIS: ICD-10-CM

## 2024-08-10 DIAGNOSIS — N97.9 INFERTILITY, FEMALE: Primary | ICD-10-CM

## 2024-08-10 DIAGNOSIS — E03.8 HYPOTHYROIDISM DUE TO HASHIMOTO'S THYROIDITIS: ICD-10-CM

## 2024-08-10 LAB
B-HCG SERPL-ACNC: <0.6 MIU/ML (ref 0–5)
FSH SERPL-ACNC: 3.4 MIU/ML
PROGEST SERPL-MCNC: 2.64 NG/ML
PROLACTIN SERPL-MCNC: 14.92 NG/ML (ref 3.34–26.72)
T4 FREE SERPL-MCNC: 0.71 NG/DL (ref 0.61–1.12)
TSH SERPL DL<=0.05 MIU/L-ACNC: 2.87 UIU/ML (ref 0.45–4.5)

## 2024-08-10 PROCEDURE — 84146 ASSAY OF PROLACTIN: CPT

## 2024-08-10 PROCEDURE — 36415 COLL VENOUS BLD VENIPUNCTURE: CPT

## 2024-08-10 PROCEDURE — 83001 ASSAY OF GONADOTROPIN (FSH): CPT

## 2024-08-10 PROCEDURE — 84439 ASSAY OF FREE THYROXINE: CPT

## 2024-08-10 PROCEDURE — 84443 ASSAY THYROID STIM HORMONE: CPT

## 2024-08-10 PROCEDURE — 84144 ASSAY OF PROGESTERONE: CPT

## 2024-08-10 PROCEDURE — 84702 CHORIONIC GONADOTROPIN TEST: CPT

## 2024-08-13 NOTE — RESULT ENCOUNTER NOTE
All testing is normal/negative, with negative pregnancy test as well.  Would recommend continue observation as we discussed at recent visit.  If continued irregular bleeding, would follow-up accordingly.

## 2024-08-20 ENCOUNTER — TELEPHONE (OUTPATIENT)
Age: 39
End: 2024-08-20

## 2024-08-20 NOTE — TELEPHONE ENCOUNTER
"Patient calling back about her results. Per results note:    \"Fabien Gallagher,     I attempted to call you a few times in past few days to discuss your thyroid levels. The thyroid levels are normal, but if you are planning for a pregnancy in the near future, we can try to even better control you levels by adjusting your levothyroxine dose. Please let me know and if so we can increase your Synthroid to 125 mcg 1 tablet daily and then repeat a TSH and free T4 in 6 weeks. I look forward to hearing from you.     Thank you.     Rahel Banks PA-C\"    Informed patient of message. Patient reports that she has been taking 125 mcg of the Synthroid daily since June 10th.  She said that she found out she was pregnant then had a miscarriage 2 days later. Please advise.   "

## 2024-08-20 NOTE — TELEPHONE ENCOUNTER
If she has been taking Synthroid 125 mcg 1 tablet daily Monday through Sunday then she should increase her dose to 1 tablet daily Monday through Saturday and 1.5 tablets on Sunday.  Please order repeat TSH and free T4 to do in 6 weeks.  Please update med list.

## 2024-08-21 DIAGNOSIS — E06.3 HYPOTHYROIDISM DUE TO HASHIMOTO'S THYROIDITIS: ICD-10-CM

## 2024-08-21 DIAGNOSIS — E03.8 HYPOTHYROIDISM DUE TO HASHIMOTO'S THYROIDITIS: ICD-10-CM

## 2024-08-21 RX ORDER — LEVOTHYROXINE SODIUM 125 MCG
TABLET ORAL
Qty: 90 TABLET | Refills: 1 | Status: SHIPPED | OUTPATIENT
Start: 2024-08-21

## 2024-09-08 ENCOUNTER — OFFICE VISIT (OUTPATIENT)
Dept: URGENT CARE | Facility: CLINIC | Age: 39
End: 2024-09-08
Payer: COMMERCIAL

## 2024-09-08 ENCOUNTER — APPOINTMENT (OUTPATIENT)
Dept: RADIOLOGY | Facility: CLINIC | Age: 39
End: 2024-09-08
Payer: COMMERCIAL

## 2024-09-08 VITALS
TEMPERATURE: 97.5 F | HEIGHT: 72 IN | OXYGEN SATURATION: 97 % | DIASTOLIC BLOOD PRESSURE: 58 MMHG | RESPIRATION RATE: 18 BRPM | HEART RATE: 72 BPM | SYSTOLIC BLOOD PRESSURE: 102 MMHG | BODY MASS INDEX: 29.69 KG/M2 | WEIGHT: 219.2 LBS

## 2024-09-08 DIAGNOSIS — B96.89 BACTERIAL UPPER RESPIRATORY INFECTION: Primary | ICD-10-CM

## 2024-09-08 DIAGNOSIS — J06.9 BACTERIAL UPPER RESPIRATORY INFECTION: Primary | ICD-10-CM

## 2024-09-08 DIAGNOSIS — R30.0 DYSURIA: ICD-10-CM

## 2024-09-08 DIAGNOSIS — B99.9 ELEVATED TEMPERATURE DUE TO INFECTION: ICD-10-CM

## 2024-09-08 DIAGNOSIS — J02.0 STREP PHARYNGITIS: ICD-10-CM

## 2024-09-08 LAB
S PYO AG THROAT QL: POSITIVE
SL AMB  POCT GLUCOSE, UA: NEGATIVE
SL AMB LEUKOCYTE ESTERASE,UA: ABNORMAL
SL AMB POCT BILIRUBIN,UA: NEGATIVE
SL AMB POCT BLOOD,UA: ABNORMAL
SL AMB POCT CLARITY,UA: CLEAR
SL AMB POCT COLOR,UA: ABNORMAL
SL AMB POCT KETONES,UA: NEGATIVE
SL AMB POCT NITRITE,UA: NEGATIVE
SL AMB POCT PH,UA: 7.5
SL AMB POCT SPECIFIC GRAVITY,UA: 1
SL AMB POCT URINE HCG: NEGATIVE
SL AMB POCT URINE PROTEIN: 30
SL AMB POCT UROBILINOGEN: 0.2

## 2024-09-08 PROCEDURE — 87880 STREP A ASSAY W/OPTIC: CPT | Performed by: NURSE PRACTITIONER

## 2024-09-08 PROCEDURE — 71046 X-RAY EXAM CHEST 2 VIEWS: CPT

## 2024-09-08 PROCEDURE — 99214 OFFICE O/P EST MOD 30 MIN: CPT

## 2024-09-08 PROCEDURE — 87086 URINE CULTURE/COLONY COUNT: CPT | Performed by: NURSE PRACTITIONER

## 2024-09-08 PROCEDURE — 81025 URINE PREGNANCY TEST: CPT | Performed by: NURSE PRACTITIONER

## 2024-09-08 PROCEDURE — 81002 URINALYSIS NONAUTO W/O SCOPE: CPT | Performed by: NURSE PRACTITIONER

## 2024-09-08 RX ORDER — DEXAMETHASONE 4 MG/1
4 TABLET ORAL 2 TIMES DAILY WITH MEALS
Qty: 10 TABLET | Refills: 0 | Status: SHIPPED | OUTPATIENT
Start: 2024-09-08

## 2024-09-08 RX ORDER — NITROFURANTOIN 25; 75 MG/1; MG/1
100 CAPSULE ORAL 2 TIMES DAILY
Qty: 10 CAPSULE | Refills: 0 | Status: SHIPPED | OUTPATIENT
Start: 2024-09-08

## 2024-09-08 RX ORDER — AZITHROMYCIN 250 MG/1
TABLET, FILM COATED ORAL
Qty: 6 TABLET | Refills: 0 | Status: SHIPPED | OUTPATIENT
Start: 2024-09-08 | End: 2024-09-12

## 2024-09-08 NOTE — PROGRESS NOTES
[facility]        NAME: Aileen Us is a 38 y.o. female  : 1985    MRN: [id number]  DATE: 2024  TIME: 10:10 AM    Assessment and Plan   Bacterial upper respiratory infection [J06.9, B96.89]  1. Bacterial upper respiratory infection  azithromycin (ZITHROMAX) 250 mg tablet    dexamethasone (DECADRON) 4 mg tablet      2. Dysuria  Urine culture    POCT urine dip    POCT urine HCG    nitrofurantoin (MACROBID) 100 mg capsule      3. Elevated temperature due to infection  XR chest pa and lateral      4. Strep pharyngitis  POCT rapid ANTIGEN strepA            Patient Instructions       Rapid strep is positive  Urine HCG is negative  Urine dip is positive for leuks esterace and blood  Follow up with PCP in 3-5 days.  Proceed to  ER if symptoms worsen.    If tests have been performed at [facility], our office will contact you with results if changes need to be made to the care plan discussed with you at the visit.  [facility]    Chief Complaint     Chief Complaint   Patient presents with    Possible UTI     Started Friday with fatigue, urgency when urinating, and yesterday morning her body was aching     Cold Like Symptoms     Started yesterday morning with fever tmax: 103, chills, vomiting, and swollen glands         History of Present Illness       HPI  Reports she was recently diagnosed with covid. She is currently having chest wall pain, back pain, congestion, runny nose, chills, fatigue, worsening sore throat, and urinary frequency. Says she urinates, she feels different. Yesterday her max temp was 103 degrees.       Review of Systems   Review of Systems   Constitutional:  Positive for chills, fatigue and fever.   HENT:  Positive for congestion, rhinorrhea, sinus pressure and sore throat.    Cardiovascular:  Positive for chest pain (chestwall pain).   Gastrointestinal:  Positive for vomiting (yesterday). Negative for abdominal pain.    Genitourinary:  Positive for dysuria and frequency. Negative for difficulty urinating and urgency.   Musculoskeletal:  Positive for back pain.   Neurological:  Positive for headaches.         Current Medications       Current Outpatient Medications:     azithromycin (ZITHROMAX) 250 mg tablet, Take 2 tablets today then 1 tablet daily x 4 days, Disp: 6 tablet, Rfl: 0    dexamethasone (DECADRON) 4 mg tablet, Take 1 tablet (4 mg total) by mouth 2 (two) times a day with meals, Disp: 10 tablet, Rfl: 0    nitrofurantoin (MACROBID) 100 mg capsule, Take 1 capsule (100 mg total) by mouth 2 (two) times a day, Disp: 10 capsule, Rfl: 0    Ergocalciferol (Vitamin D2) 50 MCG (2000 UT) TABS, Take 2,000 Units by mouth Takes 2 pills daily (Patient not taking: Reported on 2024), Disp: , Rfl:     Prenatal Vit-Fe Fumarate-FA (PRENATAL VITAMINS PO), Take by mouth, Disp: , Rfl:     Progesterone (Prometrium) 200 MG CAPS, Insert 1 capsule VAGINALLY nightly (Patient not taking: Reported on 2024), Disp: 60 capsule, Rfl: 3    Synthroid 125 MCG tablet, Take 1 tablet daily Monday - Saturday and 1.5 tablets on , Disp: 90 tablet, Rfl: 1    vitamin B-12 (VITAMIN B-12) 500 mcg tablet, Take 1 tablet (500 mcg total) by mouth daily (Patient not taking: Reported on 2024), Disp: , Rfl:     Current Allergies     Allergies as of 2024    (No Known Allergies)            The following portions of the patient's history were reviewed and updated as appropriate: allergies, current medications, past family history, past medical history, past social history, past surgical history and problem list.     Past Medical History:   Diagnosis Date    Abnormal Pap smear of cervix     Anemia     Depression     Dermatitis      2 para 2     Hashimoto's disease 2017    Herpes     Hirsutism     last assessed: 14    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 thyroiditis     Mild cervical dysplasia     Pregnancy     last assessed: 3/28/17     Subclinical hypothyroidism     last assessed: 12/29/14    Urinary tract infection     Varicella     Vitamin D deficiency        Past Surgical History:   Procedure Laterality Date    COLPOSCOPY VULVA W/ BIOPSY      10/1/07 - MALLORY-1 noted at 5:00 and 12:00 ECC was negative    COMBINED HYSTEROSCOPY DIAGNOSTIC / D&C  10/2014    polyp removal    DILATION AND CURETTAGE OF UTERUS      WISDOM TOOTH EXTRACTION         Family History   Problem Relation Age of Onset    Stroke Mother     Depression Mother     Thyroid disease Mother     Hypertension Father     Depression Maternal Aunt     Diabetes Maternal Aunt     Heart attack Paternal Uncle     Diabetes Other          Medications have been verified.        Objective   /58 (BP Location: Left arm, Patient Position: Sitting, Cuff Size: Extra-Large)   Pulse 72   Temp 97.5 °F (36.4 °C) (Tympanic)   Resp 18   Ht 6' (1.829 m)   Wt 99.4 kg (219 lb 3.2 oz)   LMP 08/12/2024 (Exact Date)   SpO2 97%   BMI 29.73 kg/m²   Patient's last menstrual period was 08/12/2024 (exact date).       Physical Exam     Physical Exam  Constitutional:       Appearance: She is ill-appearing.   HENT:      Right Ear: Tympanic membrane normal.      Left Ear: Tympanic membrane normal.      Nose: Rhinorrhea present.      Mouth/Throat:      Pharynx: No posterior oropharyngeal erythema.      Comments: Tonsils 2+ with exudate  Cardiovascular:      Rate and Rhythm: Regular rhythm.      Heart sounds: Normal heart sounds.   Pulmonary:      Effort: Pulmonary effort is normal.      Breath sounds: Normal breath sounds.

## 2024-09-10 ENCOUNTER — OFFICE VISIT (OUTPATIENT)
Dept: FAMILY MEDICINE CLINIC | Facility: CLINIC | Age: 39
End: 2024-09-10
Payer: COMMERCIAL

## 2024-09-10 VITALS
DIASTOLIC BLOOD PRESSURE: 84 MMHG | RESPIRATION RATE: 18 BRPM | OXYGEN SATURATION: 98 % | HEIGHT: 72 IN | TEMPERATURE: 97.5 F | BODY MASS INDEX: 29.45 KG/M2 | WEIGHT: 217.4 LBS | SYSTOLIC BLOOD PRESSURE: 126 MMHG

## 2024-09-10 DIAGNOSIS — R39.9 UTI SYMPTOMS: ICD-10-CM

## 2024-09-10 DIAGNOSIS — N20.0 KIDNEY STONE ON RIGHT SIDE: Primary | ICD-10-CM

## 2024-09-10 DIAGNOSIS — F41.9 ANXIETY: ICD-10-CM

## 2024-09-10 LAB
BACTERIA UR CULT: NORMAL
BACTERIA UR QL AUTO: ABNORMAL /HPF
BILIRUB UR QL STRIP: NEGATIVE
CAOX CRY URNS QL MICRO: ABNORMAL /HPF
CLARITY UR: ABNORMAL
COLOR UR: YELLOW
GLUCOSE UR STRIP-MCNC: NEGATIVE MG/DL
HGB UR QL STRIP.AUTO: ABNORMAL
KETONES UR STRIP-MCNC: NEGATIVE MG/DL
LEUKOCYTE ESTERASE UR QL STRIP: ABNORMAL
MUCOUS THREADS UR QL AUTO: ABNORMAL
NITRITE UR QL STRIP: NEGATIVE
NON-SQ EPI CELLS URNS QL MICRO: ABNORMAL /HPF
PH UR STRIP.AUTO: 6 [PH]
PROT UR STRIP-MCNC: ABNORMAL MG/DL
RBC #/AREA URNS AUTO: ABNORMAL /HPF
SL AMB  POCT GLUCOSE, UA: NEGATIVE
SL AMB LEUKOCYTE ESTERASE,UA: NEGATIVE
SL AMB POCT BILIRUBIN,UA: NEGATIVE
SL AMB POCT BLOOD,UA: 50
SL AMB POCT CLARITY,UA: ABNORMAL
SL AMB POCT COLOR,UA: ABNORMAL
SL AMB POCT KETONES,UA: NEGATIVE
SL AMB POCT NITRITE,UA: NEGATIVE
SL AMB POCT PH,UA: 6
SL AMB POCT SPECIFIC GRAVITY,UA: 1.02
SL AMB POCT URINE PROTEIN: 30
SL AMB POCT UROBILINOGEN: 0.2
SP GR UR STRIP.AUTO: 1.02 (ref 1–1.03)
UROBILINOGEN UR STRIP-ACNC: <2 MG/DL
WBC #/AREA URNS AUTO: ABNORMAL /HPF

## 2024-09-10 PROCEDURE — 87086 URINE CULTURE/COLONY COUNT: CPT | Performed by: FAMILY MEDICINE

## 2024-09-10 PROCEDURE — 81002 URINALYSIS NONAUTO W/O SCOPE: CPT | Performed by: FAMILY MEDICINE

## 2024-09-10 PROCEDURE — 99214 OFFICE O/P EST MOD 30 MIN: CPT | Performed by: FAMILY MEDICINE

## 2024-09-10 PROCEDURE — 81001 URINALYSIS AUTO W/SCOPE: CPT | Performed by: FAMILY MEDICINE

## 2024-09-10 RX ORDER — SERTRALINE HYDROCHLORIDE 25 MG/1
25 TABLET, FILM COATED ORAL DAILY
Qty: 90 TABLET | Refills: 0 | Status: SHIPPED | OUTPATIENT
Start: 2024-09-10

## 2024-09-10 RX ORDER — CEPHALEXIN 500 MG/1
500 CAPSULE ORAL EVERY 8 HOURS SCHEDULED
Qty: 21 CAPSULE | Refills: 0 | Status: SHIPPED | OUTPATIENT
Start: 2024-09-10 | End: 2024-09-17

## 2024-09-10 NOTE — ASSESSMENT & PLAN NOTE
Known history of renal stone, due for follow up.  Orders:    US kidney and bladder; Future    cephalexin (KEFLEX) 500 mg capsule; Take 1 capsule (500 mg total) by mouth every 8 (eight) hours for 7 days

## 2024-09-10 NOTE — ASSESSMENT & PLAN NOTE
Start Zoloft, follow up in one month.  Orders:    sertraline (ZOLOFT) 25 mg tablet; Take 1 tablet (25 mg total) by mouth daily

## 2024-09-10 NOTE — ASSESSMENT & PLAN NOTE
Recheck UA and culture given previous was negative but still having UTI symptoms. Start Keflex.   Orders:    Urinalysis with microscopic; Future    Urine culture; Future

## 2024-09-10 NOTE — PROGRESS NOTES
Ambulatory Visit  Name: Aileen Us      : 1985      MRN: [id number]  Encounter Provider: Grace Aguirre DO  Encounter Date: 9/10/2024   Encounter department: [facility]    Assessment & Plan  Kidney stone on right side  Known history of renal stone, due for follow up.  Orders:    US kidney and bladder; Future    cephalexin (KEFLEX) 500 mg capsule; Take 1 capsule (500 mg total) by mouth every 8 (eight) hours for 7 days    UTI symptoms  Recheck UA and culture given previous was negative but still having UTI symptoms. Start Keflex.   Orders:    Urinalysis with microscopic; Future    Urine culture; Future    Anxiety  Start Zoloft, follow up in one month.  Orders:    sertraline (ZOLOFT) 25 mg tablet; Take 1 tablet (25 mg total) by mouth daily       History of Present Illness     HPI  Here today for urgent care follow up. Was seen there  for UTI, prescribed Macrobid, and strep, prescribed Azithromycin and Decadron. Had COVID right before she had strep. Kids were both sick recently. Not sleeping well because kids wake up at night. Feels very overwhelmed. Wants to work but can't due to time constraints. Sees sports psychologist weekly.     Today the UTI symptoms are persistent. Still having urgency and dysuria. Due for period tomorrow.     Feeling very anxious and overwhelmed. Family history of depression. May want to consider medications in the future. Mom  14 years ago but has never dealt with grief. Was on Abilify previously.       Review of Systems        Objective     /84 (BP Location: Left arm, Patient Position: Sitting, Cuff Size: Standard)   Temp 97.5 °F (36.4 °C) (Temporal)   Resp 18   Ht 6' (1.829 m)   Wt 98.6 kg (217 lb 6.4 oz)   LMP  (LMP Unknown)   SpO2 98%   BMI 29.48 kg/m²     Physical Exam

## 2024-09-11 LAB — BACTERIA UR CULT: NORMAL

## 2024-09-12 ENCOUNTER — HOSPITAL ENCOUNTER (OUTPATIENT)
Dept: RADIOLOGY | Age: 39
Discharge: HOME | End: 2024-09-12
Payer: COMMERCIAL

## 2024-09-12 DIAGNOSIS — N20.0 KIDNEY STONE ON RIGHT SIDE: ICD-10-CM

## 2024-09-12 PROCEDURE — 76775 US EXAM ABDO BACK WALL LIM: CPT

## 2024-09-16 ENCOUNTER — TELEPHONE (OUTPATIENT)
Age: 39
End: 2024-09-16

## 2024-09-16 DIAGNOSIS — N20.0 KIDNEY STONE ON RIGHT SIDE: Primary | ICD-10-CM

## 2024-09-16 NOTE — TELEPHONE ENCOUNTER
Patient called to review US kidney and bladder from 9/12/24.  Also, she said she is still having R side throat pain.  She said she has 2 more days left of Keflex 500 mg capsule.    Thank you

## 2024-09-18 NOTE — TELEPHONE ENCOUNTER
US showed R kidney stone 5 mm unchanged from her last US in January 2024 and not causing any obstruction. I am going to send her a referral for urology to see if they think she should have the stone removed if that might be the cause of her pain - they should call her to schedule. She should continue her antibiotics and throat pain can be treated with Tylenol. If she is done with Decadron she can start taking Ibuprofen as well, gargle with saltwater, drink tea with honey.

## 2024-09-18 NOTE — TELEPHONE ENCOUNTER
Called and spoke with patient and relay provider message/instructions. Patient verbalized understanding

## 2024-10-23 ENCOUNTER — OFFICE VISIT (OUTPATIENT)
Dept: FAMILY MEDICINE CLINIC | Facility: CLINIC | Age: 39
End: 2024-10-23
Payer: COMMERCIAL

## 2024-10-23 VITALS
TEMPERATURE: 97.8 F | HEIGHT: 72 IN | OXYGEN SATURATION: 98 % | BODY MASS INDEX: 29.96 KG/M2 | HEART RATE: 60 BPM | DIASTOLIC BLOOD PRESSURE: 78 MMHG | SYSTOLIC BLOOD PRESSURE: 120 MMHG | RESPIRATION RATE: 16 BRPM | WEIGHT: 221.2 LBS

## 2024-10-23 DIAGNOSIS — Z00.00 ENCOUNTER FOR WELLNESS EXAMINATION IN ADULT: Primary | ICD-10-CM

## 2024-10-23 DIAGNOSIS — F34.1 DYSTHYMIC DISORDER: ICD-10-CM

## 2024-10-23 DIAGNOSIS — E06.3 HYPOTHYROIDISM DUE TO HASHIMOTO'S THYROIDITIS: ICD-10-CM

## 2024-10-23 DIAGNOSIS — N92.6 IRREGULAR MENSES: ICD-10-CM

## 2024-10-23 DIAGNOSIS — F41.1 GENERALIZED ANXIETY DISORDER: ICD-10-CM

## 2024-10-23 PROBLEM — R39.9 UTI SYMPTOMS: Status: RESOLVED | Noted: 2024-09-10 | Resolved: 2024-10-23

## 2024-10-23 PROBLEM — F41.9 ANXIETY DISORDER: Status: ACTIVE | Noted: 2017-08-13

## 2024-10-23 PROBLEM — R30.0 BURNING WITH URINATION: Status: RESOLVED | Noted: 2023-12-19 | Resolved: 2024-10-23

## 2024-10-23 LAB
SL AMB  POCT GLUCOSE, UA: NORMAL
SL AMB LEUKOCYTE ESTERASE,UA: NORMAL
SL AMB POCT BILIRUBIN,UA: NORMAL
SL AMB POCT BLOOD,UA: NORMAL
SL AMB POCT CLARITY,UA: CLEAR
SL AMB POCT COLOR,UA: YELLOW
SL AMB POCT KETONES,UA: NORMAL
SL AMB POCT NITRITE,UA: NORMAL
SL AMB POCT PH,UA: 7
SL AMB POCT SPECIFIC GRAVITY,UA: 1.01
SL AMB POCT URINE HCG: NEGATIVE
SL AMB POCT URINE PROTEIN: NORMAL
SL AMB POCT UROBILINOGEN: NORMAL

## 2024-10-23 PROCEDURE — 81002 URINALYSIS NONAUTO W/O SCOPE: CPT | Performed by: FAMILY MEDICINE

## 2024-10-23 PROCEDURE — 99395 PREV VISIT EST AGE 18-39: CPT | Performed by: FAMILY MEDICINE

## 2024-10-23 PROCEDURE — 81025 URINE PREGNANCY TEST: CPT | Performed by: FAMILY MEDICINE

## 2024-10-23 NOTE — ASSESSMENT & PLAN NOTE
Synthroid 125 mcg daily  Endocrinology follows  Recent TSH is normal 2.8 as of August 10, 2024

## 2024-10-23 NOTE — PROGRESS NOTES
"Ambulatory Visit  Name: Aileen Us      : 1985      MRN: [id number]  Encounter Provider: Viviana Inman MD  Encounter Date: 10/23/2024   Encounter department: [facility]    Assessment & Plan  Encounter for wellness examination in adult  Healthy diet.  Regular exercise.  Follows with sports psychologist and .       Generalized anxiety disorder  Ongoing symptoms of anxiety, depression, dysthymia.  Patient is in counseling.  Reports stress eating.  She is possibly considering third pregnancy  I believe she would be a good candidate for SSRI therapy (Zoloft).  We will discuss with OB/GYN  Patient will strongly consider and will contact me if she is ready to start Rx.       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 thyroiditis  Synthroid 125 mcg daily  Endocrinology follows  Recent TSH is normal 2.8 as of August 10, 2024       Irregular menses  LMP 2024  Menstrual cycle was 1 week late.  Urine pregnancy test is negative  Orders:    POCT urine HCG    POCT urine dip       History of Present Illness      Annual well exam    Healthy diet.  Active lifestyle.  Exercises regularly.  Used to play basketball for [employer]     Seen recently for UTI s/o by    Rxed Zoloft due to feeling overwhelmed, patient has not started Rx so far.   Fhx - depression (aunt and mother)   Follows with sport psychologist   Follows with her personal      Recent evaluation at urgent care center and PCP.    US kidney 24: No acute findings.  Right lower pole 5 mm calculus unchanged from prior CT in 2024.  Left kidney-unremarkable     Lmp - 24- 1 week behind her menstrual cycle now.  No contraception.  No urinary symptoms, no flank pain    Ongoing symptoms of dysthymia and dysphoria.  Feels \" trapped\" , unmotivated and depressed for no obvious reason. No desire to see her friends.  Feels overwhelmed easily.  Works part-time. Mother of 2 children,  9 and 7 years " old .  Possibly considering third pregnancy.Concerned about using food as a stress reducer        Review of Systems   Constitutional: Negative.    HENT: Negative.     Eyes: Negative.    Respiratory: Negative.     Cardiovascular: Negative.    Endocrine: Negative.    Genitourinary:  Positive for menstrual problem.        As per HPI   Musculoskeletal: Negative.    Allergic/Immunologic: Negative.    Neurological: Negative.    Hematological: Negative.    Psychiatric/Behavioral:  Positive for dysphoric mood. The patient is nervous/anxious.      Past Medical History:   Diagnosis Date    Abnormal Pap smear of cervix     Anemia     Depression     Dermatitis      2 para 2     Hashimoto's disease 2017    Herpes     Hirsutism     last assessed: 14    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 thyroiditis     Mild cervical dysplasia     Pregnancy     last assessed: 3/28/17    Subclinical hypothyroidism     last assessed: 14    Urinary tract infection     Varicella     Vitamin D deficiency      Past Surgical History:   Procedure Laterality Date    COLPOSCOPY VULVA W/ BIOPSY      10/1/07 - MALLORY-1 noted at 5:00 and 12:00 ECC was negative    COMBINED HYSTEROSCOPY DIAGNOSTIC / D&C  10/2014    polyp removal    DILATION AND CURETTAGE OF UTERUS      WISDOM TOOTH EXTRACTION       Family History   Problem Relation Age of Onset    Stroke Mother     Depression Mother     Thyroid disease Mother     Hypertension Father     Depression Maternal Aunt     Diabetes Maternal Aunt     Heart attack Paternal Uncle     Diabetes Other      Social History     Tobacco Use    Smoking status: Never    Smokeless tobacco: Never   Vaping Use    Vaping status: Never Used   Substance and Sexual Activity    Alcohol use: Yes     Alcohol/week: 2.0 standard drinks of alcohol     Types: 1 Glasses of wine, 1 Cans of beer per week     Comment: social    Drug use: No    Sexual activity: Yes     Partners: Male     Current Outpatient Medications on File Prior to  Visit   Medication Sig    Prenatal Vit-Fe Fumarate-FA (PRENATAL VITAMINS PO) Take by mouth    Synthroid 125 MCG tablet Take 1 tablet daily Monday - Saturday and 1.5 tablets on Sunday    Progesterone (Prometrium) 200 MG CAPS Insert 1 capsule VAGINALLY nightly (Patient not taking: Reported on 10/23/2024)    [DISCONTINUED] dexamethasone (DECADRON) 4 mg tablet Take 1 tablet (4 mg total) by mouth 2 (two) times a day with meals (Patient not taking: Reported on 10/23/2024)    [DISCONTINUED] Ergocalciferol (Vitamin D2) 50 MCG (2000 UT) TABS Take 2,000 Units by mouth Takes 2 pills daily (Patient not taking: Reported on 8/8/2024)    [DISCONTINUED] nitrofurantoin (MACROBID) 100 mg capsule Take 1 capsule (100 mg total) by mouth 2 (two) times a day (Patient not taking: Reported on 10/23/2024)    [DISCONTINUED] sertraline (ZOLOFT) 25 mg tablet Take 1 tablet (25 mg total) by mouth daily (Patient not taking: Reported on 10/23/2024)    [DISCONTINUED] vitamin B-12 (VITAMIN B-12) 500 mcg tablet Take 1 tablet (500 mcg total) by mouth daily (Patient not taking: Reported on 8/8/2024)     No Known Allergies  Immunization History   Administered Date(s) Administered    COVID-19 PFIZER VACCINE 0.3 ML IM 07/27/2021, 08/17/2021    INFLUENZA 11/10/2022    Influenza Quadrivalent Preservative Free 3 years and older IM 10/28/2015, 01/19/2017    Influenza, injectable, quadrivalent, preservative free 0.5 mL 11/06/2018, 11/19/2021, 11/10/2022    Influenza, recombinant, quadrivalent,injectable, preservative free 12/03/2019    Influenza, seasonal, injectable 01/19/2017    Tdap 07/06/2015, 07/11/2017, 07/08/2024     Objective     /78 (BP Location: Left arm, Patient Position: Sitting, Cuff Size: Large)   Pulse 60   Temp 97.8 °F (36.6 °C) (Temporal)   Resp 16   Ht 6' (1.829 m)   Wt 100 kg (221 lb 3.2 oz)   SpO2 98%   BMI 30.00 kg/m²     Physical Exam  Vitals and nursing note reviewed.   Constitutional:       General: She is not in acute  distress.     Appearance: Normal appearance. She is well-developed. She is not ill-appearing.   HENT:      Head: Normocephalic and atraumatic.   Eyes:      Conjunctiva/sclera: Conjunctivae normal.   Neck:      Thyroid: No thyromegaly.      Vascular: No carotid bruit.   Cardiovascular:      Rate and Rhythm: Normal rate and regular rhythm.      Heart sounds: Normal heart sounds. No murmur heard.  Pulmonary:      Effort: Pulmonary effort is normal. No respiratory distress.      Breath sounds: Normal breath sounds. No wheezing.   Abdominal:      General: Bowel sounds are normal. There is no abdominal bruit.   Musculoskeletal:         General: Normal range of motion.      Cervical back: Neck supple.      Right lower leg: No edema.      Left lower leg: No edema.   Neurological:      General: No focal deficit present.      Mental Status: She is alert and oriented to person, place, and time.      Cranial Nerves: No cranial nerve deficit.      Coordination: Coordination normal.   Psychiatric:         Attention and Perception: Attention normal.         Mood and Affect: Mood normal. Affect is labile and tearful.         Behavior: Behavior normal.

## 2024-10-23 NOTE — ASSESSMENT & PLAN NOTE
Ongoing symptoms of anxiety, depression, dysthymia.  Patient is in counseling.  Reports stress eating.  She is possibly considering third pregnancy  I believe she would be a good candidate for SSRI therapy (Zoloft).  We will discuss with OB/GYN  Patient will strongly consider and will contact me if she is ready to start Rx.

## 2024-10-23 NOTE — Clinical Note
Hello, [place] patient with recurrent symptoms of anxiety/depression.  She might be considering pregnancy. I discussed trial of SSRI/Zoloft with the patient.  Would you have any objections to start Rx with the chance of possible pregnancy?  To my best knowledge, sertraline is considered to be safe. Thank you Viviana

## 2024-10-26 PROBLEM — F34.1 DYSTHYMIC DISORDER: Status: ACTIVE | Noted: 2024-10-26

## 2025-01-30 ENCOUNTER — APPOINTMENT (OUTPATIENT)
Dept: LAB | Facility: CLINIC | Age: 40
End: 2025-01-30
Payer: COMMERCIAL

## 2025-01-30 DIAGNOSIS — E55.9 VITAMIN D DEFICIENCY: ICD-10-CM

## 2025-01-30 DIAGNOSIS — E06.3 HYPOTHYROIDISM DUE TO HASHIMOTO'S THYROIDITIS: ICD-10-CM

## 2025-01-30 DIAGNOSIS — E53.8 VITAMIN B 12 DEFICIENCY: ICD-10-CM

## 2025-01-30 DIAGNOSIS — N97.9 INFERTILITY, FEMALE: ICD-10-CM

## 2025-01-30 LAB
25(OH)D3 SERPL-MCNC: 25.2 NG/ML (ref 30–100)
T4 FREE SERPL-MCNC: 0.87 NG/DL (ref 0.61–1.12)
TSH SERPL DL<=0.05 MIU/L-ACNC: 1.21 UIU/ML (ref 0.45–4.5)
VIT B12 SERPL-MCNC: 315 PG/ML (ref 180–914)

## 2025-01-30 PROCEDURE — 82607 VITAMIN B-12: CPT

## 2025-01-30 PROCEDURE — 36415 COLL VENOUS BLD VENIPUNCTURE: CPT

## 2025-01-30 PROCEDURE — 82306 VITAMIN D 25 HYDROXY: CPT

## 2025-01-30 PROCEDURE — 84439 ASSAY OF FREE THYROXINE: CPT

## 2025-01-30 PROCEDURE — 84443 ASSAY THYROID STIM HORMONE: CPT

## 2025-01-31 ENCOUNTER — RESULTS FOLLOW-UP (OUTPATIENT)
Dept: ENDOCRINOLOGY | Facility: CLINIC | Age: 40
End: 2025-01-31

## 2025-01-31 NOTE — RESULT ENCOUNTER NOTE
Fabien Us   Your thyroid blood work result is within normal range, continue current dose of Synthroid  Your vitamin D is low, please make sure you take vitamin D3 supplementation 2000 IU daily regularly  Your vitamin B12 is low 315, please take vitamin B12 supplementation 500 mcg daily on regular basis  Please follow-up for your appointment as scheduled.  If you get pregnant please inform endocrinology as your dose for levothyroxine will need adjustment    Dr. Melani Mosher MD

## 2025-02-06 DIAGNOSIS — E06.3 HYPOTHYROIDISM DUE TO HASHIMOTO'S THYROIDITIS: ICD-10-CM

## 2025-02-06 RX ORDER — LEVOTHYROXINE SODIUM 125 MCG
TABLET ORAL
Qty: 90 TABLET | Refills: 1 | Status: SHIPPED | OUTPATIENT
Start: 2025-02-06

## 2025-04-03 ENCOUNTER — OFFICE VISIT (OUTPATIENT)
Dept: ENDOCRINOLOGY | Facility: CLINIC | Age: 40
End: 2025-04-03
Payer: COMMERCIAL

## 2025-04-03 VITALS
OXYGEN SATURATION: 98 % | SYSTOLIC BLOOD PRESSURE: 114 MMHG | BODY MASS INDEX: 28.17 KG/M2 | HEART RATE: 45 BPM | DIASTOLIC BLOOD PRESSURE: 70 MMHG | HEIGHT: 72 IN | WEIGHT: 208 LBS

## 2025-04-03 DIAGNOSIS — E53.8 VITAMIN B 12 DEFICIENCY: ICD-10-CM

## 2025-04-03 DIAGNOSIS — E06.3 HYPOTHYROIDISM DUE TO HASHIMOTO'S THYROIDITIS: Primary | ICD-10-CM

## 2025-04-03 DIAGNOSIS — E55.9 VITAMIN D DEFICIENCY: ICD-10-CM

## 2025-04-03 PROCEDURE — 99214 OFFICE O/P EST MOD 30 MIN: CPT | Performed by: INTERNAL MEDICINE

## 2025-04-03 RX ORDER — MULTIVITAMIN WITH IRON
500 TABLET ORAL DAILY
COMMUNITY

## 2025-04-03 RX ORDER — LEVOTHYROXINE SODIUM 125 MCG
TABLET ORAL
Qty: 90 TABLET | Refills: 1 | Status: SHIPPED | OUTPATIENT
Start: 2025-04-03

## 2025-04-03 NOTE — ASSESSMENT & PLAN NOTE
Continue vitamin D3 supplementation 4000 IU daily  Orders:    Vitamin D 25 hydroxy; Future    Vitamin B12; Future    Vitamin D 25 hydroxy; Future

## 2025-04-03 NOTE — ASSESSMENT & PLAN NOTE
Lab Results   Component Value Date    ESX3FZLAGGQH 1.206 01/30/2025    TSH 1.98 06/06/2019   Patient is clinically and biochemically euthyroid.  Continue current dose of Synthroid 125 mcg daily 7 days a week.  Repeat thyroid blood work in 2 months and again in 6 months  Follow-up in 8 months.    Orders:    Synthroid 125 MCG tablet; TAKE 1 TABLET BY MOUTH DAILY on empty stomach 1 hour before breakfast    TSH, 3rd generation; Future    T4, free; Future    T4, free; Future    TSH, 3rd generation; Future

## 2025-04-03 NOTE — PROGRESS NOTES
Name: Aileen Us      : 1985      MRN: [id number]  Encounter Provider: Melani Mosher MD  Encounter Date: 4/3/2025   Encounter department: [facility] DIABETES AND ENDOCRINOLOGY [place]    No chief complaint on file.  :  Assessment & Plan  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 thyroiditis    Lab Results   Component Value Date    [id number] 1.206 2025    TSH 1.98 2019   Patient is clinically and biochemically euthyroid.  Continue current dose of Synthroid 125 mcg daily 7 days a week.  Repeat thyroid blood work in 2 months and again in 6 months  Follow-up in 8 months.    Orders:    Synthroid 125 MCG tablet; TAKE 1 TABLET BY MOUTH DAILY on empty stomach 1 hour before breakfast    TSH, 3rd generation; Future    T4, free; Future    T4, free; Future    TSH, 3rd generation; Future    Vitamin B 12 deficiency  Continue vitamin B12 supplementation 500 mcg daily  Repeat vitamin B12 in 2 months and again in 8 months  Orders:    Vitamin D 25 hydroxy; Future    Vitamin B12; Future    Vitamin B12; Future    Vitamin D deficiency  Continue vitamin D3 supplementation 4000 IU daily  Orders:    Vitamin D 25 hydroxy; Future    Vitamin B12; Future    Vitamin D 25 hydroxy; Future        History of Present Illness   History of Present Illness    Aileen Us is a 39 y.o. female with medical history of hypothyroidism secondary to Hashimoto thyroiditis managed on brand Synthroid is here for follow-up.  She takes Synthroid 125 mcg daily 6 days a week and half tablet every .  She denies missing doses.  Since she was started on Synthroid, we have not noticed any fluctuation in thyroid function test.  For vitamin D deficiency, she takes vitamin D3 4000 IU daily.  She also has history of hirsutism however previous adrenal and ovarian workup was negative for hyperandrogenism.  She exercises regularly, eats a healthy diet.  She has also vitamin B-12 deficiency and currently taking vitamin  B12 100 mcg daily      Component      Latest Ref Rng 1/30/2025   FREE T4      0.61 - 1.12 ng/dL 0.87    TSH 3RD GENERATON      0.450 - 4.500 uIU/mL 1.206    Vitamin B-12      180 - 914 pg/mL 315    VITAMIN D, 25-HYDROXY      30.0 - 100.0 ng/mL 25.2 (L)             Review of Systems   Constitutional:  Negative for activity change, diaphoresis, fatigue, fever and unexpected weight change.   HENT: Negative.     Eyes:  Negative for visual disturbance.   Respiratory:  Negative for cough, chest tightness and shortness of breath.    Cardiovascular:  Negative for chest pain, palpitations and leg swelling.   Gastrointestinal:  Negative for abdominal pain, blood in stool, constipation, diarrhea, nausea and vomiting.   Endocrine: Negative for cold intolerance, heat intolerance, polydipsia, polyphagia and polyuria.   Genitourinary:  Negative for dysuria, enuresis, frequency and urgency.   Musculoskeletal:  Negative for arthralgias and myalgias.   Skin:  Negative for pallor, rash and wound.   Allergic/Immunologic: Negative.    Neurological:  Negative for dizziness, tremors, weakness and numbness.   Hematological: Negative.    Psychiatric/Behavioral: Negative.      as per HPI       Medical History Reviewed by provider this encounter:     .    Objective   /70 (BP Location: Left arm, Patient Position: Sitting, Cuff Size: Large)   Pulse (!) 45   Ht 6' (1.829 m)   Wt 94.3 kg (208 lb)   SpO2 98%   BMI 28.21 kg/m²      Body mass index is 28.21 kg/m².  Wt Readings from Last 3 Encounters:   04/03/25 94.3 kg (208 lb)   10/23/24 100 kg (221 lb 3.2 oz)   09/10/24 98.6 kg (217 lb 6.4 oz)     Physical Exam  Vitals reviewed.   Constitutional:       Appearance: Normal appearance.   Cardiovascular:      Rate and Rhythm: Normal rate and regular rhythm.   Pulmonary:      Effort: Pulmonary effort is normal.      Breath sounds: Normal breath sounds.   Musculoskeletal:         General: Normal range of motion.      Cervical back: Normal  range of motion and neck supple.      Right lower leg: No edema.      Left lower leg: No edema.   Skin:     General: Skin is warm.   Neurological:      General: No focal deficit present.      Mental Status: She is alert and oriented to person, place, and time.   Psychiatric:         Mood and Affect: Mood normal.         Behavior: Behavior normal.       Physical Exam      Results    Labs:   Lab Results   Component Value Date    HGBA1C 5.3 11/22/2021    HGBA1C 5.4 12/12/2014     Lab Results   Component Value Date    CREATININE 0.83 06/13/2024    CREATININE 0.76 01/13/2024    CREATININE 0.89 01/10/2024    BUN 17 06/13/2024     (L) 03/13/2017    K 3.8 06/13/2024     06/13/2024    CO2 28 06/13/2024     eGFR   Date Value Ref Range Status   06/13/2024 90 ml/min/1.73sq m Final     Lab Results   Component Value Date    CHOL 193 12/12/2014    HDL 63 11/22/2021    TRIG 28 11/22/2021     Lab Results   Component Value Date    ALT 17 06/13/2024    AST 22 06/13/2024    ALKPHOS 43 06/13/2024    BILITOT 0.7 03/13/2017     Lab Results   Component Value Date    [id number] 1.206 01/30/2025    KXB8GZQPAYXI 2.871 08/10/2024    FTO1RAQQJQLH 2.197 04/15/2024     Lab Results   Component Value Date    FREET4 0.87 01/30/2025       There are no Patient Instructions on file for this visit.    Discussed with the patient and all questioned fully answered. She will call me if any problems arise.

## 2025-04-24 ENCOUNTER — OFFICE VISIT (OUTPATIENT)
Dept: FAMILY MEDICINE CLINIC | Facility: CLINIC | Age: 40
End: 2025-04-24
Payer: COMMERCIAL

## 2025-04-24 ENCOUNTER — TELEPHONE (OUTPATIENT)
Age: 40
End: 2025-04-24

## 2025-04-24 ENCOUNTER — APPOINTMENT (OUTPATIENT)
Dept: LAB | Facility: CLINIC | Age: 40
End: 2025-04-24
Payer: COMMERCIAL

## 2025-04-24 VITALS
OXYGEN SATURATION: 98 % | SYSTOLIC BLOOD PRESSURE: 122 MMHG | DIASTOLIC BLOOD PRESSURE: 84 MMHG | TEMPERATURE: 98.2 F | HEART RATE: 82 BPM | RESPIRATION RATE: 18 BRPM | BODY MASS INDEX: 27.96 KG/M2 | HEIGHT: 72 IN | WEIGHT: 206.4 LBS

## 2025-04-24 DIAGNOSIS — J02.9 ACUTE PHARYNGITIS, UNSPECIFIED ETIOLOGY: Primary | ICD-10-CM

## 2025-04-24 DIAGNOSIS — F34.1 DYSTHYMIC DISORDER: ICD-10-CM

## 2025-04-24 DIAGNOSIS — J02.9 ACUTE PHARYNGITIS, UNSPECIFIED ETIOLOGY: ICD-10-CM

## 2025-04-24 DIAGNOSIS — Z34.90 PREGNANCY, UNSPECIFIED GESTATIONAL AGE: ICD-10-CM

## 2025-04-24 DIAGNOSIS — E53.8 VITAMIN B 12 DEFICIENCY: ICD-10-CM

## 2025-04-24 DIAGNOSIS — E55.9 VITAMIN D DEFICIENCY: ICD-10-CM

## 2025-04-24 DIAGNOSIS — E06.3 HYPOTHYROIDISM DUE TO HASHIMOTO'S THYROIDITIS: ICD-10-CM

## 2025-04-24 LAB
25(OH)D3 SERPL-MCNC: 37.1 NG/ML (ref 30–100)
B-HCG SERPL-ACNC: ABNORMAL MIU/ML (ref 0–5)
PROGEST SERPL-MCNC: 39.48 NG/ML
T4 FREE SERPL-MCNC: 1.02 NG/DL (ref 0.61–1.12)
TSH SERPL DL<=0.05 MIU/L-ACNC: 1.87 UIU/ML (ref 0.45–4.5)
VIT B12 SERPL-MCNC: 288 PG/ML (ref 180–914)

## 2025-04-24 PROCEDURE — 82607 VITAMIN B-12: CPT

## 2025-04-24 PROCEDURE — 84443 ASSAY THYROID STIM HORMONE: CPT

## 2025-04-24 PROCEDURE — 99213 OFFICE O/P EST LOW 20 MIN: CPT | Performed by: FAMILY MEDICINE

## 2025-04-24 PROCEDURE — 82306 VITAMIN D 25 HYDROXY: CPT

## 2025-04-24 PROCEDURE — 36415 COLL VENOUS BLD VENIPUNCTURE: CPT

## 2025-04-24 PROCEDURE — 84439 ASSAY OF FREE THYROXINE: CPT

## 2025-04-24 RX ORDER — AMOXICILLIN 875 MG/1
875 TABLET, COATED ORAL 2 TIMES DAILY
Qty: 14 TABLET | Refills: 0 | Status: SHIPPED | OUTPATIENT
Start: 2025-04-24 | End: 2025-05-01

## 2025-04-24 NOTE — PROGRESS NOTES
FAMILY PRACTICE OFFICE VISIT       NAME: Aileen Us  AGE: 39 y.o. SEX: female       : 1985        MRN: [id number]    DATE: 2025  TIME: 11:57 AM    Assessment and Plan     Problem List Items Addressed This Visit       Acute pharyngitis - Primary    Pharyngitis.  Patient given prescription for amoxicillin 875 mg to take 1 twice daily for 7 days.  She may also add Tylenol as needed.  Patient will call if symptoms persist after medication completed         Relevant Medications    amoxicillin (AMOXIL) 875 mg tablet    Other Relevant Orders    TSH, 3rd generation    T4, free    hCG, quantitative    Vitamin D 25 hydroxy    Vitamin B12    Progesterone    Pregnancy    Pregnancy.  Patient will obtain blood work as ordered to verify pregnancy.         Relevant Orders    TSH, 3rd generation    T4, free    hCG, quantitative    Vitamin D 25 hydroxy    Vitamin B12    Progesterone           Chief Complaint     Chief Complaint   Patient presents with    Earache           Sore Throat    Possible Pregnancy     Miscarriage Last 2024       History of Present Illness     Patient in the office with approximately 1 week history of sore throat and anterior neck discomfort.  She denies any coughing or fevers but has had some diaphoresis at bedtime.  Patient recently discovered she had a positive urine pregnancy test this week.    Earache   Associated symptoms include a sore throat.   Sore Throat   Associated symptoms include ear pain.       Review of Systems   Review of Systems   Constitutional:  Positive for diaphoresis.   HENT:  Positive for ear pain and sore throat.    Respiratory: Negative.     Cardiovascular: Negative.    Gastrointestinal: Negative.        Active Problem List     Patient Active Problem List   Diagnosis    Herpes simplex infection    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 thyroiditis    Vitamin D deficiency    Anxiety disorder    History of elective     Other chest pain    Sinus  bradycardia    Chronic right-sided low back pain without sciatica    Pelvic pain    Right adnexal tenderness    Kidney stone on right side    Dysthymic disorder    Acute pharyngitis    Pregnancy       Past Medical History:  Past Medical History:   Diagnosis Date    Abnormal Pap smear of cervix     Anemia     Depression     Dermatitis      2 para 2     Hashimoto's disease 2017    Herpes     Hirsutism     last assessed: 14    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 thyroiditis     Mild cervical dysplasia     Pregnancy     last assessed: 3/28/17    Subclinical hypothyroidism     last assessed: 14    Urinary tract infection     Varicella     Vitamin D deficiency        Past Surgical History:  Past Surgical History:   Procedure Laterality Date    COLPOSCOPY VULVA W/ BIOPSY      10/1/07 - MALLORY-1 noted at 5:00 and 12:00 ECC was negative    COMBINED HYSTEROSCOPY DIAGNOSTIC / D&C  10/2014    polyp removal    DILATION AND CURETTAGE OF UTERUS      WISDOM TOOTH EXTRACTION         Family History:  Family History   Problem Relation Age of Onset    Stroke Mother     Depression Mother     Thyroid disease Mother     Hypertension Father     Depression Maternal Aunt     Diabetes Maternal Aunt     Heart attack Paternal Uncle     Diabetes Other        Social History:  Social History     Socioeconomic History    Marital status: /Civil Union     Spouse name: Not on file    Number of children: 2    Years of education: Not on file    Highest education level: Not on file   Occupational History    Not on file   Tobacco Use    Smoking status: Never    Smokeless tobacco: Never   Vaping Use    Vaping status: Never Used   Substance and Sexual Activity    Alcohol use: Yes     Alcohol/week: 2.0 standard drinks of alcohol     Types: 1 Glasses of wine, 1 Cans of beer per week     Comment: social    Drug use: No    Sexual activity: Yes     Partners: Male   Other Topics Concern    Not on file   Social History Narrative    Caffeine use     Jainism affiliation [employer]ism    Uses seatbelts     Social Drivers of Health     Financial Resource Strain: Not on file   Food Insecurity: Not on file   Transportation Needs: Not on file   Physical Activity: Not on file   Stress: Not on file   Social Connections: Not on file   Intimate Partner Violence: Not on file   Housing Stability: Not on file       Objective     Vitals:    04/24/25 0953   BP: 122/84   Pulse: 82   Resp: 18   Temp: 98.2 °F (36.8 °C)   SpO2: 98%     Wt Readings from Last 3 Encounters:   04/24/25 93.6 kg (206 lb 6.4 oz)   04/03/25 94.3 kg (208 lb)   10/23/24 100 kg (221 lb 3.2 oz)       Physical Exam  Constitutional:       General: She is not in acute distress.     Appearance: Normal appearance. She is not ill-appearing.   HENT:      Head: Normocephalic and atraumatic.      Right Ear: Tympanic membrane, ear canal and external ear normal. There is no impacted cerumen.      Left Ear: Tympanic membrane, ear canal and external ear normal. There is no impacted cerumen.      Mouth/Throat:      Mouth: Mucous membranes are moist.      Pharynx: No oropharyngeal exudate or posterior oropharyngeal erythema.   Eyes:      General:         Right eye: No discharge.         Left eye: No discharge.      Conjunctiva/sclera: Conjunctivae normal.      Pupils: Pupils are equal, round, and reactive to light.   Neck:      Vascular: No carotid bruit.      Comments: Patient with mildly tender bilateral anterior cervical adenopathy  Cardiovascular:      Rate and Rhythm: Normal rate and regular rhythm.      Heart sounds: Normal heart sounds. No murmur heard.  Pulmonary:      Effort: Pulmonary effort is normal.      Breath sounds: Normal breath sounds. No wheezing, rhonchi or rales.   Musculoskeletal:      Right lower leg: No edema.      Left lower leg: No edema.   Lymphadenopathy:      Cervical: Cervical adenopathy present.   Skin:     Findings: No rash.   Neurological:      General: No focal deficit present.       Mental Status: She is alert and oriented to person, place, and time.      Cranial Nerves: No cranial nerve deficit.   Psychiatric:         Mood and Affect: Mood normal.         Behavior: Behavior normal.         Thought Content: Thought content normal.         Judgment: Judgment normal.         Pertinent Laboratory/Diagnostic Studies:  Lab Results   Component Value Date    BUN 17 06/13/2024    CREATININE 0.83 06/13/2024    CALCIUM 9.7 06/13/2024     (L) 03/13/2017    K 3.8 06/13/2024    CO2 28 06/13/2024     06/13/2024     Lab Results   Component Value Date    ALT 17 06/13/2024    AST 22 06/13/2024    ALKPHOS 43 06/13/2024    BILITOT 0.7 03/13/2017       Lab Results   Component Value Date    WBC 6.84 06/13/2024    HGB 13.8 06/13/2024    HCT 41.3 06/13/2024    MCV 88 06/13/2024     06/13/2024       Lab Results   Component Value Date    TSH 1.98 06/06/2019       Lab Results   Component Value Date    CHOL 193 12/12/2014     Lab Results   Component Value Date    TRIG 28 11/22/2021     Lab Results   Component Value Date    HDL 63 11/22/2021     Lab Results   Component Value Date    LDLCALC 106 (H) 11/22/2021     Lab Results   Component Value Date    HGBA1C 5.3 11/22/2021       Results for orders placed or performed in visit on 01/30/25   T4, free   Result Value Ref Range    Free T4 0.87 0.61 - 1.12 ng/dL   TSH, 3rd generation   Result Value Ref Range    TSH 3RD GENERATON 1.206 0.450 - 4.500 uIU/mL   Vitamin B12   Result Value Ref Range    Vitamin B-12 315 180 - 914 pg/mL   Vitamin D 25 hydroxy   Result Value Ref Range    Vit D, 25-Hydroxy 25.2 (L) 30.0 - 100.0 ng/mL     *Note: Due to a large number of results and/or encounters for the requested time period, some results have not been displayed. A complete set of results can be found in Results Review.       Orders Placed This Encounter   Procedures    TSH, 3rd generation    T4, free    hCG, quantitative    Vitamin D 25 hydroxy    Vitamin B12     Progesterone       ALLERGIES:  No Known Allergies    Current Medications     Current Outpatient Medications   Medication Sig Dispense Refill    amoxicillin (AMOXIL) 875 mg tablet Take 1 tablet (875 mg total) by mouth 2 (two) times a day for 7 days 14 tablet 0    Cholecalciferol 100 MCG (4000 UT) CAPS Take by mouth      Prenatal Vit-Fe Fumarate-FA (PRENATAL VITAMINS PO) Take by mouth      Synthroid 125 MCG tablet TAKE 1 TABLET BY MOUTH DAILY on empty stomach 1 hour before breakfast 90 tablet 1    vitamin B-12 (VITAMIN B-12) 500 mcg tablet Take 500 mcg by mouth daily      Progesterone (Prometrium) 200 MG CAPS Insert 1 capsule VAGINALLY nightly (Patient not taking: Reported on 10/23/2024) 60 capsule 3     No current facility-administered medications for this visit.         Health Maintenance     Health Maintenance   Topic Date Due    Hepatitis C Screening  Never done    Influenza Vaccine (1) 09/01/2024    COVID-19 Vaccine (3 - 2024-25 season) 09/01/2024    Annual Physical  10/23/2025    Depression Screening  04/24/2026    Cervical Cancer Screening  08/03/2028    DTaP,Tdap,and Td Vaccines (4 - Td or Tdap) 07/08/2034    Zoster Vaccine (1 of 2) 12/19/2036    HIV Screening  Completed    Meningococcal B Vaccine  Aged Out    RSV Vaccine age 0-20 Months  Aged Out    Pneumococcal Vaccine: Pediatrics (0 to 5 Years) and At-Risk Patients (6 to 64 Years)  Aged Out    HIB Vaccine  Aged Out    IPV Vaccine  Aged Out    Hepatitis A Vaccine  Aged Out    Meningococcal ACWY Vaccine  Aged Out    HPV Vaccine  Aged Out     Immunization History   Administered Date(s) Administered    COVID-19 PFIZER VACCINE 0.3 ML IM 07/27/2021, 08/17/2021    INFLUENZA 11/10/2022    Influenza Quadrivalent Preservative Free 3 years and older IM 10/28/2015, 01/19/2017    Influenza, injectable, quadrivalent, preservative free 0.5 mL 11/06/2018, 11/19/2021, 11/10/2022    Influenza, recombinant, quadrivalent,injectable, preservative free 12/03/2019    Influenza,  seasonal, injectable 01/19/2017    Tdap 07/06/2015, 07/11/2017, 07/08/2024       Toney Rodriguez MD

## 2025-04-24 NOTE — ASSESSMENT & PLAN NOTE
Pharyngitis.  Patient given prescription for amoxicillin 875 mg to take 1 twice daily for 7 days.  She may also add Tylenol as needed.  Patient will call if symptoms persist after medication completed

## 2025-04-24 NOTE — TELEPHONE ENCOUNTER
FYI- Patient called stating she had a positive home pregnancy test and had HCG drawn today and results are in process. States she also had thyroid labs drawn.

## 2025-04-25 ENCOUNTER — PATIENT MESSAGE (OUTPATIENT)
Dept: ENDOCRINOLOGY | Facility: CLINIC | Age: 40
End: 2025-04-25

## 2025-04-25 ENCOUNTER — RESULTS FOLLOW-UP (OUTPATIENT)
Dept: FAMILY MEDICINE CLINIC | Facility: CLINIC | Age: 40
End: 2025-04-25

## 2025-04-25 ENCOUNTER — TELEPHONE (OUTPATIENT)
Age: 40
End: 2025-04-25

## 2025-04-25 RX ORDER — LEVOTHYROXINE SODIUM 125 MCG
TABLET ORAL
Qty: 125 TABLET | Refills: 1 | Status: SHIPPED | OUTPATIENT
Start: 2025-04-25

## 2025-04-25 NOTE — TELEPHONE ENCOUNTER
Patient calling regarding positive HCG results received by PCP. Has D&V scheduled 5/12. Patient looking for reassurance that this is okay and looking for guidance on what she should be doing. Advised first step would be D&V to ensure pregnancy dating and viability and from there we would place appropriate referrals to MFM. Patient verbalizes understanding.

## 2025-04-25 NOTE — TELEPHONE ENCOUNTER
Please inform pt because of pregnancy , to increase the dose to synthroid 125 mcg po daily from Mon to Friday , take 1.5 tablet on sat and 2 tablets on Sunday .  She should go for blood work in 6 weeks   Please order tSH and free t4

## 2025-04-25 NOTE — PATIENT COMMUNICATION
Received a call from the patient regarding her medication. She understood the change and had not further questions or concerns. Thanks!

## 2025-04-29 ENCOUNTER — RESULTS FOLLOW-UP (OUTPATIENT)
Age: 40
End: 2025-04-29

## 2025-05-12 ENCOUNTER — ULTRASOUND (OUTPATIENT)
Age: 40
End: 2025-05-12

## 2025-05-12 VITALS
BODY MASS INDEX: 27.77 KG/M2 | DIASTOLIC BLOOD PRESSURE: 62 MMHG | SYSTOLIC BLOOD PRESSURE: 108 MMHG | WEIGHT: 205 LBS | HEIGHT: 72 IN

## 2025-05-12 DIAGNOSIS — Z32.01 ENCOUNTER FOR PREGNANCY TEST, RESULT POSITIVE: Primary | ICD-10-CM

## 2025-05-12 NOTE — PROGRESS NOTES
Name: Aileen Us      : 1985      MRN: [id number]  Encounter Provider: Bea Hebert MD  Encounter Date: 2025   Encounter department: [facility] [place]  :  Assessment & Plan  Encounter for pregnancy test, result positive  Uncertain viability.   RTO in 2 weeks for repeat US.   - either early loss, or just early GA as she has long cycles.   - precautions reviewed.     Orders:    AMB US OB < 14 weeks single or first gestation level 1        History of Present Illness   Early pregnancy ultrasound.   LMP 3/15 - but cycles can be 45d, has never had regular cycles.   Welcome pregnancy.   Had labs end of April.   Was given progesterone supplementation, but stopped due to s/e.   No bleeding. Occasional cramping.   Some nausea without vomiting.  Rh positive.       Aileen Us is a 39 y.o. female who presents for early pregnancy US.   History obtained from: patient    Review of Systems       Objective   /62   Ht 6' (1.829 m)   Wt 93 kg (205 lb)   LMP 03/15/2025 (Exact Date)   BMI 27.80 kg/m²      Physical Exam  Vitals reviewed.   Constitutional:       Appearance: Normal appearance.   Genitourinary:     General: Normal vulva.      Vagina: No vaginal discharge.   Neurological:      Mental Status: She is alert and oriented to person, place, and time.   Psychiatric:         Mood and Affect: Mood normal.         Behavior: Behavior normal.

## 2025-05-16 ENCOUNTER — TELEPHONE (OUTPATIENT)
Age: 40
End: 2025-05-16

## 2025-05-16 NOTE — TELEPHONE ENCOUNTER
Patient called in with questions about exercise in pregnancy as she works out with a  on Wednesdays, and has an upcoming golf game. Reviewed that it is safe to stay active, to not overexert or push herself, stop if there is any symptoms of pain or discomfort, take rest breaks as needed, and focus on staying well hydrated. Patient verbalized understanding and is thankful.

## 2025-05-19 ENCOUNTER — NURSE TRIAGE (OUTPATIENT)
Age: 40
End: 2025-05-19

## 2025-05-19 NOTE — TELEPHONE ENCOUNTER
"FOLLOW UP: Appointment tomorrow at 2:45    REASON FOR CONVERSATION: Abdominal Pain Pregnant    SYMPTOMS: upper stomach pains, diarrhea, nausea, fatigue, green vaginal discharge    OTHER: Pt called in reporting LMP 3/15/25- 9w2d however she had US on 5/12/25 with uncertain viability. Pt states she started to feel unwell on Friday night which worsened Saturday with constant upper stomach pains, diarrhea, nausea, fatigue. Pt reports the stomach pain is relieved a little bit after having a bowel movement. She is not sure if this is related to a stomach bug or miscarriage. She only vomited x1. She is hydrating well. She denies any vaginal bleeding, fever, urinary symptoms. She does reports seeing a small amount of green vaginal discharge since Saturday, no odor or other vaginal symptoms. Pt given appointment for tomorrow and is advised to hydrate well and continue to monitor in the meantime. Pt aware to call back with any severe pain unrelieved with tylenol, vaginal bleeding, changes to discharge, vaginal symptoms or other concerns/questions.     DISPOSITION: See Today or Tomorrow in Office (overriding See Today in Office)      Reason for Disposition   Unusual vaginal discharge (e.g., odorous, yellow, green, or foamy-white)    Answer Assessment - Initial Assessment Questions  1. LOCATION: \"Where does it hurt?\"       Upper abdomen/below rib cage that goes below belly button  2. RADIATION: \"Does the pain shoot anywhere else?\" (e.g., chest, back, shoulder)      denies  3. ONSET: \"When did the pain begin?\" (e.g., minutes, hours or days ago)       Friday night  4. ONSET: \"Gradual or sudden onset?\"      gradual  5. PATTERN \"Does the pain come and go, or has it been constant since it started?\"       Constant but has some relief after a bowel movement  6. SEVERITY: \"How bad is the pain?\" \"What does it keep you from doing?\"  (e.g., Scale 1-10; mild, moderate, or severe)      Sharp   7. RECURRENT SYMPTOM: \"Have you ever had this " "type of stomach pain before?\" If Yes, ask: \"When was the last time?\" and \"What happened that time?\"       Stomach illness?  8. CAUSE: \"What do you think is causing the stomach pain?\"      Possible stomach bug? Miscarriage?  9. RELIEVING/AGGRAVATING FACTORS: \"What makes it better or worse?\" (e.g., antacids, bowel movement, movement)      Bath helped last night, bowel movements help  10. OTHER SYMPTOMS: \"Do you have any other symptoms?\" (e.g., back pain, diarrhea, fever, urination pain, vaginal bleeding, vaginal discharge, vomiting)        Diarrhea, nausea, vomited x1   11. JADEN: \"What date are you expecting to deliver?\"        LMP 3/15/25    Protocols used: Pregnancy - Abdominal Pain Less Than 20 Weeks EGA-Adult-[place]    "

## 2025-05-20 ENCOUNTER — OFFICE VISIT (OUTPATIENT)
Age: 40
End: 2025-05-20

## 2025-05-20 VITALS — WEIGHT: 204.4 LBS | DIASTOLIC BLOOD PRESSURE: 70 MMHG | BODY MASS INDEX: 27.72 KG/M2 | SYSTOLIC BLOOD PRESSURE: 102 MMHG

## 2025-05-20 DIAGNOSIS — O26.891 ABDOMINAL PAIN DURING PREGNANCY IN FIRST TRIMESTER: ICD-10-CM

## 2025-05-20 DIAGNOSIS — Z34.90 EARLY STAGE OF PREGNANCY: ICD-10-CM

## 2025-05-20 DIAGNOSIS — B37.31 YEAST VAGINITIS: ICD-10-CM

## 2025-05-20 DIAGNOSIS — Z11.3 SCREENING FOR STDS (SEXUALLY TRANSMITTED DISEASES): ICD-10-CM

## 2025-05-20 DIAGNOSIS — N89.8 VAGINAL DISCHARGE: Primary | ICD-10-CM

## 2025-05-20 DIAGNOSIS — R10.9 ABDOMINAL PAIN DURING PREGNANCY IN FIRST TRIMESTER: ICD-10-CM

## 2025-05-20 LAB
BV WHIFF TEST VAG QL: ABNORMAL
CLUE CELLS SPEC QL WET PREP: ABNORMAL
PH SMN: ABNORMAL [PH]
SL AMB POCT WET MOUNT: ABNORMAL
T VAGINALIS VAG QL WET PREP: ABNORMAL
YEAST VAG QL WET PREP: ABNORMAL

## 2025-05-20 PROCEDURE — 87491 CHLMYD TRACH DNA AMP PROBE: CPT | Performed by: OBSTETRICS & GYNECOLOGY

## 2025-05-20 PROCEDURE — 87591 N.GONORRHOEAE DNA AMP PROB: CPT | Performed by: OBSTETRICS & GYNECOLOGY

## 2025-05-20 RX ORDER — MICONAZOLE NITRATE 2 %
1 CREAM WITH APPLICATOR VAGINAL
Qty: 45 G | Refills: 0 | Status: SHIPPED | OUTPATIENT
Start: 2025-05-20

## 2025-05-20 NOTE — PATIENT INSTRUCTIONS
Yeast vaginitis:  for Monistat 3-7 days vaginally.  Ok to use antifungal shampoo external vulvar area.    For CBC, CMP and repeat beta hCG.  F/u with Dr. Eisenberg as scheduled.   Patient verbalized understanding of today's discussion with all questions and concerns addressed to her satisfaction.

## 2025-05-20 NOTE — PROGRESS NOTES
What we talked about today:    Patient Instructions   Yeast vaginitis:  for Monistat 3-7 days vaginally.  Ok to use antifungal shampoo external vulvar area.    For CBC, CMP and repeat beta hCG.  F/u with Dr. Eisenberg as scheduled.   Patient verbalized understanding of today's discussion with all questions and concerns addressed to her satisfaction.            Assessment/Plan:    1. Vaginal discharge  -     POCT wet mount  -     miconazole (MONISTAT-7) 2 % vaginal cream; Insert 1 applicator into the vagina daily at bedtime  -     Chlamydia/GC amplified DNA by PCR  2. Abdominal pain during pregnancy in first trimester  -     CBC and differential; Future; Expected date: Collect anytime  -     Comprehensive metabolic panel; Future; Expected date: Collect anytime  3. Early stage of pregnancy  -     hCG, quantitative; Future  4. Yeast vaginitis  -     miconazole (MONISTAT-7) 2 % vaginal cream; Insert 1 applicator into the vagina daily at bedtime  5. Screening for STDs (sexually transmitted diseases)  -     Chlamydia/GC amplified DNA by PCR          Subjective:      Patient ID: Aileen Us is a 39 y.o. female, presents today complaining of green d/c since before the u/s.    HPI:    Pt states had diarrhea starting on Saturday and had severe stomach pains.  Itching started the first week of June.  Denies sick contacts at home.  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  No more pooping since Monday morning.      Had recent abx 4/26 x 7days for ear infx.  Denies any F/C.  Does have a CANALES but thinks dehydrated.      Pt was convinced she was having a miscarriage but denies any bleeding.          The following portions of the patient's history were reviewed and updated as appropriate: allergies, current medications, past family history, past medical history, past social history, past surgical history, and problem list.      Review of Systems   Constitutional:  Negative for chills, fatigue and fever.   HENT:  Negative.     Eyes: Negative.    Respiratory: Negative.     Cardiovascular: Negative.    Gastrointestinal: Negative.  Negative for abdominal distention and abdominal pain.   Endocrine: Negative.    Genitourinary:  Positive for vaginal discharge.   Musculoskeletal: Negative.    Skin: Negative.    Allergic/Immunologic: Negative.    Neurological: Negative.    Hematological: Negative.    Psychiatric/Behavioral: Negative.     All other systems reviewed and are negative.            Objective:      /70   Wt 92.7 kg (204 lb 6.4 oz)   LMP 03/15/2025 (Exact Date)   BMI 27.72 kg/m²        Physical Exam  Vitals reviewed.   Constitutional:       General: She is not in acute distress.     Appearance: Normal appearance. She is not ill-appearing.   HENT:      Head: Normocephalic and atraumatic.     Eyes:      Extraocular Movements: Extraocular movements intact.     Pulmonary:      Effort: Pulmonary effort is normal.     Musculoskeletal:      Cervical back: Normal range of motion.     Skin:     General: Skin is warm and dry.     Neurological:      Mental Status: She is alert and oriented to person, place, and time.     Psychiatric:         Mood and Affect: Mood normal.         Behavior: Behavior normal.         Thought Content: Thought content normal.         Judgment: Judgment normal.           GYN exam: NEFG with whitish d/c.  No foul odor.  No bladder, CMT, uterine, or adnexal tenderness to palpation.  Urethra appears normal.     Wetprep was performed today.    positive hyphae, positive WBC's, and few clue cells.             DAT Oshea MD, FACOG

## 2025-05-21 ENCOUNTER — APPOINTMENT (OUTPATIENT)
Dept: LAB | Facility: CLINIC | Age: 40
End: 2025-05-21
Payer: COMMERCIAL

## 2025-05-21 DIAGNOSIS — Z34.90 EARLY STAGE OF PREGNANCY: ICD-10-CM

## 2025-05-21 DIAGNOSIS — O26.891 ABDOMINAL PAIN DURING PREGNANCY IN FIRST TRIMESTER: ICD-10-CM

## 2025-05-21 DIAGNOSIS — R10.9 ABDOMINAL PAIN DURING PREGNANCY IN FIRST TRIMESTER: ICD-10-CM

## 2025-05-21 LAB
ALBUMIN SERPL BCG-MCNC: 4.3 G/DL (ref 3.5–5)
ALP SERPL-CCNC: 47 U/L (ref 34–104)
ALT SERPL W P-5'-P-CCNC: 16 U/L (ref 7–52)
ANION GAP SERPL CALCULATED.3IONS-SCNC: 8 MMOL/L (ref 4–13)
AST SERPL W P-5'-P-CCNC: 18 U/L (ref 13–39)
B-HCG SERPL-ACNC: ABNORMAL MIU/ML (ref 0–5)
BASOPHILS # BLD AUTO: 0.03 THOUSANDS/ÂΜL (ref 0–0.1)
BASOPHILS NFR BLD AUTO: 1 % (ref 0–1)
BILIRUB SERPL-MCNC: 0.58 MG/DL (ref 0.2–1)
BUN SERPL-MCNC: 14 MG/DL (ref 5–25)
C TRACH DNA SPEC QL NAA+PROBE: NEGATIVE
CALCIUM SERPL-MCNC: 9.4 MG/DL (ref 8.4–10.2)
CHLORIDE SERPL-SCNC: 104 MMOL/L (ref 96–108)
CO2 SERPL-SCNC: 27 MMOL/L (ref 21–32)
CREAT SERPL-MCNC: 0.7 MG/DL (ref 0.6–1.3)
EOSINOPHIL # BLD AUTO: 0.03 THOUSAND/ÂΜL (ref 0–0.61)
EOSINOPHIL NFR BLD AUTO: 1 % (ref 0–6)
ERYTHROCYTE [DISTWIDTH] IN BLOOD BY AUTOMATED COUNT: 12.7 % (ref 11.6–15.1)
GFR SERPL CREATININE-BSD FRML MDRD: 110 ML/MIN/1.73SQ M
GLUCOSE P FAST SERPL-MCNC: 74 MG/DL (ref 65–99)
HCT VFR BLD AUTO: 36.9 % (ref 34.8–46.1)
HGB BLD-MCNC: 12.5 G/DL (ref 11.5–15.4)
IMM GRANULOCYTES # BLD AUTO: 0.02 THOUSAND/UL (ref 0–0.2)
IMM GRANULOCYTES NFR BLD AUTO: 0 % (ref 0–2)
LYMPHOCYTES # BLD AUTO: 1.91 THOUSANDS/ÂΜL (ref 0.6–4.47)
LYMPHOCYTES NFR BLD AUTO: 35 % (ref 14–44)
MCH RBC QN AUTO: 29.4 PG (ref 26.8–34.3)
MCHC RBC AUTO-ENTMCNC: 33.9 G/DL (ref 31.4–37.4)
MCV RBC AUTO: 87 FL (ref 82–98)
MONOCYTES # BLD AUTO: 0.29 THOUSAND/ÂΜL (ref 0.17–1.22)
MONOCYTES NFR BLD AUTO: 5 % (ref 4–12)
N GONORRHOEA DNA SPEC QL NAA+PROBE: NEGATIVE
NEUTROPHILS # BLD AUTO: 3.11 THOUSANDS/ÂΜL (ref 1.85–7.62)
NEUTS SEG NFR BLD AUTO: 58 % (ref 43–75)
NRBC BLD AUTO-RTO: 0 /100 WBCS
PLATELET # BLD AUTO: 261 THOUSANDS/UL (ref 149–390)
PMV BLD AUTO: 10.6 FL (ref 8.9–12.7)
POTASSIUM SERPL-SCNC: 3.9 MMOL/L (ref 3.5–5.3)
PROT SERPL-MCNC: 7.1 G/DL (ref 6.4–8.4)
RBC # BLD AUTO: 4.25 MILLION/UL (ref 3.81–5.12)
SODIUM SERPL-SCNC: 139 MMOL/L (ref 135–147)
WBC # BLD AUTO: 5.39 THOUSAND/UL (ref 4.31–10.16)

## 2025-05-21 PROCEDURE — 36415 COLL VENOUS BLD VENIPUNCTURE: CPT

## 2025-05-21 PROCEDURE — 84702 CHORIONIC GONADOTROPIN TEST: CPT

## 2025-05-21 PROCEDURE — 85025 COMPLETE CBC W/AUTO DIFF WBC: CPT

## 2025-05-21 PROCEDURE — 80053 COMPREHEN METABOLIC PANEL: CPT

## 2025-05-23 ENCOUNTER — NURSE TRIAGE (OUTPATIENT)
Age: 40
End: 2025-05-23

## 2025-05-23 NOTE — TELEPHONE ENCOUNTER
"FOLLOW UP: n/a    REASON FOR CONVERSATION: Pregnancy Problem    SYMPTOMS: pink tinge in monistat discharge    OTHER: Patient is early pregnancy, dating unknown. Has f/u u/s on 5/29. Patient states she has been using monistat to treat yeast diagnosed on 5/20. Today when using restroom noticed the slightest pink in discharge. Advised this is likely related to infection and insertion of medication.     Patient with questions on how to proceed if she does begin bleeding. Advised call to advise if bleeding is like a period.     Reviewed emergency room parameters--soaking >2 sanitary pads per hour for two hours, severe pain, lightheaded or dizziness. Encouraged patient can [facility] with any concerns or advice needed over weekend. Patient verbalized understanding and was thankful.     DISPOSITION: Home Care    Reason for Disposition   SPOTTING (single or brief episode)    Answer Assessment - Initial Assessment Questions  1. ONSET: \"When did this bleeding start?\"        This AM  2. BLEEDING SEVERITY: \"Describe the bleeding that you are having.\" \"How much bleeding is there?\"       Very light pink streak when monistat discharged from vaginal   3. ABDOMEN PAIN: \"Do you have any pain?\" \"How bad is the pain?\"  (e.g., Scale 0-10; none, mild, moderate, or severe)      no  4. PREGNANCY: \"Do you know how many weeks or months pregnant you are?\" \"When was the first day of your last normal menstrual period?\"      unsure  5. ULTRASOUND: \"Have you had an ultrasound during this pregnancy?\"  Note: To confirm intrauterine pregnancy, placenta location.      U/s--5/12, unsure what was visualized. Per patient \"fetus measuring small\"  6. HEMODYNAMIC STATUS: \"Are you weak or feeling lightheaded?\" If Yes, ask: \"Can you stand and walk normally?\"       no  7. OTHER SYMPTOMS: \"What other symptoms are you having with the bleeding?\" (e.g., passed tissue, vaginal discharge, fever, menstrual-type cramps)      no    Protocols used: Pregnancy - " Vaginal Bleeding Less Than 20 Weeks EGA-Adult-[place]

## 2025-05-24 PROBLEM — J02.9 ACUTE PHARYNGITIS: Status: RESOLVED | Noted: 2025-04-24 | Resolved: 2025-05-24

## 2025-05-27 ENCOUNTER — RESULTS FOLLOW-UP (OUTPATIENT)
Age: 40
End: 2025-05-27

## 2025-05-29 ENCOUNTER — ULTRASOUND (OUTPATIENT)
Age: 40
End: 2025-05-29

## 2025-05-29 ENCOUNTER — TELEPHONE (OUTPATIENT)
Age: 40
End: 2025-05-29

## 2025-05-29 VITALS
DIASTOLIC BLOOD PRESSURE: 68 MMHG | SYSTOLIC BLOOD PRESSURE: 104 MMHG | BODY MASS INDEX: 27.87 KG/M2 | WEIGHT: 205.8 LBS | HEIGHT: 72 IN

## 2025-05-29 DIAGNOSIS — Z32.01 ENCOUNTER FOR PREGNANCY TEST, RESULT POSITIVE: Primary | ICD-10-CM

## 2025-05-29 DIAGNOSIS — O03.9 MISCARRIAGE: ICD-10-CM

## 2025-05-29 RX ORDER — DOXYCYCLINE 100 MG/1
200 CAPSULE ORAL ONCE
Status: CANCELLED | OUTPATIENT
Start: 2025-05-30 | End: 2025-05-29

## 2025-05-29 NOTE — PROGRESS NOTES
Name: Aileen Us      : 1985      MRN: [id number]  Encounter Provider: Bea Hebert MD  Encounter Date: 2025   Encounter department: [facility] [place]  :  Assessment & Plan  Encounter for pregnancy test, result positive    Orders:    AMB US OB < 14 weeks single or first gestation level 1    Miscarriage    Reviewed options for management.   Expectant, Medical, Surgical discussed.   She opts for surgical management.   Disposition options reviewed, opts for hospital disposition, consent signed.   Pre-op abx orders placed.     Orders:    JANICE US OB < 14 weeks single or first gestation level 1        History of Present Illness   HPI  Aileen Us is a 39 y.o. female who presents for follow up ultrasound.   Completed meds for yeast infection, only mild external itching remains.   No bleeding since last visit.     History obtained from: patient    Review of Systems  Past Medical History   Past Medical History[1]  Past Surgical History[2]  Family History[3]   reports that she has never smoked. She has never used smokeless tobacco. She reports current alcohol use of about 2.0 standard drinks of alcohol per week. She reports that she does not use drugs.  Current Outpatient Medications   Medication Instructions    Cholecalciferol 100 MCG (4000 UT) CAPS Take by mouth    miconazole (MONISTAT-7) 2 % vaginal cream 1 applicator, Vaginal, Daily at bedtime    Prenatal Vit-Fe Fumarate-FA (PRENATAL VITAMINS PO) Take by mouth    Synthroid 125 MCG tablet TAKE 1 TABLET BY MOUTH DAILY on empty stomach 1 hour before breakfast from Mon-Friday, take 1.5 tab on sat and 2 tabs on     vitamin B-12 (VITAMIN B-12) 500 mcg, Daily   Allergies[4]      Objective   /68   Ht 6' (1.829 m)   Wt 93.4 kg (205 lb 12.8 oz)   LMP 03/15/2025 (Exact Date)   BMI 27.91 kg/m²      Physical Exam  Vitals reviewed.   Constitutional:       Appearance: Normal appearance.   HENT:      Head:  Normocephalic and atraumatic.   Pulmonary:      Effort: No respiratory distress.   Abdominal:      Palpations: Abdomen is soft.      Tenderness: There is no abdominal tenderness.   Genitourinary:     General: Normal vulva.      Vagina: No vaginal discharge.     Musculoskeletal:      Right lower leg: No edema.      Left lower leg: No edema.     Skin:     General: Skin is warm and dry.      Capillary Refill: Capillary refill takes less than 2 seconds.     Neurological:      Mental Status: She is alert and oriented to person, place, and time.                  [1]   Past Medical History:  Diagnosis Date    Abnormal Pap smear of cervix     Anemia     Depression     Dermatitis      2 para 2     Hashimoto's disease 2017    Herpes     Hirsutism     last assessed: 14    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 thyroiditis     Mild cervical dysplasia     Pregnancy     last assessed: 3/28/17    Subclinical hypothyroidism     last assessed: 14    Urinary tract infection     Varicella     Vitamin D deficiency    [2]   Past Surgical History:  Procedure Laterality Date    COLPOSCOPY VULVA W/ BIOPSY      10/1/07 - MALLORY-1 noted at 5:00 and 12:00 ECC was negative    COMBINED HYSTEROSCOPY DIAGNOSTIC / D&C  10/2014    polyp removal    DILATION AND CURETTAGE OF UTERUS      WISDOM TOOTH EXTRACTION     [3]   Family History  Problem Relation Name Age of Onset    Stroke Mother Elle     Depression Mother Elle     Thyroid disease Mother Elle     Hypertension Father      Depression Maternal Aunt      Diabetes Maternal Aunt      Heart attack Paternal Uncle      Diabetes Other Family History    [4] No Known Allergies

## 2025-05-29 NOTE — TELEPHONE ENCOUNTER
Aileen received message changing her OR time to7:30. She cannot make that work.      Contacted Damaris at  OR who will provide further assistance.     Dr. Oshea made aware of possible change in start time via esc.

## 2025-05-29 NOTE — TELEPHONE ENCOUNTER
Pt is scheduled for 5/30/25 @ [facility] with Dr. Oshea.    Waiting for pt to call back to confirm and go over all her instructions and schedule her post op appt.

## 2025-05-29 NOTE — TELEPHONE ENCOUNTER
MD Carri Kern  [facility] OB GYN Department  Surgery Scheduling Sheet    Patient Name: Aileen Us  : 1985    Provider: Bea Hebert MD     Needed: no; Language: N/A    Procedure: exam under anesthesia and dilation and evacuation    Diagnosis: missed     Special Needs or Equipment: none    Anesthesia: IV sedation with anesthesia    Length of stay: outpatient  Does patient have comorbid conditions that will require close perioperative monitoring prior to safe discharge: no    The patient has comorbid conditions that will require close perioperative monitoring prior to safe discharge, including N/A.  This may require acute care beyond the usual and routine recovery period. As such, inpatient admission post-operatively is expected and appropriate, and anticipated hospital length of stay will be >2 midnights.    Pre-Admission Testing Needed: no  Labs that should be ordered: cbc and type and screen    Order PAT that is recommended in prep for procedure?: Yes    Medical Clearance Needed: no; Provider: N/A    MA Form Signed (tubals/hysterectomy): Not Indicated    Surgical Drink Given: no    How many days out of work: 1 day(s)    How many days no drivin day(s)      Is pre op appt needed?  no  Interval for post op appt: 2 week(s)      For Surgical Scheduler:    Surgery Scheduled On:  [place]: [facility]    Pre-op Appt:  Post op Appt:  Consult/Medical clearance appt:

## 2025-05-30 ENCOUNTER — TRANSCRIBE ORDERS (OUTPATIENT)
Dept: LAB | Facility: HOSPITAL | Age: 40
End: 2025-05-30

## 2025-05-30 ENCOUNTER — ANESTHESIA EVENT (OUTPATIENT)
Dept: PERIOP | Facility: HOSPITAL | Age: 40
End: 2025-05-30
Payer: COMMERCIAL

## 2025-05-30 ENCOUNTER — HOSPITAL ENCOUNTER (OUTPATIENT)
Facility: HOSPITAL | Age: 40
Setting detail: OUTPATIENT SURGERY
Discharge: HOME | End: 2025-05-30
Attending: OBSTETRICS & GYNECOLOGY | Admitting: OBSTETRICS & GYNECOLOGY
Payer: COMMERCIAL

## 2025-05-30 ENCOUNTER — ANESTHESIA (OUTPATIENT)
Dept: PERIOP | Facility: HOSPITAL | Age: 40
End: 2025-05-30
Payer: COMMERCIAL

## 2025-05-30 VITALS
TEMPERATURE: 97.1 F | HEIGHT: 72 IN | WEIGHT: 204 LBS | RESPIRATION RATE: 20 BRPM | HEART RATE: 56 BPM | BODY MASS INDEX: 27.63 KG/M2 | OXYGEN SATURATION: 98 % | DIASTOLIC BLOOD PRESSURE: 77 MMHG | SYSTOLIC BLOOD PRESSURE: 112 MMHG

## 2025-05-30 DIAGNOSIS — Z98.890 POSTOPERATIVE STATE: Primary | ICD-10-CM

## 2025-05-30 DIAGNOSIS — O02.1 MISSED AB: ICD-10-CM

## 2025-05-30 DIAGNOSIS — O02.1 MISSED AB: Primary | ICD-10-CM

## 2025-05-30 DIAGNOSIS — O02.1 MISSED ABORTION: ICD-10-CM

## 2025-05-30 LAB
ABO GROUP BLD: NORMAL
BASOPHILS # BLD AUTO: 0.02 THOUSANDS/ÂΜL (ref 0–0.1)
BASOPHILS NFR BLD AUTO: 0 % (ref 0–1)
BLD GP AB SCN SERPL QL: NEGATIVE
EOSINOPHIL # BLD AUTO: 0.07 THOUSAND/ÂΜL (ref 0–0.61)
EOSINOPHIL NFR BLD AUTO: 1 % (ref 0–6)
ERYTHROCYTE [DISTWIDTH] IN BLOOD BY AUTOMATED COUNT: 12.7 % (ref 11.6–15.1)
HCT VFR BLD AUTO: 36.7 % (ref 34.8–46.1)
HGB BLD-MCNC: 12.4 G/DL (ref 11.5–15.4)
IMM GRANULOCYTES # BLD AUTO: 0.03 THOUSAND/UL (ref 0–0.2)
IMM GRANULOCYTES NFR BLD AUTO: 1 % (ref 0–2)
LYMPHOCYTES # BLD AUTO: 2.22 THOUSANDS/ÂΜL (ref 0.6–4.47)
LYMPHOCYTES NFR BLD AUTO: 38 % (ref 14–44)
MCH RBC QN AUTO: 29.2 PG (ref 26.8–34.3)
MCHC RBC AUTO-ENTMCNC: 33.8 G/DL (ref 31.4–37.4)
MCV RBC AUTO: 86 FL (ref 82–98)
MONOCYTES # BLD AUTO: 0.37 THOUSAND/ÂΜL (ref 0.17–1.22)
MONOCYTES NFR BLD AUTO: 6 % (ref 4–12)
NEUTROPHILS # BLD AUTO: 3.19 THOUSANDS/ÂΜL (ref 1.85–7.62)
NEUTS SEG NFR BLD AUTO: 54 % (ref 43–75)
NRBC BLD AUTO-RTO: 0 /100 WBCS
PLATELET # BLD AUTO: 231 THOUSANDS/UL (ref 149–390)
PMV BLD AUTO: 9.7 FL (ref 8.9–12.7)
RBC # BLD AUTO: 4.25 MILLION/UL (ref 3.81–5.12)
RH BLD: POSITIVE
SPECIMEN EXPIRATION DATE: NORMAL
WBC # BLD AUTO: 5.9 THOUSAND/UL (ref 4.31–10.16)

## 2025-05-30 PROCEDURE — 86900 BLOOD TYPING SEROLOGIC ABO: CPT | Performed by: OBSTETRICS & GYNECOLOGY

## 2025-05-30 PROCEDURE — 59820 CARE OF MISCARRIAGE: CPT | Performed by: OBSTETRICS & GYNECOLOGY

## 2025-05-30 PROCEDURE — 85025 COMPLETE CBC W/AUTO DIFF WBC: CPT | Performed by: OBSTETRICS & GYNECOLOGY

## 2025-05-30 PROCEDURE — NC001 PR NO CHARGE: Performed by: OBSTETRICS & GYNECOLOGY

## 2025-05-30 PROCEDURE — 76998 US GUIDE INTRAOP: CPT | Performed by: OBSTETRICS & GYNECOLOGY

## 2025-05-30 PROCEDURE — 81229 CYTOG ALYS CHRML ABNR SNPCGH: CPT

## 2025-05-30 PROCEDURE — 86850 RBC ANTIBODY SCREEN: CPT | Performed by: OBSTETRICS & GYNECOLOGY

## 2025-05-30 PROCEDURE — 88305 TISSUE EXAM BY PATHOLOGIST: CPT | Performed by: PATHOLOGY

## 2025-05-30 PROCEDURE — 86901 BLOOD TYPING SEROLOGIC RH(D): CPT | Performed by: OBSTETRICS & GYNECOLOGY

## 2025-05-30 RX ORDER — PROPOFOL 10 MG/ML
INJECTION, EMULSION INTRAVENOUS AS NEEDED
Status: DISCONTINUED | OUTPATIENT
Start: 2025-05-30 | End: 2025-05-30

## 2025-05-30 RX ORDER — SODIUM CHLORIDE, SODIUM LACTATE, POTASSIUM CHLORIDE, CALCIUM CHLORIDE 600; 310; 30; 20 MG/100ML; MG/100ML; MG/100ML; MG/100ML
20 INJECTION, SOLUTION INTRAVENOUS CONTINUOUS
Status: DISCONTINUED | OUTPATIENT
Start: 2025-05-30 | End: 2025-05-30 | Stop reason: HOSPADM

## 2025-05-30 RX ORDER — HYDROMORPHONE HCL/PF 1 MG/ML
0.5 SYRINGE (ML) INJECTION
Status: DISCONTINUED | OUTPATIENT
Start: 2025-05-30 | End: 2025-05-30 | Stop reason: HOSPADM

## 2025-05-30 RX ORDER — METOCLOPRAMIDE HYDROCHLORIDE 5 MG/ML
10 INJECTION INTRAMUSCULAR; INTRAVENOUS ONCE AS NEEDED
Status: DISCONTINUED | OUTPATIENT
Start: 2025-05-30 | End: 2025-05-30 | Stop reason: HOSPADM

## 2025-05-30 RX ORDER — LIDOCAINE HYDROCHLORIDE 10 MG/ML
INJECTION, SOLUTION EPIDURAL; INFILTRATION; INTRACAUDAL; PERINEURAL AS NEEDED
Status: DISCONTINUED | OUTPATIENT
Start: 2025-05-30 | End: 2025-05-30

## 2025-05-30 RX ORDER — ONDANSETRON 4 MG/1
4 TABLET, ORALLY DISINTEGRATING ORAL EVERY 6 HOURS PRN
Qty: 20 TABLET | Refills: 0 | Status: SHIPPED | OUTPATIENT
Start: 2025-05-30

## 2025-05-30 RX ORDER — FENTANYL CITRATE/PF 50 MCG/ML
50 SYRINGE (ML) INJECTION
Status: DISCONTINUED | OUTPATIENT
Start: 2025-05-30 | End: 2025-05-30 | Stop reason: HOSPADM

## 2025-05-30 RX ORDER — CEFAZOLIN SODIUM 2 G/50ML
2000 SOLUTION INTRAVENOUS ONCE
Status: COMPLETED | OUTPATIENT
Start: 2025-05-30 | End: 2025-05-30

## 2025-05-30 RX ORDER — ONDANSETRON 2 MG/ML
INJECTION INTRAMUSCULAR; INTRAVENOUS AS NEEDED
Status: DISCONTINUED | OUTPATIENT
Start: 2025-05-30 | End: 2025-05-30

## 2025-05-30 RX ORDER — ACETAMINOPHEN AND CODEINE PHOSPHATE 300; 30 MG/1; MG/1
1 TABLET ORAL EVERY 4 HOURS PRN
Qty: 5 TABLET | Refills: 0 | Status: SHIPPED | OUTPATIENT
Start: 2025-05-30 | End: 2025-06-09

## 2025-05-30 RX ORDER — IBUPROFEN 600 MG/1
600 TABLET, FILM COATED ORAL EVERY 6 HOURS PRN
Qty: 30 TABLET | Refills: 0 | Status: SHIPPED | OUTPATIENT
Start: 2025-05-30

## 2025-05-30 RX ORDER — ONDANSETRON 2 MG/ML
4 INJECTION INTRAMUSCULAR; INTRAVENOUS ONCE AS NEEDED
Status: DISCONTINUED | OUTPATIENT
Start: 2025-05-30 | End: 2025-05-30 | Stop reason: HOSPADM

## 2025-05-30 RX ORDER — DIPHENHYDRAMINE HYDROCHLORIDE 50 MG/ML
12.5 INJECTION, SOLUTION INTRAMUSCULAR; INTRAVENOUS ONCE AS NEEDED
Status: DISCONTINUED | OUTPATIENT
Start: 2025-05-30 | End: 2025-05-30 | Stop reason: HOSPADM

## 2025-05-30 RX ORDER — MIDAZOLAM HYDROCHLORIDE 2 MG/2ML
INJECTION, SOLUTION INTRAMUSCULAR; INTRAVENOUS AS NEEDED
Status: DISCONTINUED | OUTPATIENT
Start: 2025-05-30 | End: 2025-05-30

## 2025-05-30 RX ORDER — MEPERIDINE HYDROCHLORIDE 25 MG/ML
12.5 INJECTION INTRAMUSCULAR; INTRAVENOUS; SUBCUTANEOUS
Status: DISCONTINUED | OUTPATIENT
Start: 2025-05-30 | End: 2025-05-30 | Stop reason: HOSPADM

## 2025-05-30 RX ORDER — DEXAMETHASONE SODIUM PHOSPHATE 10 MG/ML
INJECTION, SOLUTION INTRAMUSCULAR; INTRAVENOUS AS NEEDED
Status: DISCONTINUED | OUTPATIENT
Start: 2025-05-30 | End: 2025-05-30

## 2025-05-30 RX ORDER — EPHEDRINE SULFATE 50 MG/ML
INJECTION INTRAVENOUS AS NEEDED
Status: DISCONTINUED | OUTPATIENT
Start: 2025-05-30 | End: 2025-05-30

## 2025-05-30 RX ORDER — FENTANYL CITRATE 50 UG/ML
INJECTION, SOLUTION INTRAMUSCULAR; INTRAVENOUS AS NEEDED
Status: DISCONTINUED | OUTPATIENT
Start: 2025-05-30 | End: 2025-05-30

## 2025-05-30 RX ORDER — BUPIVACAINE HYDROCHLORIDE 2.5 MG/ML
INJECTION, SOLUTION EPIDURAL; INFILTRATION; INTRACAUDAL; PERINEURAL AS NEEDED
Status: DISCONTINUED | OUTPATIENT
Start: 2025-05-30 | End: 2025-05-30 | Stop reason: HOSPADM

## 2025-05-30 RX ORDER — KETOROLAC TROMETHAMINE 30 MG/ML
INJECTION, SOLUTION INTRAMUSCULAR; INTRAVENOUS AS NEEDED
Status: DISCONTINUED | OUTPATIENT
Start: 2025-05-30 | End: 2025-05-30

## 2025-05-30 RX ORDER — DOXYCYCLINE 100 MG/1
200 CAPSULE ORAL ONCE
Status: DISCONTINUED | OUTPATIENT
Start: 2025-05-30 | End: 2025-05-30

## 2025-05-30 RX ADMIN — PROPOFOL 200 MG: 10 INJECTION, EMULSION INTRAVENOUS at 10:48

## 2025-05-30 RX ADMIN — SODIUM CHLORIDE, SODIUM LACTATE, POTASSIUM CHLORIDE, AND CALCIUM CHLORIDE: .6; .31; .03; .02 INJECTION, SOLUTION INTRAVENOUS at 11:41

## 2025-05-30 RX ADMIN — DEXAMETHASONE SODIUM PHOSPHATE 10 MG: 10 INJECTION, SOLUTION INTRAMUSCULAR; INTRAVENOUS at 10:48

## 2025-05-30 RX ADMIN — KETOROLAC TROMETHAMINE 30 MG: 30 INJECTION, SOLUTION INTRAMUSCULAR; INTRAVENOUS at 11:35

## 2025-05-30 RX ADMIN — EPHEDRINE SULFATE 10 MG: 50 INJECTION INTRAVENOUS at 10:54

## 2025-05-30 RX ADMIN — CEFAZOLIN SODIUM 2000 MG: 2 SOLUTION INTRAVENOUS at 10:38

## 2025-05-30 RX ADMIN — ONDANSETRON 4 MG: 2 INJECTION INTRAMUSCULAR; INTRAVENOUS at 10:48

## 2025-05-30 RX ADMIN — EPHEDRINE SULFATE 10 MG: 50 INJECTION INTRAVENOUS at 11:04

## 2025-05-30 RX ADMIN — SODIUM CHLORIDE, SODIUM LACTATE, POTASSIUM CHLORIDE, AND CALCIUM CHLORIDE: .6; .31; .03; .02 INJECTION, SOLUTION INTRAVENOUS at 09:43

## 2025-05-30 RX ADMIN — FENTANYL CITRATE 100 MCG: 50 INJECTION, SOLUTION INTRAMUSCULAR; INTRAVENOUS at 10:43

## 2025-05-30 RX ADMIN — MIDAZOLAM 2 MG: 1 INJECTION INTRAMUSCULAR; INTRAVENOUS at 10:38

## 2025-05-30 RX ADMIN — LIDOCAINE HYDROCHLORIDE 100 MG: 10 INJECTION, SOLUTION EPIDURAL; INFILTRATION; INTRACAUDAL at 10:48

## 2025-05-30 NOTE — ANESTHESIA POSTPROCEDURE EVALUATION
Post-Op Assessment Note    CV Status:  Stable  Pain Score: 0    Pain management: adequate       Mental Status:  Awake and anxious   Hydration Status:  Euvolemic and stable   PONV Controlled:  Controlled   Airway Patency:  Patent     Post Op Vitals Reviewed: Yes    No anethesia notable event occurred.    Staff: CRNA           Last Filed PACU Vitals:  Vitals Value Taken Time   Temp     Pulse 71 05/30/25 11:54   /70 05/30/25 11:54   Resp 19 05/30/25 11:54   SpO2 99 % 05/30/25 11:54

## 2025-05-30 NOTE — ANESTHESIA PREPROCEDURE EVALUATION
Procedure:  DILATATION AND EVACUATION (D&E) 6 WEEKS, EXAM UNDER ANESTHESIA AND ALL OTHER INDICATED PROCEDURES (Uterus)    Relevant Problems   CARDIO   (+) Other chest pain   (+) Sinus bradycardia      ENDO   (+) 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 thyroiditis      /RENAL   (+) Kidney stone on right side      GYN   (+) Pregnancy      MUSCULOSKELETAL   (+) Chronic right-sided low back pain without sciatica      NEURO/PSYCH   (+) Anxiety disorder   (+) Chronic right-sided low back pain without sciatica   (+) Dysthymic disorder        Physical Exam    Airway     Mallampati score: II    Neck ROM: full      Cardiovascular      Dental       Pulmonary      Neurological      Other Findings  post-pubertal.      Anesthesia Plan  ASA Score- 2     Anesthesia Type- IV sedation with anesthesia with ASA Monitors.         Additional Monitors:     Airway Plan:     Comment: I, Dr. Cummings, the attending physician, have personally seen and evaluated the patient prior to anesthetic care.  I have reviewed the pre-anesthetic record, and other medical records if appropriate to the anesthetic care.  If a CRNA is involved in the case, I have reviewed the CRNA assessment, if present, and agree.  The patient is in a suitable condition to proceed with my formulated anesthetic plan.  .       Plan Factors-    Chart reviewed.                      Induction- intravenous.    Postoperative Plan-         Informed Consent- Anesthetic plan and risks discussed with patient.  I personally reviewed this patient with the CRNA. Discussed and agreed on the Anesthesia Plan with the CRNA..      NPO Status:  Vitals Value Taken Time   Date of last liquid 05/30/25 05/30/25 07:45   Time of last liquid 0130 05/30/25 07:45   Date of last solid 05/29/25 05/30/25 07:45   Time of last solid 1930 05/30/25 07:45

## 2025-05-30 NOTE — DISCHARGE INSTR - AVS FIRST PAGE
Call for any fevers >101F, or chills/nausea/vomiting >24hrs, pain not well controlled with pain meds, or vaginal bleeding >2pads/hr.      Call an ambulance or have someone drive you to the nearest ER with severe pain, very heavy bleeding, or passing out.      NOTHING per vagina until all bleeding stops.     NO soaking/tub baths until all bleeding stops.

## 2025-06-03 PROCEDURE — 88305 TISSUE EXAM BY PATHOLOGIST: CPT | Performed by: PATHOLOGY

## 2025-06-03 NOTE — ANESTHESIA POSTPROCEDURE EVALUATION
Post-Op Assessment Note    CV Status:  Stable    Pain management: adequate       Mental Status:  Alert and awake   Hydration Status:  Euvolemic   PONV Controlled:  Controlled   Airway Patency:  Patent     Post Op Vitals Reviewed: Yes    No anethesia notable event occurred.    Staff: Anesthesiologist           Last Filed PACU Vitals:  Vitals Value Taken Time   Temp     Pulse 54 05/30/25 12:22   /65 05/30/25 12:20   Resp 17 05/30/25 12:22   SpO2 100 % 05/30/25 12:22   Vitals shown include unfiled device data.    Modified Trena:     Vitals Value Taken Time   Activity 2 05/30/25 12:20   Respiration 2 05/30/25 12:20   Circulation 2 05/30/25 12:20   Consciousness 1 05/30/25 12:20   Oxygen Saturation 2 05/30/25 12:20     Modified Trena Score: 9

## 2025-06-04 ENCOUNTER — TELEPHONE (OUTPATIENT)
Age: 40
End: 2025-06-04

## 2025-06-04 DIAGNOSIS — E06.3 HYPOTHYROIDISM DUE TO HASHIMOTO'S THYROIDITIS: ICD-10-CM

## 2025-06-04 NOTE — OP NOTE
OPERATIVE REPORT  PATIENT NAME: Aileen Us    :  1985  MRN: [id number]  Pt Location: [facility] OR ROOM 04    SURGERY DATE: 2025    Surgeons and Role:     * DAT Oshea MD - Primary    Preop Diagnosis:  Missed  [O02.1]    Post-Op Diagnosis Codes:     * Missed  [O02.1]    Procedure(s):  DILATATION AND EVACUATION (D&E) 6 WEEKS. EXAM UNDER ANESTHESIA AND ALL OTHER INDICATED PROCEDURES    Specimen(s):  ID Type Source Tests Collected by Time Destination   1 : In [facility]'s BSS solution, to be sent to [facility] Tissue Products of Conception TISSUE EXAM DAT Oshea MD 2025 1058    2 :  Tissue Products of Conception TISSUE EXAM DAT Oshea MD 2025 1101    3 : POC, to be sent to lab [facility], in [facility] BSS Tissue Products of Conception CHROMOSOME ANALYSIS, POC, W/RFX TO SNP MICROARRAY, CHROMOSOME, BIOPSIES, POC/SKIN DAT Oshea MD 2025 1058        Estimated Blood Loss:   Minimal    Drains:  * No LDAs found *    Anesthesia Type:   IV Sedation with Anesthesia    Operative Indications:  Missed  [O02.1]  ***    Operative Findings:  ***       Complications:   {Post Op Complications:48428}    Procedure and Technique:  ***   {Op note attestation:96989}    Patient Disposition:  {op note disposition:04134}         SIGNATURE: FABIO Oshea MD  DATE: 2025  TIME: 4:11 PM                 adequate, pt was placed in dorsolithotomy position.  An abdominal ultrasound was then performed noting pregnancy consistent intrauterine fetal demise at 6 weeks, 10 weeks by LMP, consistent with missed .  Absent fetal cardiac activity.  She was then prepped and draped in normal sterile fashion and a second timeout procedure was then performed with all team members present in agreement.     A bivalve speculum was then placed into the vagina and the anterior lip of the cervix was then grasped with Allis clamps.  The entirety of the procedure then transpired under ultrasound guidance.  The speculum was then removed and replaced with a weighted speculum to her vagina posteriorly.  She was then dilated to 24 Welsh and a polyps grasper was then directed at the gestational sac.  This tissue was submitted for genetic analysis, per patient request.  A # 8 curved suction curette was then advanced toward the fundus and suction was then applied while gently rotating the suction curette in a 360 degree fashion, noting products of conception within the suction tubing.  This was repeated until minimal products of conception was noted.  Next, a # 1 sharp curette was then used to curettage the endometrium.  This was repeated until all four quadrants were noted to be gritty.  The endometrium was noted to be well curettaged and no further flow noted on ultrasound.   Bleeding per the cervical os was noted to be minimal.       There were no bowel, bladder, or ureteral injuries noted.  A total of 10 cc of quarter percent Marcaine was then used as a paracervical block injected at the 2, 4, 8, 10:00 positions.  Minimal bleeding noted.  All instruments were then removed from vagina.  The patient was then taken out of dorsolithotomy position.       All instrument, sponge, lap and needle counts were correct x3.  Patient tolerated the procedure without any complications.  Patient did receive 2 g Ancef antibiotics prior to onset of  case.  Patient was then transferred to PACU for further recovery from anesthesia.             Patient Disposition:  PACU              R Suzanne Oshea MD, FACOG

## 2025-06-04 NOTE — TELEPHONE ENCOUNTER
Patient states she had a miscarriage last week. Asking if she should have labs to check thyroid levels or adjust medication. Please advise, thank you.

## 2025-06-05 NOTE — TELEPHONE ENCOUNTER
Please inform pt to go back to her usual dose of 125 mcg , synthroid , daily 7 days a week   She will need TSH and free t4 in 6 weeks, please order     Dr. Melani Mosher

## 2025-06-06 RX ORDER — LEVOTHYROXINE SODIUM 125 MCG
TABLET ORAL
Qty: 90 TABLET | Refills: 1 | Status: SHIPPED | OUTPATIENT
Start: 2025-06-06

## 2025-06-06 NOTE — ADDENDUM NOTE
Addended by: DESTINY MATHIS on: 6/6/2025 10:12 AM     Modules accepted: Orders     Partial Purse String (Intermediate) Text: Given the location of the defect and the characteristics of the surrounding skin an intermediate purse string closure was deemed most appropriate.  Undermining was performed circumfirentially around the surgical defect.  A purse string suture was then placed and tightened. Wound tension only allowed a partial closure of the circular defect.

## 2025-06-12 ENCOUNTER — OFFICE VISIT (OUTPATIENT)
Age: 40
End: 2025-06-12

## 2025-06-12 VITALS — SYSTOLIC BLOOD PRESSURE: 96 MMHG | BODY MASS INDEX: 27.75 KG/M2 | WEIGHT: 204.6 LBS | DIASTOLIC BLOOD PRESSURE: 72 MMHG

## 2025-06-12 DIAGNOSIS — Z31.41 ENCOUNTER FOR FERTILITY TESTING: ICD-10-CM

## 2025-06-12 DIAGNOSIS — Z09 POSTOP CHECK: Primary | ICD-10-CM

## 2025-06-12 PROCEDURE — 99024 POSTOP FOLLOW-UP VISIT: CPT | Performed by: OBSTETRICS & GYNECOLOGY

## 2025-06-12 NOTE — PROGRESS NOTES
What we talked about today:    Patient Instructions   Still awaiting genetics from POCs.   Continue taking prenatal vitamins, add 800mcg Folic acid.   Check beta hCG and Prog as soon as + home pregnancy test, then call the office.   Patient verbalized understanding of today's discussion with all questions and concerns addressed to her satisfaction.            Assessment/Plan:    1. Postop check  2. Encounter for fertility testing  -     Progesterone; Future  -     hCG, quantitative; Future          Subjective:      Patient ID: Aileen Us is a 39 y.o. female, presents today complaining of postop f/u from D&E.    HPI:    Pt states doing better both emotionally and physically.  Bleeding has pretty much stopped.      Asking when can go in the pool.        The following portions of the patient's history were reviewed and updated as appropriate: allergies, current medications, past family history, past medical history, past social history, past surgical history, and problem list.      Review of Systems   Constitutional:  Negative for chills, fatigue and fever.   HENT: Negative.     Eyes: Negative.    Respiratory: Negative.     Cardiovascular: Negative.    Gastrointestinal: Negative.  Negative for abdominal distention and abdominal pain.   Endocrine: Negative.    Musculoskeletal: Negative.    Skin: Negative.    Allergic/Immunologic: Negative.    Neurological: Negative.    Hematological: Negative.    Psychiatric/Behavioral: Negative.     All other systems reviewed and are negative.            Objective:      BP 96/72   Wt 92.8 kg (204 lb 9.6 oz)   LMP 03/15/2025 (Exact Date)   Breastfeeding No   BMI 27.75 kg/m²        Physical Exam  Vitals reviewed.   Constitutional:       General: She is not in acute distress.     Appearance: Normal appearance. She is not ill-appearing.   HENT:      Head: Normocephalic and atraumatic.     Eyes:      Extraocular Movements: Extraocular movements intact.     Pulmonary:       Effort: Pulmonary effort is normal.     Musculoskeletal:      Cervical back: Normal range of motion.     Skin:     General: Skin is warm and dry.     Neurological:      Mental Status: She is alert and oriented to person, place, and time.     Psychiatric:         Mood and Affect: Mood normal.         Behavior: Behavior normal.         Thought Content: Thought content normal.         Judgment: Judgment normal.           GYN exam: NEFG.  No blood in vault.      Wetprep was not performed today.          DAT Oshea MD, FACOG

## 2025-06-12 NOTE — PATIENT INSTRUCTIONS
Still awaiting genetics from POCs.   Continue taking prenatal vitamins, add 800mcg Folic acid.   Check beta hCG and Prog as soon as + home pregnancy test, then call the office.   Patient verbalized understanding of today's discussion with all questions and concerns addressed to her satisfaction.

## 2025-06-16 LAB
CELLS ANALYZED: 20
CELLS COUNTED: 20
CELLS KARYOTYPED.TOTAL BLD/T: 2
CLINICAL CYTOGENETICIST SPEC: NORMAL
ISCN BAND LEVEL QL: 400
KARYOTYP BLD/T: NORMAL
KARYOTYP BLD/T: NORMAL
SL AMB REFLEX: NORMAL
SPECIMEN SOURCE: NORMAL

## 2025-06-17 ENCOUNTER — RESULTS FOLLOW-UP (OUTPATIENT)
Age: 40
End: 2025-06-17

## 2025-07-11 ENCOUNTER — TELEPHONE (OUTPATIENT)
Age: 40
End: 2025-07-11

## 2025-07-11 ENCOUNTER — APPOINTMENT (OUTPATIENT)
Dept: LAB | Facility: CLINIC | Age: 40
End: 2025-07-11
Payer: COMMERCIAL

## 2025-07-11 DIAGNOSIS — Z31.41 ENCOUNTER FOR FERTILITY TESTING: ICD-10-CM

## 2025-07-11 LAB
25(OH)D3 SERPL-MCNC: 26.8 NG/ML (ref 30–100)
B-HCG SERPL-ACNC: 22.6 MIU/ML (ref 0–5)
PROGEST SERPL-MCNC: 1.17 NG/ML
T4 FREE SERPL-MCNC: 1.06 NG/DL (ref 0.61–1.12)
TSH SERPL DL<=0.05 MIU/L-ACNC: 0.4 UIU/ML (ref 0.45–4.5)
VIT B12 SERPL-MCNC: 248 PG/ML (ref 180–914)

## 2025-07-11 PROCEDURE — 84144 ASSAY OF PROGESTERONE: CPT

## 2025-07-11 PROCEDURE — 84702 CHORIONIC GONADOTROPIN TEST: CPT

## 2025-07-11 NOTE — TELEPHONE ENCOUNTER
Had D&E 5/30 for missed AB w/ Dr. Oshea, last bleeding was June 12th, no bleeding after this date.  Patient states she + preg test this AM.  She is leaving for vacation and she wanted to check. No concerning symptoms.   she will go to lab to do the ordered HCG and progesterone levels already ordered by Dr. Mee Watson.  Patient aware office will call her with follow up results. Patient verbalzied understanding.  Inbasket sent to Dr. Oshea with update.

## 2025-08-13 ENCOUNTER — TELEPHONE (OUTPATIENT)
Age: 40
End: 2025-08-13

## (undated) DEVICE — MAT ABSORBANT ARTHROSCOPY FLOOR 46 X 40 IN

## (undated) DEVICE — STRL ALLENTOWN HYSTEROSCOPY PK: Brand: CARDINAL HEALTH

## (undated) DEVICE — GLOVE INDICATOR PI UNDERGLOVE SZ 6.5 BLUE

## (undated) DEVICE — SYRINGE 10ML LL CONTROL TOP

## (undated) DEVICE — CURETTE VACURETTE CRVD 8MM

## (undated) DEVICE — D + E TUBING W /FILTER

## (undated) DEVICE — WET SKIN PREP TRAY: Brand: MEDLINE INDUSTRIES, INC.

## (undated) DEVICE — COLLECTION SET, DISPOSABLE WITH HANDLE AND TAPERED FITTINGS TUBING, 6 FT (183 CM) (10/PK): Brand: GYRUS ACMI

## (undated) DEVICE — NEEDLE SPINAL 22G X 3.5IN  QUINCKE

## (undated) DEVICE — D + E SUCTION CANISTER

## (undated) DEVICE — GLOVE SRG LF STRL BGL SKNSNS 6.5 PF